# Patient Record
Sex: MALE | Race: WHITE | Employment: OTHER | URBAN - METROPOLITAN AREA
[De-identification: names, ages, dates, MRNs, and addresses within clinical notes are randomized per-mention and may not be internally consistent; named-entity substitution may affect disease eponyms.]

---

## 2017-06-19 ENCOUNTER — APPOINTMENT (OUTPATIENT)
Dept: GENERAL RADIOLOGY | Age: 72
DRG: 446 | End: 2017-06-19
Attending: INTERNAL MEDICINE
Payer: MEDICARE

## 2017-06-19 ENCOUNTER — HOSPITAL ENCOUNTER (INPATIENT)
Age: 72
LOS: 1 days | Discharge: HOME | DRG: 446 | End: 2017-06-20
Attending: INTERNAL MEDICINE | Admitting: HOSPITALIST
Payer: MEDICARE

## 2017-06-19 ENCOUNTER — APPOINTMENT (OUTPATIENT)
Dept: CT IMAGING | Age: 72
DRG: 446 | End: 2017-06-19
Attending: INTERNAL MEDICINE
Payer: MEDICARE

## 2017-06-19 DIAGNOSIS — R79.89 ABNORMAL LFTS: ICD-10-CM

## 2017-06-19 DIAGNOSIS — R10.13 ABDOMINAL PAIN, EPIGASTRIC: Primary | ICD-10-CM

## 2017-06-19 DIAGNOSIS — K80.63 CALCULUS OF GALLBLADDER AND BILE DUCT WITH ACUTE CHOLECYSTITIS, WITH OBSTRUCTION: ICD-10-CM

## 2017-06-19 PROBLEM — R10.9 ABDOMINAL PAIN: Status: ACTIVE | Noted: 2017-06-19

## 2017-06-19 PROBLEM — M54.9 BACK PAIN: Status: ACTIVE | Noted: 2017-06-19

## 2017-06-19 PROBLEM — K80.50 CHOLEDOCHOLITHIASIS: Status: ACTIVE | Noted: 2017-06-19

## 2017-06-19 LAB
ALBUMIN SERPL BCP-MCNC: 4.1 G/DL (ref 3.4–5)
ALBUMIN/GLOB SERPL: 1.3 {RATIO} (ref 0.8–1.7)
ALP SERPL-CCNC: 91 U/L (ref 45–117)
ALT SERPL-CCNC: 450 U/L (ref 16–61)
ANION GAP BLD CALC-SCNC: 12 MMOL/L (ref 3–18)
ANION GAP BLD CALC-SCNC: 24 MMOL/L (ref 10–20)
APPEARANCE UR: CLEAR
APTT PPP: 35.2 SEC (ref 23–36.4)
APTT PPP: 39.6 SEC (ref 23–36.4)
AST SERPL W P-5'-P-CCNC: 263 U/L (ref 15–37)
ATRIAL RATE: 166 BPM
BACTERIA URNS QL MICRO: ABNORMAL /HPF
BASOPHILS # BLD AUTO: 0 K/UL (ref 0–0.06)
BASOPHILS # BLD AUTO: 0 K/UL (ref 0–0.06)
BASOPHILS # BLD: 0 % (ref 0–2)
BASOPHILS # BLD: 0 % (ref 0–2)
BILIRUB SERPL-MCNC: 7.7 MG/DL (ref 0.2–1)
BILIRUB UR QL: ABNORMAL
BNP SERPL-MCNC: 2087 PG/ML (ref 0–900)
BUN BLD-MCNC: 21 MG/DL (ref 7–18)
BUN SERPL-MCNC: 19 MG/DL (ref 7–18)
BUN/CREAT SERPL: 17 (ref 12–20)
CA-I BLD-MCNC: 1.17 MMOL/L (ref 1.12–1.32)
CALCIUM SERPL-MCNC: 9.3 MG/DL (ref 8.5–10.1)
CALCULATED R AXIS, ECG10: 55 DEGREES
CALCULATED T AXIS, ECG11: -31 DEGREES
CHLORIDE BLD-SCNC: 100 MMOL/L (ref 100–108)
CHLORIDE SERPL-SCNC: 105 MMOL/L (ref 100–108)
CK MB CFR SERPL CALC: 1.6 % (ref 0–4)
CK MB SERPL-MCNC: 1.9 NG/ML (ref 5–25)
CK SERPL-CCNC: 118 U/L (ref 39–308)
CO2 BLD-SCNC: 26 MMOL/L (ref 19–24)
CO2 SERPL-SCNC: 27 MMOL/L (ref 21–32)
COLOR UR: ABNORMAL
CREAT SERPL-MCNC: 1.13 MG/DL (ref 0.6–1.3)
CREAT UR-MCNC: 1.1 MG/DL (ref 0.6–1.3)
DIAGNOSIS, 93000: NORMAL
DIFFERENTIAL METHOD BLD: ABNORMAL
DIFFERENTIAL METHOD BLD: ABNORMAL
EOSINOPHIL # BLD: 0 K/UL (ref 0–0.4)
EOSINOPHIL # BLD: 0.1 K/UL (ref 0–0.4)
EOSINOPHIL NFR BLD: 0 % (ref 0–5)
EOSINOPHIL NFR BLD: 1 % (ref 0–5)
EPITH CASTS URNS QL MICRO: ABNORMAL /LPF (ref 0–5)
ERYTHROCYTE [DISTWIDTH] IN BLOOD BY AUTOMATED COUNT: 13.3 % (ref 11.6–14.5)
ERYTHROCYTE [DISTWIDTH] IN BLOOD BY AUTOMATED COUNT: 13.5 % (ref 11.6–14.5)
GLOBULIN SER CALC-MCNC: 3.2 G/DL (ref 2–4)
GLUCOSE BLD STRIP.AUTO-MCNC: 129 MG/DL (ref 74–106)
GLUCOSE SERPL-MCNC: 128 MG/DL (ref 74–99)
GLUCOSE UR STRIP.AUTO-MCNC: NEGATIVE MG/DL
HCT VFR BLD AUTO: 50.3 % (ref 36–48)
HCT VFR BLD AUTO: 51.8 % (ref 36–48)
HCT VFR BLD CALC: 55 % (ref 36–49)
HGB BLD-MCNC: 17.5 G/DL (ref 13–16)
HGB BLD-MCNC: 18.1 G/DL (ref 13–16)
HGB BLD-MCNC: 18.7 G/DL (ref 12–16)
HGB UR QL STRIP: ABNORMAL
INR PPP: 1.3 (ref 0.8–1.2)
KETONES UR QL STRIP.AUTO: NEGATIVE MG/DL
LEUKOCYTE ESTERASE UR QL STRIP.AUTO: NEGATIVE
LIPASE SERPL-CCNC: 113 U/L (ref 73–393)
LYMPHOCYTES # BLD AUTO: 11 % (ref 21–52)
LYMPHOCYTES # BLD AUTO: 8 % (ref 21–52)
LYMPHOCYTES # BLD: 1 K/UL (ref 0.9–3.6)
LYMPHOCYTES # BLD: 1.1 K/UL (ref 0.9–3.6)
MAGNESIUM SERPL-MCNC: 1.7 MG/DL (ref 1.6–2.6)
MCH RBC QN AUTO: 33.6 PG (ref 24–34)
MCH RBC QN AUTO: 33.7 PG (ref 24–34)
MCHC RBC AUTO-ENTMCNC: 34.8 G/DL (ref 31–37)
MCHC RBC AUTO-ENTMCNC: 34.9 G/DL (ref 31–37)
MCV RBC AUTO: 96.3 FL (ref 74–97)
MCV RBC AUTO: 96.7 FL (ref 74–97)
MONOCYTES # BLD: 1.1 K/UL (ref 0.05–1.2)
MONOCYTES # BLD: 1.3 K/UL (ref 0.05–1.2)
MONOCYTES NFR BLD AUTO: 13 % (ref 3–10)
MONOCYTES NFR BLD AUTO: 9 % (ref 3–10)
MUCOUS THREADS URNS QL MICRO: ABNORMAL /LPF
NEUTS SEG # BLD: 7.1 K/UL (ref 1.8–8)
NEUTS SEG # BLD: 9.8 K/UL (ref 1.8–8)
NEUTS SEG NFR BLD AUTO: 75 % (ref 40–73)
NEUTS SEG NFR BLD AUTO: 83 % (ref 40–73)
NITRITE UR QL STRIP.AUTO: NEGATIVE
PH UR STRIP: 5.5 [PH] (ref 5–8)
PLATELET # BLD AUTO: 160 K/UL (ref 135–420)
PLATELET # BLD AUTO: 179 K/UL (ref 135–420)
PMV BLD AUTO: 10.3 FL (ref 9.2–11.8)
PMV BLD AUTO: 10.7 FL (ref 9.2–11.8)
POTASSIUM BLD-SCNC: 3.8 MMOL/L (ref 3.5–5.5)
POTASSIUM SERPL-SCNC: 3.8 MMOL/L (ref 3.5–5.5)
PROT SERPL-MCNC: 7.3 G/DL (ref 6.4–8.2)
PROT UR STRIP-MCNC: 100 MG/DL
PROTHROMBIN TIME: 15.9 SEC (ref 11.5–15.2)
Q-T INTERVAL, ECG07: 358 MS
QRS DURATION, ECG06: 140 MS
QTC CALCULATION (BEZET), ECG08: 497 MS
RBC # BLD AUTO: 5.2 M/UL (ref 4.7–5.5)
RBC # BLD AUTO: 5.38 M/UL (ref 4.7–5.5)
RBC #/AREA URNS HPF: ABNORMAL /HPF (ref 0–5)
SODIUM BLD-SCNC: 145 MMOL/L (ref 136–145)
SODIUM SERPL-SCNC: 144 MMOL/L (ref 136–145)
SP GR UR REFRACTOMETRY: >1.03 (ref 1–1.03)
TROPONIN I BLD-MCNC: <0.04 NG/ML (ref 0–0.08)
TROPONIN I SERPL-MCNC: <0.02 NG/ML (ref 0–0.06)
UROBILINOGEN UR QL STRIP.AUTO: 1 EU/DL (ref 0.2–1)
VENTRICULAR RATE, ECG03: 116 BPM
WBC # BLD AUTO: 11.9 K/UL (ref 4.6–13.2)
WBC # BLD AUTO: 9.5 K/UL (ref 4.6–13.2)
WBC URNS QL MICRO: ABNORMAL /HPF (ref 0–5)

## 2017-06-19 PROCEDURE — 71275 CT ANGIOGRAPHY CHEST: CPT

## 2017-06-19 PROCEDURE — 74011000258 HC RX REV CODE- 258: Performed by: INTERNAL MEDICINE

## 2017-06-19 PROCEDURE — 81001 URINALYSIS AUTO W/SCOPE: CPT | Performed by: INTERNAL MEDICINE

## 2017-06-19 PROCEDURE — 83690 ASSAY OF LIPASE: CPT | Performed by: INTERNAL MEDICINE

## 2017-06-19 PROCEDURE — 74022 RADEX COMPL AQT ABD SERIES: CPT

## 2017-06-19 PROCEDURE — 85730 THROMBOPLASTIN TIME PARTIAL: CPT | Performed by: HOSPITALIST

## 2017-06-19 PROCEDURE — 85025 COMPLETE CBC W/AUTO DIFF WBC: CPT | Performed by: INTERNAL MEDICINE

## 2017-06-19 PROCEDURE — 85730 THROMBOPLASTIN TIME PARTIAL: CPT | Performed by: INTERNAL MEDICINE

## 2017-06-19 PROCEDURE — C9113 INJ PANTOPRAZOLE SODIUM, VIA: HCPCS | Performed by: INTERNAL MEDICINE

## 2017-06-19 PROCEDURE — 36415 COLL VENOUS BLD VENIPUNCTURE: CPT | Performed by: HOSPITALIST

## 2017-06-19 PROCEDURE — 83735 ASSAY OF MAGNESIUM: CPT | Performed by: INTERNAL MEDICINE

## 2017-06-19 PROCEDURE — 85610 PROTHROMBIN TIME: CPT | Performed by: INTERNAL MEDICINE

## 2017-06-19 PROCEDURE — 74011250636 HC RX REV CODE- 250/636: Performed by: HOSPITALIST

## 2017-06-19 PROCEDURE — 93005 ELECTROCARDIOGRAM TRACING: CPT

## 2017-06-19 PROCEDURE — 65270000029 HC RM PRIVATE

## 2017-06-19 PROCEDURE — 99285 EMERGENCY DEPT VISIT HI MDM: CPT

## 2017-06-19 PROCEDURE — 84484 ASSAY OF TROPONIN QUANT: CPT | Performed by: INTERNAL MEDICINE

## 2017-06-19 PROCEDURE — 74011250637 HC RX REV CODE- 250/637: Performed by: INTERNAL MEDICINE

## 2017-06-19 PROCEDURE — 82550 ASSAY OF CK (CPK): CPT | Performed by: INTERNAL MEDICINE

## 2017-06-19 PROCEDURE — 74011250636 HC RX REV CODE- 250/636: Performed by: INTERNAL MEDICINE

## 2017-06-19 PROCEDURE — 83880 ASSAY OF NATRIURETIC PEPTIDE: CPT | Performed by: INTERNAL MEDICINE

## 2017-06-19 PROCEDURE — 80047 BASIC METABLC PNL IONIZED CA: CPT

## 2017-06-19 PROCEDURE — 80053 COMPREHEN METABOLIC PANEL: CPT | Performed by: INTERNAL MEDICINE

## 2017-06-19 RX ORDER — TADALAFIL 5 MG/1
5 TABLET ORAL
COMMUNITY
End: 2017-06-21

## 2017-06-19 RX ORDER — PANTOPRAZOLE SODIUM 40 MG/10ML
40 INJECTION, POWDER, LYOPHILIZED, FOR SOLUTION INTRAVENOUS
Status: COMPLETED | OUTPATIENT
Start: 2017-06-19 | End: 2017-06-19

## 2017-06-19 RX ORDER — TAMSULOSIN HYDROCHLORIDE 0.4 MG/1
0.4 CAPSULE ORAL DAILY
Status: DISCONTINUED | OUTPATIENT
Start: 2017-06-20 | End: 2017-06-21 | Stop reason: HOSPADM

## 2017-06-19 RX ORDER — ONDANSETRON 2 MG/ML
4 INJECTION INTRAMUSCULAR; INTRAVENOUS
Status: DISPENSED | OUTPATIENT
Start: 2017-06-19 | End: 2017-06-20

## 2017-06-19 RX ORDER — MOMETASONE FUROATE 50 UG/1
2 SPRAY, METERED NASAL DAILY
COMMUNITY

## 2017-06-19 RX ORDER — METFORMIN HYDROCHLORIDE 1000 MG/1
1000 TABLET ORAL 2 TIMES DAILY WITH MEALS
COMMUNITY

## 2017-06-19 RX ORDER — DILTIAZEM HYDROCHLORIDE 240 MG/1
240 CAPSULE, COATED, EXTENDED RELEASE ORAL DAILY
Status: DISCONTINUED | OUTPATIENT
Start: 2017-06-20 | End: 2017-06-19 | Stop reason: SDUPTHER

## 2017-06-19 RX ORDER — MONTELUKAST SODIUM 10 MG/1
10 TABLET ORAL
COMMUNITY

## 2017-06-19 RX ORDER — NEBIVOLOL 10 MG/1
10 TABLET ORAL DAILY
COMMUNITY

## 2017-06-19 RX ORDER — MORPHINE SULFATE 2 MG/ML
2 INJECTION, SOLUTION INTRAMUSCULAR; INTRAVENOUS
Status: DISPENSED | OUTPATIENT
Start: 2017-06-19 | End: 2017-06-20

## 2017-06-19 RX ORDER — DILTIAZEM HYDROCHLORIDE 240 MG/1
240 CAPSULE, COATED, EXTENDED RELEASE ORAL DAILY
Status: DISCONTINUED | OUTPATIENT
Start: 2017-06-20 | End: 2017-06-21 | Stop reason: HOSPADM

## 2017-06-19 RX ORDER — SODIUM CHLORIDE 9 MG/ML
75 INJECTION, SOLUTION INTRAVENOUS CONTINUOUS
Status: DISCONTINUED | OUTPATIENT
Start: 2017-06-19 | End: 2017-06-21 | Stop reason: HOSPADM

## 2017-06-19 RX ORDER — NEBIVOLOL 10 MG/1
10 TABLET ORAL
Status: DISCONTINUED | OUTPATIENT
Start: 2017-06-19 | End: 2017-06-19 | Stop reason: RX

## 2017-06-19 RX ORDER — DILTIAZEM HYDROCHLORIDE 240 MG/1
360 CAPSULE, EXTENDED RELEASE ORAL DAILY
COMMUNITY
End: 2017-06-27

## 2017-06-19 RX ORDER — NEBIVOLOL 5 MG/1
10 TABLET ORAL
Status: COMPLETED | OUTPATIENT
Start: 2017-06-19 | End: 2017-06-19

## 2017-06-19 RX ORDER — HEPARIN SODIUM 10000 [USP'U]/100ML
18-36 INJECTION, SOLUTION INTRAVENOUS
Status: DISCONTINUED | OUTPATIENT
Start: 2017-06-19 | End: 2017-06-21 | Stop reason: HOSPADM

## 2017-06-19 RX ORDER — TESTOSTERONE 50 MG/5G
5 GEL TRANSDERMAL
COMMUNITY

## 2017-06-19 RX ORDER — TAMSULOSIN HYDROCHLORIDE 0.4 MG/1
0.8 CAPSULE ORAL DAILY
COMMUNITY
End: 2022-02-17

## 2017-06-19 RX ORDER — NEBIVOLOL 5 MG/1
10 TABLET ORAL DAILY
Status: DISCONTINUED | OUTPATIENT
Start: 2017-06-20 | End: 2017-06-21 | Stop reason: HOSPADM

## 2017-06-19 RX ADMIN — PANTOPRAZOLE SODIUM 40 MG: 40 INJECTION, POWDER, FOR SOLUTION INTRAVENOUS at 17:01

## 2017-06-19 RX ADMIN — PIPERACILLIN SODIUM,TAZOBACTAM SODIUM 3.38 G: 3; .375 INJECTION, POWDER, FOR SOLUTION INTRAVENOUS at 17:01

## 2017-06-19 RX ADMIN — SODIUM CHLORIDE 75 ML/HR: 900 INJECTION, SOLUTION INTRAVENOUS at 22:31

## 2017-06-19 RX ADMIN — HEPARIN SODIUM AND DEXTROSE 18 UNITS/KG/HR: 10000; 5 INJECTION INTRAVENOUS at 22:31

## 2017-06-19 RX ADMIN — NEBIVOLOL HYDROCHLORIDE 10 MG: 5 TABLET ORAL at 17:23

## 2017-06-19 NOTE — ED TRIAGE NOTES
amb into ed w/ reports epigastric pain going through to his mid back onset yesterday -worse today. No n/v/d reported - pt w/ last bm today and \"light colored\".

## 2017-06-19 NOTE — IP AVS SNAPSHOT
[address] [place] [address] 
[phone] Patient: Christiano Givens MRN: [id number] TOB:[id number] You are allergic to the following No active allergies Recent Documentation Height Weight BMI Smoking Status 1.803 m 90.5 kg 28.63 kg/m2 Never Smoker Unresulted Labs Order Current Status ACTIN (SMOOTH MUSCLE) ANTIBODY In process ANTINUCLEAR ANTIBODIES, IFA In process HEPATITIS PANEL, ACUTE In process Emergency Contacts Name Discharge Info Relation Home Work Mobile Marce Burton DISCHARGE CAREGIVER [3] Spouse [3] [phone] About your hospitalization You were admitted on:  June 19, 2017 You last received care in the:  [address] [place] [address] You were discharged on:  June 20, 2017 Unit phone number:  [phone] Why you were hospitalized Your primary diagnosis was:  Choledocholithiasis Your diagnoses also included:  Back Pain, Abdominal Pain, Abnormal Lfts, A-Fib (Hcc), Hypertension Providers Seen During Your Hospitalizations Provider Role Specialty Primary office phone Niko Patel MD Attending Provider Emergency Medicine [phone] Waqar Ley MD Attending Provider [facility] [phone] Your Primary Care Physician (PCP) Primary Care Physician Office Phone Office Fax Fernando Berg [phone] Follow-up Information Follow up With Details Comments Contact Info Malcom Malik MD Schedule an appointment as soon as possible for a visit in 10 days  [address] [facility] 150 
[phone] Jevon Packer MD Go in 1 week as set up by Dr Sherice Ocampo 1 [facility] 150 
[phone] Current Discharge Medication List  
  
START taking these medications Dose & Instructions Dispensing Information Comments Morning Noon Evening Bedtime  
 dabigatran etexilate 75 mg capsule Commonly known as:  PRADAXA Your last dose was: Your next dose is:    
   
   
 Dose:  75 mg Take 1 Cap by mouth every twelve (12) hours. Indications: he has this med at home Quantity:  1 Cap Refills:  0  
     
   
   
   
  
 oxyCODONE-acetaminophen 5-325 mg per tablet Commonly known as:  PERCOCET Your last dose was: Your next dose is:    
   
   
 Dose:  1 Tab Take 1 Tab by mouth every six (6) hours as needed for Pain. Max Daily Amount: 4 Tabs. Quantity:  12 Tab Refills:  0 CONTINUE these medications which have NOT CHANGED Dose & Instructions Dispensing Information Comments Morning Noon Evening Bedtime BYSTOLIC 10 mg tablet Generic drug:  nebivolol Your last dose was: Your next dose is:    
   
   
 Dose:  10 mg Take 10 mg by mouth daily. Refills:  0  
     
   
   
   
  
 dilTIAZem  mg XR capsule Commonly known as:  DILACOR XR Your last dose was: Your next dose is:    
   
   
 Dose:  360 mg Take 360 mg by mouth daily. Refills:  0  
     
   
   
   
  
 FLOMAX 0.4 mg capsule Generic drug:  tamsulosin Your last dose was: Your next dose is:    
   
   
 Dose:  0.4 mg Take 0.4 mg by mouth daily. Refills:  0  
     
   
   
   
  
 metFORMIN 1,000 mg tablet Commonly known as:  GLUCOPHAGE Your last dose was: Your next dose is:    
   
   
 Dose:  1000 mg Take 1,000 mg by mouth two (2) times daily (with meals). Refills:  0  
     
   
   
   
  
 NASONEX 50 mcg/actuation nasal spray Generic drug:  mometasone Your last dose was: Your next dose is:    
   
   
 Dose:  2 Spray 2 Sprays daily. Refills:  0 SINGULAIR 10 mg tablet Generic drug:  montelukast  
   
Your last dose was:     
   
Your next dose is:    
   
   
 Dose:  10 mg  
 Take 10 mg by mouth daily. Refills:  0  
     
   
   
   
  
 TESTIM 50 mg/5 gram (1 %) gel Generic drug:  testosterone Your last dose was: Your next dose is:    
   
   
 Dose:  5 g  
5 g by TransDERmal route daily. Refills:  0 STOP taking these medications CIALIS 5 mg tablet Generic drug:  tadalafil Where to Get Your Medications Information on where to get these meds will be given to you by the nurse or doctor. ! Ask your nurse or doctor about these medications  
  dabigatran etexilate 75 mg capsule  
 oxyCODONE-acetaminophen 5-325 mg per tablet Discharge Instructions Learning About Acute Cholecystitis What is cholecystitis? Cholecystitis (say \"koh-lih-sis-TY-tus\") is inflammation of the gallbladder. The gallbladder stores bile. Bile helps the body digest food. Normally, the bile flows from the gallbladder to the small intestine. A gallstone stuck in the cystic duct is most often the cause of sudden (acute) cholecystitis. The cystic duct is the tube that carries the bile out of the gallbladder. The gallstone blocks the bile from leaving the gallbladder. This results in an irritated and swollen gallbladder. The disease can also be caused by infection or trauma, such as an injury from a car accident. Cholecystitis has to be treated right away. You will probably have to go to the hospital. Surgery is the usual treatment. What are the symptoms? Symptoms include: · Steady and severe pain in the upper right part of belly. This is the most common symptom. The pain can sometimes move to your back or right shoulder blade. It may last for more than 6 hours. · Nausea or vomiting. · A fever. How is it treated? The main way to treat this disease is surgery to remove the gallbladder.  This surgery can often be done through small cuts (incisions) in the belly. This is called a laparoscopic cholecystectomy. In some cases, you may need a more extensive surgery. You may need surgery as soon as possible. The doctor may try to reduce swelling and irritation in the gallbladder before removing it. You may be given fluids and antibiotics through an IV. You may also be given pain medicine. Follow-up care is a key part of your treatment and safety. Be sure to make and go to all appointments, and call your doctor if you are having problems. It's also a good idea to know your test results and keep a list of the medicines you take. Where can you learn more? Go to http://cailin-case.info/. Enter T940 in the search box to learn more about \"Learning About Acute Cholecystitis. \" Current as of: August 9, 2016 Content Version: 11.3 © 5125-3854 [employer]. Care instructions adapted under license by [employer] (which disclaims liability or warranty for this information). If you have questions about a medical condition or this instruction, always ask your healthcare professional. [place] [address] any warranty or liability for your use of this information. Discharge Orders None Introducing [facility]! Gracia Mejia introduces [employer] patient portal. Now you can access parts of your medical record, email your doctor's office, and request medication refills online. 1. In your internet browser, go to [email or web address] 2. Click on the First Time User? Click Here link in the Sign In box. You will see the New Member Sign Up page. 3. Enter your [employer] Access Code exactly as it appears below. You will not need to use this code after youve completed the sign-up process. If you do not sign up before the expiration date, you must request a new code. · [employer] Access Code: [email or web address] Expires: 9/18/2017  8:06 PM 
 
 4. Enter the last four digits of your Social Security Number (xxxx) and Date of Birth (mm/dd/yyyy) as indicated and click Submit. You will be taken to the next sign-up page. 5. Create a Sample[employer] ID. This will be your [employer] login ID and cannot be changed, so think of one that is secure and easy to remember. 6. Create a Sample[employer] password. You can change your password at any time. 7. Enter your Password Reset Question and Answer. This can be used at a later time if you forget your password. 8. Enter your e-mail address. You will receive e-mail notification when new information is available in [address] 9. Click Sign Up. You can now view and download portions of your medical record. 10. Click the Download Summary menu link to download a portable copy of your medical information. If you have questions, please visit the Frequently Asked Questions section of the Sample[employer] website. Remember, [employer] is NOT to be used for urgent needs. For medical emergencies, dial 911. Now available from your iPhone and Android! General Information Please provide this summary of care documentation to your next provider. Patient Signature:  ____________________________________________________________ Date:  ____________________________________________________________  
  
Rosalva Boyce Provider Signature:  ____________________________________________________________ Date:  ____________________________________________________________

## 2017-06-19 NOTE — ED NOTES
TRANSFER - OUT REPORT:    Verbal report given to [facility] RN(name) on Jake Manners  being transferred to medical(unit) for routine progression of care       Report consisted of patients Situation, Background, Assessment and   Recommendations(SBAR). Information from the following report(s) SBAR, Kardex and ED Summary was reviewed with the receiving nurse. Lines:   Peripheral IV 06/19/17 Right Antecubital (Active)   Site Assessment Clean, dry, & intact 6/19/2017 12:51 PM   Phlebitis Assessment 0 6/19/2017 12:51 PM   Infiltration Assessment 0 6/19/2017 12:51 PM   Dressing Status Clean, dry, & intact 6/19/2017 12:51 PM   Dressing Type Transparent 6/19/2017 12:51 PM   Hub Color/Line Status Flushed 6/19/2017 12:51 PM   Action Taken Blood drawn 6/19/2017 12:51 PM        Opportunity for questions and clarification was provided.       Patient transported with:   [employer]

## 2017-06-19 NOTE — ED NOTES
Meal tray ordered (clears) Patient resting on stretcher. Denies pain. Sitting up and reading book. No acute distress noted.

## 2017-06-19 NOTE — ED PROVIDER NOTES
[address]  EMERGENCY DEPARTMENT HISTORY AND PHYSICAL EXAM       Date: 6/19/2017   Patient Name: Robert Espinosa   YOB: 1945  Medical Record Number: [id number]    History of Presenting Illness     Chief Complaint   Patient presents with    Abdominal Pain        History Provided By:  patient    Additional History: 12:35 PM  Robert Espinosa is a 66 y.o. male with a known hx of AFIB, CAD, and HTN who presents to the emergency department via EMS C/O gradually worsening epigastric abdominal pain which radiates to mid back onset yesterday. Pt last meal was last night. Pt last took medication last night. Last BM was light colored today. NKDA. No hx of cardiac issues. PSHx of right shoulder replacement. Pt is a non smoker and an EtOH user. Pt denies SOB, blood in stools, N/V/D, and any other associated signs and sx. Primary Care Provider: Claria Eisenmenger, MD   Specialist:    Past History     Past Medical History:   Past Medical History:   Diagnosis Date    Arthritis     CAD (coronary artery disease)     afib    Hypertension     Ill-defined condition     kidney stones        Past Surgical History:   Past Surgical History:   Procedure Laterality Date    HX ORTHOPAEDIC      r shoulder replacement        Family History:   History reviewed. No pertinent family history. Social History:   Social History   Substance Use Topics    Smoking status: Never Smoker    Smokeless tobacco: None    Alcohol use Yes        Allergies:   No Known Allergies     Review of Systems   Review of Systems   HENT: Positive for congestion. Respiratory: Negative for shortness of breath. Gastrointestinal: Positive for abdominal pain. Negative for blood in stool, diarrhea, nausea and vomiting. Musculoskeletal: Positive for back pain. All other systems reviewed and are negative.       Physical Exam  Vitals:    06/19/17 1230 06/19/17 1608   BP: (!) 151/111 165/87   Pulse: (!) 105 96   Resp: 20 18 Temp: 97.5 °F (36.4 °C) 97.9 °F (36.6 °C)   SpO2: 97% 95%   Weight: 88.5 kg (195 lb)    Height: 5' 11\" (1.803 m)        Physical Exam   Constitutional: He is oriented to person, place, and time. He appears well-developed and well-nourished. HENT:   Head: Normocephalic and atraumatic. Right Ear: External ear normal.   Left Ear: External ear normal.   Nose: Nose normal.   Mouth/Throat: Oropharynx is clear and moist.   Eyes: Conjunctivae and EOM are normal. Pupils are equal, round, and reactive to light. Neck: Normal range of motion. Neck supple. No JVD present. No tracheal deviation present. No thyromegaly present. Cardiovascular: Normal rate, regular rhythm, normal heart sounds and intact distal pulses. Pulmonary/Chest: Effort normal and breath sounds normal.   Abdominal: Soft. Bowel sounds are normal. He exhibits no distension and no mass. There is tenderness in the right upper quadrant and epigastric area. No HSM. Ventral hernia. Hyperactive bowel sounds. Musculoskeletal: Normal range of motion. He exhibits no edema or tenderness. Lymphadenopathy:     He has no cervical adenopathy. Neurological: He is alert and oriented to person, place, and time. He has normal reflexes. No cranial nerve deficit. He exhibits normal muscle tone. Coordination normal.   No focal weakness   Skin: Skin is warm and dry. Psychiatric: He has a normal mood and affect. His behavior is normal. Thought content normal.   Nursing note and vitals reviewed.     Diagnostic Study Results     Labs -      Recent Results (from the past 12 hour(s))   EKG, 12 LEAD, INITIAL    Collection Time: 06/19/17 12:34 PM   Result Value Ref Range    Ventricular Rate 116 BPM    Atrial Rate 166 BPM    QRS Duration 140 ms    Q-T Interval 358 ms    QTC Calculation (Bezet) 497 ms    Calculated R Axis 55 degrees    Calculated T Axis -31 degrees    Diagnosis       Atrial fibrillation with rapid ventricular response with premature   ventricular or aberrantly conducted complexes  Right bundle branch block  T wave abnormality, consider inferior ischemia or digitalis effect  Abnormal ECG  No previous ECGs available     CBC WITH AUTOMATED DIFF    Collection Time: 06/19/17 12:44 PM   Result Value Ref Range    WBC 11.9 4.6 - 13.2 K/uL    RBC 5.38 4.70 - 5.50 M/uL    HGB 18.1 (H) 13.0 - 16.0 g/dL    HCT 51.8 (H) 36.0 - 48.0 %    MCV 96.3 74.0 - 97.0 FL    MCH 33.6 24.0 - 34.0 PG    MCHC 34.9 31.0 - 37.0 g/dL    RDW 13.3 11.6 - 14.5 %    PLATELET 928 637 - 566 K/uL    MPV 10.7 9.2 - 11.8 FL    NEUTROPHILS 83 (H) 40 - 73 %    LYMPHOCYTES 8 (L) 21 - 52 %    MONOCYTES 9 3 - 10 %    EOSINOPHILS 0 0 - 5 %    BASOPHILS 0 0 - 2 %    ABS. NEUTROPHILS 9.8 (H) 1.8 - 8.0 K/UL    ABS. LYMPHOCYTES 1.0 0.9 - 3.6 K/UL    ABS. MONOCYTES 1.1 0.05 - 1.2 K/UL    ABS. EOSINOPHILS 0.0 0.0 - 0.4 K/UL    ABS. BASOPHILS 0.0 0.0 - 0.06 K/UL    DF AUTOMATED     METABOLIC PANEL, COMPREHENSIVE    Collection Time: 06/19/17 12:44 PM   Result Value Ref Range    Sodium 144 136 - 145 mmol/L    Potassium 3.8 3.5 - 5.5 mmol/L    Chloride 105 100 - 108 mmol/L    CO2 27 21 - 32 mmol/L    Anion gap 12 3.0 - 18 mmol/L    Glucose 128 (H) 74 - 99 mg/dL    BUN 19 (H) 7.0 - 18 MG/DL    Creatinine 1.13 0.6 - 1.3 MG/DL    BUN/Creatinine ratio 17 12 - 20      GFR est AA >60 >60 ml/min/1.73m2    GFR est non-AA >60 >60 ml/min/1.73m2    Calcium 9.3 8.5 - 10.1 MG/DL    Bilirubin, total 7.7 (H) 0.2 - 1.0 MG/DL    ALT (SGPT) 450 (H) 16 - 61 U/L    AST (SGOT) 263 (H) 15 - 37 U/L    Alk.  phosphatase 91 45 - 117 U/L    Protein, total 7.3 6.4 - 8.2 g/dL    Albumin 4.1 3.4 - 5.0 g/dL    Globulin 3.2 2.0 - 4.0 g/dL    A-G Ratio 1.3 0.8 - 1.7     LIPASE    Collection Time: 06/19/17 12:44 PM   Result Value Ref Range    Lipase 113 73 - 393 U/L   MAGNESIUM    Collection Time: 06/19/17 12:44 PM   Result Value Ref Range    Magnesium 1.7 1.6 - 2.6 mg/dL   CARDIAC PANEL,(CK, CKMB & TROPONIN)    Collection Time: 06/19/17 12:44 PM Result Value Ref Range     39 - 308 U/L    CK - MB 1.9 <3.6 ng/ml    CK-MB Index 1.6 0.0 - 4.0 %    Troponin-I, Qt. <0.02 0.00 - 0.06 NG/ML   PRO-BNP    Collection Time: 06/19/17 12:44 PM   Result Value Ref Range    NT pro-BNP 2087 (H) 0 - 900 PG/ML   PROTHROMBIN TIME + INR    Collection Time: 06/19/17 12:44 PM   Result Value Ref Range    Prothrombin time 15.9 (H) 11.5 - 15.2 sec    INR 1.3 (H) 0.8 - 1.2     PTT    Collection Time: 06/19/17 12:44 PM   Result Value Ref Range    aPTT 39.6 (H) 23.0 - 36.4 SEC   POC TROPONIN-I    Collection Time: 06/19/17 12:50 PM   Result Value Ref Range    Troponin-I (POC) <0.04 0.00 - 0.08 ng/mL   POC CHEM8    Collection Time: 06/19/17 12:51 PM   Result Value Ref Range    CO2 (POC) 26 (H) 19 - 24 MMOL/L    Glucose (POC) 129 (H) 74 - 106 MG/DL    BUN (POC) 21 (H) 7 - 18 MG/DL    Creatinine (POC) 1.1 0.6 - 1.3 MG/DL    GFR-AA (POC) >60 >60 ml/min/1.73m2    GFR, non-AA (POC) >60 >60 ml/min/1.73m2    Sodium (POC) 145 136 - 145 MMOL/L    Potassium (POC) 3.8 3.5 - 5.5 MMOL/L    Calcium, ionized (POC) 1.17 1.12 - 1.32 MMOL/L    Chloride (POC) 100 100 - 108 MMOL/L    Anion gap (POC) 24 (H) 10 - 20      Hematocrit (POC) 55 (H) 36 - 49 %    Hemoglobin (POC) 18.7 (H) 12 - 16 G/DL       Radiologic Studies -  The following have been ordered and reviewed:  CTA CHEST ABD PELV W CONT   Final Result   IMPRESSION:     No acute vascular abnormality identified. No evidence of aortic dissection or  aneurysm.     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     Cholelithiasis. Mild/moderate intra and extrahepatic biliary tree dilatation. Question of filling defect in the distal CBD. Recommend clinical correlation for  possibility of choledocholithiasis or other lesion and correlation with LFTs to  exclude mechanical obstruction.     1.4 cm lesion in the midpole right kidney not clearly cystic in nature.   Recommend nonemergent ultrasound to assess if this could be a hemorrhagic or  complex cyst versus solid neoplasm.     Nonobstructing bilateral renal calculi.     Right thyroid nodules. This can be further assessed and followed with  nonemergent ultrasound.     Additional chronic and incidental findings as described. XR ABD ACUTE W 1 V CHEST   Final Result   IMPRESSION:      No acute process. As read by the radiologist.         Medical Decision Making   I am the first provider for this patient. I reviewed the vital signs, available nursing notes, past medical history, past surgical history, family history and social history. Vital Signs-Reviewed the patient's vital signs. Patient Vitals for the past 12 hrs:   Temp Pulse Resp BP SpO2   06/19/17 1608 97.9 °F (36.6 °C) 96 18 165/87 95 %   06/19/17 1230 97.5 °F (36.4 °C) (!) 105 20 (!) 151/111 97 %       Pulse Oximetry Analysis - Normal 97% on room air     Cardiac Monitor:   Rate: 116 bpm  Rhythm: Atrial Fibrillation      EKG interpretation: (Preliminary)  Rhythm: 116 bpm. Rate (approx.): AFIB with RVR with RBBB.; Negative STEMI   EKG read by Elisa Naylor MD at 12:34 PM    Old Medical Records: Nursing notes. Provider Notes:   INITIAL CLINICAL IMPRESSION and PLANS:  The patient presents with the primary complaint(s) of: abdominal pain. The presentation, to include historical aspects and clinical findings are consistent with the DX of epigastric pain. However, other possible DX's to consider as primary, associated with, or exacerbated by include:    1. Back pain  2. STEMI  3. Disection, aneurysm, pancreatitis, hepatitis, PUD, ACS, bowel ischemia    Considering the above, my initial management plan to evaluate and therapeutic interventions include the following and as noted in the orders:    1. Labs: CBC, CMP, Lipase, Mg++, Cardiac Panel, Pro-BNP, Prothrombin Time + INR, PTT, UA  2. Imaging: EKG, CTA Chest Abd Pelv w Contrast, Acute Abdominal XR. Procedures:   Procedures    ED Course:  12:35 PM  Initial assessment performed.  The patients presenting problems have been discussed, and they are in agreement with the care plan formulated and outlined with them. I have encouraged them to ask questions as they arise throughout their visit. 3:35 PM  Paged surgeon Dr. Maida Cueva who stated he will consult though to call GI and admit to hospitalist.    4:08 PM   Sammie Gray says admit to hospitalist. Order MRCP. Clear liquid diet. 4:08 PM Discussed patient's history, exam, and available diagnostics results with Jayjay Wellington MD, internal medicine, who agree with admitting the patient. 4:09 PM  Patient is being admitted to the hospital by Jayjay Wellington MD. The results of their tests and reasons for their admission have been discussed with them and/or available family. They convey agreement and understanding for the need to be admitted and for their admission diagnosis. 4:18 PM   Pt has not taken BP medication. Pt requesting Cardizem and Bystolic. Will give pt medication given high blood pressure at 151/111. Medications Given in the ED:  Medications   morphine injection 2 mg (not administered)   ondansetron (ZOFRAN) injection 4 mg (not administered)   iopamidol (ISOVUE-370) 76 % injection 100 mL (not administered)   pantoprazole (PROTONIX) injection 40 mg (not administered)   piperacillin-tazobactam (ZOSYN) 3.375 g in 0.9% sodium chloride (MBP/ADV) 100 mL MBP (not administered)       Diagnosis   Clinical Impression:   1. Abdominal pain, epigastric    2. Calculus of gallbladder and bile duct with acute cholecystitis, with obstruction    3. Abnormal LFTs         3:49 PM  I have spent 35 minutes of critical care time involved in lab review, consultations with specialist, family decision-making, and documentation. During this entire length of time I was immediately available to the patient. Critical Care:   The reason for providing this level of medical care for this critically ill patient was due a critical illness that impaired one or more vital organ systems such that there was a high probability of imminent or life threatening deterioration in the patients condition. This care involved high complexity decision making to assess, manipulate, and support vital system functions, to treat this degreee vital organ system failure and to prevent further life threatening deterioration of the patients condition. _______________________________   Attestations: This note is prepared by Princess Mata, acting as a Scribe for Randi Arceo MD on 12:38 PM on 6/19/2017. Randi Arceo MD: The scribe's documentation has been prepared under my direction and personally reviewed by me in its entirety.   _______________________________

## 2017-06-20 ENCOUNTER — APPOINTMENT (OUTPATIENT)
Dept: MRI IMAGING | Age: 72
DRG: 446 | End: 2017-06-20
Attending: INTERNAL MEDICINE
Payer: MEDICARE

## 2017-06-20 VITALS
WEIGHT: 199.5 LBS | TEMPERATURE: 98.3 F | RESPIRATION RATE: 18 BRPM | HEIGHT: 71 IN | BODY MASS INDEX: 27.93 KG/M2 | HEART RATE: 104 BPM | DIASTOLIC BLOOD PRESSURE: 72 MMHG | SYSTOLIC BLOOD PRESSURE: 134 MMHG | OXYGEN SATURATION: 97 %

## 2017-06-20 LAB
ALBUMIN SERPL BCP-MCNC: 3.4 G/DL (ref 3.4–5)
ALBUMIN SERPL BCP-MCNC: 3.5 G/DL (ref 3.4–5)
ALBUMIN/GLOB SERPL: 1.2 {RATIO} (ref 0.8–1.7)
ALBUMIN/GLOB SERPL: 1.3 {RATIO} (ref 0.8–1.7)
ALP SERPL-CCNC: 80 U/L (ref 45–117)
ALP SERPL-CCNC: 80 U/L (ref 45–117)
ALT SERPL-CCNC: 271 U/L (ref 16–61)
ALT SERPL-CCNC: 322 U/L (ref 16–61)
ANION GAP BLD CALC-SCNC: 10 MMOL/L (ref 3–18)
APTT PPP: 104.6 SEC (ref 23–36.4)
APTT PPP: 125.6 SEC (ref 23–36.4)
APTT PPP: 130.9 SEC (ref 23–36.4)
APTT PPP: 71.2 SEC (ref 23–36.4)
AST SERPL W P-5'-P-CCNC: 149 U/L (ref 15–37)
AST SERPL W P-5'-P-CCNC: 99 U/L (ref 15–37)
BILIRUB DIRECT SERPL-MCNC: 3.2 MG/DL (ref 0–0.2)
BILIRUB SERPL-MCNC: 5.1 MG/DL (ref 0.2–1)
BILIRUB SERPL-MCNC: 9.2 MG/DL (ref 0.2–1)
BUN SERPL-MCNC: 15 MG/DL (ref 7–18)
BUN/CREAT SERPL: 13 (ref 12–20)
CALCIUM SERPL-MCNC: 8.2 MG/DL (ref 8.5–10.1)
CHLORIDE SERPL-SCNC: 107 MMOL/L (ref 100–108)
CO2 SERPL-SCNC: 27 MMOL/L (ref 21–32)
CREAT SERPL-MCNC: 1.17 MG/DL (ref 0.6–1.3)
ERYTHROCYTE [DISTWIDTH] IN BLOOD BY AUTOMATED COUNT: 13.4 % (ref 11.6–14.5)
GLOBULIN SER CALC-MCNC: 2.7 G/DL (ref 2–4)
GLOBULIN SER CALC-MCNC: 2.9 G/DL (ref 2–4)
GLUCOSE SERPL-MCNC: 97 MG/DL (ref 74–99)
HCT VFR BLD AUTO: 49.6 % (ref 36–48)
HGB BLD-MCNC: 17.1 G/DL (ref 13–16)
LIPASE SERPL-CCNC: 116 U/L (ref 73–393)
MCH RBC QN AUTO: 33.5 PG (ref 24–34)
MCHC RBC AUTO-ENTMCNC: 34.5 G/DL (ref 31–37)
MCV RBC AUTO: 97.1 FL (ref 74–97)
PLATELET # BLD AUTO: 135 K/UL (ref 135–420)
PMV BLD AUTO: 10.4 FL (ref 9.2–11.8)
POTASSIUM SERPL-SCNC: 3.6 MMOL/L (ref 3.5–5.5)
PROT SERPL-MCNC: 6.1 G/DL (ref 6.4–8.2)
PROT SERPL-MCNC: 6.4 G/DL (ref 6.4–8.2)
RBC # BLD AUTO: 5.11 M/UL (ref 4.7–5.5)
SODIUM SERPL-SCNC: 144 MMOL/L (ref 136–145)
WBC # BLD AUTO: 7.1 K/UL (ref 4.6–13.2)

## 2017-06-20 PROCEDURE — 83516 IMMUNOASSAY NONANTIBODY: CPT | Performed by: INTERNAL MEDICINE

## 2017-06-20 PROCEDURE — 80053 COMPREHEN METABOLIC PANEL: CPT | Performed by: HOSPITALIST

## 2017-06-20 PROCEDURE — 65270000029 HC RM PRIVATE

## 2017-06-20 PROCEDURE — 85027 COMPLETE CBC AUTOMATED: CPT | Performed by: HOSPITALIST

## 2017-06-20 PROCEDURE — 74011250636 HC RX REV CODE- 250/636: Performed by: HOSPITALIST

## 2017-06-20 PROCEDURE — 80074 ACUTE HEPATITIS PANEL: CPT | Performed by: INTERNAL MEDICINE

## 2017-06-20 PROCEDURE — 86038 ANTINUCLEAR ANTIBODIES: CPT | Performed by: INTERNAL MEDICINE

## 2017-06-20 PROCEDURE — 76498 UNLISTED MR PROCEDURE: CPT

## 2017-06-20 PROCEDURE — 74011250637 HC RX REV CODE- 250/637: Performed by: INTERNAL MEDICINE

## 2017-06-20 PROCEDURE — 74011636320 HC RX REV CODE- 636/320: Performed by: INTERNAL MEDICINE

## 2017-06-20 PROCEDURE — 36415 COLL VENOUS BLD VENIPUNCTURE: CPT | Performed by: HOSPITALIST

## 2017-06-20 PROCEDURE — 80076 HEPATIC FUNCTION PANEL: CPT | Performed by: INTERNAL MEDICINE

## 2017-06-20 PROCEDURE — 85730 THROMBOPLASTIN TIME PARTIAL: CPT | Performed by: HOSPITALIST

## 2017-06-20 PROCEDURE — 74011250637 HC RX REV CODE- 250/637: Performed by: HOSPITALIST

## 2017-06-20 PROCEDURE — 83690 ASSAY OF LIPASE: CPT | Performed by: HOSPITALIST

## 2017-06-20 RX ORDER — OXYCODONE AND ACETAMINOPHEN 5; 325 MG/1; MG/1
1 TABLET ORAL
Qty: 12 TAB | Refills: 0 | Status: SHIPPED | OUTPATIENT
Start: 2017-06-20 | End: 2017-06-27

## 2017-06-20 RX ORDER — HEPARIN SODIUM 1000 [USP'U]/ML
40 INJECTION, SOLUTION INTRAVENOUS; SUBCUTANEOUS ONCE
Status: COMPLETED | OUTPATIENT
Start: 2017-06-20 | End: 2017-06-20

## 2017-06-20 RX ORDER — DABIGATRAN ETEXILATE 75 MG/1
75 CAPSULE ORAL EVERY 12 HOURS
Qty: 1 CAP | Refills: 0 | Status: SHIPPED | OUTPATIENT
Start: 2017-06-20

## 2017-06-20 RX ADMIN — IOPAMIDOL 100 ML: 755 INJECTION, SOLUTION INTRAVENOUS at 03:00

## 2017-06-20 RX ADMIN — SODIUM CHLORIDE 75 ML/HR: 900 INJECTION, SOLUTION INTRAVENOUS at 10:08

## 2017-06-20 RX ADMIN — TAMSULOSIN HYDROCHLORIDE 0.4 MG: 0.4 CAPSULE ORAL at 10:02

## 2017-06-20 RX ADMIN — DILTIAZEM HYDROCHLORIDE 240 MG: 240 CAPSULE, EXTENDED RELEASE ORAL at 10:02

## 2017-06-20 RX ADMIN — HEPARIN SODIUM 3620 UNITS: 1000 INJECTION, SOLUTION INTRAVENOUS; SUBCUTANEOUS at 12:15

## 2017-06-20 RX ADMIN — HEPARIN SODIUM AND DEXTROSE 20 UNITS/KG/HR: 10000; 5 INJECTION INTRAVENOUS at 15:01

## 2017-06-20 RX ADMIN — NEBIVOLOL HYDROCHLORIDE 10 MG: 5 TABLET ORAL at 10:02

## 2017-06-20 NOTE — PROGRESS NOTES
Patient alert and awake with no /co pain or distress. Assessment complete. Call light in reach, safety and comfort measures are in place.

## 2017-06-20 NOTE — PROGRESS NOTES
Shift Summary: Shift uneventful, patient rested during shift with no complaints of pain. Remote telemetry box 46: A Fib, PVC's, and BBB. VSS. NAD.

## 2017-06-20 NOTE — PROGRESS NOTES
Hospitalist Progress Note    Patient: Kathi Santiago MRN: [id number]  CSN: [id number]    YOB: 1945  Age: 66 y.o. Sex: male    DOA: 6/19/2017 LOS:  LOS: 1 day                Assessment/Plan     Patient Active Problem List   Diagnosis Code    Choledocholithiasis K80.50    Back pain M54.9    Abdominal pain R10.9    Abnormal LFTs R79.89    A-fib ([address] I48.91    Hypertension I10        67 yo male admitted for abdominal pain. Denies any symptoms    Choledocholithiasis - MRCP with cholelithiasis, no evidence to suggest choledocholithiasis. CBD diameter with in normal limits. GI and general surgery to see. Liver enzymes improving.     Chronic A-fib - continue his bystolic and cardizem. He reports that he is on pradaxa at home, will hold for procedure, start on heparin drip.      HTN - continue home medications. Review of systems  General: No fevers or chills. Cardiovascular: No chest pain or pressure. No palpitations. Pulmonary: No shortness of breath. Gastrointestinal: No nausea, vomiting. Physical Exam:  General: Awake, cooperative, no acute distress    HEENT: NC, Atraumatic. PERRLA, anicteric sclerae. Lungs: CTA Bilaterally. No Wheezing/Rhonchi/Rales. Heart:  Regular  rhythm,  No murmur, No Rubs, No Gallops  Abdomen: Soft, Non distended, Non tender.  +Bowel sounds,   Extremities: No c/c/e  Psych:   Not anxious or agitated. Neurologic:  No acute neurological deficit.                  Vital signs/Intake and Output:  Visit Vitals    /72    Pulse (!) 104    Temp 98.3 °F (36.8 °C)    Resp 18    Ht 5' 11\" (1.803 m)    Wt 90.5 kg (199 lb 8 oz)    SpO2 97%    BMI 28.63 kg/m2     Current Shift:     Last three shifts:  06/18 1901 - 06/20 0700  In: 769.6 [I.V.:769.6]  Out: -             Labs: Results:       Chemistry Recent Labs      06/20/17   0401  06/19/17   1244   GLU  97  128*   NA  144  144   K  3.6  3.8   CL  107  105   CO2  27  27   BUN  15  19*   CREA  1.17  1.13   CA 8. 2*  9.3   AGAP  10  12   BUCR  13  17   AP  80  91   TP  6.1*  7.3   ALB  3.4  4.1   GLOB  2.7  3.2   AGRAT  1.3  1.3      CBC w/Diff Recent Labs      06/20/17   0401  06/19/17   2130  06/19/17   1244   WBC  7.1  9.5  11.9   RBC  5.11  5.20  5.38   HGB  17.1*  17.5*  18.1*   HCT  49.6*  50.3*  51.8*   PLT  135  160  179   GRANS   --   75*  83*   LYMPH   --   11*  8*   EOS   --   1  0      Cardiac Enzymes Recent Labs      06/19/17   1244   CPK  118   CKND1  1.6      Coagulation Recent Labs      06/20/17   1449  06/20/17   1017   06/19/17   1244   PTP   --    --    --   15.9*   INR   --    --    --   1.3*   APTT  130.9*  71.2*   < >  39.6*    < > = values in this interval not displayed. Lipid Panel No results found for: CHOL, CHOLPOCT, CHOLX, CHLST, CHOLV, 072878, HDL, LDL, LDLC, DLDLP, 433502, VLDLC, VLDL, TGLX, TRIGL, TRIGP, TGLPOCT, CHHD, CHHDX   BNP No results for input(s): BNPP in the last 72 hours.    Liver Enzymes Recent Labs      06/20/17   0401   TP  6.1*   ALB  3.4   AP  80   SGOT  149*      Thyroid Studies No results found for: T4, T3U, TSH, TSHEXT     Procedures/imaging: see electronic medical records for all procedures/Xrays and details which were not copied into this note but were reviewed prior to creation of Plan

## 2017-06-20 NOTE — ROUTINE PROCESS
TRANSFER - IN REPORT:    Verbal report received from Evaristo Dash RN (name) on Kay Martini  being received from ED (unit) for routine progression of care      Report consisted of patients Situation, Background, Assessment and   Recommendations(SBAR). Information from the following report(s) SBAR, Kardex, ED Summary, Procedure Summary, Intake/Output, MAR, Recent Results, Med Rec Status and Cardiac Rhythm A FIB was reviewed with the receiving nurse. Opportunity for questions and clarification was provided. Assessment completed upon patients arrival to unit and care assumed.

## 2017-06-20 NOTE — PROGRESS NOTES
Pt . In nad currently. States he is having no pain at this time. Pt will  Need lap hubert and he desires to proceed  Around July/03/17 after returning from vacation on the [facility]. Will arrange. Surgically he could be discharged now and my office will coordinate.   #[id number]

## 2017-06-20 NOTE — CONSULTS
[address] [place] [address] Rd    Name:  Magdaleno Francis  MR#:  [id number]  :  1945  Account #:  [de-identified]  Date of Adm:  2017  Date of Consultation:  2017      HISTORY OF PRESENT ILLNESS: The patient a 71-year-old male  who came yesterday around noon to the emergency room because of  severe epigastric pain that started the night before, on  night in  the evening and continued throughout the night. The pain was  described as epigastric, radiating to the back, graded as 5-6/10,  associated with dark urine and pale stools. He did not have any fever  or chills. The pain subsided shortly after arriving to the emergency  room, and presently he is completely pain-free. He claimed that he has  been having similar intermittent pain for the past 5-10 years. He is not  very sure about the dates, exactly, but it seems it happens every few  months, but it was not as severe as this one. Also, this is the first time  when he had dark urine. He denies any weight loss. He does not  abuse alcohol. Last night after the pain started, he ate a steak and 1  beer, but this made the pain worse. 20 years ago when he was treated  for kidney stones, he was told to have gallstones. This patient has hypertension, has benign prostatic hypertrophy. He  does not smoke or abuse alcohol. ALLERGIES: NO KNOWN DRUG ALLERGIES. MEDICATIONS AT HOME  1. He takes Bystolic 10.  2. Glucophage 1000 mg twice a day. 3. Diltiazem 360 mg daily. 4. Flomax 0.4 mg.  5. Testosterone 5 grams transdermal daily. 6. Singular 10 mg.  7. Nasonex 50. He denies having any coronary artery disease, any stroke. He does  have a history of atrial fibrillation. He has been on Pradaxa since that  time. He also had a colonoscopy at age 66, where multiple polyps  were removed and he had a repeat colonoscopy a year ago by Dr. Joe Ellington that was negative.  He denies having any dyspepsia, heartburn,  nausea, vomiting or dysphagia. His weight has been stable. No  melena. He has no shortness of breath. No chest pain. No stroke,  paralysis, numbness, loss of consciousness, dizziness. Sometime he claimed that he has hematuria. PHYSICAL EXAMINATION  GENERAL: We have a 78-year-old  male who appears to be  in no distress. He has some memory problems. VITAL SIGNS: He weighs 199 pounds, temperature 98.3, pulse 104,  blood pressure 134/72, breathing 18, saturation 97% on room air. SKIN: Normal. There are no stigmata of chronic liver disease. HEENT: Eyes are unremarkable. The pupils are equal and reactive to  light. The sclerae are anicteric. The conjunctivae are pink. Oropharyngeal cavity is normal with moist and pink mucous  membrane, normal teeth. NECK: Supple. No palpable mass, no enlarged thyroid. LUNGS: Clear to auscultation. CARDIAC: Rhythm is regular. S1, S2 normal. No murmur. ABDOMEN: [place]icklish, rounded, soft, nontender. No mass or  organomegaly. Bowel sounds are normal.  EXTREMITIES: Remarkable for discrete bilateral tibial edema. NEUROLOGIC: He is alert and oriented. Moves all his 4 extremities. LABORATORY DATA: We have a hemoglobin of 15.1, WBC 7.1,  normal MCV and MCH, normal platelets of 630, and that yesterday  was 160 and 179. 75% neutrophils. Coagulation, his PTT was 130. Basic metabolic panel, we have a glucose 128, BUN 19 and today is  15, creatinine 1.13 and 1.17 respectively. Total bilirubin was 7.7  yesterday and today 9.2.  and today 322.  and today  149. Alkaline phosphatase completely normal. Lipase normal.    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 There was a question of distal  common bile duct stone. There was 1.4 cm lesion in the mid pole of  the right kidney, of unknown nature.  Nonobstructing bilateral renal  calculi, right thyroid nodule. MRI with MRCP done this morning showed cholelithiasis, without  evidence to suggest choledocholithiasis. Common bile duct diameter is  within normal limits for the patient's age. Small amount of layering  material is noted within the distal portion of the common bile duct. However, this is not definitive, and may be artifactual. Hepatic and  renal cysts. The lesion that was noticed on the CT scan in right kidney  is suggestive of protein, sebaceous or hemorrhagic cyst. There is  trace bilateral pleural effusion, mild cardiac enlargement and a small  pericardial effusion, bilateral nonobstructing renal calculi. CONCLUSION: This is a 69-year-old male who presented with biliary  pain and abnormal liver enzymes and even obstructive jaundice. The  biliary tree was slightly dilated. Most likely he passed a small stone. He  needs to have his gallbladder removed and also he will need to have  perioperative cholangiogram and preferably a liver biopsy. It seems the  elevation of his LFTs are more in relation to the passage of  choledocholithiasis, but there is always a possibility that he may have a  hepatic problem. The patient denied ever abusing alcohol. For now, I will request serology for hepatitis B and C. I would request  also the autoimmune antibodies just in case, but in the surgery, if there  is no obstructing stone, liver biopsy would be probably appreciated just  to make sure that we are not dealing with any underlying liver disease. This patient has atrial fibrillation. He is on Eliquis. He has diabetes  mellitus, hypertension. 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 He has enlarged prostate, 6.5 x 6.4 cm. Also, he has history of multiple polyps that were removed 2 years ago,  but last colonoscopy a year after was negative. MD Chata Betancourt / Eric Harper  D:  06/20/2017   17:49  T:  06/20/2017   19:00  Job #:  [id number]

## 2017-06-20 NOTE — H&P
History & Physical    Patient: James Scott MRN: [id number]  CSN: [id number]    YOB: 1945  Age: 66 y.o. Sex: male      DOA: 6/19/2017  Primary Care Provider:  Katia Knight MD      Assessment/Plan     Patient Active Problem List   Diagnosis Code    Choledocholithiasis K80.50    Back pain M54.9    Abdominal pain R10.9    Abnormal LFTs R79.89    A-fib ([address] I48.91    Hypertension I10     Admit to telemetry    Choledocholithiasis - mainstay of treatment is the removal of CBD stone. ER physician contacted GI who has recommended MRCP which is ordered. Need to watch for acute pancreatitis. His lipase is in normal range now. Will follow up with lipase level. Start on gentle IVF. Will keep him NPO except for medications    Chronic A-fib - continue his bystolic and cardizem. He reports that he is on pradaxa at home, will hold for procedure, start on heparin drip. HTN - continue home medications. CC: abdominal pain       HPI:     James Scott is a 66 y.o. male who has past history of A-fib, HTN, nephrolithiasis presents to ER with complains of abdominal pain. Started since yesterday night. Located RUQ and epigastric region, radiating to back. Associated with adriana colored stools, dark urine. Denies any N/V/D, fever/chills, chest pain, SOB. He has history of A-fib and HTN. In ER CT scan showed Cholelithiasis. Mild/moderate intra and extrahepatic biliary tree dilatation. Question of filling defect in the distal CBD. Recommend clinical correlation for possibility of choledocholithiasis or other lesion and correlation with LFTs to exclude mechanical obstruction. His transaminases elevated. ER physician contacted GI who recommended MRCP.     Past Medical History:   Diagnosis Date    A-fib ([address]     Arthritis     Hypertension     Ill-defined condition     kidney stones       Past Surgical History:   Procedure Laterality Date    HX ORTHOPAEDIC      r shoulder replacement       History reviewed. No pertinent family history. Social History     Social History    Marital status:      Spouse name: N/A    Number of children: N/A    Years of education: N/A     Social History Main Topics    Smoking status: Never Smoker    Smokeless tobacco: None    Alcohol use Yes    Drug use: No    Sexual activity: Not Asked     Other Topics Concern    None     Social History Narrative    None       Prior to Admission medications    Medication Sig Start Date End Date Taking? Authorizing Provider   nebivolol (BYSTOLIC) 10 mg tablet Take 10 mg by mouth daily. Yes Felicia Carrion MD   metFORMIN (GLUCOPHAGE) 1,000 mg tablet Take 1,000 mg by mouth two (2) times daily (with meals). Yes Felicia Carrion MD   dilTIAZem XR (DILACOR XR) 240 mg XR capsule Take 240 mg by mouth daily. Yes Felicia Carrion MD   tadalafil (CIALIS) 5 mg tablet Take 5 mg by mouth. Yes Felicia Carrion MD   tamsulosin (FLOMAX) 0.4 mg capsule Take 0.4 mg by mouth daily. Yes Felicia Carrion MD   testosterone (TESTIM) 50 mg/5 gram (1 %) gel 5 g by TransDERmal route daily. Yes Felicia Carrion MD   montelukast (SINGULAIR) 10 mg tablet Take 10 mg by mouth daily. Yes Felicia Carrion MD   mometasone (NASONEX) 50 mcg/actuation nasal spray 2 Sprays daily. Yes Felicia Carrion MD       No Known Allergies    Review of Systems  Gen: No fever, chills, malaise, weight loss/gain. Heent: No headache, rhinorrhea, epistaxis, ear pain, hearing loss, sinus pain, neck pain/stiffness, sore throat. Heart: No chest pain, palpitations, QUINN, pnd, or orthopnea. Resp: No cough, hemoptysis, wheezing and shortness of breath. GI: see above. : No urinary obstruction, dysuria or hematuria. Derm: No rash, new skin lesion or pruritis. Musc/skeletal: no bone or joint complains. Vasc: No edema, cyanosis or claudication. Endo: No heat/cold intolerance, no polyuria,polydipsia or polyphagia. Neuro: No unilateral weakness, numbness, tingling. No seizures.    Heme: No easy bruising or bleeding. Physical Exam:     Physical Exam:  Visit Vitals    /87 (BP 1 Location: Right arm, BP Patient Position: At rest)    Pulse 96    Temp 97.9 °F (36.6 °C)    Resp 18    Ht 5' 11\" (1.803 m)    Wt 88.5 kg (195 lb)    SpO2 95%    BMI 27.2 kg/m2      O2 Device: Room air    Temp (24hrs), Av.7 °F (36.5 °C), Min:97.5 °F (36.4 °C), Max:97.9 °F (36.6 °C)             General:  Awake, cooperative, no distress. Head:  Normocephalic, without obvious abnormality, atraumatic. Eyes:  Conjunctivae/corneas clear, sclera anicteric, PERRL, EOMs intact. Nose: Nares normal. No drainage or sinus tenderness. Throat: Lips, mucosa, and tongue normal.    Neck: Supple, symmetrical, trachea midline, no adenopathy. Lungs:   Clear to auscultation bilaterally. Heart:  Irregular rate and rhythm, S1, S2 normal, no murmur, click, rub or gallop. Abdomen: Soft, non-tender. Bowel sounds normal. No masses,  No organomegaly. Extremities: Extremities normal, atraumatic, no cyanosis or edema. Capillary refill normal.   Pulses: 2+ and symmetric all extremities. Skin: Skin color pink, turgor normal. No rashes or lesions   Neurologic: CNII-XII intact. No focal motor or sensory deficit.        Labs Reviewed:    CMP:   Lab Results   Component Value Date/Time     2017 12:44 PM    K 3.8 2017 12:44 PM     2017 12:44 PM    CO2 27 2017 12:44 PM    AGAP 12 2017 12:44 PM     (H) 2017 12:44 PM    BUN 19 (H) 2017 12:44 PM    CREA 1.13 2017 12:44 PM    GFRAA >60 2017 12:44 PM    GFRNA >60 2017 12:44 PM    CA 9.3 2017 12:44 PM    MG 1.7 2017 12:44 PM    ALB 4.1 2017 12:44 PM    TP 7.3 2017 12:44 PM    GLOB 3.2 2017 12:44 PM    AGRAT 1.3 2017 12:44 PM    SGOT 263 (H) 2017 12:44 PM     (H) 2017 12:44 PM     CBC:   Lab Results   Component Value Date/Time    WBC 11.9 06/19/2017 12:44 PM    HGB 18.1 (H) 06/19/2017 12:44 PM    HCT 51.8 (H) 06/19/2017 12:44 PM     06/19/2017 12:44 PM     All Cardiac Markers in the last 24 hours:   Lab Results   Component Value Date/Time     06/19/2017 12:44 PM    CKMB 1.9 06/19/2017 12:44 PM    CKND1 1.6 06/19/2017 12:44 PM    TROIQ <0.02 06/19/2017 12:44 PM    TNIPOC <0.04 06/19/2017 12:50 PM         Procedures/imaging: see electronic medical records for all procedures/Xrays and details which were not copied into this note but were reviewed prior to creation of Plan        CC: Magalis Gooden MD

## 2017-06-20 NOTE — PROGRESS NOTES
conducted an initial consultation and Spiritual Assessment for Ml Allen, who is a 66 y. o.,male. Patients Primary Language is: [place]. According to the patients EMR [employer] Affiliation is: Unitarian. The reason the Patient came to the hospital is:   Patient Active Problem List    Diagnosis Date Noted    Choledocholithiasis 06/19/2017    Back pain 06/19/2017    Abdominal pain 06/19/2017    Abnormal LFTs 06/19/2017    A-fib ([address]     Hypertension         The  provided the following Interventions:  Initiated a relationship of care and support. Explored issues of parker, belief, spirituality and Latter day/ritual needs while hospitalized. Listened empathically. Provided chaplaincy education. Provided information about Spiritual Care Services. Offered assurance of continued prayers on patients behalf. Chart reviewed. The following outcomes were achieved:  Patient shared limited information about both their medical narrative and spiritual journey/beliefs. Patient processed feeling about current hospitalization. Patient expressed gratitude for pastoral care visit. Assessment:  Patient does not have any Latter day/cultural needs that will affect patients preferences in health care. There are no further spiritual or Latter day issues which require intervention at this time. Plan:  Chaplains will continue to follow and will provide pastoral care on an as needed/requested basis.  recommends bedside caregivers page  on duty if patient shows signs of acute spiritual or emotional distress.       Sister Jeffylisa Christian Bhatia, [address]  [phone]

## 2017-06-20 NOTE — CONSULTS
[address] [place] [address] Rd    Name:  Malcom Grier  MR#:  [id number]  :  1945  Account #:  [de-identified]  Date of Adm:  2017  Date of Consultation:  2017      REASON FOR CONSULTATION: I was asked by Dr. Gabby Aguilar to see this  patient in surgical consultation concerning gallbladder trouble. HISTORY OF PRESENT ILLNESS: This is a 58-year-old male with a  history of atrial fibrillation and hypertension, who presented with severe  pain and discomfort in the epigastrium that started approximately 2  days prior to admission. It progressively worsened. He became  nauseated with progressive pain, but no vomiting. He noticed that his  urine and stool had changes; that his urine became dark colored and  stool became adriana-colored. He was seen in the emergency room and  evaluation suggested possible choledocholithiasis. We advised  hospitalization with the GI consult. MRCP has been ordered showing  that there were no stones along the common bile duct. The patient is  now being evaluated for possible cholecystectomy. PAST MEDICAL HISTORY: Significant for chronic back pain, atrial  fibrillation, and hypertension. ALLERGIES: NO KNOWN ALLERGIES. CURRENT MEDICATIONS: Included  1. Bystolic. 2. Metformin. 3. Diltiazem. 4. Singulair. 5. Nasonex. PREVIOUS SURGERIES: Include right shoulder surgery. FAMILY HISTORY: Not significant for breast, colon cancer. It is  significant for coronary artery disease. SOCIAL HISTORY: . REVIEW OF SYSTEMS: Except for the pain and discomfort in his  abdomen at this time he is relatively free of any other major problems. PHYSICAL EXAMINATION  GENERAL: Pleasant, cooperative male, looking his stated age. HEENT: Normal.  NECK: Supple. CHEST: Clear. HEART: Sinus without murmur, gallop or rub. ABDOMEN: Nondistended. He has mild tenderness in the right upper  quadrant. The patient has an umbilical hernia defect.   GENITOURINARY: Normal.  EXTREMITIES: Full range of motion without neurologic deficit. NEUROLOGIC: Grossly intact. IMPRESSION: Acute and chronic calculus cholecystitis with now ruled  out common bile duct stone. RECOMMENDATIONS: Schedule the patient for laparoscopic  cholecystectomy. However, the patient points out that he has made  reservations for a vacation along the [employer]  starting in 2 days. The patient therefore would like to hold off on  surgery until he gets back if he is feeling as well as he is feeling now. Otherwise, he is having no abdominal pain or discomfort at this time. Today, we carefully talked about low-fat diet and avoiding flare-ups of  pain if at all possible. He understands the value of avoiding fats. Additionally, the patient has been advised to go ahead and schedule  the surgery as soon as he gets back, which is now 07/03/2017.         MD SHAHAB Snowden / JUMANA  D:  06/20/2017   19:03  T:  06/20/2017   19:46  Job #:  [id number]

## 2017-06-20 NOTE — PROGRESS NOTES
1700 assumed care of patient from [id number] [facility]. Patient awake alert and oriented x4. Patient denies pain at this time. 36 Dr Valentino Parrot in room to discuss patient DX. Wife present for discussion. Dr king has returned page and is on his way to consult on patients case. 1830 Heparin rate change, decreased by 2 units. 1910 Bedside and Verbal shift change report given to Abrahan Gracia RN (oncoming nurse) by Don Jacob RN (offgoing nurse). Report included the following information SBAR, Procedure Summary, MAR and Recent Results.

## 2017-06-20 NOTE — ROUTINE PROCESS
Bedside and Verbal shift change report given to Ana Paula Peraza RN (oncoming nurse) by Leeanne Villa RN (offgoing nurse). Report included the following information SBAR, Kardex, ED Summary, Procedure Summary, Intake/Output, MAR, Recent Results, Med Rec Status and Cardiac Rhythm A Fib: PVC: BBB.

## 2017-06-20 NOTE — PROGRESS NOTES
Pt admitted due to abd pain due to choledocholithiasis. Surgery has been consulted. Pt is independent and his wife, Jack Brooke ([phone]), will assist pt upon discharge. No plan of care needs have been identified at this time. CM remains available to assist with plan of care needs. Readmission Risk Assessment:     Moderate Risk and MSSP/Good Help ACO patients    RRAT Score:  13-20    Initial Assessment: physician follow up vs [facility]     Emergency Contact:  Wife/renata Burton    Pertinent Medical Hx:     See chart    PCP/Specialists:  [place]:   None    DME:      None    Moderate Risk Care Transition Plan:  1. Evaluate for [facility] or H, SNF, acute rehab, community care coordination of resources. 2. Involve patient/caregiver in assessment, planning, education and implement of intervention. 3. CM daily patient care huddles/interdisciplinary rounds. 4. PCP/Specialist appointment within 5  7 days made prior to discharge. 5. Facilitate transportation and logistics for follow-up appointments. 6. Medication reconciliation [address] [facility]  7. Formal handoff between hospital provider and post-acute provider to transition patient  Handoff to [facility] Nurse Navigator or PCP practice. Care Management Interventions  PCP Verified by CM: Yes  Mode of Transport at Discharge:  Other (see comment) (Family/wife)  Transition of Care Consult (CM Consult): Discharge Planning  Health Maintenance Reviewed: Yes  Current Support Network: Lives with Spouse  Confirm Follow Up Transport: Family  Plan discussed with Pt/Family/Caregiver: Yes  Discharge Location  Discharge Placement: Home

## 2017-06-21 LAB
HAV IGM SERPL QL IA: NEGATIVE
HBV CORE IGM SER QL: NEGATIVE
HBV SURFACE AG SER QL: <0.1 INDEX
HBV SURFACE AG SER QL: NEGATIVE
HCV AB SER IA-ACNC: 0.04 INDEX
HCV AB SERPL QL IA: NEGATIVE
HCV COMMENT,HCGAC: NORMAL
SP1: NORMAL
SP2: NORMAL
SP3: NORMAL

## 2017-06-21 NOTE — DISCHARGE INSTRUCTIONS
Learning About Acute Cholecystitis  What is cholecystitis? Cholecystitis (say \"koh-lih-sis-TY-tus\") is inflammation of the gallbladder. The gallbladder stores bile. Bile helps the body digest food. Normally, the bile flows from the gallbladder to the small intestine. A gallstone stuck in the cystic duct is most often the cause of sudden (acute) cholecystitis. The cystic duct is the tube that carries the bile out of the gallbladder. The gallstone blocks the bile from leaving the gallbladder. This results in an irritated and swollen gallbladder. The disease can also be caused by infection or trauma, such as an injury from a car accident. Cholecystitis has to be treated right away. You will probably have to go to the hospital. Surgery is the usual treatment. What are the symptoms? Symptoms include:  · Steady and severe pain in the upper right part of belly. This is the most common symptom. The pain can sometimes move to your back or right shoulder blade. It may last for more than 6 hours. · Nausea or vomiting. · A fever. How is it treated? The main way to treat this disease is surgery to remove the gallbladder. This surgery can often be done through small cuts (incisions) in the belly. This is called a laparoscopic cholecystectomy. In some cases, you may need a more extensive surgery. You may need surgery as soon as possible. The doctor may try to reduce swelling and irritation in the gallbladder before removing it. You may be given fluids and antibiotics through an IV. You may also be given pain medicine. Follow-up care is a key part of your treatment and safety. Be sure to make and go to all appointments, and call your doctor if you are having problems. It's also a good idea to know your test results and keep a list of the medicines you take. Where can you learn more? Go to http://cailin-case.info/.   Enter T940 in the search box to learn more about \"Learning About Acute Cholecystitis. \"  Current as of: August 9, 2016  Content Version: 11.3  © 4469-6737 [employer]. Care instructions adapted under license by [employer] (which disclaims liability or warranty for this information). If you have questions about a medical condition or this instruction, always ask your healthcare professional. [place] [address] any warranty or liability for your use of this information.

## 2017-06-21 NOTE — DISCHARGE SUMMARY
[facility] SUMMARY    Name:  Rosaura Pantoja  MR#:  [id number]  :  1945  Account #:  [de-identified]  Date of Adm:  2017  Date of Discharge:  2017      CONSULTANTS:  Marlen Ferreira M.D., of General Surgery    DIAGNOSES AT DISCHARGE  1. Cholelithiasis. 2. Chronic atrial fibrillation. 3. Hypertension. 4. Non-insulin-dependent diabetes mellitus. HOSPITAL SUMMARY:  This is a 71-year-old gentleman with known  gallbladder disease. His wife, who is a nurse, states that he has  declined to move ahead to have his gallbladder removed in the past,  although he has had prior gallbladder attacks. He came into the  hospital with abdominal pain that started the night before. It was in his  right upper quadrant and epigastric region. He had associated adriana-  colored stools and dark urine. The CT scan in the ER showed  cholelithiasis. There was a question of a filling defect in the distal  common bile duct, and he had elevated LFTs also. The patient had an  MRCP that showed no evidence to suggest choledocholithiasis. There  was cholelithiasis. The common bile duct was within normal limits. There were hepatic and renal cysts, trace bilateral pleural effusions,  and bilateral non-obstructing renal calculi. 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 He had cholelithiasis and a 1.4 cm lesion in the midpole of the  right kidney, not clearly cystic in nature. It was recommended a  nonemergent ultrasound to see if this could be hemorrhagic or a  complex cyst versus neoplasm. There was a right thyroid nodule. His  LFTs were 450 and 263 on admission respectively for ALT and AST,  and are now down to 271 and 99. His total bilirubin went from 9.2 to  5.1, and his direct bilirubin was 3.2, which is still elevated.   Troponin  was normal.  On metabolic profile, otherwise, sodium, potassium,  chloride, and bicarbonate were all within normal limits. Creatinine was  1.17, and BUN was 15. His hemoglobin and hematocrit were stable at  17.1 and 49.6, with white count 7.1 and platelet count 695. His  urinalysis showed moderate bilirubin, which is consistent with his  disease. He had a blood sugar check yesterday afternoon that was  129. In summary, the patient has been cleared by Surgery today. They  state that due to the fact that he has no pain currently, he tolerated  clear liquids today, and he wants to leave the hospital to go on a  scheduled vacation, that he could be discharged. Shital Garcia M.D.,  called me. I am on-call tonight. He asked me if we could discharge  the patient to home as he is desperate to go home, so I have reviewed  his chart. I have gone to interview the patient and his wife, who is a  nurse, at the bedside. He denies any abdominal pain, nausea, and  diarrhea. He had noticed adriana-colored stools until yesterday. His urine  has been dark in color, but he denies any dysuria. He has no chest  pain or shortness of breath. A Heparin drip was started empirically  because he was taken off his Pradaxa here in anticipation of a surgical  procedure. But, at this point, he is anxious to go home. He states he  has a planned vacation. Shital Garcia M.D., has said that he can  schedule his laparoscopic cholecystectomy when he returns from  vacation in 7 to 10 days, and that is what the patient wants to do. His  abdomen is soft and not tender this evening. I examined him. He has  no rebound or guarding. No Proctor sign. He has no abnormal  distention. He is mentating appropriately and looks comfortable  otherwise. Myself, his wife, and he have had a discussion about  decreasing his utilization of Tylenol due to his elevated LFTs, which he  is chronically on at home, as well as avoiding alcohol.   He is to be on a  bland diet over the next week until he follows up with Surgery. Today, his vital signs reveal his blood pressure is 134/72, pulse 97 to  104, temperature 98.3, and respiratory rate 18, and he is saturating  97% on room air. Due to the fact he has been cleared by Surgery, his  LFTs are improving, and clinically he is better, as well as wanting to  leave the hospital, I will let him discharge this evening. FOLLOWUP:  He has a followup scheduled with Talisha Hanks M.D.,  the week of July 3, 2017, and he is to see Dr. Emelyn Moctezuma, his PCP, in two  weeks. DISCHARGE MEDICATIONS  His medications for discharge include:  1. His usual Pradaxa twice daily. 2. Diltiazem  mg daily. 3. Glucophage 1000 mg twice daily. 4. Singulair 10 mg daily. 5. Bystolic 10 mg daily. 6. Percocet 1 tablet every 6 hours as needed for pain, #12, no refills. 7. Flomax 0.4 mg daily. 8. Testosterone transdermally daily. I have asked him to hold his Cialis due to his gallbladder disease. Of  note, his lipase was normal here. I have discussed the plan of care  with both him and his wife. Per his request, we will let him discharge  this evening and have his surgery done as an outpatient. I have  advised them if he has any recurrent pain, nausea, diarrhea, fevers,  chills, or any other worrisome symptoms, he is to go to the closest  emergency room, which he and his wife both understand.         Judith Cortes MD    [place] / [address]  D:  06/20/2017   20:10  T:  06/21/2017   07:54  Job #:  [id number]

## 2017-06-27 LAB
ACTIN IGG SERPL-ACNC: 26 UNITS (ref 0–19)
ANA TITR SER IF: POSITIVE {TITER}
NOTE:, 016270: NORMAL
SPECKLED PATTERN: NORMAL

## 2017-07-03 ENCOUNTER — ANESTHESIA EVENT (OUTPATIENT)
Dept: SURGERY | Age: 72
End: 2017-07-03
Payer: MEDICARE

## 2017-07-03 ENCOUNTER — ANESTHESIA (OUTPATIENT)
Dept: SURGERY | Age: 72
End: 2017-07-03
Payer: MEDICARE

## 2017-07-03 ENCOUNTER — HOSPITAL ENCOUNTER (OUTPATIENT)
Age: 72
Setting detail: OUTPATIENT SURGERY
Discharge: HOME | End: 2017-07-03
Attending: SURGERY | Admitting: SURGERY
Payer: MEDICARE

## 2017-07-03 ENCOUNTER — APPOINTMENT (OUTPATIENT)
Dept: GENERAL RADIOLOGY | Age: 72
End: 2017-07-03
Attending: SURGERY
Payer: MEDICARE

## 2017-07-03 VITALS
SYSTOLIC BLOOD PRESSURE: 135 MMHG | WEIGHT: 199.56 LBS | DIASTOLIC BLOOD PRESSURE: 80 MMHG | TEMPERATURE: 97.3 F | BODY MASS INDEX: 27.94 KG/M2 | HEIGHT: 71 IN | OXYGEN SATURATION: 95 % | RESPIRATION RATE: 15 BRPM | HEART RATE: 78 BPM

## 2017-07-03 LAB
EST. AVERAGE GLUCOSE BLD GHB EST-MCNC: 120 MG/DL
GLUCOSE BLD STRIP.AUTO-MCNC: 121 MG/DL (ref 70–110)
HBA1C MFR BLD: 5.8 % (ref 4.5–5.6)
INR PPP: 1.1 (ref 0.8–1.2)
PROTHROMBIN TIME: 13.7 SEC (ref 11.5–15.2)

## 2017-07-03 PROCEDURE — 77030016151 HC PROTCTR LNS DFOG COVD -B: Performed by: SURGERY

## 2017-07-03 PROCEDURE — 74300 X-RAY BILE DUCTS/PANCREAS: CPT

## 2017-07-03 PROCEDURE — 77030020053 HC ELECTRD LAPSCP COVD -B: Performed by: SURGERY

## 2017-07-03 PROCEDURE — 82962 GLUCOSE BLOOD TEST: CPT

## 2017-07-03 PROCEDURE — 74011250636 HC RX REV CODE- 250/636

## 2017-07-03 PROCEDURE — 74011250636 HC RX REV CODE- 250/636: Performed by: SURGERY

## 2017-07-03 PROCEDURE — 77030002935 HC SUT MCRYL J&J -C: Performed by: SURGERY

## 2017-07-03 PROCEDURE — 76210000006 HC OR PH I REC 0.5 TO 1 HR: Performed by: SURGERY

## 2017-07-03 PROCEDURE — 77030018875 HC APPL CLP LIG4 J&J -B: Performed by: SURGERY

## 2017-07-03 PROCEDURE — 74011000250 HC RX REV CODE- 250: Performed by: SURGERY

## 2017-07-03 PROCEDURE — 77030008683 HC TU ET CUF COVD -A: Performed by: ANESTHESIOLOGY

## 2017-07-03 PROCEDURE — 74011000250 HC RX REV CODE- 250

## 2017-07-03 PROCEDURE — 76010000153 HC OR TIME 1.5 TO 2 HR: Performed by: SURGERY

## 2017-07-03 PROCEDURE — 76060000034 HC ANESTHESIA 1.5 TO 2 HR: Performed by: SURGERY

## 2017-07-03 PROCEDURE — 77030034154 HC SHR COAG HARM ACE J&J -F: Performed by: SURGERY

## 2017-07-03 PROCEDURE — 77030008462 HC STPLR SKN PROX J&J -A: Performed by: SURGERY

## 2017-07-03 PROCEDURE — 77030010507 HC ADH SKN DERMBND J&J -B: Performed by: SURGERY

## 2017-07-03 PROCEDURE — 77030027138 HC INCENT SPIROMETER -A: Performed by: SURGERY

## 2017-07-03 PROCEDURE — 74011000272 HC RX REV CODE- 272: Performed by: SURGERY

## 2017-07-03 PROCEDURE — 77030031139 HC SUT VCRL2 J&J -A: Performed by: SURGERY

## 2017-07-03 PROCEDURE — 77030020255 HC SOL INJ LR 1000ML BG: Performed by: SURGERY

## 2017-07-03 PROCEDURE — 83036 HEMOGLOBIN GLYCOSYLATED A1C: CPT | Performed by: SURGERY

## 2017-07-03 PROCEDURE — 88304 TISSUE EXAM BY PATHOLOGIST: CPT | Performed by: SURGERY

## 2017-07-03 PROCEDURE — 36415 COLL VENOUS BLD VENIPUNCTURE: CPT | Performed by: SURGERY

## 2017-07-03 PROCEDURE — 74011250636 HC RX REV CODE- 250/636: Performed by: ANESTHESIOLOGY

## 2017-07-03 PROCEDURE — 76210000026 HC REC RM PH II 1 TO 1.5 HR: Performed by: SURGERY

## 2017-07-03 PROCEDURE — 77030033271 HC TRCR ENDOSC EPATH2 J&J -B: Performed by: SURGERY

## 2017-07-03 PROCEDURE — 77030020782 HC GWN BAIR PAWS FLX 3M -B: Performed by: SURGERY

## 2017-07-03 PROCEDURE — 77030004818 HC CATH CHOLGM TELE -B: Performed by: SURGERY

## 2017-07-03 PROCEDURE — 77030008603 HC TRCR ENDOSC EPATH J&J -C: Performed by: SURGERY

## 2017-07-03 PROCEDURE — 77030006643: Performed by: ANESTHESIOLOGY

## 2017-07-03 PROCEDURE — 77030009851 HC PCH RTVR ENDOSC AMR -B: Performed by: SURGERY

## 2017-07-03 PROCEDURE — 74011636320 HC RX REV CODE- 636/320: Performed by: SURGERY

## 2017-07-03 PROCEDURE — 77030032490 HC SLV COMPR SCD KNE COVD -B: Performed by: SURGERY

## 2017-07-03 PROCEDURE — 77030010030: Performed by: SURGERY

## 2017-07-03 PROCEDURE — 85610 PROTHROMBIN TIME: CPT | Performed by: SURGERY

## 2017-07-03 RX ORDER — HYDROMORPHONE HYDROCHLORIDE 1 MG/ML
0.5 INJECTION, SOLUTION INTRAMUSCULAR; INTRAVENOUS; SUBCUTANEOUS
Status: DISCONTINUED | OUTPATIENT
Start: 2017-07-03 | End: 2017-07-03 | Stop reason: HOSPADM

## 2017-07-03 RX ORDER — PROPOFOL 10 MG/ML
INJECTION, EMULSION INTRAVENOUS AS NEEDED
Status: DISCONTINUED | OUTPATIENT
Start: 2017-07-03 | End: 2017-07-03 | Stop reason: HOSPADM

## 2017-07-03 RX ORDER — ROCURONIUM BROMIDE 10 MG/ML
INJECTION, SOLUTION INTRAVENOUS AS NEEDED
Status: DISCONTINUED | OUTPATIENT
Start: 2017-07-03 | End: 2017-07-03 | Stop reason: HOSPADM

## 2017-07-03 RX ORDER — SODIUM CHLORIDE, SODIUM LACTATE, POTASSIUM CHLORIDE, CALCIUM CHLORIDE 600; 310; 30; 20 MG/100ML; MG/100ML; MG/100ML; MG/100ML
50 INJECTION, SOLUTION INTRAVENOUS CONTINUOUS
Status: DISCONTINUED | OUTPATIENT
Start: 2017-07-03 | End: 2017-07-03 | Stop reason: HOSPADM

## 2017-07-03 RX ORDER — DEXAMETHASONE SODIUM PHOSPHATE 4 MG/ML
INJECTION, SOLUTION INTRA-ARTICULAR; INTRALESIONAL; INTRAMUSCULAR; INTRAVENOUS; SOFT TISSUE AS NEEDED
Status: DISCONTINUED | OUTPATIENT
Start: 2017-07-03 | End: 2017-07-03 | Stop reason: HOSPADM

## 2017-07-03 RX ORDER — FENTANYL CITRATE 50 UG/ML
INJECTION, SOLUTION INTRAMUSCULAR; INTRAVENOUS AS NEEDED
Status: DISCONTINUED | OUTPATIENT
Start: 2017-07-03 | End: 2017-07-03

## 2017-07-03 RX ORDER — SODIUM CHLORIDE, SODIUM LACTATE, POTASSIUM CHLORIDE, CALCIUM CHLORIDE 600; 310; 30; 20 MG/100ML; MG/100ML; MG/100ML; MG/100ML
125 INJECTION, SOLUTION INTRAVENOUS CONTINUOUS
Status: DISCONTINUED | OUTPATIENT
Start: 2017-07-03 | End: 2017-07-03 | Stop reason: HOSPADM

## 2017-07-03 RX ORDER — DEXTROSE 50 % IN WATER (D50W) INTRAVENOUS SYRINGE
25-50 AS NEEDED
Status: DISCONTINUED | OUTPATIENT
Start: 2017-07-03 | End: 2017-07-03 | Stop reason: HOSPADM

## 2017-07-03 RX ORDER — SODIUM CHLORIDE 0.9 % (FLUSH) 0.9 %
5-10 SYRINGE (ML) INJECTION AS NEEDED
Status: DISCONTINUED | OUTPATIENT
Start: 2017-07-03 | End: 2017-07-03 | Stop reason: HOSPADM

## 2017-07-03 RX ORDER — MIDAZOLAM HYDROCHLORIDE 1 MG/ML
INJECTION, SOLUTION INTRAMUSCULAR; INTRAVENOUS AS NEEDED
Status: DISCONTINUED | OUTPATIENT
Start: 2017-07-03 | End: 2017-07-03 | Stop reason: HOSPADM

## 2017-07-03 RX ORDER — GLYCOPYRROLATE 0.2 MG/ML
INJECTION INTRAMUSCULAR; INTRAVENOUS AS NEEDED
Status: DISCONTINUED | OUTPATIENT
Start: 2017-07-03 | End: 2017-07-03 | Stop reason: HOSPADM

## 2017-07-03 RX ORDER — MAGNESIUM SULFATE 100 %
4 CRYSTALS MISCELLANEOUS AS NEEDED
Status: DISCONTINUED | OUTPATIENT
Start: 2017-07-03 | End: 2017-07-03 | Stop reason: HOSPADM

## 2017-07-03 RX ORDER — ONDANSETRON 2 MG/ML
4 INJECTION INTRAMUSCULAR; INTRAVENOUS ONCE
Status: DISCONTINUED | OUTPATIENT
Start: 2017-07-03 | End: 2017-07-03 | Stop reason: HOSPADM

## 2017-07-03 RX ORDER — FENTANYL CITRATE 50 UG/ML
INJECTION, SOLUTION INTRAMUSCULAR; INTRAVENOUS AS NEEDED
Status: DISCONTINUED | OUTPATIENT
Start: 2017-07-03 | End: 2017-07-03 | Stop reason: HOSPADM

## 2017-07-03 RX ORDER — NEOSTIGMINE METHYLSULFATE 5 MG/5 ML
SYRINGE (ML) INTRAVENOUS AS NEEDED
Status: DISCONTINUED | OUTPATIENT
Start: 2017-07-03 | End: 2017-07-03 | Stop reason: HOSPADM

## 2017-07-03 RX ORDER — NALOXONE HYDROCHLORIDE 0.4 MG/ML
0.1 INJECTION, SOLUTION INTRAMUSCULAR; INTRAVENOUS; SUBCUTANEOUS
Status: DISCONTINUED | OUTPATIENT
Start: 2017-07-03 | End: 2017-07-03 | Stop reason: HOSPADM

## 2017-07-03 RX ORDER — CEFAZOLIN SODIUM IN 0.9 % NACL 2 G/100 ML
PLASTIC BAG, INJECTION (ML) INTRAVENOUS AS NEEDED
Status: DISCONTINUED | OUTPATIENT
Start: 2017-07-03 | End: 2017-07-03 | Stop reason: HOSPADM

## 2017-07-03 RX ORDER — INSULIN LISPRO 100 [IU]/ML
INJECTION, SOLUTION INTRAVENOUS; SUBCUTANEOUS ONCE
Status: DISCONTINUED | OUTPATIENT
Start: 2017-07-03 | End: 2017-07-03 | Stop reason: HOSPADM

## 2017-07-03 RX ORDER — ONDANSETRON 2 MG/ML
INJECTION INTRAMUSCULAR; INTRAVENOUS AS NEEDED
Status: DISCONTINUED | OUTPATIENT
Start: 2017-07-03 | End: 2017-07-03 | Stop reason: HOSPADM

## 2017-07-03 RX ORDER — LIDOCAINE HYDROCHLORIDE 20 MG/ML
INJECTION, SOLUTION EPIDURAL; INFILTRATION; INTRACAUDAL; PERINEURAL AS NEEDED
Status: DISCONTINUED | OUTPATIENT
Start: 2017-07-03 | End: 2017-07-03 | Stop reason: HOSPADM

## 2017-07-03 RX ORDER — OXYCODONE AND ACETAMINOPHEN 5; 325 MG/1; MG/1
1 TABLET ORAL AS NEEDED
Status: DISCONTINUED | OUTPATIENT
Start: 2017-07-03 | End: 2017-07-03 | Stop reason: HOSPADM

## 2017-07-03 RX ORDER — FENTANYL CITRATE 50 UG/ML
25 INJECTION, SOLUTION INTRAMUSCULAR; INTRAVENOUS
Status: ACTIVE | OUTPATIENT
Start: 2017-07-03 | End: 2017-07-03

## 2017-07-03 RX ADMIN — PROPOFOL 140 MG: 10 INJECTION, EMULSION INTRAVENOUS at 07:52

## 2017-07-03 RX ADMIN — SODIUM CHLORIDE, SODIUM LACTATE, POTASSIUM CHLORIDE, AND CALCIUM CHLORIDE 125 ML/HR: 600; 310; 30; 20 INJECTION, SOLUTION INTRAVENOUS at 09:53

## 2017-07-03 RX ADMIN — Medication 2 MG: at 09:11

## 2017-07-03 RX ADMIN — GLYCOPYRROLATE 0.4 MG: 0.2 INJECTION INTRAMUSCULAR; INTRAVENOUS at 09:11

## 2017-07-03 RX ADMIN — FENTANYL CITRATE 100 MCG: 50 INJECTION, SOLUTION INTRAMUSCULAR; INTRAVENOUS at 07:51

## 2017-07-03 RX ADMIN — MIDAZOLAM HYDROCHLORIDE 2 MG: 1 INJECTION, SOLUTION INTRAMUSCULAR; INTRAVENOUS at 07:47

## 2017-07-03 RX ADMIN — DEXAMETHASONE SODIUM PHOSPHATE 4 MG: 4 INJECTION, SOLUTION INTRA-ARTICULAR; INTRALESIONAL; INTRAMUSCULAR; INTRAVENOUS; SOFT TISSUE at 08:05

## 2017-07-03 RX ADMIN — GLYCOPYRROLATE 0.1 MG: 0.2 INJECTION INTRAMUSCULAR; INTRAVENOUS at 08:19

## 2017-07-03 RX ADMIN — FENTANYL CITRATE 50 MCG: 50 INJECTION, SOLUTION INTRAMUSCULAR; INTRAVENOUS at 08:10

## 2017-07-03 RX ADMIN — LIDOCAINE HYDROCHLORIDE 60 MG: 20 INJECTION, SOLUTION EPIDURAL; INFILTRATION; INTRACAUDAL; PERINEURAL at 07:52

## 2017-07-03 RX ADMIN — Medication 2 G: at 08:08

## 2017-07-03 RX ADMIN — SODIUM CHLORIDE, SODIUM LACTATE, POTASSIUM CHLORIDE, AND CALCIUM CHLORIDE 125 ML/HR: 600; 310; 30; 20 INJECTION, SOLUTION INTRAVENOUS at 06:21

## 2017-07-03 RX ADMIN — SODIUM CHLORIDE, SODIUM LACTATE, POTASSIUM CHLORIDE, AND CALCIUM CHLORIDE: 600; 310; 30; 20 INJECTION, SOLUTION INTRAVENOUS at 08:16

## 2017-07-03 RX ADMIN — ONDANSETRON 4 MG: 2 INJECTION INTRAMUSCULAR; INTRAVENOUS at 08:50

## 2017-07-03 RX ADMIN — FENTANYL CITRATE 50 MCG: 50 INJECTION, SOLUTION INTRAMUSCULAR; INTRAVENOUS at 08:47

## 2017-07-03 RX ADMIN — ROCURONIUM BROMIDE 10 MG: 10 INJECTION, SOLUTION INTRAVENOUS at 08:04

## 2017-07-03 RX ADMIN — ROCURONIUM BROMIDE 35 MG: 10 INJECTION, SOLUTION INTRAVENOUS at 07:52

## 2017-07-03 RX ADMIN — HYDROMORPHONE HYDROCHLORIDE 0.5 MG: 1 INJECTION, SOLUTION INTRAMUSCULAR; INTRAVENOUS; SUBCUTANEOUS at 09:52

## 2017-07-03 NOTE — DISCHARGE INSTRUCTIONS
Cholecystectomy: What to Expect at [address] [place] [address]  After your surgery, it is normal to feel weak and tired for several days after you return home. Your belly may be swollen. If you had laparoscopic surgery, you may also have pain in your shoulder for about 24 hours. You may have gas or need to burp a lot at first, and a few people get diarrhea. The diarrhea usually goes away in 2 to 4 weeks, but it may last longer. How quickly you recover depends on whether you had a laparoscopic or open surgery. · For a laparoscopic surgery, most people can go back to work or their normal routine in 1 to 2 weeks, but it may take longer, depending on the type of work you do. · For an open surgery, it will probably take 4 to 6 weeks before you get back to your normal routine. This care sheet gives you a general idea about how long it will take for you to recover. However, each person recovers at a different pace. Follow the steps below to get better as quickly as possible. How can you care for yourself at home? Activity  · Rest when you feel tired. Getting enough sleep will help you recover. · Try to walk each day. Start out by walking a little more than you did the day before. Gradually increase the amount you walk. Walking boosts blood flow and helps prevent pneumonia and constipation. · For about 2 to 4 weeks, avoid lifting anything that would make you strain. This may include a child, heavy grocery bags and milk containers, a heavy briefcase or backpack, cat litter or dog food bags, or a vacuum . · Avoid strenuous activities, such as biking, jogging, weightlifting, and aerobic exercise, until your doctor says it is okay. · You may shower 24 to 48 hours after surgery, if your doctor okays it. Pat the cut (incision) dry. Do not take a bath for the first 2 weeks, or until your doctor tells you it is okay.   · You may drive when you are no longer taking pain medicine and can quickly move your foot from the gas pedal to the brake. You must also be able to sit comfortably for a long period of time, even if you do not plan to go far. You might get caught in traffic. · For a laparoscopic surgery, most people can go back to work or their normal routine in 1 to 2 weeks, but it may take longer. For an open surgery, it will probably take 4 to 6 weeks before you get back to your normal routine. · Your doctor will tell you when you can have sex again. Diet  · Eat smaller meals more often instead of fewer larger meals. You can eat a normal diet, but avoid eating fatty foods for about 1 month. Fatty foods include hamburger, whole milk, cheese, and many snack foods. If your stomach is upset, try bland, low-fat foods like plain rice, broiled chicken, toast, and yogurt. · Drink plenty of fluids (unless your doctor tells you not to). · If you have diarrhea, try avoiding spicy foods, dairy products, fatty foods, and alcohol. You can also watch to see if specific foods cause it, and stop eating them. If the diarrhea continues for more than 2 weeks, talk to your doctor. · You may notice that your bowel movements are not regular right after your surgery. This is common. Try to avoid constipation and straining with bowel movements. You may want to take a fiber supplement every day. If you have not had a bowel movement after a couple of days, ask your doctor about taking a mild laxative. Medicines  · Your doctor will tell you if and when you can restart your medicines. He or she will also give you instructions about taking any new medicines. · If you take blood thinners, such as warfarin (Coumadin), clopidogrel (Plavix), or aspirin, be sure to talk to your doctor. He or she will tell you if and when to start taking those medicines again. Make sure that you understand exactly what your doctor wants you to do. · Take pain medicines exactly as directed.   ¨ If the doctor gave you a prescription medicine for pain, take it as prescribed. ¨ If you are not taking a prescription pain medicine, take an over-the-counter medicine such as acetaminophen (Tylenol), ibuprofen (Advil, Motrin), or naproxen (Aleve). Read and follow all instructions on the label. ¨ Do not take two or more pain medicines at the same time unless the doctor told you to. Many pain medicines contain acetaminophen, which is Tylenol. Too much Tylenol can be harmful. · If you think your pain medicine is making you sick to your stomach:  ¨ Take your medicine after meals (unless your doctor tells you not to). ¨ Ask your doctor for a different pain medicine. · If your doctor prescribed antibiotics, take them as directed. Do not stop taking them just because you feel better. You need to take the full course of antibiotics. Incision care  · If you have strips of tape on the incision, or cut, leave the tape on for a week or until it falls off. · After 24 to 48 hours, wash the area daily with warm, soapy water, and pat it dry. · You may have staples to hold the cut together. Keep them dry until your doctor takes them out. This is usually in 7 to 10 days. · Keep the area clean and dry. You may cover it with a gauze bandage if it weeps or rubs against clothing. Change the bandage every day. Ice  · To reduce swelling and pain, put ice or a cold pack on your belly for 10 to 20 minutes at a time. Do this every 1 to 2 hours. Put a thin cloth between the ice and your skin. Follow-up care is a key part of your treatment and safety. Be sure to make and go to all appointments, and call your doctor if you are having problems. It's also a good idea to know your test results and keep a list of the medicines you take. When should you call for help? Call 911 anytime you think you may need emergency care. For example, call if:  · You passed out (lost consciousness). · You have severe trouble breathing. · You have sudden chest pain and shortness of breath, or you cough up blood.   Call your doctor now or seek immediate medical care if:  · You are sick to your stomach and cannot drink fluids. · You have pain that does not get better when you take your pain medicine. · You have signs of infection, such as:  ¨ Increased pain, swelling, warmth, or redness. ¨ Red streaks leading from the incision. ¨ Pus draining from the incision. ¨ Swollen lymph nodes in your neck, armpits, or groin. ¨ A fever. · Your urine turns dark brown or your stool is light-colored or adriana-colored. · Your skin or the whites of your eyes turn yellow. · Bright red blood has soaked through a large bandage over your incision. · You have signs of a blood clot, such as:  ¨ Pain in your calf, back of knee, thigh, or groin. ¨ Redness and swelling in your leg or groin. · You have trouble passing urine or stool, especially if you have mild pain or swelling in your lower belly. Watch closely for any changes in your health, and be sure to contact your doctor if:  · You had a laparoscopic surgery and your shoulder pain lasts more than 24 hours. · You do not have a bowel movement after taking a laxative. Where can you learn more? Go to http://cailin-case.info/. Enter 637 20 [id number] in the search box to learn more about \"Cholecystectomy: What to Expect at Home. \"  Current as of: August 9, 2016  Content Version: 11.3  © 8029-7954 [employer]. Care instructions adapted under license by [employer] (which disclaims liability or warranty for this information). If you have questions about a medical condition or this instruction, always ask your healthcare professional. [place] [address] any warranty or liability for your use of this information. DISCHARGE SUMMARY from Nurse    The following personal items are in your possession at time of discharge:    Dental Appliances: None  Visual Aid: Glasses, With patient     Home Medications: None  Jewelry: None  Clothing:  Footwear, Pants, Shirt, Socks, Undergarments (given to Gemini Eugene )  Other Valuables: Eyeglasses (given to Gemini Eugene )             PATIENT INSTRUCTIONS:    After general anesthesia or intravenous sedation, for 24 hours or while taking prescription Narcotics:  · Limit your activities  · Do not drive and operate hazardous machinery  · Do not make important personal or business decisions  · Do  not drink alcoholic beverages  · If you have not urinated within 8 hours after discharge, please contact your surgeon on call. Report the following to your surgeon:  · Excessive pain, swelling, redness or odor of or around the surgical area  · Temperature over 100.5  · Nausea and vomiting lasting longer than 4 hours or if unable to take medications  · Any signs of decreased circulation or nerve impairment to extremity: change in color, persistent  numbness, tingling, coldness or increase pain  · Any questions        What to do at Home:  Recommended activity: Activity as tolerated and no driving for today,     If you experience any of the following symptoms: fever, chills or nausea vomiting  please follow up with your physician. *  Please give a list of your current medications to your Primary Care Provider. *  Please update this list whenever your medications are discontinued, doses are      changed, or new medications (including over-the-counter products) are added. *  Please carry medication information at all times in case of emergency situations. These are general instructions for a healthy lifestyle:    No smoking/ No tobacco products/ Avoid exposure to second hand smoke    Surgeon General's Warning:  Quitting smoking now greatly reduces serious risk to your health.     Obesity, smoking, and sedentary lifestyle greatly increases your risk for illness    A healthy diet, regular physical exercise & weight monitoring are important for maintaining a healthy lifestyle    You may be retaining fluid if you have a history of heart failure or if you experience any of the following symptoms:  Weight gain of 3 pounds or more overnight or 5 pounds in a week, increased swelling in our hands or feet or shortness of breath while lying flat in bed. Please call your doctor as soon as you notice any of these symptoms; do not wait until your next office visit. Recognize signs and symptoms of STROKE:    F-face looks uneven    A-arms unable to move or move unevenly    S-speech slurred or non-existent    T-time-call 911 as soon as signs and symptoms begin-DO NOT go       Back to bed or wait to see if you get better-TIME IS BRAIN. Warning Signs of HEART ATTACK     Call 911 if you have these symptoms:   Chest discomfort. Most heart attacks involve discomfort in the center of the chest that lasts more than a few minutes, or that goes away and comes back. It can feel like uncomfortable pressure, squeezing, fullness, or pain.  Discomfort in other areas of the upper body. Symptoms can include pain or discomfort in one or both arms, the back, neck, jaw, or stomach.  Shortness of breath with or without chest discomfort.  Other signs may include breaking out in a cold sweat, nausea, or lightheadedness. Don't wait more than five minutes to call 911 - MINUTES MATTER! Fast action can save your life. Calling 911 is almost always the fastest way to get lifesaving treatment. Emergency Medical Services staff can begin treatment when they arrive -- up to an hour sooner than if someone gets to the hospital by car. Patient armband removed and shredded    The discharge information has been reviewed with the patient and guardian. The patient and caregiver verbalized understanding. Discharge medications reviewed with the patient and caregiver and appropriate educational materials and side effects teaching were provided.

## 2017-07-03 NOTE — ANESTHESIA POSTPROCEDURE EVALUATION
Post-Anesthesia Evaluation and Assessment    Patient: Neli Pedersen MRN: [id number]  SSN: [id number]    YOB: 1945  Age: 66 y.o. Sex: male       Cardiovascular Function/Vital Signs  Visit Vitals    /85    Pulse 80    Temp 37.1 °C (98.8 °F)    Resp 13    Ht 5' 11\" (1.803 m)    Wt 90.5 kg (199 lb 9 oz)    SpO2 96%    BMI 27.83 kg/m2       Patient is status post general anesthesia for Procedure(s):  LAPAROSCOPIC CHOLECYSTECTOMY WITH INTRAOPERATIVE CHOLANGIOGRAM W/C-ARM . Nausea/Vomiting: None    Postoperative hydration reviewed and adequate. Pain:  Pain Scale 1: FLACC (07/03/17 1005)  Pain Intensity 1: 0 (07/03/17 1005)   Managed    Neurological Status:   Neuro (WDL): Within Defined Limits (07/03/17 1005)  Neuro  Neurologic State: Alert; Appropriate for age;Drowsy (07/03/17 1005)  LUE Motor Response: Purposeful (07/03/17 0940)  LLE Motor Response: Purposeful (07/03/17 0940)  RUE Motor Response: Purposeful (07/03/17 0940)  RLE Motor Response: Purposeful (07/03/17 0940)   At baseline    Mental Status and Level of Consciousness: Arousable    Pulmonary Status:   O2 Device: Nasal cannula (07/03/17 0931)   Adequate oxygenation and airway patent    Complications related to anesthesia: None    Post-anesthesia assessment completed.  No concerns      Signed By: Judy Yates CRNA     July 3, 2017

## 2017-07-03 NOTE — PERIOP NOTES
Verified no duplicate medications and noted on discharge instructions that pt is to resume Pradaxa tomorrow July 4, 2017 with Juan Luis Joseph RN

## 2017-07-03 NOTE — OP NOTES
[address] [place] [address]  OPERATIVE REPORT    Name:  Marianna Castro  MR#:  [id number]  :  1945  Account #:  [de-identified]  Date of Adm:  2017  Date of Surgery:  2017      PREOPERATIVE DIAGNOSIS: Acute on chronic calculous  cholecystitis. POSTOPERATIVE DIAGNOSIS: Acute on chronic calculous  cholecystitis. PROCEDURES PERFORMED: Laparoscopic cholecystectomy and  intraoperative cholangiogram.    SURGEON: Lalo Gomez MD    ANESTHESIA: General endotracheal.    ANESTHESIOLOGIST: Dr. Jimenez Spindle: 5 mL. FLUID REPLACEMENT: 1 L. DRAINS: Not any. SPECIMENS REMOVED: Gallbladder and its contents. COMPLICATIONS: Not any identified during the operative procedure. INDICATIONS FOR PROCEDURE: Bouts of severe right upper  quadrant abdominal pain consistent with acute cholecystitis. Multiple  stones seen in the gallbladder and, therefore, a chronic component  expected. The procedure, risks, benefits, and alternatives were  discussed with the patient and his wife. They fully understood and  agreed. PROCEDURE IN DETAIL: The patient was taken to the operating room  on July 3, 2017. On the operating table, satisfactory general  endotracheal anesthesia was introduced. The abdomen was properly  prepped and draped. A surgical pause was held, identifying the patient,  planned procedure. An infraumbilical incision was used to establish  pneumoperitoneum using Mack technique. Following this, a  subxiphoid 2 mm trocar and 2 lateral 5 mm ports were inserted. The  gallbladder was noted to be quite tense and distended. Needle  aspiration decompression was required. There was a suggestion of a  stone jammed in the neck of the gallbladder. The patient had dense  adhesions over it from the greater omentum, and that was adhered to  the wall of it.     After decompressing the gallbladder and dissecting away the omental  attachment, the cholecystoduodenal ligament was identified. Gallbladder was elevated over the surface of the liver, and the  cholecystoduodenal ligament was divided. The cystic duct was  identified and we entered the triangle of Calot. The cystic duct was  noted to be somewhat larger than normally expected. We were able to  dissect it out and place a clip proximally on the gallbladder side, then  an intraoperative cholangiogram was obtained. Although the flow of contrast into the duodenum was noted, the  common bile duct and the hepatic radicles appeared somewhat  enlarged and dilated. There was no clear filling defect noted. And images were submitted to our radiologist for further evaluation. At this point, the cystic duct was doubly clipped and divided. The cystic  artery was identified, doubly clipped, and divided with the Harmonic  scalpel. The gallbladder was then removed from its fossa in retrograde  fashion using the Harmonic scalpel. It was placed in an Endobag and  removed at the infraumbilical site. There were multiple large stones  noted in the gallbladder. At this point we reestablished  pneumoperitoneum, we checked for hemostasis and the gallbladder  fossa appeared to be perfectly dry, no signs of bowel or bladder leak,  and irrigation was completed. At this point, all trocars were removed. The fascia at the infraumbilical site was closed with interrupted 0  Vicryl, the skin closed at all 4 port sites with subcuticular stitches of 4-0  Monocryl and the skin staples. Following sterile dry dressings were applied. The sponge, needle, and instrument count were correct x2. Intraoperatively, the patient tolerated this procedure without difficulty  and was taken to the recovery room in satisfactory condition. Please note that prior to beginning this procedure, a surgical pause was  held, identifying the patient and planned procedure.         MD SHAHAB Taylor / Shira Singh  D:  07/03/2017   09:33  T:  07/03/2017   19:40  Job #:  [id number]

## 2017-07-03 NOTE — BRIEF OP NOTE
BRIEF OPERATIVE NOTE    Date of Procedure: 7/3/2017   Preoperative Diagnosis: ACUTE CHOLECYSTITIS  Postoperative Diagnosis: ACUTE CHOLECYSTITIS    Procedure(s):  LAPAROSCOPIC CHOLECYSTECTOMY WITH INTRAOPERATIVE CHOLANGIOGRAM W/C-ARM   Surgeon(s) and Role:     * Sherril Spatz, MD - Primary         Assistant Staff:       Surgical Staff:  Circ-1: Ran Connelly  Circ-2: Manuela Rick  Radiology Technician: Juliette Foy  Scrub Tech-1: Josephina Schirmer  Scrub Tech-2: Shanon Chavez  Event Time In   Incision Start 1160   Incision Close 0913     Anesthesia: General   Estimated Blood Loss: 5 ml  Specimens:   ID Type Source Tests Collected by Time Destination   1 : gallbladder and contents Preservative Gallbladder  Sherril Spatz, MD 7/3/2017 7353 Pathology      Findings: acute and chronic calculous cholecystitis   Complications: not any identified during surgery  Implants: * No implants in log *

## 2017-07-03 NOTE — IP AVS SNAPSHOT
Razamikel Heron 
 
 
 [facility] [address] 
[phone] Patient: Marcianne Blizzard MRN: [id number] RDP:6/3/2182 You are allergic to the following Allergen Reactions Scallops Nausea and Vomiting Recent Documentation Height Weight BMI Smoking Status 1.803 m 90.5 kg 27.83 kg/m2 Never Smoker Emergency Contacts Name Discharge Info Relation Home Work Mobile [address] CAREGIVER [3] Spouse [3] [phone] About your hospitalization You were admitted on:  July 3, 2017 You last received care in the:  [facility] PACU You were discharged on:  July 3, 2017 Unit phone number:  [phone] Why you were hospitalized Your primary diagnosis was:  Not on File Providers Seen During Your Hospitalizations Provider Role Specialty Primary office phone Wendee Curling, MD Attending Provider General Surgery [phone] Your Primary Care Physician (PCP) Primary Care Physician Office Phone Office Fax Amanda Factor [phone] Follow-up Information None Current Discharge Medication List  
  
ASK your doctor about these medications Dose & Instructions Dispensing Information Comments Morning Noon Evening Bedtime BONIVA 150 mg tablet Generic drug:  ibandronate Your last dose was: Your next dose is:    
   
   
 Dose:  150 mg Take 150 mg by mouth every thirty (30) days. Refills:  0  
     
   
   
   
  
 BYSTOLIC 10 mg tablet Generic drug:  nebivolol Your last dose was: Your next dose is:    
   
   
 Dose:  10 mg Take 10 mg by mouth daily. Refills:  0  
     
   
   
   
  
 CALCIUM 600 WITH VITAMIN D3 600 mg(1,500mg) -400 unit Cap Generic drug:  Calcium-Cholecalciferol (D3) Your last dose was: Your next dose is: Take  by mouth two (2) times a day. Refills:  0  
     
   
   
   
  
 dabigatran etexilate 75 mg capsule Commonly known as:  PRADAXA Your last dose was: Your next dose is:    
   
   
 Dose:  75 mg Take 1 Cap by mouth every twelve (12) hours. Indications: he has this med at home Quantity:  1 Cap Refills:  0  
     
   
   
   
  
 dilTIAZem  mg Tb24 tablet Commonly known as:  CARDIZEM LA Your last dose was: Your next dose is:    
   
   
 Dose:  360 mg Take 360 mg by mouth daily. Refills:  0  
     
   
   
   
  
 FLOMAX 0.4 mg capsule Generic drug:  tamsulosin Your last dose was: Your next dose is:    
   
   
 Dose:  0.8 mg Take 0.8 mg by mouth daily. Refills:  0  
     
   
   
   
  
 metFORMIN 1,000 mg tablet Commonly known as:  GLUCOPHAGE Your last dose was: Your next dose is:    
   
   
 Dose:  1000 mg Take 1,000 mg by mouth two (2) times daily (with meals). Refills:  0  
     
   
   
   
  
 multivitamin tablet Commonly known as:  ONE A DAY Your last dose was: Your next dose is:    
   
   
 Dose:  1 Tab Take 1 Tab by mouth daily. Refills:  0  
     
   
   
   
  
 NASONEX 50 mcg/actuation nasal spray Generic drug:  mometasone Your last dose was: Your next dose is:    
   
   
 Dose:  2 Spray 2 Sprays by Both Nostrils route daily. Refills:  0 SINGULAIR 10 mg tablet Generic drug:  montelukast  
   
Your last dose was: Your next dose is:    
   
   
 Dose:  10 mg Take 10 mg by mouth nightly. Refills:  0  
     
   
   
   
  
 TESTIM 50 mg/5 gram (1 %) gel Generic drug:  testosterone Your last dose was: Your next dose is:    
   
   
 Dose:  5 g  
5 g by TransDERmal route four (4) days a week. Refills:  0  
     
   
   
   
  
 VITAMIN C 500 mg tablet Generic drug:  ascorbic acid (vitamin C) Your last dose was: Your next dose is:    
   
   
 Dose:  500 mg Take 500 mg by mouth daily. Refills:  0 Discharge Instructions Cholecystectomy: What to Expect at [facility] Your Recovery After your surgery, it is normal to feel weak and tired for several days after you return home. Your belly may be swollen. If you had laparoscopic surgery, you may also have pain in your shoulder for about 24 hours. You may have gas or need to burp a lot at first, and a few people get diarrhea. The diarrhea usually goes away in 2 to 4 weeks, but it may last longer. How quickly you recover depends on whether you had a laparoscopic or open surgery. · For a laparoscopic surgery, most people can go back to work or their normal routine in 1 to 2 weeks, but it may take longer, depending on the type of work you do. · For an open surgery, it will probably take 4 to 6 weeks before you get back to your normal routine. This care sheet gives you a general idea about how long it will take for you to recover. However, each person recovers at a different pace. Follow the steps below to get better as quickly as possible. How can you care for yourself at home? Activity · Rest when you feel tired. Getting enough sleep will help you recover. · Try to walk each day. Start out by walking a little more than you did the day before. Gradually increase the amount you walk. Walking boosts blood flow and helps prevent pneumonia and constipation. · For about 2 to 4 weeks, avoid lifting anything that would make you strain. This may include a child, heavy grocery bags and milk containers, a heavy briefcase or backpack, cat litter or dog food bags, or a vacuum . · Avoid strenuous activities, such as biking, jogging, weightlifting, and aerobic exercise, until your doctor says it is okay. · You may shower 24 to 48 hours after surgery, if your doctor okays it. Pat the cut (incision) dry. Do not take a bath for the first 2 weeks, or until your doctor tells you it is okay. · You may drive when you are no longer taking pain medicine and can quickly move your foot from the gas pedal to the brake. You must also be able to sit comfortably for a long period of time, even if you do not plan to go far. You might get caught in traffic. · For a laparoscopic surgery, most people can go back to work or their normal routine in 1 to 2 weeks, but it may take longer. For an open surgery, it will probably take 4 to 6 weeks before you get back to your normal routine. · Your doctor will tell you when you can have sex again. Diet · Eat smaller meals more often instead of fewer larger meals. You can eat a normal diet, but avoid eating fatty foods for about 1 month. Fatty foods include hamburger, whole milk, cheese, and many snack foods. If your stomach is upset, try bland, low-fat foods like plain rice, broiled chicken, toast, and yogurt. · Drink plenty of fluids (unless your doctor tells you not to). · If you have diarrhea, try avoiding spicy foods, dairy products, fatty foods, and alcohol. You can also watch to see if specific foods cause it, and stop eating them. If the diarrhea continues for more than 2 weeks, talk to your doctor. · You may notice that your bowel movements are not regular right after your surgery. This is common. Try to avoid constipation and straining with bowel movements. You may want to take a fiber supplement every day. If you have not had a bowel movement after a couple of days, ask your doctor about taking a mild laxative. Medicines · Your doctor will tell you if and when you can restart your medicines. He or she will also give you instructions about taking any new medicines. · If you take blood thinners, such as warfarin (Coumadin), clopidogrel (Plavix), or aspirin, be sure to talk to your doctor.  He or she will tell you if and when to start taking those medicines again. Make sure that you understand exactly what your doctor wants you to do. · Take pain medicines exactly as directed. ¨ If the doctor gave you a prescription medicine for pain, take it as prescribed. ¨ If you are not taking a prescription pain medicine, take an over-the-counter medicine such as acetaminophen (Tylenol), ibuprofen (Advil, Motrin), or naproxen (Aleve). Read and follow all instructions on the label. ¨ Do not take two or more pain medicines at the same time unless the doctor told you to. Many pain medicines contain acetaminophen, which is Tylenol. Too much Tylenol can be harmful. · If you think your pain medicine is making you sick to your stomach: 
¨ Take your medicine after meals (unless your doctor tells you not to). ¨ Ask your doctor for a different pain medicine. · If your doctor prescribed antibiotics, take them as directed. Do not stop taking them just because you feel better. You need to take the full course of antibiotics. Incision care · If you have strips of tape on the incision, or cut, leave the tape on for a week or until it falls off. · After 24 to 48 hours, wash the area daily with warm, soapy water, and pat it dry. · You may have staples to hold the cut together. Keep them dry until your doctor takes them out. This is usually in 7 to 10 days. · Keep the area clean and dry. You may cover it with a gauze bandage if it weeps or rubs against clothing. Change the bandage every day. Ice · To reduce swelling and pain, put ice or a cold pack on your belly for 10 to 20 minutes at a time. Do this every 1 to 2 hours. Put a thin cloth between the ice and your skin. Follow-up care is a key part of your treatment and safety. Be sure to make and go to all appointments, and call your doctor if you are having problems. It's also a good idea to know your test results and keep a list of the medicines you take. When should you call for help? Call 911 anytime you think you may need emergency care. For example, call if: 
· You passed out (lost consciousness). · You have severe trouble breathing. · You have sudden chest pain and shortness of breath, or you cough up blood. Call your doctor now or seek immediate medical care if: 
· You are sick to your stomach and cannot drink fluids. · You have pain that does not get better when you take your pain medicine. · You have signs of infection, such as: 
¨ Increased pain, swelling, warmth, or redness. ¨ Red streaks leading from the incision. ¨ Pus draining from the incision. ¨ Swollen lymph nodes in your neck, armpits, or groin. ¨ A fever. · Your urine turns dark brown or your stool is light-colored or adriana-colored. · Your skin or the whites of your eyes turn yellow. · Bright red blood has soaked through a large bandage over your incision. · You have signs of a blood clot, such as: 
¨ Pain in your calf, back of knee, thigh, or groin. ¨ Redness and swelling in your leg or groin. · You have trouble passing urine or stool, especially if you have mild pain or swelling in your lower belly. Watch closely for any changes in your health, and be sure to contact your doctor if: 
· You had a laparoscopic surgery and your shoulder pain lasts more than 24 hours. · You do not have a bowel movement after taking a laxative. Where can you learn more? Go to http://cailin-case.info/. Enter [phone] in the search box to learn more about \"Cholecystectomy: What to Expect at Home. \" Current as of: August 9, 2016 Content Version: 11.3 © 6616-4103 [employer]. Care instructions adapted under license by [employer] (which disclaims liability or warranty for this information).  If you have questions about a medical condition or this instruction, always ask your healthcare professional. [employer] disclaims any warranty or liability for your use of this information. DISCHARGE SUMMARY from Nurse The following personal items are in your possession at time of discharge: 
 
Dental Appliances: None Visual Aid: Glasses, With patient Home Medications: None Jewelry: None Clothing: Footwear, Pants, Shirt, Socks, Undergarments (given to Leopoldo Dubois ) Other Valuables: Eyeglasses (given to Leopoldo Dubois ) PATIENT INSTRUCTIONS: 
 
 
F-face looks uneven A-arms unable to move or move unevenly S-speech slurred or non-existent T-time-call 911 as soon as signs and symptoms begin-DO NOT go Back to bed or wait to see if you get better-TIME IS BRAIN. Warning Signs of HEART ATTACK Call 911 if you have these symptoms: 
? Chest discomfort. Most heart attacks involve discomfort in the center of the chest that lasts more than a few minutes, or that goes away and comes back. It can feel like uncomfortable pressure, squeezing, fullness, or pain. ? Discomfort in other areas of the upper body. Symptoms can include pain or discomfort in one or both arms, the back, neck, jaw, or stomach. ? Shortness of breath with or without chest discomfort. ? Other signs may include breaking out in a cold sweat, nausea, or lightheadedness. Don't wait more than five minutes to call [address]! Fast action can save your life. Calling 911 is almost always the fastest way to get lifesaving treatment. Emergency Medical Services staff can begin treatment when they arrive  up to an hour sooner than if someone gets to the hospital by car. Patient armband removed and shredded The discharge information has been reviewed with the patient and guardian. The patient and caregiver verbalized understanding. Discharge medications reviewed with the patient and caregiver and appropriate educational materials and side effects teaching were provided. Discharge Orders None Introducing [facility]! Susanne Sanchez introduces [employer] patient portal. Now you can access parts of your medical record, email your doctor's office, and request medication refills online. 1. In your internet browser, go to [email or web address] 2. Click on the First Time User? Click Here link in the Sign In box. You will see the New Member Sign Up page. 3. Enter your GroupTie Access Code exactly as it appears below. You will not need to use this code after youve completed the sign-up process. If you do not sign up before the expiration date, you must request a new code. · [employer] Access Code: [email or web address] Expires: 9/18/2017  8:06 PM 
 
4. Enter the last four digits of your Social Security Number (xxxx) and Date of Birth (mm/dd/yyyy) as indicated and click Submit. You will be taken to the next sign-up page. 5. Create a GroupTie ID. This will be your GroupTie login ID and cannot be changed, so think of one that is secure and easy to remember. 6. Create a GroupTie password. You can change your password at any time. 7. Enter your Password Reset Question and Answer. This can be used at a later time if you forget your password. 8. Enter your e-mail address. You will receive e-mail notification when new information is available in [address] 9. Click Sign Up. You can now view and download portions of your medical record. 10. Click the Download Summary menu link to download a portable copy of your medical information. If you have questions, please visit the Frequently Asked Questions section of the Group[employer] website.  Remember, Group[employer]ie is NOT to be used for urgent needs. For medical emergencies, dial 911. Now available from your iPhone and Android! General Information Please provide this summary of care documentation to your next provider. Patient Signature:  ____________________________________________________________ Date:  ____________________________________________________________  
  
Arcelia Fines Provider Signature:  ____________________________________________________________ Date:  ____________________________________________________________

## 2017-07-03 NOTE — PERIOP NOTES
Dr Vj Joel stated there was a H&P on his chart from the pt's admitting MD.  I informed OR ANGELIKA Sosa that Dr Vj Joel needed to fill in a H&P and put a short form on the chart and asked her to please let Dr Vj Joel know I couldn't find any H&P for this admission

## 2017-07-03 NOTE — ANESTHESIA PREPROCEDURE EVALUATION
Anesthetic History   No history of anesthetic complications            Review of Systems / Medical History  Patient summary reviewed, nursing notes reviewed and pertinent labs reviewed    Pulmonary  Within defined limits                 Neuro/Psych   Within defined limits           Cardiovascular    Hypertension        Dysrhythmias : atrial fibrillation           GI/Hepatic/Renal  Within defined limits              Endo/Other  Within defined limits           Other Findings              Physical Exam    Airway  Mallampati: II  TM Distance: 4 - 6 cm  Neck ROM: normal range of motion   Mouth opening: Diminished (comment)     Cardiovascular  Regular rate and rhythm,  S1 and S2 normal,  no murmur, click, rub, or gallop             Dental  No notable dental hx       Pulmonary  Breath sounds clear to auscultation               Abdominal  Abdominal exam normal       Other Findings            Anesthetic Plan    ASA: 3  Anesthesia type: general          Induction: Intravenous  Anesthetic plan and risks discussed with: Family and Patient

## 2017-12-14 ENCOUNTER — HOSPITAL ENCOUNTER (OUTPATIENT)
Dept: PREADMISSION TESTING | Age: 72
Discharge: HOME | End: 2017-12-14
Payer: MEDICARE

## 2017-12-14 DIAGNOSIS — Z01.818 PREOP EXAMINATION: ICD-10-CM

## 2017-12-14 LAB
ANION GAP SERPL CALC-SCNC: 7 MMOL/L (ref 3–18)
BUN SERPL-MCNC: 19 MG/DL (ref 7–18)
BUN/CREAT SERPL: 17 (ref 12–20)
CALCIUM SERPL-MCNC: 9.2 MG/DL (ref 8.5–10.1)
CHLORIDE SERPL-SCNC: 109 MMOL/L (ref 100–108)
CO2 SERPL-SCNC: 28 MMOL/L (ref 21–32)
CREAT SERPL-MCNC: 1.12 MG/DL (ref 0.6–1.3)
EST. AVERAGE GLUCOSE BLD GHB EST-MCNC: 103 MG/DL
GLUCOSE SERPL-MCNC: 133 MG/DL (ref 74–99)
HBA1C MFR BLD: 5.2 % (ref 4.5–5.6)
HCT VFR BLD AUTO: 48.1 % (ref 36–48)
HGB BLD-MCNC: 15.7 G/DL (ref 13–16)
POTASSIUM SERPL-SCNC: 4 MMOL/L (ref 3.5–5.5)
SODIUM SERPL-SCNC: 144 MMOL/L (ref 136–145)

## 2017-12-14 PROCEDURE — 93005 ELECTROCARDIOGRAM TRACING: CPT

## 2017-12-14 PROCEDURE — 80048 BASIC METABOLIC PNL TOTAL CA: CPT | Performed by: UROLOGY

## 2017-12-14 PROCEDURE — 36415 COLL VENOUS BLD VENIPUNCTURE: CPT | Performed by: UROLOGY

## 2017-12-14 PROCEDURE — 83036 HEMOGLOBIN GLYCOSYLATED A1C: CPT | Performed by: UROLOGY

## 2017-12-14 PROCEDURE — 85018 HEMOGLOBIN: CPT | Performed by: UROLOGY

## 2017-12-15 LAB
ATRIAL RATE: 150 BPM
CALCULATED R AXIS, ECG10: 95 DEGREES
CALCULATED T AXIS, ECG11: 24 DEGREES
DIAGNOSIS, 93000: NORMAL
Q-T INTERVAL, ECG07: 406 MS
QRS DURATION, ECG06: 144 MS
QTC CALCULATION (BEZET), ECG08: 485 MS
VENTRICULAR RATE, ECG03: 86 BPM

## 2017-12-27 ENCOUNTER — ANESTHESIA EVENT (OUTPATIENT)
Dept: SURGERY | Age: 72
End: 2017-12-27
Payer: MEDICARE

## 2017-12-27 RX ORDER — INSULIN LISPRO 100 [IU]/ML
INJECTION, SOLUTION INTRAVENOUS; SUBCUTANEOUS ONCE
Status: CANCELLED | OUTPATIENT
Start: 2017-12-27 | End: 2017-12-28

## 2017-12-27 RX ORDER — DEXTROSE 50 % IN WATER (D50W) INTRAVENOUS SYRINGE
25-50 AS NEEDED
Status: CANCELLED | OUTPATIENT
Start: 2017-12-27

## 2017-12-27 NOTE — H&P
A    Urology [address], [address] [facility]  phone: [phone]  fax: [phone]          Patient: Chirag Nunez  YOB: 1945  Date: 12/26/2017 6:22 PM   Visit Type: PreOp      Assessment/Plan  # Detail Type Description    1. Assessment Elevated prostate specific antigen [PSA] (R97.20). Patient Plan His has jumped significanlty, he had UTI at that time. His PSA is now 17.41. I will repeat in 2-3 months         2. Assessment Enlarged prostate with lower urinary tract symptoms (N40.1), chronic. Patient Plan Pt with hx of urinary sxs which have been slowly worsening. He has evidence of obstruction of cystoscopy  in addition he has bladder stone as well. Patient underwent a workup today, he is a good candidate for Urolift. I will also use holmium laser to treat  All risks including pain infection bleeding were reviewed as well as need for a saldana and he wishes to proceed         3. Assessment Frequency of micturition (R35.0). 4. Assessment Urge incontinence (N39.41). 5. Assessment Urgency of urination (R39.15). 6. 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 7. Assessment Other male erectile dysfunction (N52.8). 8. Assessment Erythrocytosis (D75.1). 9. Assessment Essential (primary) hypertension (I10). Patient Plan  This represents a risk factor and likely is a cause of the patients low testosterone and ED        10. Assessment Mixed hyperlipidemia (E78.2). Patient Plan  This represents a risk factor and likely is a cause of the patients low testosterone and ED        11. Assessment Chronic kidney disease, stage 3 (moderate) (N18.3). Patient Plan  This represents a risk factor and likely is a cause of the patients low testosterone and ED                    This 67year old male presents for BPH, Elevated PSA and Hypogonadism. History of Present Illness:  1.   BPH      Onset was gradual. It occurs intermittently. The problem is with no change. Reviewed today was a PSA taken on 08/03/2015 with findings of 5.3 ng/mL, (18% free). Associated symptoms include hematuria, intermittent stream, nocturia, slow stream, urgency, urinary incontinence (urgency) and urinary frequency. Pertinent negatives include chills, constipation and fever. Additional information: Pt with hx of urinary sxs which have been slowly worsening. He was on Proscar many years ago. He underwent workup revealing evidence of obstruction from BPH as well as a bladder stone. .      2.  Elevated PSA      The onset of symptoms was gradual. The problem is currently stable. Reviewed today was a PSA taken on 11/08/2017 with findings of 17.41 ng/mL. Pertinent history includes age over 48, family history of prostate cancer, prior prostatitis and prior UTIs. The patient does not report modifying factors such as medication, instrumentation, recent ejaculation or recent 5 BARRY. Associated symptoms include hematuria, urinary incontinence, intermittent urinary stream, nocturia , slow stream, urinary urgency and frequent urination. Additional information: no recent UTI, the patient is not sexually active, His has jumped significanlty and he had UTI at that time. His PSA is now 17.41. I will repeat in 2-3 months. 3.  Hypogonadism      The symptoms began gradually and have been moderate. The patient is here today for a follow up visit. The patient states he does have difficultly attaining an erection and has difficulty maintaining an erection. Pertinent history includes use of antihypertensives, hyperlipidemia and hypertension but not diabetes or neurologic disease. Reviewed today was a Testosterone taken on 11/08/2017 with findings of 518 ng/dL. He also complains of difficulty achieving erections and difficulty maintaining erections but denies depression. Additional information: Pt with hypogonadism. He is doing well.   He is seeing a Hematologist for high iron levels. He cont to do well using Testim  He had a recent bone density now which shows he has no osteoporosis and now has osteopenia. He is followed by hematology. Chronic conditions addressed today:  Diagnosis Description Code Status HPI Comments   Enlarged prostate with lower urinary tract symptoms N40.1     Testicular hypofunction E29.1         PROBLEM LIST:  Problem Description Onset Date   Allergic rhinitis 05/16/2013   Impaired fasting glycaemia 11/16/2012   Impotence of organic origin 12/19/2012   Testicular hypofunction 11/01/2011   Proteinuria 11/01/2011   Secondary polycythemia 11/01/2011   Actinic keratosis 11/01/2011   Degenerative joint disease involving multiple joints 11/01/2011   Urolith 05/18/2009   Benign prostatic hyperplasia 05/18/2009   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 GI Negative Constipation.  Positive Difficulty achieving erections, Difficulty maintaining erections, Hematuria, Intermittent urinary stream, Nocturia, Slow stream, Urgency, Urinary frequency, Urinary incontinence. Psych Negative Depression. Physical Exam  Exam Findings Details   Constitutional Normal Well developed. Neck Exam Normal Inspection - Normal.   Respiratory Normal Inspection - Normal.   Extremity Normal No edema. Neurological Normal Alert and oriented to person, place and time. Cranial nerves intact.   No motor or sensory deficits. Psychiatric Normal Oriented to time, place, person and situation. Appropriate mood and affect. Medications (added, continued, or stopped today)  Started Medication Directions Instruction Stopped    Bystolic 10 mg tablet take 1 Tablet by oral route  every day Rx by cardiology     Cardizem  mg capsule,extended release take 1 capsule (360MG)  by oral route  every day Rx by cardiology    12/04/2017 Cialis 5 mg tablet take 1 tablet by oral route  every day     10/16/2017 Fosamax 70 mg tablet take 1 tablet by oral route  every week in the morning, at least 30 min before first food, beverage, or medication of day     12/04/2017 metformin 500 mg tablet take 1 tablet by oral route 2 times every day with morning and evening meals     10/17/2013 multivitamin Cap take 1 capsule by ORAL route  every day     06/01/2017 Nasonex 50 mcg/actuation Spray INSTILL 2 SPRAYS IN EACH NOSTRIL ONCE DAILY      Os-Nile 500 + D3 500 mg (1,250 mg)-200 unit tablet take 1 by Oral route 2 times every day      Pradaxa 150 mg capsule take 1 capsule (150MG)  by oral route 2 times every day Rx by cardiology    06/01/2017 Singulair 10 mg tablet take 1 tablet by oral route  every day in the evening     12/18/2017 TAMSULOSIN HCL 0.4 MG CAPSULE TAKE TWO CAPSULES BY MOUTH DAILY 1/2 HOUR FOLLOWING THE SAME MEAL EACH DAY     10/16/2017 Testim 50 mg/5 gram (1 %) transdermal gel apply 1 Tube by Topical route once a day 4 times per week Called in to [facility]. DAVID    11/04/2009 VITAMIN C      06/01/2017 Voltaren 1 % topical gel apply (2G)  by topical route 4 times every day to the affected area(s)     Completed by:        Keisha Zapata  12/26/2017 6:37 PM   Document generated by:  Fabián Forbes 12/26/2017 06:37 PM      -----------------------------------------------------------------------------------------------------------

## 2017-12-28 ENCOUNTER — ANESTHESIA (OUTPATIENT)
Dept: SURGERY | Age: 72
End: 2017-12-28
Payer: MEDICARE

## 2017-12-28 ENCOUNTER — HOSPITAL ENCOUNTER (OUTPATIENT)
Age: 72
Setting detail: OUTPATIENT SURGERY
Discharge: HOME | End: 2017-12-28
Attending: UROLOGY | Admitting: UROLOGY
Payer: MEDICARE

## 2017-12-28 VITALS
SYSTOLIC BLOOD PRESSURE: 126 MMHG | RESPIRATION RATE: 18 BRPM | OXYGEN SATURATION: 97 % | WEIGHT: 190.31 LBS | TEMPERATURE: 97.9 F | HEIGHT: 71 IN | BODY MASS INDEX: 26.64 KG/M2 | DIASTOLIC BLOOD PRESSURE: 78 MMHG | HEART RATE: 90 BPM

## 2017-12-28 LAB — GLUCOSE BLD STRIP.AUTO-MCNC: 120 MG/DL (ref 70–110)

## 2017-12-28 PROCEDURE — 74011250636 HC RX REV CODE- 250/636: Performed by: UROLOGY

## 2017-12-28 PROCEDURE — 77030018836 HC SOL IRR NACL ICUM -A: Performed by: UROLOGY

## 2017-12-28 PROCEDURE — C1758 CATHETER, URETERAL: HCPCS | Performed by: UROLOGY

## 2017-12-28 PROCEDURE — 74011250636 HC RX REV CODE- 250/636

## 2017-12-28 PROCEDURE — 76060000032 HC ANESTHESIA 0.5 TO 1 HR: Performed by: UROLOGY

## 2017-12-28 PROCEDURE — 74011000250 HC RX REV CODE- 250

## 2017-12-28 PROCEDURE — 76210000006 HC OR PH I REC 0.5 TO 1 HR: Performed by: UROLOGY

## 2017-12-28 PROCEDURE — 77030010103 HC SEAL PRT ENDOSC OCOA -B: Performed by: UROLOGY

## 2017-12-28 PROCEDURE — 76210000021 HC REC RM PH II 0.5 TO 1 HR: Performed by: UROLOGY

## 2017-12-28 PROCEDURE — 77030005520 HC CATH URETH FOL38 BARD -A: Performed by: UROLOGY

## 2017-12-28 PROCEDURE — 82962 GLUCOSE BLOOD TEST: CPT

## 2017-12-28 PROCEDURE — C1769 GUIDE WIRE: HCPCS | Performed by: UROLOGY

## 2017-12-28 PROCEDURE — 74011250637 HC RX REV CODE- 250/637: Performed by: ANESTHESIOLOGY

## 2017-12-28 PROCEDURE — 76010000138 HC OR TIME 0.5 TO 1 HR: Performed by: UROLOGY

## 2017-12-28 PROCEDURE — 77030020782 HC GWN BAIR PAWS FLX 3M -B: Performed by: UROLOGY

## 2017-12-28 PROCEDURE — 82360 CALCULUS ASSAY QUANT: CPT | Performed by: UROLOGY

## 2017-12-28 PROCEDURE — 77030012508 HC MSK AIRWY LMA AMBU -A: Performed by: ANESTHESIOLOGY

## 2017-12-28 PROCEDURE — L8699 PROSTHETIC IMPLANT NOS: HCPCS | Performed by: UROLOGY

## 2017-12-28 PROCEDURE — 74011250637 HC RX REV CODE- 250/637: Performed by: UROLOGY

## 2017-12-28 DEVICE — SYSTEM URO W/ IMPL DEL DEV FOR TREAT OF URIN OUTFLO: Type: IMPLANTABLE DEVICE | Site: PROSTATE | Status: FUNCTIONAL

## 2017-12-28 RX ORDER — MIDAZOLAM HYDROCHLORIDE 1 MG/ML
INJECTION, SOLUTION INTRAMUSCULAR; INTRAVENOUS AS NEEDED
Status: DISCONTINUED | OUTPATIENT
Start: 2017-12-28 | End: 2017-12-28 | Stop reason: HOSPADM

## 2017-12-28 RX ORDER — PROPOFOL 10 MG/ML
INJECTION, EMULSION INTRAVENOUS AS NEEDED
Status: DISCONTINUED | OUTPATIENT
Start: 2017-12-28 | End: 2017-12-28 | Stop reason: HOSPADM

## 2017-12-28 RX ORDER — SODIUM CHLORIDE 0.9 % (FLUSH) 0.9 %
5-10 SYRINGE (ML) INJECTION AS NEEDED
Status: CANCELLED | OUTPATIENT
Start: 2017-12-28

## 2017-12-28 RX ORDER — HYDROMORPHONE HYDROCHLORIDE 2 MG/ML
0.5 INJECTION, SOLUTION INTRAMUSCULAR; INTRAVENOUS; SUBCUTANEOUS
Status: DISCONTINUED | OUTPATIENT
Start: 2017-12-28 | End: 2017-12-28 | Stop reason: HOSPADM

## 2017-12-28 RX ORDER — SODIUM CHLORIDE, SODIUM LACTATE, POTASSIUM CHLORIDE, CALCIUM CHLORIDE 600; 310; 30; 20 MG/100ML; MG/100ML; MG/100ML; MG/100ML
125 INJECTION, SOLUTION INTRAVENOUS CONTINUOUS
Status: DISCONTINUED | OUTPATIENT
Start: 2017-12-28 | End: 2017-12-28 | Stop reason: HOSPADM

## 2017-12-28 RX ORDER — ATROPA BELLADONNA AND OPIUM 16.2; 3 MG/1; MG/1
SUPPOSITORY RECTAL AS NEEDED
Status: DISCONTINUED | OUTPATIENT
Start: 2017-12-28 | End: 2017-12-28 | Stop reason: HOSPADM

## 2017-12-28 RX ORDER — SODIUM CHLORIDE 0.9 % (FLUSH) 0.9 %
5-10 SYRINGE (ML) INJECTION AS NEEDED
Status: DISCONTINUED | OUTPATIENT
Start: 2017-12-28 | End: 2017-12-28 | Stop reason: HOSPADM

## 2017-12-28 RX ORDER — SODIUM CHLORIDE 0.9 % (FLUSH) 0.9 %
5-10 SYRINGE (ML) INJECTION EVERY 8 HOURS
Status: CANCELLED | OUTPATIENT
Start: 2017-12-28

## 2017-12-28 RX ORDER — FENTANYL CITRATE 50 UG/ML
INJECTION, SOLUTION INTRAMUSCULAR; INTRAVENOUS AS NEEDED
Status: DISCONTINUED | OUTPATIENT
Start: 2017-12-28 | End: 2017-12-28 | Stop reason: HOSPADM

## 2017-12-28 RX ORDER — FUROSEMIDE 10 MG/ML
INJECTION INTRAMUSCULAR; INTRAVENOUS AS NEEDED
Status: DISCONTINUED | OUTPATIENT
Start: 2017-12-28 | End: 2017-12-28 | Stop reason: HOSPADM

## 2017-12-28 RX ORDER — LEVOFLOXACIN 5 MG/ML
500 INJECTION, SOLUTION INTRAVENOUS ONCE
Status: COMPLETED | OUTPATIENT
Start: 2017-12-28 | End: 2017-12-28

## 2017-12-28 RX ORDER — TADALAFIL 5 MG/1
5 TABLET ORAL
COMMUNITY

## 2017-12-28 RX ORDER — INSULIN LISPRO 100 [IU]/ML
INJECTION, SOLUTION INTRAVENOUS; SUBCUTANEOUS ONCE
Status: DISCONTINUED | OUTPATIENT
Start: 2017-12-28 | End: 2017-12-28 | Stop reason: HOSPADM

## 2017-12-28 RX ORDER — HYDROCODONE BITARTRATE AND ACETAMINOPHEN 5; 325 MG/1; MG/1
1 TABLET ORAL
Status: COMPLETED | OUTPATIENT
Start: 2017-12-28 | End: 2017-12-28

## 2017-12-28 RX ORDER — ONDANSETRON 2 MG/ML
INJECTION INTRAMUSCULAR; INTRAVENOUS AS NEEDED
Status: DISCONTINUED | OUTPATIENT
Start: 2017-12-28 | End: 2017-12-28 | Stop reason: HOSPADM

## 2017-12-28 RX ORDER — LIDOCAINE HYDROCHLORIDE 20 MG/ML
INJECTION, SOLUTION EPIDURAL; INFILTRATION; INTRACAUDAL; PERINEURAL AS NEEDED
Status: DISCONTINUED | OUTPATIENT
Start: 2017-12-28 | End: 2017-12-28 | Stop reason: HOSPADM

## 2017-12-28 RX ORDER — DEXTROSE 50 % IN WATER (D50W) INTRAVENOUS SYRINGE
25-50 AS NEEDED
Status: DISCONTINUED | OUTPATIENT
Start: 2017-12-28 | End: 2017-12-28 | Stop reason: HOSPADM

## 2017-12-28 RX ADMIN — MIDAZOLAM HYDROCHLORIDE 2 MG: 1 INJECTION, SOLUTION INTRAMUSCULAR; INTRAVENOUS at 11:06

## 2017-12-28 RX ADMIN — FENTANYL CITRATE 25 MCG: 50 INJECTION, SOLUTION INTRAMUSCULAR; INTRAVENOUS at 11:25

## 2017-12-28 RX ADMIN — PROPOFOL 50 MG: 10 INJECTION, EMULSION INTRAVENOUS at 11:49

## 2017-12-28 RX ADMIN — FUROSEMIDE 10 MG: 10 INJECTION INTRAMUSCULAR; INTRAVENOUS at 11:49

## 2017-12-28 RX ADMIN — LIDOCAINE HYDROCHLORIDE 60 MG: 20 INJECTION, SOLUTION EPIDURAL; INFILTRATION; INTRACAUDAL; PERINEURAL at 11:12

## 2017-12-28 RX ADMIN — HYDROCODONE BITARTRATE AND ACETAMINOPHEN 1 TABLET: 5; 325 TABLET ORAL at 13:41

## 2017-12-28 RX ADMIN — ONDANSETRON 4 MG: 2 INJECTION INTRAMUSCULAR; INTRAVENOUS at 11:18

## 2017-12-28 RX ADMIN — SODIUM CHLORIDE, SODIUM LACTATE, POTASSIUM CHLORIDE, AND CALCIUM CHLORIDE 125 ML/HR: 600; 310; 30; 20 INJECTION, SOLUTION INTRAVENOUS at 12:33

## 2017-12-28 RX ADMIN — SODIUM CHLORIDE, SODIUM LACTATE, POTASSIUM CHLORIDE, AND CALCIUM CHLORIDE 125 ML/HR: 600; 310; 30; 20 INJECTION, SOLUTION INTRAVENOUS at 10:36

## 2017-12-28 RX ADMIN — LEVOFLOXACIN 500 MG: 5 INJECTION, SOLUTION INTRAVENOUS at 11:06

## 2017-12-28 RX ADMIN — SODIUM CHLORIDE, SODIUM LACTATE, POTASSIUM CHLORIDE, AND CALCIUM CHLORIDE: 600; 310; 30; 20 INJECTION, SOLUTION INTRAVENOUS at 11:41

## 2017-12-28 RX ADMIN — FENTANYL CITRATE 25 MCG: 50 INJECTION, SOLUTION INTRAMUSCULAR; INTRAVENOUS at 11:28

## 2017-12-28 RX ADMIN — FENTANYL CITRATE 25 MCG: 50 INJECTION, SOLUTION INTRAMUSCULAR; INTRAVENOUS at 11:23

## 2017-12-28 RX ADMIN — PROPOFOL 150 MG: 10 INJECTION, EMULSION INTRAVENOUS at 11:12

## 2017-12-28 RX ADMIN — FENTANYL CITRATE 25 MCG: 50 INJECTION, SOLUTION INTRAMUSCULAR; INTRAVENOUS at 11:11

## 2017-12-28 NOTE — BRIEF OP NOTE
BRIEF OPERATIVE NOTE    Date of Procedure: 12/28/2017   Preoperative Diagnosis: BENIGN PROSTATE HYPERTROPHY W/OBSTRUCTION, BLADDER STONES  Postoperative Diagnosis: BENIGN PROSTATE HYPERTROPHY W/OBSTRUCTION, BLADDER STONES    Procedure(s):  UROLIFT,  1+5  CYSTOLITHALOPAXY W/HOLMIUM \"SPEC POP\"  Surgeon(s) and Role:     * Cherrie Crump MD - Primary         Assistant Staff:       Surgical Staff:  Circ-1: Clive Scott RN  Scrub Tech-1: Ivette Moritz  Surg Asst-1: Nancy Flores  Float Staff: Reji Faith RN  Event Time In   Incision Start 1116   Incision Close      Anesthesia: General   Estimated Blood Loss: minimal  Specimens:   ID Type Source Tests Collected by Time Destination   1 : bladder stones Preservative Bladder  Cherrie Crump MD 12/28/2017 1142 Pathology      Findings: enlarged lateral lobes, 2cm bladder stone, cellules and trabeculations   Complications: none  Implants:   Implant Name Type Inv.  Item Serial No.  Lot No. LRB No. Used Action   IMPL UROL UROLIFT SYSTEM -- NEOTRACT - [id number]  IMPL UROL UROLIFT SYSTEM -- NEOTRACT  [employer] [id number] N/A 2 Implanted   IMPL [place] [address] -- NEOTRACT - [id number]   [address] -- NEOTRACT   [employer] [id number] N/A 4 Implanted

## 2017-12-28 NOTE — INTERVAL H&P NOTE
H&P Update:  Ale Mosley was seen and examined. History and physical has been reviewed. The patient has been examined.  There have been no significant clinical changes since the completion of the originally dated History and Physical.    Signed By: Lesly Liriano MD     December 28, 2017 11:08 AM

## 2017-12-28 NOTE — ANESTHESIA PREPROCEDURE EVALUATION
Anesthetic History   No history of anesthetic complications            Review of Systems / Medical History  Patient summary reviewed, nursing notes reviewed and pertinent labs reviewed    Pulmonary  Within defined limits                 Neuro/Psych   Within defined limits           Cardiovascular    Hypertension              Exercise tolerance: >4 METS     GI/Hepatic/Renal  Within defined limits              Endo/Other        Arthritis     Other Findings              Physical Exam    Airway  Mallampati: III  TM Distance: 4 - 6 cm  Neck ROM: decreased range of motion   Mouth opening: Normal     Cardiovascular    Rhythm: irregular  Rate: normal         Dental         Pulmonary                 Abdominal  GI exam deferred       Other Findings            Anesthetic Plan    ASA: 3  Anesthesia type: general          Induction: Intravenous  Anesthetic plan and risks discussed with: Patient

## 2017-12-28 NOTE — ANESTHESIA POSTPROCEDURE EVALUATION
Post-Anesthesia Evaluation and Assessment    Patient: Filemon Byers MRN: [id number]  SSN: [id number]    YOB: 1945  Age: 67 y.o. Sex: male       Cardiovascular Function/Vital Signs  Visit Vitals    /76    Pulse 93    Temp 36.2 °C (97.2 °F)    Resp 13    Ht 5' 11\" (1.803 m)    Wt 86.3 kg (190 lb 5 oz)    SpO2 99%    BMI 26.54 kg/m2       Patient is status post general anesthesia for Procedure(s):  UROLIFT,   CYSTOLITHALOPAXY W/HOLMIUM \"SPEC POP\". Nausea/Vomiting: None    Postoperative hydration reviewed and adequate. Pain:  Pain Scale 1: Numeric (0 - 10) (12/28/17 1235)  Pain Intensity 1: 0 (12/28/17 1235)   Managed    Neurological Status:   Neuro (WDL): Exceptions to WDL (12/28/17 1205)  Neuro  Neurologic State: Drowsy (12/28/17 1205)  LUE Motor Response: Purposeful (12/28/17 1205)  LLE Motor Response: Purposeful (12/28/17 1205)  RUE Motor Response: Purposeful (12/28/17 1205)  RLE Motor Response: Purposeful (12/28/17 1205)   At baseline    Mental Status and Level of Consciousness: Arousable    Pulmonary Status:   O2 Device: Nasal cannula (12/28/17 1235)   Adequate oxygenation and airway patent    Complications related to anesthesia: None    Post-anesthesia assessment completed.  No concerns        Signed By: Librado Sun CRNA     December 28, 2017

## 2017-12-28 NOTE — IP AVS SNAPSHOT
Lavaun Jeans 
 
 
 [address] [facility] [address] 
[phone] Patient: Oscar Huddleston MRN: [id number] AQV:8/9/1974 About your hospitalization You were admitted on:  December 28, 2017 You last received care in the:  [facility] PACU You were discharged on:  December 28, 2017 Why you were hospitalized Your primary diagnosis was:  Not on File Things You Need To Do (next 8 weeks) Follow up with Kierra Pineda MD  
  
Phone:  [phone] Where:  [address] [facility] 80 Follow up with Cherrie Crump MD  
  
Phone:  [phone] Where:  [address] [facility], [facility] 82 [facility], [address] [address] Discharge Orders None A check miguel indicates which time of day the medication should be taken. My Medications TAKE these medications as instructed Instructions Each Dose to Equal  
 Morning Noon Evening Bedtime BONIVA 150 mg tablet Generic drug:  ibandronate Your last dose was: Your next dose is: Take 150 mg by mouth every thirty (30) days. 150 mg  
    
   
   
   
  
 BYSTOLIC 10 mg tablet Generic drug:  nebivolol Your last dose was: Your next dose is: Take 10 mg by mouth daily. 10 mg CALCIUM 600 WITH VITAMIN D3 600 mg(1,500mg) -400 unit Cap Generic drug:  Calcium-Cholecalciferol (D3) Your last dose was: Your next dose is: Take  by mouth two (2) times a day. CIALIS 5 mg tablet Generic drug:  tadalafil Your last dose was: Your next dose is: Take 5 mg by mouth. 5 mg  
    
   
   
   
  
 dabigatran etexilate 75 mg capsule Commonly known as:  PRADAXA Your last dose was: Your next dose is: Take 1 Cap by mouth every twelve (12) hours. Indications: he has this med at home  75 mg  
    
   
   
   
  
 dilTIAZem  mg Tb24 tablet Commonly known as:  CARDIZEM LA Your last dose was: Your next dose is: Take 360 mg by mouth daily. 360 mg  
    
   
   
   
  
 FLOMAX 0.4 mg capsule Generic drug:  tamsulosin Your last dose was: Your next dose is: Take 0.8 mg by mouth daily. 0.8 mg  
    
   
   
   
  
 metFORMIN 1,000 mg tablet Commonly known as:  GLUCOPHAGE Your last dose was: Your next dose is: Take 1,000 mg by mouth two (2) times daily (with meals). 1000 mg  
    
   
   
   
  
 multivitamin tablet Commonly known as:  ONE A DAY Your last dose was: Your next dose is: Take 1 Tab by mouth daily. 1 Tab NASONEX 50 mcg/actuation nasal spray Generic drug:  mometasone Your last dose was: Your next dose is: 2 Sprays by Both Nostrils route daily. 2 [place] SINGULAIR 10 mg tablet Generic drug:  montelukast  
   
Your last dose was: Your next dose is: Take 10 mg by mouth nightly. 10 mg  
    
   
   
   
  
 TESTIM 50 mg/5 gram (1 %) gel Generic drug:  testosterone Your last dose was: Your next dose is:    
   
   
 5 g by TransDERmal route four (4) days a week. 5 g  
    
   
   
   
  
 VITAMIN C 500 mg tablet Generic drug:  ascorbic acid (vitamin C) Your last dose was: Your next dose is: Take 500 mg by mouth daily. 500 mg Discharge Instructions Usman Munoz M.D. [address] Office: [phone] Fax:    [phone] PROCEDURE: Procedure(s): UROLIFT, CYSTOLITHALOPAXY W/HOLMIUM \"SPEC POP\" Notify [place] [address] [facility] IMMEDIATELY if any of the following occur: ? You are unable to urinate. Urgency to urinate is not uncommon. ? You find yourself urinating small frequent amounts associated with severe lower abdominal discomfort. ? Bright red blood with clots in the urine. Some reddish urine is not uncommon and should be treated with increasing the amount of fluids you drink. ? Temperature above 101.5° and / or chills. ? You are nauseous and / or vomiting and you cannot hold down any fluids. ? Your pain is not controlled with the pain medication prescribed. Special Considerations:  
 
? Do not drive for at least 24 hours after the procedure and until you are no longer taking narcotic pain medication and you are able to move and react without hesitation. MEDICATIONS: 
Pain     Norco®     Percocet®   Dilaudid® Antibiotics     Cipro     Ceftin®     Levaquin     Bactrim DS® Urgency     Vesicare® Burning     Pyridium®      Jayda Silversmith Nausea     Zofran®       Phenergan® Miscellaneous Prescriptions Written on Chart Prescriptions sent Electronically Our office will call you tomorrow to schedule your first follow-up appointment. Please contact [place] [address] [facility] at [phone] or go to the nearest Emergency Department / Urgent Care facility for any other medical questions or concerns. Regular diet Drink plenty of fluids Shower tomorrow Keep saldana bag emptied Ambulate multiple times daily to promote good circulation and lung expansion Follow Dr. Sravan Cotto instructions for saldana removal 
 
 
 
 
DISCHARGE SUMMARY from Nurse PATIENT INSTRUCTIONS: 
 
 
F-face looks uneven A-arms unable to move or move unevenly S-speech slurred or non-existent T-time-call 911 as soon as signs and symptoms begin-DO NOT go  
 Back to bed or wait to see if you get better-TIME IS BRAIN. Warning Signs of HEART ATTACK Call 911 if you have these symptoms: 
? Chest discomfort. Most heart attacks involve discomfort in the center of the chest that lasts more than a few minutes, or that goes away and comes back. It can feel like uncomfortable pressure, squeezing, fullness, or pain. ? Discomfort in other areas of the upper body. Symptoms can include pain or discomfort in one or both arms, the back, neck, jaw, or stomach. ? Shortness of breath with or without chest discomfort. ? Other signs may include breaking out in a cold sweat, nausea, or lightheadedness. Don't wait more than five minutes to call [address]! Fast action can save your life. Calling 911 is almost always the fastest way to get lifesaving treatment. Emergency Medical Services staff can begin treatment when they arrive  up to an hour sooner than if someone gets to the hospital by car. Patient armband removed and shredded The discharge information has been reviewed with the patient and caregiver. The patient and caregiver verbalized understanding. Discharge medications reviewed with the patient and caregiver and appropriate educational materials and side effects teaching were provided. ___________________________________________________________________________________________________________________________________ Introducing [facility]! [employer] introduces gamigo patient portal. Now you can access parts of your medical record, email your doctor's office, and request medication refills online. 1. In your internet browser, go to [email or web address] 2. Click on the First Time User? Click Here link in the Sign In box. You will see the New Member Sign Up page. 3. Enter your gamigo Access Code exactly as it appears below. You will not need to use this code after youve completed the sign-up process.  If you do not sign up before the expiration date, you must request a new code. · PurePlay Access Code: [email or web address] Expires: 3/14/2018  2:15 PM 
 
4. Enter the last four digits of your Social Security Number (xxxx) and Date of Birth (mm/dd/yyyy) as indicated and click Submit. You will be taken to the next sign-up page. 5. Create a PurePlay ID. This will be your PurePlay login ID and cannot be changed, so think of one that is secure and easy to remember. 6. Create a PurePlay password. You can change your password at any time. 7. Enter your Password Reset Question and Answer. This can be used at a later time if you forget your password. 8. Enter your e-mail address. You will receive e-mail notification when new information is available in [address] 9. Click Sign Up. You can now view and download portions of your medical record. 10. Click the Download Summary menu link to download a portable copy of your medical information. If you have questions, please visit the Frequently Asked Questions section of the PurePlay website. Remember, PurePlay is NOT to be used for urgent needs. For medical emergencies, dial 911. Now available from your iPhone and Android! Providers Seen During Your Hospitalization Provider Specialty Primary office phone Magnolia Shell MD Urology [phone] Your Primary Care Physician (PCP) Primary Care Physician Office Phone Office Fax Vivek Bear [phone] You are allergic to the following Allergen Reactions Scallops Nausea and Vomiting Recent Documentation Height Weight BMI Smoking Status 1.803 m 86.3 kg 26.54 kg/m2 Never Smoker Emergency Contacts Name Discharge Info Relation Home Work Mobile [address] CAREGIVER [3] Spouse [3] [phone] Patient Belongings The following personal items are in your possession at time of discharge: Dental Appliances: None  Visual Aid: Glasses, With patient      Home Medications: None   Jewelry: None  Clothing: Pants, Shirt, Belt, Undergarments, Footwear, Socks, Jacket/Coat (with [employer])    Other Valuables: Cell Phone, Michelle Arredondo (with [employer]) Please provide this summary of care documentation to your next provider. Signatures-by signing, you are acknowledging that this After Visit Summary has been reviewed with you and you have received a copy. Patient Signature:  ____________________________________________________________ Date:  ____________________________________________________________  
  
[facility] Provider Signature:  ____________________________________________________________ Date:  ____________________________________________________________

## 2017-12-28 NOTE — PERIOP NOTES
TRANSFER - OUT REPORT:    Verbal report given to [facility] RN (name) on Fredy Quesada  being transferred to Phase II (unit) for routine progression of care       Report consisted of patients Situation, Background, Assessment and   Recommendations(SBAR). Information from the following report(s) SBAR, Kardex, Procedure Summary and Intake/Output was reviewed with the receiving nurse. Lines:   Peripheral IV 12/28/17 Right Hand (Active)   Site Assessment Clean, dry, & intact 12/28/2017 12:24 PM   Phlebitis Assessment 0 12/28/2017 12:24 PM   Infiltration Assessment 0 12/28/2017 12:24 PM   Dressing Status Clean, dry, & intact 12/28/2017 12:24 PM   Dressing Type Transparent;Tape 12/28/2017 12:08 PM   Hub Color/Line Status Infusing 12/28/2017 12:08 PM        Opportunity for questions and clarification was provided.       Patient transported with:   O2 @ 2 liters  Registered Nurse

## 2017-12-28 NOTE — DISCHARGE INSTRUCTIONS
Mary Jarrell. Simona Clancy M.D. [facility]  [address], [address], [address] [facility]  Office: [phone]  Fax:    [phone]: Procedure(s):  Lavaun Bias W/HOLMIUM \"SPEC POP\"    Notify [place] [address] [facility] IMMEDIATELY if any of the following occur:     You are unable to urinate. Urgency to urinate is not uncommon.  You find yourself urinating small frequent amounts associated with severe lower abdominal discomfort.  Bright red blood with clots in the urine. Some reddish urine is not uncommon and should be treated with increasing the amount of fluids you drink.  Temperature above 101.5° and / or chills.  You are nauseous and / or vomiting and you cannot hold down any fluids.  Your pain is not controlled with the pain medication prescribed. Special Considerations:      Do not drive for at least 24 hours after the procedure and until you are no longer taking narcotic pain medication and you are able to move and react without hesitation. MEDICATIONS:  Pain   [x]  Norco®   []  Percocet® []  Dilaudid®       Antibiotics   []  Cipro   []  Ceftin®    [x] Levaquin   []  Bactrim DS®       Urgency   []  Vesicare®   []       Burning   []  Pyridium®   []   UribelTM     Nausea   []  Zofran®   []    Phenergan®     Miscellaneous         [] Prescriptions Written on Chart    [] Prescriptions sent Electronically           Our office will call you tomorrow to schedule your first follow-up appointment. Please contact [place] [address] [facility] at [phone] or go to the nearest Emergency Department / Urgent Care facility for any other medical questions or concerns.       Regular diet  Drink plenty of fluids  Shower tomorrow  Keep saldana bag emptied  Ambulate multiple times daily to promote good circulation and lung expansion  Follow Dr. Johanne Richards instructions for saldana removal          DISCHARGE SUMMARY from Nurse    PATIENT INSTRUCTIONS:    After general anesthesia or intravenous sedation, for 24 hours or while taking prescription Narcotics:  · Limit your activities  · Do not drive and operate hazardous machinery  · Do not make important personal or business decisions  · Do  not drink alcoholic beverages  · If you have not urinated within 8 hours after discharge, please contact your surgeon on call. Report the following to your surgeon:  · Excessive pain, swelling, redness or odor of or around the surgical area  · Temperature over 100.5  · Nausea and vomiting lasting longer than 4 hours or if unable to take medications  · Any signs of decreased circulation or nerve impairment to extremity: change in color, persistent  numbness, tingling, coldness or increase pain  · Any questions    What to do at Home:  Recommended activity: Ambulate in house and No driving while on analgesics,     If you experience any of the following symptoms above, please follow up with Dr. Sheila Jasso. *  Please give a list of your current medications to your Primary Care Provider. *  Please update this list whenever your medications are discontinued, doses are      changed, or new medications (including over-the-counter products) are added. *  Please carry medication information at all times in case of emergency situations. These are general instructions for a healthy lifestyle:    No smoking/ No tobacco products/ Avoid exposure to second hand smoke  Surgeon General's Warning:  Quitting smoking now greatly reduces serious risk to your health. Obesity, smoking, and sedentary lifestyle greatly increases your risk for illness    A healthy diet, regular physical exercise & weight monitoring are important for maintaining a healthy lifestyle    You may be retaining fluid if you have a history of heart failure or if you experience any of the following symptoms:  Weight gain of 3 pounds or more overnight or 5 pounds in a week, increased swelling in our hands or feet or shortness of breath while lying flat in bed. Please call your doctor as soon as you notice any of these symptoms; do not wait until your next office visit. Recognize signs and symptoms of STROKE:    F-face looks uneven    A-arms unable to move or move unevenly    S-speech slurred or non-existent    T-time-call 911 as soon as signs and symptoms begin-DO NOT go       Back to bed or wait to see if you get better-TIME IS BRAIN. Warning Signs of HEART ATTACK     Call 911 if you have these symptoms:   Chest discomfort. Most heart attacks involve discomfort in the center of the chest that lasts more than a few minutes, or that goes away and comes back. It can feel like uncomfortable pressure, squeezing, fullness, or pain.  Discomfort in other areas of the upper body. Symptoms can include pain or discomfort in one or both arms, the back, neck, jaw, or stomach.  Shortness of breath with or without chest discomfort.  Other signs may include breaking out in a cold sweat, nausea, or lightheadedness. Don't wait more than five minutes to call 911 - MINUTES MATTER! Fast action can save your life. Calling 911 is almost always the fastest way to get lifesaving treatment. Emergency Medical Services staff can begin treatment when they arrive -- up to an hour sooner than if someone gets to the hospital by car. Patient armband removed and shredded    The discharge information has been reviewed with the patient and caregiver. The patient and caregiver verbalized understanding. Discharge medications reviewed with the patient and caregiver and appropriate educational materials and side effects teaching were provided.   ___________________________________________________________________________________________________________________________________

## 2018-01-08 LAB
CA PHOS MFR STONE: 10 %
CALCIUM OXALATE DIHYDRATE MFR STONE IR: 25 %
COLOR STONE: NORMAL
COM MFR STONE: 65 %
COMMENT, 519994: NORMAL
COMPOSITION, 120440: NORMAL
DISCLAIMER, STO32L: NORMAL
NIDUS STONE QL: NORMAL
SIZE STONE: NORMAL MM
STONE ANALYSIS-IMP: NORMAL
WT STONE: 590.7 MG

## 2018-01-12 NOTE — OP NOTES
UROLOGY OPERATIVE NOTE    Patient: Pratik Cook MRN: [id number]  SSN: [id number]    YOB: 1945  Age: 67 y.o. Sex: male          Date of Procedure:  12/28/2017   Preoperative Diagnosis:  BENIGN PROSTATE HYPERTROPHY W/OBSTRUCTION, BLADDER STONES  Postoperative Diagnosis:  BENIGN PROSTATE HYPERTROPHY W/OBSTRUCTION, BLADDER STONES    Procedure:  Procedure(s):  UROLIFT,   CYSTOLITHALOPAXY W/HOLMIUM \"SPEC POP\"  Surgeon:  Surgeon(s) and Role:     * Heidi Ruiz MD - Primary  Anesthesia:  General   Estimated Blood Loss:  Minimal  Specimens:    ID Type Source Tests Collected by Time Destination   1 : bladder stones Preservative Bladder  Heidi Ruiz MD 12/28/2017 1142 Pathology      Implants:    Implant Name Type Inv. Item Serial No.  Lot No. LRB No. Used Action   IMPL UROL UROLIFT SYSTEM -- NEOTRACT - [id number]  [address] [place] [address] [id number] N/A 2 Implanted   IMPL Klinta 36 -- NEOTRACT - [id number]   [facility] [employer] -- [address] [place] [address] [id number] N/A 4 Implanted       Findings:  enlarged lateral lobes, 2cm bladder stone, cellules and trabeculations     Pt brought into the operating room and placed into the supine position. After administration of general anesthesia he was placed in to lithotomy position. Groin and genitalia were prepped and draped in usual sterile fashion. A 23F cystoscope was inserted into the bladder. Cystoscopy revealed 2 cm bladder stone as well as cellules and trabeculations grade 2-3 throughout the bladder. The prostate revealed enlarged lateral lobes. I began using the holmium laser and fragmented the stone into multiple small pieces. Using alan syringe I was able to remove the stone completely. I remove the cystoscope and placed the Urolift cystoscope into the bladder. The cystoscopy bridge was replaced with a UroLift delivery device.   The first treatment site was the patient's left side approximately 1.5cm distal to the bladder neck. The distal tip of the delivery device was then angled laterally approximately 20 degrees at this position to compress the lateral lobe. The trigger was pulled, thereby deploying a needle containing the implant through the prostate. The needle was then retracted, allowing one end of the implant to be delivered to the capsular surface of the prostate. The implant was then tensioned to assure capsular seating and removal of slack monofilament. The device was then angled back toward midline and slowly advanced proximally (typically 3 to 4 mm) until cystoscopic verification of the monofilament being centered in the delivery bay. The urethral end piece was then affixed to the monofilament thereby tailoring the size of the implant. Excess filament was then severed. The delivery device was then re-advanced into the bladder. The delivery device was then replaced with cystoscope and bridge and the implant location and opening effect was confirmed cystoscopically. The same procedure was then repeated on the right side, and 2 additional implants were delivered just proximal to the veru montanum, again one on right and one on left side of the prostate, following the same technique. Cystoscopy then revealed a persistent area of obstruction, and two more implants were delivered in the mid prostate. A final cystoscopy was conducted first to inspect the location and state of each implant and second, to confirm the presence of a continuous anterior channel was present through the prostatic urethra with irrigation flow turned off. The bladder was then emptied. The patient was extubated and transferred to recovery room in stable condition.

## 2022-02-09 ENCOUNTER — TRANSCRIBE ORDER (OUTPATIENT)
Dept: REGISTRATION | Age: 77
End: 2022-02-09

## 2022-02-09 ENCOUNTER — HOSPITAL ENCOUNTER (OUTPATIENT)
Dept: PREADMISSION TESTING | Age: 77
Discharge: HOME | End: 2022-02-09
Payer: MEDICARE

## 2022-02-09 DIAGNOSIS — M19.012 ARTHRITIS OF LEFT SHOULDER REGION: ICD-10-CM

## 2022-02-09 DIAGNOSIS — M19.012 ARTHRITIS OF LEFT SHOULDER REGION: Primary | ICD-10-CM

## 2022-02-09 LAB
ALBUMIN SERPL-MCNC: 3.9 G/DL (ref 3.4–5)
ALBUMIN/GLOB SERPL: 1.4 {RATIO} (ref 0.8–1.7)
ALP SERPL-CCNC: 84 U/L (ref 45–117)
ALT SERPL-CCNC: 28 U/L (ref 16–61)
ANION GAP SERPL CALC-SCNC: 4 MMOL/L (ref 3–18)
AST SERPL-CCNC: 18 U/L (ref 10–38)
ATRIAL RATE: 87 BPM
BASOPHILS # BLD: 0.1 K/UL (ref 0–0.1)
BASOPHILS NFR BLD: 1 % (ref 0–2)
BILIRUB SERPL-MCNC: 0.8 MG/DL (ref 0.2–1)
BUN SERPL-MCNC: 25 MG/DL (ref 7–18)
BUN/CREAT SERPL: 21 (ref 12–20)
CALCIUM SERPL-MCNC: 9.2 MG/DL (ref 8.5–10.1)
CALCULATED R AXIS, ECG10: 89 DEGREES
CALCULATED T AXIS, ECG11: 7 DEGREES
CHLORIDE SERPL-SCNC: 110 MMOL/L (ref 100–111)
CO2 SERPL-SCNC: 30 MMOL/L (ref 21–32)
CREAT SERPL-MCNC: 1.18 MG/DL (ref 0.6–1.3)
DIAGNOSIS, 93000: NORMAL
DIFFERENTIAL METHOD BLD: ABNORMAL
EOSINOPHIL # BLD: 0.2 K/UL (ref 0–0.4)
EOSINOPHIL NFR BLD: 2 % (ref 0–5)
ERYTHROCYTE [DISTWIDTH] IN BLOOD BY AUTOMATED COUNT: 12.8 % (ref 11.6–14.5)
GLOBULIN SER CALC-MCNC: 2.7 G/DL (ref 2–4)
GLUCOSE SERPL-MCNC: 107 MG/DL (ref 74–99)
HCT VFR BLD AUTO: 49.8 % (ref 36–48)
HGB BLD-MCNC: 16.1 G/DL (ref 13–16)
IMM GRANULOCYTES # BLD AUTO: 0.1 K/UL (ref 0–0.04)
IMM GRANULOCYTES NFR BLD AUTO: 2 % (ref 0–0.5)
LYMPHOCYTES # BLD: 1.9 K/UL (ref 0.9–3.6)
LYMPHOCYTES NFR BLD: 24 % (ref 21–52)
MCH RBC QN AUTO: 32.7 PG (ref 24–34)
MCHC RBC AUTO-ENTMCNC: 32.3 G/DL (ref 31–37)
MCV RBC AUTO: 101.2 FL (ref 78–100)
MONOCYTES # BLD: 0.8 K/UL (ref 0.05–1.2)
MONOCYTES NFR BLD: 10 % (ref 3–10)
NEUTS SEG # BLD: 4.7 K/UL (ref 1.8–8)
NEUTS SEG NFR BLD: 61 % (ref 40–73)
NRBC # BLD: 0 K/UL (ref 0–0.01)
NRBC BLD-RTO: 0 PER 100 WBC
PLATELET # BLD AUTO: 188 K/UL (ref 135–420)
PMV BLD AUTO: 10.3 FL (ref 9.2–11.8)
POTASSIUM SERPL-SCNC: 4.3 MMOL/L (ref 3.5–5.5)
PROT SERPL-MCNC: 6.6 G/DL (ref 6.4–8.2)
Q-T INTERVAL, ECG07: 410 MS
QRS DURATION, ECG06: 148 MS
QTC CALCULATION (BEZET), ECG08: 463 MS
RBC # BLD AUTO: 4.92 M/UL (ref 4.35–5.65)
SODIUM SERPL-SCNC: 144 MMOL/L (ref 136–145)
VENTRICULAR RATE, ECG03: 77 BPM
WBC # BLD AUTO: 7.8 K/UL (ref 4.6–13.2)

## 2022-02-09 PROCEDURE — 93005 ELECTROCARDIOGRAM TRACING: CPT

## 2022-02-09 PROCEDURE — 80053 COMPREHEN METABOLIC PANEL: CPT

## 2022-02-09 PROCEDURE — 85025 COMPLETE CBC W/AUTO DIFF WBC: CPT

## 2022-02-09 PROCEDURE — 36415 COLL VENOUS BLD VENIPUNCTURE: CPT

## 2022-02-09 PROCEDURE — 87086 URINE CULTURE/COLONY COUNT: CPT

## 2022-02-10 LAB
BACTERIA SPEC CULT: NORMAL
SERVICE CMNT-IMP: NORMAL
SERVICE CMNT-IMP: NORMAL

## 2022-02-17 ENCOUNTER — HOSPITAL ENCOUNTER (OUTPATIENT)
Dept: PREADMISSION TESTING | Age: 77
Discharge: HOME | End: 2022-02-17

## 2022-02-17 VITALS — WEIGHT: 195 LBS | HEIGHT: 71 IN | BODY MASS INDEX: 27.3 KG/M2

## 2022-02-17 RX ORDER — TRANEXAMIC ACID 650 1/1
1950 TABLET ORAL ONCE
Status: CANCELLED | OUTPATIENT
Start: 2022-02-17 | End: 2022-02-18

## 2022-02-17 RX ORDER — MELOXICAM 15 MG/1
10 TABLET ORAL DAILY
COMMUNITY

## 2022-02-17 RX ORDER — CEFAZOLIN SODIUM/WATER 2 G/20 ML
2 SYRINGE (ML) INTRAVENOUS ONCE
Status: CANCELLED | OUTPATIENT
Start: 2022-02-17 | End: 2022-02-17

## 2022-02-17 RX ORDER — SODIUM CHLORIDE, SODIUM LACTATE, POTASSIUM CHLORIDE, CALCIUM CHLORIDE 600; 310; 30; 20 MG/100ML; MG/100ML; MG/100ML; MG/100ML
125 INJECTION, SOLUTION INTRAVENOUS CONTINUOUS
Status: CANCELLED | OUTPATIENT
Start: 2022-02-17

## 2022-02-17 NOTE — PERIOP NOTES
PAT - SURGICAL PRE-ADMISSION INSTRUCTIONS    NAME:  Promise Canchola                                                          TODAY'S DATE:  2/17/2022    SURGERY DATE:  3/4/2022                                  SURGERY ARRIVAL TIME:   TBA    1. Do NOT eat or drink anything, including candy or gum, after MIDNIGHT on 3/4/2022 , unless you have specific instructions from your Surgeon or Anesthesia Provider to do so. 2. No smoking 24 hours before surgery. 3. No alcohol 24 hours prior to the day of surgery. 4. No recreational drugs for one week prior to the day of surgery. 5. Leave all valuables, including money/purse, at home. 6. Remove all jewelry, nail polish, makeup (including mascara); no lotions, powders, deodorant, or perfume/cologne/after shave. 7. Glasses/Contact lenses and Dentures may be worn to the hospital.  They will be removed prior to surgery. 8. Call your doctor if symptoms of a cold or illness develop within 24 ours prior to surgery. 9. AN ADULT MUST DRIVE YOU HOME AFTER OUTPATIENT SURGERY. 10. If you are having an OUTPATIENT procedure, please make arrangements for a responsible adult to be with you for 24 hours after your surgery. 11. If you are admitted to the hospital, you will be assigned to a bed after surgery is complete. Normally a family member will not be able to see you until you are in your assigned bed. 12. Visitation Restrictions Explained. Special Instructions:  Covid Test to be done 2/28/2022, Quarantine requirements discussed. Patient vaccinated. Has Advanced Directive . No DNR  HOLD metformin/glucophage dose starting the EVENING BEFORE the day of surgery.     Patient Prep:    use skin prep cloths with CHG     These surgical instructions were reviewed with patient during the PAT phone call

## 2022-02-25 NOTE — H&P
Patient Name:   Margarita Baker  Account #:  [de-identified]  YOB: 1945    Chief Complaint:  Left shoulder and neck pain. History of Chief Complaint:  He is now 67. He complains of pain in his left shoulder with some soreness in his neck. He does not describe any radiation of the pain distally. Past Medical/Surgical History:    Disease/Disorder Date Side Surgery Date Side Comment   Arthritis         Atrial fibrillation         Hemochromatosis         Hypertension         Low testosterone level         Osteoporosis         Polio 1952        Prediabetes         UTI - Urinary tract infection            Cholecystectomy 2017     Cancer, skin   Excision 1977        Shoulder replacement 01/04/2007 right       Tonsillectomy      BPH - benign prostatic hyperplasia   UroLift 2018       Allergies:  No known allergies. Ingredient Reaction Medication Name Comment   NO KNOWN ALLERGIES          Current Medications:    Medication Directions   Bystolic 10 mg tablet    calcium citrate    diltiazem  mg capsule,extended release 24 hr    ibandronate 150 mg tablet    meloxicam 15 mg tablet    metformin 500 mg tablet    mometasone 50 mcg/actuation nasal spray    montelukast 10 mg tablet    multivitamin tablet    Percocet 5 mg-325 mg tablet take 1 tablet by oral route every 4 hours as needed   Pradaxa 150 mg capsule take 1 capsule by mouth twice a day   tadalafil 5 mg tablet    testosterone 50 mg/5 gram (1 %) transdermal gel      Social History:    SMOKING  Status Tobacco Type Units Per Day Yrs Used   Never smoker      ALCOHOL  There is a history of alcohol use. consumed occasionally. Family History:    Disease Detail Family Member Age Cause of Death Comments   Family history of Arthritis   N    Family history of Hypertension   N      Review of Systems:    GENERAL:  Patient has no signs of fever, chills or weight change.   HEAD/ENTM:  Patient has no signs of headaches, dizziness, hearing loss, ringing in ears, sore throat/hoarseness, recent cold, double vision, blurred vision, itchy eyes, eye redness or eye discharge. NEUROLOGIC:  Patient has no signs of fainting, muscle weakness, numbness/tingling, loss of balance or seizure disorder. CARDIOVASCULAR:  Patient has no signs of chest pain, palpitations, rheumatic fever or heart murmur. RESPIRATORY:  Patient has no signs of chronic cough, wheezing, difficulty breathing, pain on breathing or shortness of breath. GASTROINTESTINAL:  Patient has no signs of nausea/vomiting, difficulty swallowing, gas/bloating, indigestion, abdominal pain, diarrhea, bloody stools or hemorrhoids. GENITOURINARY:  Patient has no signs of blood in urine, painful urinating, burning sensation, bladder/kidney infection, frequent urinating or incontinence. MUSCULOSKELETAL: Patient presents with joint pain and swelling of feet. Patient has no signs of fracture/dislocation, sprain/strain, tendonitis, joint stiffness, rheumatoid disease or gout. INTEGUMENTARY:  Patient has no signs of rash/itching, psoriasis, Raynaud's phenomenon or varicose veins. EMOTIONAL:  Patient has no signs of anxiety, depression, bipolar disorder, memory loss or change in mood. Patient also presents with atrial fibrillation. Vitals:  Date BP Pulse Temp (F) Resp. (per min.) Height (Total in.) Weight (lbs.) BMI   09/01/2021     71.00 190.00 26.50   11/06/2017     71.00  25.94     Physical Examination:  Exam of the cervical spine shows limited motion. He has pain with extremes of motion. Neurologic exam of the upper extremities is normal.    The left shoulder shows limited motion. He reaches about 50 degrees of forward flexion and 50 degrees of abduction. He has very limited rotation with significant crepitation with movement. He has weakness in both external rotation and abduction.     Radiograph Examination:  AP, lateral, bilateral oblique, flexion, and extension views of the cervical spine were obtained and interpreted in the office today and reveal severe DJD at multiple levels, with some foraminal space narrowing, right more than left at the lower levels. AP, scapular, and axillary views of the left shoulder were obtained and interpreted in the office today and reveal a complete loss of joint cartilage with collapse of the head with flattening along the axillary view and a large inferior osteophyte. Impression:    1. Severe glenohumeral degenerative joint disease, left shoulder, with deficient rotator cuff. 2.  Cervical spondylosis. Plan:  He will be scheduled for a left reverse total shoulder arthroplasty. He understands the risks and benefits of the procedure, and he is ready to proceed. He was given a sling. Postop, he will get Percocet. He will get cardiac clearance with Dr. Leisa Olea.

## 2022-02-28 ENCOUNTER — HOSPITAL ENCOUNTER (OUTPATIENT)
Dept: PREADMISSION TESTING | Age: 77
Discharge: HOME | End: 2022-02-28
Payer: MEDICARE

## 2022-02-28 PROCEDURE — U0003 INFECTIOUS AGENT DETECTION BY NUCLEIC ACID (DNA OR RNA); SEVERE ACUTE RESPIRATORY SYNDROME CORONAVIRUS 2 (SARS-COV-2) (CORONAVIRUS DISEASE [COVID-19]), AMPLIFIED PROBE TECHNIQUE, MAKING USE OF HIGH THROUGHPUT TECHNOLOGIES AS DESCRIBED BY CMS-2020-01-R: HCPCS

## 2022-03-01 LAB — SARS-COV-2, NAA: NOT DETECTED

## 2022-03-03 ENCOUNTER — ANESTHESIA EVENT (OUTPATIENT)
Dept: SURGERY | Age: 77
End: 2022-03-03
Payer: MEDICARE

## 2022-03-04 ENCOUNTER — HOSPITAL ENCOUNTER (OUTPATIENT)
Age: 77
Setting detail: OUTPATIENT SURGERY
Discharge: HOME | End: 2022-03-04
Attending: ORTHOPAEDIC SURGERY | Admitting: ORTHOPAEDIC SURGERY
Payer: MEDICARE

## 2022-03-04 ENCOUNTER — APPOINTMENT (OUTPATIENT)
Dept: GENERAL RADIOLOGY | Age: 77
End: 2022-03-04
Attending: ORTHOPAEDIC SURGERY
Payer: MEDICARE

## 2022-03-04 ENCOUNTER — ANESTHESIA (OUTPATIENT)
Dept: SURGERY | Age: 77
End: 2022-03-04
Payer: MEDICARE

## 2022-03-04 VITALS
OXYGEN SATURATION: 94 % | SYSTOLIC BLOOD PRESSURE: 128 MMHG | HEART RATE: 73 BPM | TEMPERATURE: 97.2 F | RESPIRATION RATE: 20 BRPM | HEIGHT: 71 IN | BODY MASS INDEX: 28.94 KG/M2 | WEIGHT: 206.7 LBS | DIASTOLIC BLOOD PRESSURE: 67 MMHG

## 2022-03-04 DIAGNOSIS — M19.012 PRIMARY OSTEOARTHRITIS OF LEFT SHOULDER: Primary | Chronic | ICD-10-CM

## 2022-03-04 LAB
ABO + RH BLD: NORMAL
BLOOD GROUP ANTIBODIES SERPL: NORMAL
GLUCOSE BLD STRIP.AUTO-MCNC: 143 MG/DL (ref 70–110)
GLUCOSE BLD STRIP.AUTO-MCNC: 91 MG/DL (ref 70–110)
SPECIMEN EXP DATE BLD: NORMAL

## 2022-03-04 PROCEDURE — 82962 GLUCOSE BLOOD TEST: CPT

## 2022-03-04 PROCEDURE — 74011000250 HC RX REV CODE- 250: Performed by: ORTHOPAEDIC SURGERY

## 2022-03-04 PROCEDURE — 74011250636 HC RX REV CODE- 250/636

## 2022-03-04 PROCEDURE — 77030006643: Performed by: ANESTHESIOLOGY

## 2022-03-04 PROCEDURE — 77030008477 HC STYL SATN SLP COVD -A: Performed by: ANESTHESIOLOGY

## 2022-03-04 PROCEDURE — 74011250636 HC RX REV CODE- 250/636: Performed by: ORTHOPAEDIC SURGERY

## 2022-03-04 PROCEDURE — 76210000016 HC OR PH I REC 1 TO 1.5 HR: Performed by: ORTHOPAEDIC SURGERY

## 2022-03-04 PROCEDURE — 2709999900 HC NON-CHARGEABLE SUPPLY: Performed by: ORTHOPAEDIC SURGERY

## 2022-03-04 PROCEDURE — 77030041075 HC DRSG AG OPTIFRM MDII -B: Performed by: ORTHOPAEDIC SURGERY

## 2022-03-04 PROCEDURE — 77030040361 HC SLV COMPR DVT MDII -B: Performed by: ORTHOPAEDIC SURGERY

## 2022-03-04 PROCEDURE — C1713 ANCHOR/SCREW BN/BN,TIS/BN: HCPCS | Performed by: ORTHOPAEDIC SURGERY

## 2022-03-04 PROCEDURE — C1776 JOINT DEVICE (IMPLANTABLE): HCPCS | Performed by: ORTHOPAEDIC SURGERY

## 2022-03-04 PROCEDURE — 74011000258 HC RX REV CODE- 258

## 2022-03-04 PROCEDURE — 77030013708 HC HNDPC SUC IRR PULS STRY –B: Performed by: ORTHOPAEDIC SURGERY

## 2022-03-04 PROCEDURE — 74011000250 HC RX REV CODE- 250: Performed by: ANESTHESIOLOGY

## 2022-03-04 PROCEDURE — 77030006807 HC BLD SAW RECIP KMET -B: Performed by: ORTHOPAEDIC SURGERY

## 2022-03-04 PROCEDURE — 76060000036 HC ANESTHESIA 2.5 TO 3 HR: Performed by: ORTHOPAEDIC SURGERY

## 2022-03-04 PROCEDURE — 77030008683 HC TU ET CUF COVD -A: Performed by: ANESTHESIOLOGY

## 2022-03-04 PROCEDURE — 77030013079 HC BLNKT BAIR HGGR 3M -A: Performed by: ANESTHESIOLOGY

## 2022-03-04 PROCEDURE — 77030018846 HC SOL IRR STRL H20 ICUM -A: Performed by: ORTHOPAEDIC SURGERY

## 2022-03-04 PROCEDURE — 77030033138 HC SUT PGA STRATFX J&J -B: Performed by: ORTHOPAEDIC SURGERY

## 2022-03-04 PROCEDURE — 76942 ECHO GUIDE FOR BIOPSY: CPT | Performed by: ORTHOPAEDIC SURGERY

## 2022-03-04 PROCEDURE — 77030031139 HC SUT VCRL2 J&J -A: Performed by: ORTHOPAEDIC SURGERY

## 2022-03-04 PROCEDURE — 36415 COLL VENOUS BLD VENIPUNCTURE: CPT

## 2022-03-04 PROCEDURE — 77030027138 HC INCENT SPIROMETER -A: Performed by: ORTHOPAEDIC SURGERY

## 2022-03-04 PROCEDURE — 73020 X-RAY EXAM OF SHOULDER: CPT

## 2022-03-04 PROCEDURE — 77030020782 HC GWN BAIR PAWS FLX 3M -B: Performed by: ORTHOPAEDIC SURGERY

## 2022-03-04 PROCEDURE — 64415 NJX AA&/STRD BRCH PLXS IMG: CPT | Performed by: ANESTHESIOLOGY

## 2022-03-04 PROCEDURE — 77030026044 HC TIP IRR PULS STRY -A: Performed by: ORTHOPAEDIC SURGERY

## 2022-03-04 PROCEDURE — 86900 BLOOD TYPING SEROLOGIC ABO: CPT

## 2022-03-04 PROCEDURE — 74011250636 HC RX REV CODE- 250/636: Performed by: ANESTHESIOLOGY

## 2022-03-04 PROCEDURE — 77030002922 HC SUT FBRWRE ARTH -B: Performed by: ORTHOPAEDIC SURGERY

## 2022-03-04 PROCEDURE — 74011250637 HC RX REV CODE- 250/637: Performed by: ORTHOPAEDIC SURGERY

## 2022-03-04 PROCEDURE — 76010000132 HC OR TIME 2.5 TO 3 HR: Performed by: ORTHOPAEDIC SURGERY

## 2022-03-04 PROCEDURE — 76210000022 HC REC RM PH II 1.5 TO 2 HR: Performed by: ORTHOPAEDIC SURGERY

## 2022-03-04 PROCEDURE — 77030016060 HC NDL NRV BLK TELE -A: Performed by: ANESTHESIOLOGY

## 2022-03-04 PROCEDURE — 74011000250 HC RX REV CODE- 250

## 2022-03-04 DEVICE — STEM HUM DIA14MM SHLDR PRI PRESSFIT EQUINOXE: Type: IMPLANTABLE DEVICE | Site: SHOULDER | Status: FUNCTIONAL

## 2022-03-04 DEVICE — IMPLANTABLE DEVICE: Type: IMPLANTABLE DEVICE | Site: SHOULDER | Status: FUNCTIONAL

## 2022-03-04 DEVICE — KIT BNE SCR L26MM DIA4.5MM GLEN SHLDR ORNG COMPR LOK CAP: Type: IMPLANTABLE DEVICE | Site: SHOULDER | Status: FUNCTIONAL

## 2022-03-04 DEVICE — KIT BONE SCR L38MM DIA4.5MM GLEN SHLDR GRN COMPR LCK CAP RVS: Type: IMPLANTABLE DEVICE | Site: SHOULDER | Status: FUNCTIONAL

## 2022-03-04 DEVICE — DEPUY CMW 2 FAST SET BONE CEMENT 20G: Type: IMPLANTABLE DEVICE | Site: SHOULDER | Status: FUNCTIONAL

## 2022-03-04 DEVICE — SHOULDER S3 TOT ADV REVRS -- IMPL CAPPED S3: Type: IMPLANTABLE DEVICE | Site: SHOULDER | Status: FUNCTIONAL

## 2022-03-04 DEVICE — KIT BONE SCR L18MM DIA4.5MM GLEN SHLDR WHT COMPR LCK CAP RVS: Type: IMPLANTABLE DEVICE | Site: SHOULDER | Status: FUNCTIONAL

## 2022-03-04 DEVICE — IMPLANTABLE DEVICE
Type: IMPLANTABLE DEVICE | Site: SHOULDER | Status: FUNCTIONAL
Brand: EQUINOXE

## 2022-03-04 DEVICE — SCR TORQUE DEFINING KIT -- EQUINOXE: Type: IMPLANTABLE DEVICE | Site: SHOULDER | Status: FUNCTIONAL

## 2022-03-04 DEVICE — TRAY HUM ADPT REV +0 -- EQUINOXE: Type: IMPLANTABLE DEVICE | Site: SHOULDER | Status: FUNCTIONAL

## 2022-03-04 DEVICE — SCR BNE LCK GLENOSPHERE -- EQUINOXE: Type: IMPLANTABLE DEVICE | Site: SHOULDER | Status: FUNCTIONAL

## 2022-03-04 RX ORDER — ROPIVACAINE HYDROCHLORIDE 5 MG/ML
INJECTION, SOLUTION EPIDURAL; INFILTRATION; PERINEURAL
Status: COMPLETED | OUTPATIENT
Start: 2022-03-04 | End: 2022-03-04

## 2022-03-04 RX ORDER — ONDANSETRON 2 MG/ML
4 INJECTION INTRAMUSCULAR; INTRAVENOUS
Status: CANCELLED | OUTPATIENT
Start: 2022-03-04

## 2022-03-04 RX ORDER — MAGNESIUM SULFATE 100 %
4 CRYSTALS MISCELLANEOUS AS NEEDED
Status: DISCONTINUED | OUTPATIENT
Start: 2022-03-04 | End: 2022-03-04 | Stop reason: HOSPADM

## 2022-03-04 RX ORDER — CEFAZOLIN SODIUM/WATER 2 G/20 ML
2 SYRINGE (ML) INTRAVENOUS ONCE
Status: COMPLETED | OUTPATIENT
Start: 2022-03-04 | End: 2022-03-04

## 2022-03-04 RX ORDER — SODIUM CHLORIDE 0.9 % (FLUSH) 0.9 %
5-40 SYRINGE (ML) INJECTION AS NEEDED
Status: DISCONTINUED | OUTPATIENT
Start: 2022-03-04 | End: 2022-03-04 | Stop reason: HOSPADM

## 2022-03-04 RX ORDER — ROCURONIUM BROMIDE 10 MG/ML
INJECTION, SOLUTION INTRAVENOUS AS NEEDED
Status: DISCONTINUED | OUTPATIENT
Start: 2022-03-04 | End: 2022-03-04 | Stop reason: HOSPADM

## 2022-03-04 RX ORDER — NALOXONE HYDROCHLORIDE 0.4 MG/ML
0.4 INJECTION, SOLUTION INTRAMUSCULAR; INTRAVENOUS; SUBCUTANEOUS AS NEEDED
Status: CANCELLED | OUTPATIENT
Start: 2022-03-04

## 2022-03-04 RX ORDER — HYDROMORPHONE HYDROCHLORIDE 1 MG/ML
0.5 INJECTION, SOLUTION INTRAMUSCULAR; INTRAVENOUS; SUBCUTANEOUS
Status: DISCONTINUED | OUTPATIENT
Start: 2022-03-04 | End: 2022-03-04 | Stop reason: HOSPADM

## 2022-03-04 RX ORDER — SUCCINYLCHOLINE CHLORIDE 100 MG/5ML
SYRINGE (ML) INTRAVENOUS AS NEEDED
Status: DISCONTINUED | OUTPATIENT
Start: 2022-03-04 | End: 2022-03-04 | Stop reason: HOSPADM

## 2022-03-04 RX ORDER — TRANEXAMIC ACID 650 1/1
1950 TABLET ORAL ONCE
Status: COMPLETED | OUTPATIENT
Start: 2022-03-04 | End: 2022-03-04

## 2022-03-04 RX ORDER — FENTANYL CITRATE 50 UG/ML
INJECTION, SOLUTION INTRAMUSCULAR; INTRAVENOUS AS NEEDED
Status: DISCONTINUED | OUTPATIENT
Start: 2022-03-04 | End: 2022-03-04 | Stop reason: HOSPADM

## 2022-03-04 RX ORDER — CEFAZOLIN SODIUM/WATER 2 G/20 ML
2 SYRINGE (ML) INTRAVENOUS EVERY 8 HOURS
Status: CANCELLED | OUTPATIENT
Start: 2022-03-04 | End: 2022-03-05

## 2022-03-04 RX ORDER — LIDOCAINE HYDROCHLORIDE 20 MG/ML
INJECTION, SOLUTION EPIDURAL; INFILTRATION; INTRACAUDAL; PERINEURAL AS NEEDED
Status: DISCONTINUED | OUTPATIENT
Start: 2022-03-04 | End: 2022-03-04 | Stop reason: HOSPADM

## 2022-03-04 RX ORDER — FENTANYL CITRATE 50 UG/ML
25 INJECTION, SOLUTION INTRAMUSCULAR; INTRAVENOUS AS NEEDED
Status: DISCONTINUED | OUTPATIENT
Start: 2022-03-04 | End: 2022-03-04 | Stop reason: HOSPADM

## 2022-03-04 RX ORDER — EPHEDRINE SULFATE/0.9% NACL/PF 50 MG/5 ML
SYRINGE (ML) INTRAVENOUS AS NEEDED
Status: DISCONTINUED | OUTPATIENT
Start: 2022-03-04 | End: 2022-03-04 | Stop reason: HOSPADM

## 2022-03-04 RX ORDER — DEXTROSE MONOHYDRATE 100 MG/ML
0-250 INJECTION, SOLUTION INTRAVENOUS AS NEEDED
Status: DISCONTINUED | OUTPATIENT
Start: 2022-03-04 | End: 2022-03-04 | Stop reason: HOSPADM

## 2022-03-04 RX ORDER — NALOXONE HYDROCHLORIDE 0.4 MG/ML
0.1 INJECTION, SOLUTION INTRAMUSCULAR; INTRAVENOUS; SUBCUTANEOUS AS NEEDED
Status: DISCONTINUED | OUTPATIENT
Start: 2022-03-04 | End: 2022-03-04 | Stop reason: HOSPADM

## 2022-03-04 RX ORDER — ONDANSETRON 2 MG/ML
INJECTION INTRAMUSCULAR; INTRAVENOUS AS NEEDED
Status: DISCONTINUED | OUTPATIENT
Start: 2022-03-04 | End: 2022-03-04 | Stop reason: HOSPADM

## 2022-03-04 RX ORDER — DEXAMETHASONE SODIUM PHOSPHATE 4 MG/ML
INJECTION, SOLUTION INTRA-ARTICULAR; INTRALESIONAL; INTRAMUSCULAR; INTRAVENOUS; SOFT TISSUE AS NEEDED
Status: DISCONTINUED | OUTPATIENT
Start: 2022-03-04 | End: 2022-03-04 | Stop reason: HOSPADM

## 2022-03-04 RX ORDER — INSULIN LISPRO 100 [IU]/ML
INJECTION, SOLUTION INTRAVENOUS; SUBCUTANEOUS ONCE
Status: DISCONTINUED | OUTPATIENT
Start: 2022-03-04 | End: 2022-03-04 | Stop reason: HOSPADM

## 2022-03-04 RX ORDER — SODIUM CHLORIDE 0.9 % (FLUSH) 0.9 %
5-40 SYRINGE (ML) INJECTION EVERY 8 HOURS
Status: DISCONTINUED | OUTPATIENT
Start: 2022-03-04 | End: 2022-03-04 | Stop reason: HOSPADM

## 2022-03-04 RX ORDER — DEXMEDETOMIDINE HYDROCHLORIDE 100 UG/ML
INJECTION, SOLUTION INTRAVENOUS
Status: COMPLETED | OUTPATIENT
Start: 2022-03-04 | End: 2022-03-04

## 2022-03-04 RX ORDER — SODIUM CHLORIDE, SODIUM LACTATE, POTASSIUM CHLORIDE, CALCIUM CHLORIDE 600; 310; 30; 20 MG/100ML; MG/100ML; MG/100ML; MG/100ML
1000 INJECTION, SOLUTION INTRAVENOUS CONTINUOUS
Status: DISCONTINUED | OUTPATIENT
Start: 2022-03-04 | End: 2022-03-04 | Stop reason: HOSPADM

## 2022-03-04 RX ORDER — DIPHENHYDRAMINE HYDROCHLORIDE 50 MG/ML
12.5 INJECTION, SOLUTION INTRAMUSCULAR; INTRAVENOUS
Status: DISCONTINUED | OUTPATIENT
Start: 2022-03-04 | End: 2022-03-04 | Stop reason: HOSPADM

## 2022-03-04 RX ORDER — SODIUM CHLORIDE 0.9 % (FLUSH) 0.9 %
5-40 SYRINGE (ML) INJECTION EVERY 8 HOURS
Status: CANCELLED | OUTPATIENT
Start: 2022-03-04

## 2022-03-04 RX ORDER — SODIUM CHLORIDE, SODIUM LACTATE, POTASSIUM CHLORIDE, CALCIUM CHLORIDE 600; 310; 30; 20 MG/100ML; MG/100ML; MG/100ML; MG/100ML
125 INJECTION, SOLUTION INTRAVENOUS CONTINUOUS
Status: DISCONTINUED | OUTPATIENT
Start: 2022-03-04 | End: 2022-03-04 | Stop reason: HOSPADM

## 2022-03-04 RX ORDER — MIDAZOLAM HYDROCHLORIDE 1 MG/ML
INJECTION, SOLUTION INTRAMUSCULAR; INTRAVENOUS
Status: COMPLETED | OUTPATIENT
Start: 2022-03-04 | End: 2022-03-04

## 2022-03-04 RX ORDER — FLUMAZENIL 0.1 MG/ML
0.2 INJECTION INTRAVENOUS
Status: DISCONTINUED | OUTPATIENT
Start: 2022-03-04 | End: 2022-03-04 | Stop reason: HOSPADM

## 2022-03-04 RX ORDER — OXYCODONE AND ACETAMINOPHEN 5; 325 MG/1; MG/1
1 TABLET ORAL
Qty: 60 TABLET | Refills: 0 | Status: SHIPPED
Start: 2022-03-04 | End: 2022-03-11

## 2022-03-04 RX ORDER — SODIUM CHLORIDE 0.9 % (FLUSH) 0.9 %
5-40 SYRINGE (ML) INJECTION AS NEEDED
Status: CANCELLED | OUTPATIENT
Start: 2022-03-04

## 2022-03-04 RX ORDER — PROPOFOL 10 MG/ML
INJECTION, EMULSION INTRAVENOUS AS NEEDED
Status: DISCONTINUED | OUTPATIENT
Start: 2022-03-04 | End: 2022-03-04 | Stop reason: HOSPADM

## 2022-03-04 RX ORDER — PHENYLEPHRINE HCL IN 0.9% NACL 1 MG/10 ML
SYRINGE (ML) INTRAVENOUS AS NEEDED
Status: DISCONTINUED | OUTPATIENT
Start: 2022-03-04 | End: 2022-03-04 | Stop reason: HOSPADM

## 2022-03-04 RX ADMIN — DEXAMETHASONE SODIUM PHOSPHATE 4 MG: 4 INJECTION, SOLUTION INTRAMUSCULAR; INTRAVENOUS at 08:11

## 2022-03-04 RX ADMIN — SODIUM CHLORIDE, SODIUM LACTATE, POTASSIUM CHLORIDE, AND CALCIUM CHLORIDE 1000 ML: 600; 310; 30; 20 INJECTION, SOLUTION INTRAVENOUS at 06:25

## 2022-03-04 RX ADMIN — ROCURONIUM BROMIDE 10 MG: 10 INJECTION, SOLUTION INTRAVENOUS at 08:38

## 2022-03-04 RX ADMIN — FENTANYL CITRATE 50 MCG: 50 INJECTION, SOLUTION INTRAMUSCULAR; INTRAVENOUS at 07:39

## 2022-03-04 RX ADMIN — ONDANSETRON HYDROCHLORIDE 4 MG: 2 INJECTION INTRAMUSCULAR; INTRAVENOUS at 08:11

## 2022-03-04 RX ADMIN — Medication 2 G: at 07:48

## 2022-03-04 RX ADMIN — Medication 150 MCG: at 09:47

## 2022-03-04 RX ADMIN — LIDOCAINE HYDROCHLORIDE 40 MG: 20 INJECTION, SOLUTION INTRAVENOUS at 07:40

## 2022-03-04 RX ADMIN — SUGAMMADEX 200 MG: 100 INJECTION, SOLUTION INTRAVENOUS at 09:53

## 2022-03-04 RX ADMIN — ROCURONIUM BROMIDE 10 MG: 10 INJECTION, SOLUTION INTRAVENOUS at 09:28

## 2022-03-04 RX ADMIN — ROCURONIUM BROMIDE 10 MG: 10 INJECTION, SOLUTION INTRAVENOUS at 07:39

## 2022-03-04 RX ADMIN — Medication 100 MG: at 07:40

## 2022-03-04 RX ADMIN — ROCURONIUM BROMIDE 20 MG: 10 INJECTION, SOLUTION INTRAVENOUS at 09:11

## 2022-03-04 RX ADMIN — MIDAZOLAM 2 MG: 1 INJECTION INTRAMUSCULAR; INTRAVENOUS at 07:10

## 2022-03-04 RX ADMIN — Medication 100 MCG: at 08:08

## 2022-03-04 RX ADMIN — DEXMEDETOMIDINE HYDROCHLORIDE 20 MCG: 100 INJECTION, SOLUTION INTRAVENOUS at 07:10

## 2022-03-04 RX ADMIN — PHENYLEPHRINE HYDROCHLORIDE 45 MCG/MIN: 10 INJECTION INTRAVENOUS at 08:31

## 2022-03-04 RX ADMIN — SODIUM CHLORIDE, SODIUM LACTATE, POTASSIUM CHLORIDE, AND CALCIUM CHLORIDE 125 ML/HR: 600; 310; 30; 20 INJECTION, SOLUTION INTRAVENOUS at 06:31

## 2022-03-04 RX ADMIN — PROPOFOL 140 MG: 10 INJECTION, EMULSION INTRAVENOUS at 07:40

## 2022-03-04 RX ADMIN — SODIUM CHLORIDE, SODIUM LACTATE, POTASSIUM CHLORIDE, AND CALCIUM CHLORIDE: 600; 310; 30; 20 INJECTION, SOLUTION INTRAVENOUS at 09:48

## 2022-03-04 RX ADMIN — Medication 10 MG: at 07:52

## 2022-03-04 RX ADMIN — ROPIVACAINE HYDROCHLORIDE 20 ML: 5 INJECTION, SOLUTION EPIDURAL; INFILTRATION; PERINEURAL at 07:10

## 2022-03-04 RX ADMIN — TRANEXAMIC ACID 1950 MG: 650 TABLET ORAL at 06:21

## 2022-03-04 RX ADMIN — Medication 100 MCG: at 07:52

## 2022-03-04 RX ADMIN — Medication 150 MCG: at 08:20

## 2022-03-04 RX ADMIN — ROCURONIUM BROMIDE 30 MG: 10 INJECTION, SOLUTION INTRAVENOUS at 07:53

## 2022-03-04 RX ADMIN — Medication 15 MG: at 08:11

## 2022-03-04 NOTE — DISCHARGE INSTRUCTIONS
DISCHARGE SUMMARY from Nurse    Regular diet  Drink plenty of fluids  DO NOT REMOVE DRESSING OR GET INCISION WET BEFORE FOLLOW UP APPOINTMENT WITH DOCTOR  Follow all Dr. Jn Lemos instructions    PATIENT INSTRUCTIONS:    After general anesthesia or intravenous sedation, for 24 hours or while taking prescription Narcotics:  · Limit your activities  · Do not drive and operate hazardous machinery  · Do not make important personal or business decisions  · Do  not drink alcoholic beverages  · If you have not urinated within 8 hours after discharge, please contact your surgeon on call. Report the following to your surgeon:  · Excessive pain, swelling, redness or odor of or around the surgical area  · Temperature over 100.5  · Nausea and vomiting lasting longer than 4 hours or if unable to take medications  · Any signs of decreased circulation or nerve impairment to extremity: change in color, persistent  numbness, tingling, coldness or increase pain  · Any questions    What to do at Home:  Recommended activity: Activity as tolerated and no driving for today,     If you experience any of the above symptoms , please follow up with Dr. Carlos Alberto Hayes. *  Please give a list of your current medications to your Primary Care Provider. *  Please update this list whenever your medications are discontinued, doses are      changed, or new medications (including over-the-counter products) are added. *  Please carry medication information at all times in case of emergency situations. These are general instructions for a healthy lifestyle:    No smoking/ No tobacco products/ Avoid exposure to second hand smoke  Surgeon General's Warning:  Quitting smoking now greatly reduces serious risk to your health.     Obesity, smoking, and sedentary lifestyle greatly increases your risk for illness    A healthy diet, regular physical exercise & weight monitoring are important for maintaining a healthy lifestyle    You may be retaining fluid if you have a history of heart failure or if you experience any of the following symptoms:  Weight gain of 3 pounds or more overnight or 5 pounds in a week, increased swelling in our hands or feet or shortness of breath while lying flat in bed. Please call your doctor as soon as you notice any of these symptoms; do not wait until your next office visit. Patient armband removed and shredded        The discharge information has been reviewed with the patient and caregiver. The patient and caregiver verbalized understanding. Discharge medications reviewed with the patient and caregiver and appropriate educational materials and side effects teaching were provided.   ___________________________________________________________________________________________________________________________________

## 2022-03-04 NOTE — PERIOP NOTES
Having some fluctuations of both heart rate and blood pressure. Has atrial fibrillation. No significant signs/symptoms of distress present. O2 sats remaining above 90% on room air without deep breathing or using IS. Discharge instructions given verbally to patient and wife with understanding confirmed verbally. Instructed to keep incision dry/clean till seen by Dr. Thakur Later, sponge bathe only. Instructed on use of arm sling to left arm, to leave it in place at all times and prevent elevation or dropping of left shoulder. Understanding confirmed verbally. Written instructions given to wife to take home.

## 2022-03-04 NOTE — INTERVAL H&P NOTE
Update History & Physical    The Patient's History and Physical of February 28, 2022 was reviewed with the patient and I examined the patient. There was no change. The surgical site was confirmed by the patient and me. Plan:  The risk, benefits, expected outcome, and alternative to the recommended procedure have been discussed with the patient. Patient understands and wants to proceed with the procedure.     Electronically signed by Tran Nair MD on 3/4/2022 at 7:13 AM

## 2022-03-04 NOTE — ANESTHESIA POSTPROCEDURE EVALUATION
Procedure(s):  EXACTECH REVERSE TOTAL LEFT SHOULDER ARTHROPLASTY GEN WITH SCALENE BLOCK PRN. general, regional    Anesthesia Post Evaluation        Comments: Post-Anesthesia Evaluation and Assessment    Cardiovascular Function/Vital Signs  /80   Pulse 91   Temp 36.3 °C (97.4 °F)   Resp 15   Ht 5' 10.75\" (1.797 m)   Wt 93.8 kg (206 lb 11.2 oz)   SpO2 97%   BMI 29.03 kg/m²     Patient is status post Procedure(s):  EXACTECH REVERSE TOTAL LEFT SHOULDER ARTHROPLASTY GEN WITH SCALENE BLOCK PRN. Nausea/Vomiting: Controlled. Postoperative hydration reviewed and adequate. Pain:  Pain Scale 1: Numeric (0 - 10) (03/04/22 1050)  Pain Intensity 1: 0 (03/04/22 1050)   Managed. Neurological Status:   Neuro (WDL): Exceptions to WDL (03/04/22 1015)   At baseline. Mental Status and Level of Consciousness: Arousable. Pulmonary Status:   O2 Device: Nasal cannula (03/04/22 1050)   Adequate oxygenation and airway patent. Complications related to anesthesia: None    Post-anesthesia assessment completed. No concerns. Patient has met all discharge requirements. Signed By: Long Cali MD    March 4, 2022                   INITIAL Post-op Vital signs:   Vitals Value Taken Time   /98 03/04/22 1105   Temp 36.3 °C (97.4 °F) 03/04/22 1015   Pulse 96 03/04/22 1108   Resp 18 03/04/22 1108   SpO2 93 % 03/04/22 1108   Vitals shown include unvalidated device data.

## 2022-03-04 NOTE — OP NOTES
PREOPERATIVE DIAGNOSIS: Cuff Tear Arthropathy, left shoulder. POSTOPERATIVE DIAGNOSIS: Cuff Tear Arthropathy, left shoulder. PROCEDURE: Cemented left Reverse Total Shoulder Arthroplasty [employer]    IMPLANTS:   Implant Name Type Inv.  Item Serial No.  Lot No. LRB No. Used Action   SCR LCK CAP KIT 4.5X26MM ORG -- EQUINOXE - [id number]  SCR LCK CAP KIT 4.5X26MM ORG -- EQUINOXE [id number] [employer]  Left 1 Implanted   KIT BONE SCR L18MM DIA4.5MM CURTIS SHLDR WHT COMPR LCK CAP RVS - [id number]  KIT BONE SCR L18MM DIA4.5MM CURTIS SHLDR WHT COMPR [address] CAP RVS [id number] [facility] [facility] [place]  Left 1 Implanted   SCR TORQUE DEFINING KIT -- EQUINOXE - [id number]  SCR TORQUE DEFINING KIT -- [place] [id number] [employer]  Left 1 Implanted   PLATE CURTIS STD SHLDR LEOBARDO REV EQUINOXE - [id number]  PLATE CURTIS STD SHLDR LEOBARDO REV [facility]OXE [id number] [facility]  Left 1 Implanted   SCR BNE LCK GLENOSPHERE -- EQUINOXE - [id number]  SCR BNE LCK GLENOSPHERE -- EQUINOXE [id number] [employer]  Left 1 Implanted   Reverse shoulder glenosphere   [id number]   Left 1 Implanted   SCR BNE LCK GLENOSPHERE -- EQUINOXE - [id number]  SCR BNE LCK GLENOSPHERE -- EQUINOXE [id number] [employer]  Left 1 Implanted   TRAY HUM ADPT REV +0 -- EQUINOXE - [id number]  TRAY HUM ADPT REV +0 -- EQUINOXE [id number] [employer]  Left 1 Implanted   STEM HUM [id number] SHLDR LADARIUS PRESSFIT EQUINOXE - [id number]  STEM HUM [id number] SHLDR LADARIUS PRESSFIT EQU[employer]E [id number] [employer] [facility] [place]  Left 1 Implanted   CEMENT BNE 20GM HALF DOSE PMMA W/ GENT M VISC RADPQ FAST - [id number]  CEMENT BNE 20GM HALF DOSE PMMA W/ GENT M VISC RADPQ FAST  JN incrediblue ORTHOPEDICS_ [id number] Left 1 Implanted   LINER HUM [id number] +2.5MM OFFSET STD POLY FOR RVS SHLDR SYS - [id number]  LINER HUM [id number] +2.5MM OFFSET STD POLY FOR RVS [facility] SYS [id number] [employer]_WD  Left 1 Implanted       SURGEON: Flores Horne MD    ANESTHESIOLOGIST: Anesthesiologist: Cynthia Smith MD  CRNA: Kee Linda CRNA     ASSISTANTS: Circ-1: Elvis Steen RN  Circ-2: Davi Urbina RN  Circ-Relief: Rossi Infante  Scrub Tech-1: Ran Waters  Scrub Tech-2: Baron Manrique  Scrub Private/Assistant: Dilma Kinsey CNA    ANAESTHESIA: general anesthesia after scalene block. COMPLICATIONS: None. ESTIMATED BLOOD LOSS: 200 cc    SPECIMENS: None     DRAINS:  None. DESCRIPTION OF PROCEDURE: This 67 y. o.year-old male  with a history of persistent left shoulder pain and weakness, had a history of an unrepairable rotator cuff tear and severe DJD of the glenohumeral joint. The patient then opted to proceed with reconstructive surgery. The patient was taken to the operating room and placed in the beach-chair position, and the left shoulder was prepped and draped in a normal manner. The skin was injected with 0.5% Marcaine with epinephrine. An oblique incision was made over the anterior aspect of the shoulder. The incision was carried through the subcutaneous tissue through the deep fascia and the cephalic vein was retracted laterally. The dissection was carried around the humeral head. The biceps was dissected free, tenodesed distally with #2 FiberWire suture and the intraarticular portion was excised. The lesser tuberosity was osteotomized and the subscapularis was freed circumferentially and allowed to retract medially with stay sutures of #2 FiberWire. The humeral head was dislocated, and it was noted to have severe arthritic changes, along with a massive unrepairable tear of the rotator cuff. The head was osteotomized using a saw guide. The shaft was prepared using sequential reamers. The humeral head was retracted inferiorly and posteriorly. Attention was then turned to the glenoid, which showed severe arthritic changes. The labrum was excised circumferentially. The glenoid reamer was then used and finally the metaglene was placed in position, it was then secured using 3 screws, all of which were locking.  The  glenosphere was then screwed in position. The proximal humerus was then prepared and trials were placed in position with sequential reductions. The trials were removed. The shaft was irrigated using antiobiotic solution via pulsed lavage. Trial reductions were again performed. The final humeral  component was cemented in position. The shoulder was reduced. Good motion was confirmed with no sign of instability. The wound was thoroughly irrigated with antibiotic solution. Hemostasis was assured using a Bovie. The delto-pectoral interval was closed using 0 Vicryl suture. The subcutaneous tissue was closed with 2-0 vicryl and the skin with a Quill stitch. A sterile compressive dressing was applied, along with a sling. The patient left the operating room in satisfactory condition.

## 2022-03-04 NOTE — ANESTHESIA PROCEDURE NOTES
Peripheral Block    Start time: 3/4/2022 7:10 AM  End time: 3/4/2022 7:13 AM  Performed by: Marilyn Seaman MD  Authorized by: Marilyn Seaman MD       Pre-procedure: Indications: at surgeon's request and procedure for pain    Preanesthetic Checklist: patient identified, risks and benefits discussed, site marked, timeout performed, anesthesia consent given and patient being monitored    Timeout Time: 07:10 EST          Block Type:   Block Type:   Interscalene  Laterality:  Left  Monitoring:  Standard ASA monitoring, continuous pulse ox, frequent vital sign checks, heart rate, responsive to questions and oxygen  Injection Technique:  Single shot  Procedures: ultrasound guided    Patient Position: seated  Prep: chlorhexidine    Location:  Interscalene  Needle Type:  Stimuplex  Needle Gauge:  22 G  Needle Localization:  Anatomical landmarks, ultrasound guidance and nerve stimulator  Medication Injected:  Midazolam (VERSED) injection, 2 mg  ropivacaine (PF) (NAROPIN) 5 mg/mL (0.5 %) injection, 20 mL  dexmedeTOMidine (PRECEDEX) 100 mcg/mL iv solution, 20 mcg  Med Admin Time: 3/4/2022 7:10 AM    Assessment:  Number of attempts:  1  Injection Assessment:  Incremental injection every 5 mL, local visualized surrounding nerve on ultrasound, negative aspiration for CSF, negative aspiration for blood, no paresthesia, no intravascular symptoms and ultrasound image on chart  Patient tolerance:  Patient tolerated the procedure well with no immediate complications  Patient could lift arm immediately after and no pain on injection

## 2022-03-04 NOTE — PERIOP NOTES
Received in Phase II awake/alert. Assisted from stretcher to recliner with help X 2 persons. Back and knees stiff, moving very slowly, but safely.

## 2022-03-04 NOTE — PERIOP NOTES
Reviewed PTA medication list with patient/caregiver and patient/caregiver denies any additional medications. Patient admits to having a responsible adult care for them at home for at least 24 hours after surgery. Patient encouraged to use gown warming system and informed that using said warming gown to regulate body temperature prior to a procedure has been shown to help reduce the risks of blood clots and infection. Patient's pharmacy of choice verified and documented in PTA medication section. Dual skin assessment & fall risk band verification completed with Yaw Enrique RN.

## 2022-03-04 NOTE — ANESTHESIA PREPROCEDURE EVALUATION
Relevant Problems   CARDIOVASCULAR   (+) A-fib (HCC)   (+) Hypertension       Anesthetic History   No history of anesthetic complications            Review of Systems / Medical History  Patient summary reviewed, nursing notes reviewed and pertinent labs reviewed    Pulmonary  Within defined limits                 Neuro/Psych   Within defined limits           Cardiovascular    Hypertension        Dysrhythmias : atrial fibrillation      Exercise tolerance: >4 METS     GI/Hepatic/Renal  Within defined limits              Endo/Other    Diabetes    Arthritis     Other Findings              Physical Exam    Airway  Mallampati: III  TM Distance: 4 - 6 cm  Neck ROM: normal range of motion   Mouth opening: Normal     Cardiovascular    Rhythm: irregular  Rate: normal         Dental      Comments: Broken right upper tooth and angled right upper incisor   Pulmonary  Breath sounds clear to auscultation               Abdominal  GI exam deferred       Other Findings            Anesthetic Plan    ASA: 3  Anesthesia type: general and regional - interscalene block      Post-op pain plan if not by surgeon: peripheral nerve block single    Induction: Intravenous  Anesthetic plan and risks discussed with: Patient      Risk of a block include nerve injury, bleeding, infection, and failure as the most common ones although they rare.   Ga/oett/block

## 2022-03-18 PROBLEM — R10.9 ABDOMINAL PAIN: Status: ACTIVE | Noted: 2017-06-19

## 2022-03-19 PROBLEM — M54.9 BACK PAIN: Status: ACTIVE | Noted: 2017-06-19

## 2022-03-19 PROBLEM — R79.89 ABNORMAL LFTS: Status: ACTIVE | Noted: 2017-06-19

## 2022-03-19 PROBLEM — M19.012 DJD OF LEFT SHOULDER: Status: ACTIVE | Noted: 2022-03-04

## 2022-03-19 PROBLEM — K80.50 CHOLEDOCHOLITHIASIS: Status: ACTIVE | Noted: 2017-06-19

## 2023-02-27 ENCOUNTER — HOSPITAL ENCOUNTER (OUTPATIENT)
Facility: HOSPITAL | Age: 78
Discharge: HOME | End: 2023-02-27
Payer: MEDICARE

## 2023-02-27 VITALS
TEMPERATURE: 98.3 F | OXYGEN SATURATION: 99 % | DIASTOLIC BLOOD PRESSURE: 85 MMHG | HEART RATE: 113 BPM | RESPIRATION RATE: 18 BRPM | SYSTOLIC BLOOD PRESSURE: 125 MMHG

## 2023-02-27 DIAGNOSIS — L97.211 CALF ULCER, RIGHT, LIMITED TO BREAKDOWN OF SKIN (HCC): ICD-10-CM

## 2023-02-27 DIAGNOSIS — L97.212 CALF ULCER, RIGHT, WITH FAT LAYER EXPOSED (HCC): Primary | ICD-10-CM

## 2023-02-27 DIAGNOSIS — L97.812 VARICOSE VEINS OF RIGHT LOWER EXTREMITY WITH ULCER OF OTHER PART OF LOWER LEG WITH FAT LAYER EXPOSED (HCC): ICD-10-CM

## 2023-02-27 DIAGNOSIS — I83.018 VARICOSE VEINS OF RIGHT LOWER EXTREMITY WITH ULCER OF OTHER PART OF LOWER LEG WITH FAT LAYER EXPOSED (HCC): ICD-10-CM

## 2023-02-27 PROBLEM — I83.019 VARICOSE VEINS OF RIGHT LOWER EXTREMITY WITH ULCER (HCC): Status: ACTIVE | Noted: 2023-02-27

## 2023-02-27 PROBLEM — L97.919 VARICOSE VEINS OF RIGHT LOWER EXTREMITY WITH ULCER (HCC): Status: ACTIVE | Noted: 2023-02-27

## 2023-02-27 PROCEDURE — 11042 DBRDMT SUBQ TIS 1ST 20SQCM/<: CPT

## 2023-02-27 RX ORDER — LIDOCAINE HYDROCHLORIDE 40 MG/ML
SOLUTION TOPICAL ONCE
OUTPATIENT
Start: 2023-02-27 | End: 2023-02-27

## 2023-02-27 RX ORDER — BACITRACIN ZINC AND POLYMYXIN B SULFATE 500; 1000 [USP'U]/G; [USP'U]/G
OINTMENT TOPICAL ONCE
Status: CANCELLED | OUTPATIENT
Start: 2023-02-27 | End: 2023-02-27

## 2023-02-27 RX ORDER — LIDOCAINE HYDROCHLORIDE 20 MG/ML
JELLY TOPICAL ONCE
OUTPATIENT
Start: 2023-02-27 | End: 2023-02-27

## 2023-02-27 RX ORDER — BACITRACIN, NEOMYCIN, POLYMYXIN B 400; 3.5; 5 [USP'U]/G; MG/G; [USP'U]/G
OINTMENT TOPICAL ONCE
OUTPATIENT
Start: 2023-02-27 | End: 2023-02-27

## 2023-02-27 RX ORDER — LIDOCAINE 40 MG/G
CREAM TOPICAL ONCE
OUTPATIENT
Start: 2023-02-27 | End: 2023-02-27

## 2023-02-27 RX ORDER — BETAMETHASONE DIPROPIONATE 0.05 %
OINTMENT (GRAM) TOPICAL ONCE
OUTPATIENT
Start: 2023-02-27 | End: 2023-02-27

## 2023-02-27 RX ORDER — LIDOCAINE HYDROCHLORIDE 20 MG/ML
JELLY TOPICAL ONCE
Status: CANCELLED | OUTPATIENT
Start: 2023-02-27 | End: 2023-02-27

## 2023-02-27 RX ORDER — GENTAMICIN SULFATE 1 MG/G
OINTMENT TOPICAL ONCE
OUTPATIENT
Start: 2023-02-27 | End: 2023-02-27

## 2023-02-27 RX ORDER — LIDOCAINE HYDROCHLORIDE 40 MG/ML
SOLUTION TOPICAL ONCE
Status: CANCELLED | OUTPATIENT
Start: 2023-02-27 | End: 2023-02-27

## 2023-02-27 RX ORDER — CLOBETASOL PROPIONATE 0.5 MG/G
OINTMENT TOPICAL ONCE
OUTPATIENT
Start: 2023-02-27 | End: 2023-02-27

## 2023-02-27 RX ORDER — BACITRACIN ZINC AND POLYMYXIN B SULFATE 500; 1000 [USP'U]/G; [USP'U]/G
OINTMENT TOPICAL ONCE
OUTPATIENT
Start: 2023-02-27 | End: 2023-02-27

## 2023-02-27 RX ORDER — LIDOCAINE 50 MG/G
OINTMENT TOPICAL ONCE
Status: CANCELLED | OUTPATIENT
Start: 2023-02-27 | End: 2023-02-27

## 2023-02-27 RX ORDER — LIDOCAINE 50 MG/G
OINTMENT TOPICAL ONCE
OUTPATIENT
Start: 2023-02-27 | End: 2023-02-27

## 2023-02-27 RX ORDER — GINSENG 100 MG
CAPSULE ORAL ONCE
Status: CANCELLED | OUTPATIENT
Start: 2023-02-27 | End: 2023-02-27

## 2023-02-27 RX ORDER — BETAMETHASONE DIPROPIONATE 0.05 %
OINTMENT (GRAM) TOPICAL ONCE
Status: CANCELLED | OUTPATIENT
Start: 2023-02-27 | End: 2023-02-27

## 2023-02-27 RX ORDER — CLOBETASOL PROPIONATE 0.5 MG/G
OINTMENT TOPICAL ONCE
Status: CANCELLED | OUTPATIENT
Start: 2023-02-27 | End: 2023-02-27

## 2023-02-27 RX ORDER — BACITRACIN, NEOMYCIN, POLYMYXIN B 400; 3.5; 5 [USP'U]/G; MG/G; [USP'U]/G
OINTMENT TOPICAL ONCE
Status: CANCELLED | OUTPATIENT
Start: 2023-02-27 | End: 2023-02-27

## 2023-02-27 RX ORDER — GENTAMICIN SULFATE 1 MG/G
OINTMENT TOPICAL ONCE
Status: CANCELLED | OUTPATIENT
Start: 2023-02-27 | End: 2023-02-27

## 2023-02-27 RX ORDER — LIDOCAINE 40 MG/G
CREAM TOPICAL ONCE
Status: CANCELLED | OUTPATIENT
Start: 2023-02-27 | End: 2023-02-27

## 2023-02-27 RX ORDER — GINSENG 100 MG
CAPSULE ORAL ONCE
OUTPATIENT
Start: 2023-02-27 | End: 2023-02-27

## 2023-02-27 NOTE — FLOWSHEET NOTE
02/27/23 1102   Wound 02/27/23 Leg Right;Lateral   Date First Assessed/Time First Assessed: 02/27/23 1057   Present on Hospital Admission: Yes  Wound Approximate Age at First Assessment (Weeks): 8 weeks  Primary Wound Type: Venous Ulcer  Location: Leg  Wound Location Orientation: Right;Lateral   Wound Image     Wound Etiology Venous   Dressing Status Intact   Wound Cleansed Wound cleanser   Dressing/Treatment Collagen with Ag;Gauze dressing/dressing sponge;Silicone border;Tubular bandage   Offloading for Diabetic Foot Ulcers Offloading not required   Dressing Change Due 03/06/23   Wound Length (cm) 1.6 cm   Wound Width (cm) 1 cm   Wound Depth (cm) 0.1 cm   Wound Surface Area (cm^2) 1.6 cm^2   Wound Volume (cm^3) 0.16 cm^3   Post-Procedure Length (cm) 1.6 cm   Post-Procedure Width (cm) 1 cm   Post-Procedure Depth (cm) 0.2 cm   Post-Procedure Surface Area (cm^2) 1.6 cm^2   Post-Procedure Volume (cm^3) 0.32 cm^3   Wound Assessment Devitalized tissue;Pink/red   Drainage Amount Small   Drainage Description Serosanguinous   Odor None   Guillermina-wound Assessment Edematous; Intact   Margins Defined edges   Wound Thickness Description not for Pressure Injury Full thickness   Wound 02/27/23 Leg Right; Lower superior   Date First Assessed/Time First Assessed: 02/27/23 1059   Present on Hospital Admission: Yes  Wound Approximate Age at First Assessment (Weeks): 8 weeks  Primary Wound Type: Venous Ulcer  Location: Leg  Wound Location Orientation: Right; Lower  Wound De. ..    Wound Image     Wound Etiology Venous   Dressing Status Intact   Wound Cleansed Wound cleanser   Dressing/Treatment Collagen with Ag;Gauze dressing/dressing sponge;Silicone border;Tubular bandage   Offloading for Diabetic Foot Ulcers Offloading not required   Dressing Change Due 03/06/23   Wound Length (cm) 1.7 cm   Wound Width (cm) 1.4 cm   Wound Depth (cm) 0.1 cm   Wound Surface Area (cm^2) 2.38 cm^2   Wound Volume (cm^3) 0.238 cm^3   Post-Procedure Length (cm) 1.7 cm   Post-Procedure Width (cm) 1.4 cm   Post-Procedure Depth (cm) 0.3 cm   Post-Procedure Surface Area (cm^2) 2.38 cm^2   Post-Procedure Volume (cm^3) 0.714 cm^3   Wound Assessment Devitalized tissue;Pink/red   Drainage Amount Small   Drainage Description Serosanguinous   Odor None   Guillermina-wound Assessment Edematous; Intact   Margins Defined edges   Wound Thickness Description not for Pressure Injury Full thickness   Wound 02/27/23 Leg Lower;Right inferior   Date First Assessed: 02/27/23   Wound Approximate Age at First Assessment (Weeks): 8 weeks  Primary Wound Type: Venous Ulcer  Location: Leg  Wound Location Orientation: Lower;Right  Wound Description (Comments): inferior   Wound Image     Wound Etiology Venous   Dressing Status Intact   Wound Cleansed Wound cleanser   Dressing/Treatment Collagen with Ag;Gauze dressing/dressing sponge;Silicone border;Tubular bandage   Offloading for Diabetic Foot Ulcers Offloading not required   Dressing Change Due 03/06/23   Wound Length (cm) 1 cm   Wound Width (cm) 1.4 cm   Wound Depth (cm) 0.1 cm   Wound Surface Area (cm^2) 1.4 cm^2   Wound Volume (cm^3) 0.14 cm^3   Post-Procedure Length (cm) 1 cm   Post-Procedure Width (cm) 1.4 cm   Post-Procedure Depth (cm) 0.1 cm   Post-Procedure Surface Area (cm^2) 1.4 cm^2   Post-Procedure Volume (cm^3) 0.14 cm^3   Wound Assessment Devitalized tissue;Pink/red   Drainage Amount Small   Drainage Description Serosanguinous   Odor None   Guillermina-wound Assessment Edematous   Margins Defined edges   Wound Thickness Description not for Pressure Injury Full thickness   Stevan Scale   Sensory Perceptions 4   Moisture 4   Activity 4   Mobility 4   Nutrition 4   Friction and Shear 3   Stevan Scale Score 23   Wound Follow Up   Require Follow Up Yes

## 2023-02-27 NOTE — DISCHARGE INSTR - COC
Continuity of Care Form    Patient Name: Priya Braun   :  1945  MRN:  [id number]    Admit date:  2023  Discharge date:  ***    Code Status Order: No Order   Advance Directives:     Admitting Physician:  No admitting provider for patient encounter. PCP: Bruno Hagen MD    Discharging Nurse: [facility] Unit/Room#: No information available for this encounter. Discharging Unit Phone Number: ***    Emergency Contact:   Extended Emergency Contact Information  Primary Emergency Contact: [facility]  Address:  Rafat Dyer [address] [place] [address] Phone: [phone]  Mobile Phone: [phone]  Relation: Spouse    Past Surgical History:  Past Surgical History:   Procedure Laterality Date    CHOLECYSTECTOMY  2017    ORTHOPEDIC SURGERY Right 2007    r shoulder replacement    TONSILLECTOMY         Immunization History: There is no immunization history on file for this patient.     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 ([address] I83.019, L97.919       Isolation/Infection:   Isolation            No Isolation          Patient Infection Status       None to display            Nurse Assessment:  Last Vital Signs: /85   Pulse (!) 113   Temp 98.3 °F (36.8 °C) (Oral)   Resp 18   SpO2 99%     Last documented pain score (0-10 scale):    Last Weight:   Wt Readings from Last 1 Encounters:   22 195 lb (88.5 kg)     Mental Status:  {IP PT MENTAL STATUS:93461}    IV Access:  { ANASTASIYA IV ACCESS:386038359}    Nursing Mobility/ADLs:  Walking   {[facility] AUDW:725921125}  Transfer  {[facility] DME [facility]:986297574}  Bathing  {[facility] DME DDHD:151245924}  Dressing  {[facility] DME GUZJ:890411876}  Toileting  {[facility] DME VXDN:036814406}  Feeding  {[facility] DME PQES:856463156}  Med Admin  {[facility] DME XQSY:449854832}  Med Delivery   { ANASTASIYA MED Delivery:232795990}    Wound Care Documentation and Therapy:  Wound 02/27/23 Leg Right;Lateral (Active)   Wound Image    02/27/23 1102   Wound Etiology Venous 02/27/23 1102   Dressing Status Intact 02/27/23 1102   Wound Cleansed Wound cleanser 02/27/23 1102   Dressing/Treatment Collagen with Ag;Gauze dressing/dressing sponge;Silicone border;Tubular bandage 02/27/23 1102   Offloading for Diabetic Foot Ulcers Offloading not required 02/27/23 1102   Dressing Change Due 03/06/23 02/27/23 1102   Wound Length (cm) 1.6 cm 02/27/23 1102   Wound Width (cm) 1 cm 02/27/23 1102   Wound Depth (cm) 0.1 cm 02/27/23 1102   Wound Surface Area (cm^2) 1.6 cm^2 02/27/23 1102   Wound Volume (cm^3) 0.16 cm^3 02/27/23 1102   Post-Procedure Length (cm) 1.6 cm 02/27/23 1102   Post-Procedure Width (cm) 1 cm 02/27/23 1102   Post-Procedure Depth (cm) 0.2 cm 02/27/23 1102   Post-Procedure Surface Area (cm^2) 1.6 cm^2 02/27/23 1102   Post-Procedure Volume (cm^3) 0.32 cm^3 02/27/23 1102   Wound Assessment Devitalized tissue;Pink/red 02/27/23 1102   Drainage Amount Small 02/27/23 1102   Drainage Description Serosanguinous 02/27/23 1102   Odor None 02/27/23 1102   Guillermina-wound Assessment Edematous; Intact 02/27/23 1102   Margins Defined edges 02/27/23 1102   Wound Thickness Description not for Pressure Injury Full thickness 02/27/23 1102   Number of days: 0       Wound 02/27/23 Leg Right; Lower superior (Active)   Wound Image    02/27/23 1102   Wound Etiology Venous 02/27/23 1102   Dressing Status Intact 02/27/23 1102   Wound Cleansed Wound cleanser 02/27/23 1102   Dressing/Treatment Collagen with Ag;Gauze dressing/dressing sponge;Silicone border;Tubular bandage 02/27/23 1102   Offloading for Diabetic Foot Ulcers Offloading not required 02/27/23 1102   Dressing Change Due 03/06/23 02/27/23 1102   Wound Length (cm) 1.7 cm 02/27/23 1102   Wound Width (cm) 1.4 cm 02/27/23 1102   Wound Depth (cm) 0.1 cm 02/27/23 1102   Wound Surface Area (cm^2) 2.38 cm^2 02/27/23 1102   Wound Volume (cm^3) 0.238 cm^3 02/27/23 1102   Post-Procedure Length (cm) 1.7 cm 02/27/23 1102   Post-Procedure Width (cm) 1.4 cm 02/27/23 1102   Post-Procedure Depth (cm) 0.3 cm 02/27/23 1102   Post-Procedure Surface Area (cm^2) 2.38 cm^2 02/27/23 1102   Post-Procedure Volume (cm^3) 0.714 cm^3 02/27/23 1102   Wound Assessment Devitalized tissue;Pink/red 02/27/23 1102   Drainage Amount Small 02/27/23 1102   Drainage Description Serosanguinous 02/27/23 1102   Odor None 02/27/23 1102   Guillermina-wound Assessment Edematous; Intact 02/27/23 1102   Margins Defined edges 02/27/23 1102   Wound Thickness Description not for Pressure Injury Full thickness 02/27/23 1102   Number of days: 0       Wound 02/27/23 Leg Lower;Right inferior (Active)   Wound Image    02/27/23 1102   Wound Etiology Venous 02/27/23 1102   Dressing Status Intact 02/27/23 1102   Wound Cleansed Wound cleanser 02/27/23 1102   Dressing/Treatment Collagen with Ag;Gauze dressing/dressing sponge;Silicone border;Tubular bandage 02/27/23 1102   Offloading for Diabetic Foot Ulcers Offloading not required 02/27/23 1102   Dressing Change Due 03/06/23 02/27/23 1102   Wound Length (cm) 1 cm 02/27/23 1102   Wound Width (cm) 1.4 cm 02/27/23 1102   Wound Depth (cm) 0.1 cm 02/27/23 1102   Wound Surface Area (cm^2) 1.4 cm^2 02/27/23 1102   Wound Volume (cm^3) 0.14 cm^3 02/27/23 1102   Post-Procedure Length (cm) 1 cm 02/27/23 1102   Post-Procedure Width (cm) 1.4 cm 02/27/23 1102   Post-Procedure Depth (cm) 0.1 cm 02/27/23 1102   Post-Procedure Surface Area (cm^2) 1.4 cm^2 02/27/23 1102   Post-Procedure Volume (cm^3) 0.14 cm^3 02/27/23 1102   Wound Assessment Devitalized tissue;Pink/red 02/27/23 1102   Drainage Amount Small 02/27/23 1102 Drainage Description Serosanguinous 23 1102   Odor None 23 1102   Guillermina-wound Assessment Edematous 23 1102   Margins Defined edges 23 1102   Wound Thickness Description not for Pressure Injury Full thickness 23 1102   Number of days: 0        Elimination:  Continence: Bowel: {YES / QZ:14339}  Bladder: {YES / SY:87418}  Urinary Catheter: {Urinary Catheter:374801393}   Colostomy/Ileostomy/Ileal Conduit: {YES / XY:55003}       Date of Last BM: ***  No intake or output data in the 24 hours ending 23 1517  No intake/output data recorded.     Safety Concerns:     [address] Safety Concerns:967636509}    Impairments/Disabilities:      [address] Impairments/Disabilities:270598298}    Nutrition Therapy:  Current Nutrition Therapy:   [address] Diet List:697395344}    Routes of Feeding: {CHP DME Other Feedings:300959070}  Liquids: {Slp liquid thickness:04474}  Daily Fluid Restriction: {CHP DME Yes amt example:126126350}  Last Modified Barium Swallow with Video (Video Swallowing Test): {Done Not Done FULA:262427399}    Treatments at the Time of Hospital Discharge:   Respiratory Treatments: ***  Oxygen Therapy:  {Therapy; copd oxygen:79293}  Ventilator:    { CC Vent OKIZ:705456494}    Rehab Therapies: {THERAPEUTIC INTERVENTION:0065656783}  Weight Bearing Status/Restrictions: [address] Ka[address]  Weight Bearin}  Other Medical Equipment (for information only, NOT a DME order):  {EQUIPMENT:846537738}  Other Treatments: ***    Patient's personal belongings (please select all that are sent with patient):  {CHP DME Belongings:375470059}    RN SIGNATURE:  {Esignature:362547154}    CASE MANAGEMENT/SOCIAL WORK SECTION    Inpatient Status Date: ***    Readmission Risk Assessment Score:  Readmission Risk              Risk of Unplanned Readmission:  0           Discharging to Facility/ Agency   Name:   Address:  Phone:  Fax:    Dialysis Facility (if applicable)   Name:  Address:  Dialysis Schedule:  Phone:  Fax:    / signature: {Esignature:189366121}    PHYSICIAN SECTION    Prognosis: {Prognosis:0421160660}    Condition at Discharge: Cong Richey Patient Condition:222262724}    Rehab Potential (if transferring to Rehab): {Prognosis:3908310678}    Recommended Labs or Other Treatments After Discharge: ***    Physician Certification: I certify the above information and transfer of Katalina Queen  is necessary for the continuing treatment of the diagnosis listed and that he requires {Admit to Appropriate Level of Care:73264} for {GREATER/LESS:892724323} 30 days.      Update Admission H&P: {CHP DME Changes in CDFOH:579031675}    PHYSICIAN SIGNATURE:  {Esignature:011227876}

## 2023-02-27 NOTE — PROGRESS NOTES
Wound Center  Progress Note / Procedure Note      Chief Complaint:  Radha Staff is a 65 y.o.  male  with R lower leg anterior wound of few months duration. Referred by:  Dr Brian Hauser      Assessment/Plan     65 y.o. male with Dm2    -R lower leg anterior venous stasis ulcer. Full thickness  Slough/granular  necrotic  Necessitates debridement  for wound healing and to prevent/heal infection  See below    -R lower leg lateral venous stasis ulcer. Full thickness  Slough/granular  necrotic  Necessitates debridement  for wound healing and to prevent/heal infection  See below    -R lower leg inferior venous stasis ulcer. Partial thickness  Healing well    -Dm2  A1c 6.5  Does not check at home  Controlled with metformin    -Leg swelling/varicosities  Has arthritis  Unable to put on compression   Has a hard time with socks too    Following discussed with patient   Needs :  Serial debridement- prn    Good local wound care  Dressing: collagen  Frequency : three times a week     Continue Home Health    -Edema management  Do not get dressing wet    Edema management:  Elevate leg(s) throughout the day starting in the morning  Compression: Tubigrip x 2 layer  Avoid prolonged standing      -Good Diabetic control      Patient/  understood and agrees with plan. Questions answered. Serial visits and serial debridements also discussed.     Follow up with me in 1 week    Subjective:       HPI:     Acquired wounds a few months ago  Local trauma  Not healing, being managed by [place]  Adv to come here      History/Chart/Medications reviewed    Wound caused by:   Current wound care:See flowsheet  Offloading wound:   Appetite: good  Wound associated pain: See flowsheet  Diabetic: yes  Smoker: no  ROS: no N/V/D, no T/chills; no local rash, no chest pain or shortness of breath, no headache or dizzyness      Objective:     Physical Exam:   See flowsheet / nursing notes for vitals  There were no vitals filed for this visit. General: NAD. Hygiene good  Psych: cooperative. No anxiety or depression. Normal mood and affect. Neuro: alert and oriented to person/place/situation. Otherwise nonfocal.  Derm: Normal  turgor for age, dry skin  HEENT: Normocephalic, atraumatic. EOMI. Conjunctiva clear. No scleral icterus. Neck: Normal range of motion. No masses. Chest: Respirations nonlabored  Lower extremities: color normal; temperature normal.  Calves are supple, nontender, approximately equally sized in comparison. Focussed Lower Extremity Exam:      Ulcer Description:   See Flowsheet           Data Review:              Procedure:     Ulcer assessment: Due to presence of necrotic tissue within the wound bed, ulcer requires debridement. Procedure: Debridement:   The indication for debridement was reviewed with patient. Risks of procedure (bleeding, infection, pain) were discussed with patient/ and consent signed on first visit.  Questions were answered      Subcutaneous excisional debridement right lower leg anterior (superior)and lateral leg ulcers   Indication: to remove necrotic tissue/ vitalized and devitalized tissue/ infected tissue/through skin and subcutaneous layer of wound bed  Time out done  Consent in chart   Anesthesia: Topical  lidocaine   Instrument: curette   Residual Necrosis: Present and scored   Bleeding: <1ml   Hemostasis: Pressure   Patient tolerated procedure well   Procedural Pain: none  Post - procedural pain: none    Post debridement measurements: see below  Surface area debrided: <20  sq. cm    Wound 02/27/23 Leg Right;Lateral (Active)   Wound Image    02/27/23 1102   Wound Etiology Venous 02/27/23 1102   Dressing Status Intact 02/27/23 1102   Wound Cleansed Wound cleanser 02/27/23 1102   Dressing/Treatment Collagen with Ag;Gauze dressing/dressing sponge;Silicone border;Tubular bandage 02/27/23 1102   Offloading for Diabetic Foot Ulcers Offloading not required 02/27/23 1102   Dressing Change Due 03/06/23 02/27/23 1102   Wound Length (cm) 1.6 cm 02/27/23 1102   Wound Width (cm) 1 cm 02/27/23 1102   Wound Depth (cm) 0.1 cm 02/27/23 1102   Wound Surface Area (cm^2) 1.6 cm^2 02/27/23 1102   Wound Volume (cm^3) 0.16 cm^3 02/27/23 1102   Post-Procedure Length (cm) 1.6 cm 02/27/23 1102   Post-Procedure Width (cm) 1 cm 02/27/23 1102   Post-Procedure Depth (cm) 0.2 cm 02/27/23 1102   Post-Procedure Surface Area (cm^2) 1.6 cm^2 02/27/23 1102   Post-Procedure Volume (cm^3) 0.32 cm^3 02/27/23 1102   Wound Assessment Devitalized tissue;Pink/red 02/27/23 1102   Drainage Amount Small 02/27/23 1102   Drainage Description Serosanguinous 02/27/23 1102   Odor None 02/27/23 1102   Guillermina-wound Assessment Edematous; Intact 02/27/23 1102   Margins Defined edges 02/27/23 1102   Wound Thickness Description not for Pressure Injury Full thickness 02/27/23 1102   Number of days: 0       Wound 02/27/23 Leg Right; Lower superior (Active)   Wound Image    02/27/23 1102   Wound Etiology Venous 02/27/23 1102   Dressing Status Intact 02/27/23 1102   Wound Cleansed Wound cleanser 02/27/23 1102   Dressing/Treatment Collagen with Ag;Gauze dressing/dressing sponge;Silicone border;Tubular bandage 02/27/23 1102   Offloading for Diabetic Foot Ulcers Offloading not required 02/27/23 1102   Dressing Change Due 03/06/23 02/27/23 1102   Wound Length (cm) 1.7 cm 02/27/23 1102   Wound Width (cm) 1.4 cm 02/27/23 1102   Wound Depth (cm) 0.1 cm 02/27/23 1102   Wound Surface Area (cm^2) 2.38 cm^2 02/27/23 1102   Wound Volume (cm^3) 0.238 cm^3 02/27/23 1102   Post-Procedure Length (cm) 1.7 cm 02/27/23 1102   Post-Procedure Width (cm) 1.4 cm 02/27/23 1102   Post-Procedure Depth (cm) 0.3 cm 02/27/23 1102   Post-Procedure Surface Area (cm^2) 2.38 cm^2 02/27/23 1102   Post-Procedure Volume (cm^3) 0.714 cm^3 02/27/23 1102   Wound Assessment Devitalized tissue;Pink/red 02/27/23 1102   Drainage Amount Small 02/27/23 1102   Drainage Description Serosanguinous 02/27/23 1102 Odor None 02/27/23 1102   Guillermina-wound Assessment Edematous; Intact 02/27/23 1102   Margins Defined edges 02/27/23 1102   Wound Thickness Description not for Pressure Injury Full thickness 02/27/23 1102   Number of days: 0       Wound 02/27/23 Leg Lower;Right inferior (Active)   Wound Image    02/27/23 1102   Wound Etiology Venous 02/27/23 1102   Dressing Status Intact 02/27/23 1102   Wound Cleansed Wound cleanser 02/27/23 1102   Dressing/Treatment Collagen with Ag;Gauze dressing/dressing sponge;Silicone border;Tubular bandage 02/27/23 1102   Offloading for Diabetic Foot Ulcers Offloading not required 02/27/23 1102   Dressing Change Due 03/06/23 02/27/23 1102   Wound Length (cm) 1 cm 02/27/23 1102   Wound Width (cm) 1.4 cm 02/27/23 1102   Wound Depth (cm) 0.1 cm 02/27/23 1102   Wound Surface Area (cm^2) 1.4 cm^2 02/27/23 1102   Wound Volume (cm^3) 0.14 cm^3 02/27/23 1102   Post-Procedure Length (cm) 1 cm 02/27/23 1102   Post-Procedure Width (cm) 1.4 cm 02/27/23 1102   Post-Procedure Depth (cm) 0.1 cm 02/27/23 1102   Post-Procedure Surface Area (cm^2) 1.4 cm^2 02/27/23 1102   Post-Procedure Volume (cm^3) 0.14 cm^3 02/27/23 1102   Wound Assessment Devitalized tissue;Pink/red 02/27/23 1102   Drainage Amount Small 02/27/23 1102   Drainage Description Serosanguinous 02/27/23 1102   Odor None 02/27/23 1102   Guillermina-wound Assessment Edematous 02/27/23 1102   Margins Defined edges 02/27/23 1102   Wound Thickness Description not for Pressure Injury Full thickness 02/27/23 1102   Number of days: 0          -------    Past Medical History:   Diagnosis Date    A-fib ([facility] [address]     Arthritis     Cancer ([facility] [address]     skin    Chronic kidney disease     kidney stones    Coagulation disorder ([facility] [address]     excess iron; erthythrocytosis; hemochromatosis    Diabetes ([facility] [address]     Hypertension 2007    Ill-defined condition     kidney stones     Past Surgical History:   Procedure Laterality Date    CHOLECYSTECTOMY  07/2017    ORTHOPEDIC SURGERY Right 2007 r shoulder replacement    TONSILLECTOMY       No family history on file. Social History     Socioeconomic History    Marital status:    Tobacco Use    Smoking status: Never    Smokeless tobacco: Never   Substance and Sexual Activity    Alcohol use: Yes     Alcohol/week: 3.0 standard drinks    Drug use: No        Prior to Admission medications    Medication Sig Start Date End Date Taking? Authorizing Provider   Calcium Carb-Cholecalciferol 600-10 MG-MCG CAPS Take by mouth 2 times daily    Ar Automatic Reconciliation   dabigatran (PRADAXA) 75 MG capsule Take 75 mg by mouth in the morning and 75 mg in the evening. 6/20/17   Ar Automatic Reconciliation   dilTIAZem HCl ER Coated Beads 360 MG TB24 Take 180 mg by mouth daily    Ar Automatic Reconciliation   ibandronate (BONIVA) 150 MG tablet Take 150 mg by mouth every 30 days    Ar Automatic Reconciliation   meloxicam (MOBIC) 15 MG tablet Take 10 mg by mouth daily    Ar Automatic Reconciliation   metFORMIN (GLUCOPHAGE) 1000 MG tablet Take 1,000 mg by mouth 2 times daily (with meals)    Ar Automatic Reconciliation   mometasone (NASONEX) 50 MCG/ACT nasal spray 2 sprays by Nasal route daily    Ar Automatic Reconciliation   montelukast (SINGULAIR) 10 MG tablet Take 10 mg by mouth    Ar Automatic Reconciliation   nebivolol (BYSTOLIC) 10 MG tablet Take 10 mg by mouth daily    Ar Automatic Reconciliation   tadalafil (CIALIS) 5 MG tablet Take 5 mg by mouth    Ar Automatic Reconciliation   testosterone (ANDROGEL; TESTIM) 50 MG/5GM (1%) GEL 1% gel Place 5 g onto the skin.     Ar Automatic Reconciliation      No Known Allergies       Signed By: Amari Bedoya MD

## 2023-02-27 NOTE — DISCHARGE INSTRUCTIONS
Discharge Instructions from  [facility]  [address] [place], [facility]  [phone] Fax [phone]    NAME:  Alexx Albert OF BIRTH:  1945  MEDICAL RECORD NUMBER:  [id number]  DATE: 2/27/2023    Wound Cleansing:   Do not scrub or use excessive force. Cleanse wound prior to applying a clean dressing with:  [x] Normal Saline or   [x] Wound Cleanser   [] Cleanse wound with Mild Soap & Water  [] May Shower at Discharge   [] Other:      Topical Treatments:  Do not apply lotions, creams, or ointments to wound bed unless directed. [] Apply moisturizing lotion to skin surrounding the wound prior to dressing change.  [] Apply antifungal ointment to skin surrounding the wound prior to dressing change.  [] Apply thin film of moisture barrier ointment to skin immediately around wound. [] Other:       Dressings:           Wound Location Right leg   [] Apply Primary Dressing:       [] MediHoney Gel [] Alginate with Silver [] Alginate   [] Collagen [x] Collagen with Silver   [] Santyl with Moisten saline gauze     [] Hydrocolloid   [] MediHoney Alginate [] Foam with Silver   [] Foam   [] Hydrofera Blue    [x] Mepilex Border    [] Moisten with Saline [] Hydrogel [] Mepitel     [] Bactroban/Mupirocin [] Polysporin  [x] Other:  4 x 4's underneath Border  [] Pack wound loosely with  [] Iodoform   [] Plain Packing  [] Other   [] Cover and Secure with:     [] Gauze [] Tamanna [] Kerlix   [] Ace Wrap [] Cover Roll Tape [] ABD     [x] Other: Double tubigrip size E and F   Avoid contact of tape with skin.   [x] Change dressing: [] Daily    [] Every Other Day [] Three times per week   [] Once a week [] Do Not Change Dressing   [x] Other: 2 times per week (Pt to come to Wound Clinic on Monday's)          Edema Control:  Apply: [] Compression Stocking []Right Leg []Left Leg   [x] Tubigrip [x]Right Leg Double Layer []Left Leg Double Layer       []Right Leg Single Layer []Left Leg Single Layer   [] SpandaGrip []Right Leg  []Left Leg      []Low compression 5-10 mm/Hg      []Medium compression 10-20 mm/Hg     []High compression  20-30 mm/Hg   every morning immediately when getting up should be applied to affected leg(s) from mid foot to knee making sure to cover the heel. Remove every night before going to bed. [x] Elevate leg(s) above the level of the heart when sitting. [x] Avoid prolonged standing in one place. Dietary:  [x] Diet as tolerated: [] Calorie Diabetic Diet: [] No Added Salt:  [] Increase Protein: [] Other:       Activity:  [x] Activity as tolerated:  [] Patient has no activity restrictions     [] Strict Bedrest: [] Remain off Work:     [] May return to full duty work:                                   [] Return to work with restrictions:             Return Appointment:  [] Wound and dressing supply provider:   [] ECF or Home Healthcare:  [] Wound Assessment: [] Physician or NP scheduled for Wound Assessment:   [x] Return Appointment: With Dr. Hickey Click  in  [facility])  [] Ordered tests:      Electronically signed Mariano Valdez RN on 2/27/2023 at 3:11 PM     Rosalinda August 281: Should you experience any significant changes in your wound(s) or have questions about your wound care, please contact the [facility] at [address] [place] [address] 8:00 am - 4:30. If you need help with your wound outside these hours and cannot wait until we are again available, contact your PCP or go to the hospital emergency room. PLEASE NOTE: IF YOU ARE UNABLE TO OBTAIN WOUND SUPPLIES, CONTINUE TO USE THE SUPPLIES YOU HAVE AVAILABLE UNTIL YOU ARE ABLE TO REACH US. IT IS MOST IMPORTANT TO KEEP THE WOUND COVERED AT ALL TIMES.      Physician Signature:_______________________    Date: ___________ Time:  ____________

## 2023-02-27 NOTE — WOUND CARE
Discharge Instructions from  [address] [facility]  [address] [place], [place], [facility]  [phone] Fax [phone]    NAME:  Maggie Conway OF BIRTH:  1945  MEDICAL RECORD NUMBER:  [id number]  DATE: 2/27/2023    Wound Cleansing:   Do not scrub or use excessive force. Cleanse wound prior to applying a clean dressing with:  [x] Normal Saline or   [x] Wound Cleanser   [] Cleanse wound with Mild Soap & Water  [] May Shower at Discharge   [] Other:      Topical Treatments:  Do not apply lotions, creams, or ointments to wound bed unless directed. [] Apply moisturizing lotion to skin surrounding the wound prior to dressing change.  [] Apply antifungal ointment to skin surrounding the wound prior to dressing change.  [] Apply thin film of moisture barrier ointment to skin immediately around wound. [] Other:       Dressings:           Wound Location Right leg   [] Apply Primary Dressing:       [] MediHoney Gel [] Alginate with Silver [] Alginate   [] Collagen [x] Collagen with Silver   [] Santyl with Moisten saline gauze     [] Hydrocolloid   [] MediHoney Alginate [] Foam with Silver   [] Foam   [] Hydrofera Blue    [x] Mepilex Border    [] Moisten with Saline [] Hydrogel [] Mepitel     [] Bactroban/Mupirocin [] Polysporin  [x] Other:  4 x 4's underneath Border  [] Pack wound loosely with  [] Iodoform   [] Plain Packing  [] Other   [] Cover and Secure with:     [] Gauze [] Tamanna [] Kerlix   [] Ace Wrap [] Cover Roll Tape [] ABD     [x] Other: Double tubigrip size E and F   Avoid contact of tape with skin.   [x] Change dressing: [] Daily    [] Every Other Day [] Three times per week   [] Once a week [] Do Not Change Dressing   [x] Other: 2 times per week (Pt to come to Wound Clinic on Monday's)          Edema Control:  Apply: [] Compression Stocking []Right Leg []Left Leg   [x] Tubigrip [x]Right Leg Double Layer []Left Leg Double Layer       []Right Leg Single Layer []Left Leg Single Layer   [] SpandaGrip []Right Leg  []Left Leg      []Low compression 5-10 mm/Hg      []Medium compression 10-20 mm/Hg     []High compression  20-30 mm/Hg   every morning immediately when getting up should be applied to affected leg(s) from mid foot to knee making sure to cover the heel. Remove every night before going to bed. [x] Elevate leg(s) above the level of the heart when sitting. [x] Avoid prolonged standing in one place. Dietary:  [x] Diet as tolerated: [] Calorie Diabetic Diet: [] No Added Salt:  [] Increase Protein: [] Other:       Activity:  [x] Activity as tolerated:  [] Patient has no activity restrictions     [] Strict Bedrest: [] Remain off Work:     [] May return to full duty work:                                   [] Return to work with restrictions:             Return Appointment:  [] Wound and dressing supply provider:   [] ECF or Home Healthcare:  [] Wound Assessment: [] Physician or NP scheduled for Wound Assessment:   [x] Return Appointment: With Dr. Alexei Hubbard  in  [address] [place] [address])  [] Ordered tests:      Electronically signed Marlen Mayberry RN on 2/27/2023 at 3:11 PM     Rosalinda August 281: Should you experience any significant changes in your wound(s) or have questions about your wound care, please contact the [facility] at [address] [place] [address] 8:00 am - 4:30. If you need help with your wound outside these hours and cannot wait until we are again available, contact your PCP or go to the hospital emergency room. PLEASE NOTE: IF YOU ARE UNABLE TO OBTAIN WOUND SUPPLIES, CONTINUE TO USE THE SUPPLIES YOU HAVE AVAILABLE UNTIL YOU ARE ABLE TO REACH US. IT IS MOST IMPORTANT TO KEEP THE WOUND COVERED AT ALL TIMES.      Physician Signature:_______________________    Date: ___________ Time:  ____________

## 2023-03-06 ENCOUNTER — HOSPITAL ENCOUNTER (OUTPATIENT)
Facility: HOSPITAL | Age: 78
Discharge: HOME | End: 2023-03-06
Payer: MEDICARE

## 2023-03-06 VITALS
TEMPERATURE: 97.7 F | SYSTOLIC BLOOD PRESSURE: 138 MMHG | HEART RATE: 83 BPM | OXYGEN SATURATION: 98 % | DIASTOLIC BLOOD PRESSURE: 70 MMHG | RESPIRATION RATE: 18 BRPM

## 2023-03-06 DIAGNOSIS — L97.212 CALF ULCER, RIGHT, WITH FAT LAYER EXPOSED (HCC): ICD-10-CM

## 2023-03-06 DIAGNOSIS — L97.211 CALF ULCER, RIGHT, LIMITED TO BREAKDOWN OF SKIN (HCC): ICD-10-CM

## 2023-03-06 DIAGNOSIS — L97.812 VARICOSE VEINS OF RIGHT LOWER EXTREMITY WITH ULCER OF OTHER PART OF LOWER LEG WITH FAT LAYER EXPOSED (HCC): Primary | ICD-10-CM

## 2023-03-06 DIAGNOSIS — I83.018 VARICOSE VEINS OF RIGHT LOWER EXTREMITY WITH ULCER OF OTHER PART OF LOWER LEG WITH FAT LAYER EXPOSED (HCC): Primary | ICD-10-CM

## 2023-03-06 PROCEDURE — 11042 DBRDMT SUBQ TIS 1ST 20SQCM/<: CPT

## 2023-03-06 RX ORDER — BACITRACIN, NEOMYCIN, POLYMYXIN B 400; 3.5; 5 [USP'U]/G; MG/G; [USP'U]/G
OINTMENT TOPICAL ONCE
OUTPATIENT
Start: 2023-03-06 | End: 2023-03-06

## 2023-03-06 RX ORDER — GENTAMICIN SULFATE 1 MG/G
OINTMENT TOPICAL ONCE
OUTPATIENT
Start: 2023-03-06 | End: 2023-03-06

## 2023-03-06 RX ORDER — LIDOCAINE HYDROCHLORIDE 20 MG/ML
JELLY TOPICAL ONCE
OUTPATIENT
Start: 2023-03-06 | End: 2023-03-06

## 2023-03-06 RX ORDER — BACITRACIN ZINC AND POLYMYXIN B SULFATE 500; 1000 [USP'U]/G; [USP'U]/G
OINTMENT TOPICAL ONCE
OUTPATIENT
Start: 2023-03-06 | End: 2023-03-06

## 2023-03-06 RX ORDER — COLCHICINE 0.6 MG/1
TABLET ORAL
COMMUNITY
Start: 2023-03-01

## 2023-03-06 RX ORDER — GINSENG 100 MG
CAPSULE ORAL ONCE
OUTPATIENT
Start: 2023-03-06 | End: 2023-03-06

## 2023-03-06 RX ORDER — CLOBETASOL PROPIONATE 0.5 MG/G
OINTMENT TOPICAL ONCE
OUTPATIENT
Start: 2023-03-06 | End: 2023-03-06

## 2023-03-06 RX ORDER — LIDOCAINE HYDROCHLORIDE 40 MG/ML
SOLUTION TOPICAL ONCE
OUTPATIENT
Start: 2023-03-06 | End: 2023-03-06

## 2023-03-06 RX ORDER — LIDOCAINE 40 MG/G
CREAM TOPICAL ONCE
OUTPATIENT
Start: 2023-03-06 | End: 2023-03-06

## 2023-03-06 RX ORDER — LIDOCAINE 50 MG/G
OINTMENT TOPICAL ONCE
OUTPATIENT
Start: 2023-03-06 | End: 2023-03-06

## 2023-03-06 RX ORDER — ALLOPURINOL 300 MG/1
TABLET ORAL
COMMUNITY
Start: 2023-03-01

## 2023-03-06 RX ORDER — BETAMETHASONE DIPROPIONATE 0.05 %
OINTMENT (GRAM) TOPICAL ONCE
OUTPATIENT
Start: 2023-03-06 | End: 2023-03-06

## 2023-03-06 NOTE — PROGRESS NOTES
Wound Center  Progress Note / Procedure Note      Chief Complaint:  Jassi Craig is a 65 y.o.  male  with R lower leg anterior wound of few months duration. Assessment/Plan     65 y.o. male with Dm2    -R lower leg anterior venous stasis ulcer. Full thickness  Slough/granular  necrotic  Necessitates debridement  for wound healing and to prevent/heal infection  See below    -R lower leg lateral venous stasis ulcer. Full thickness  Slough/granular  necrotic  Necessitates debridement  for wound healing and to prevent/heal infection  See below    -R lower leg inferior venous stasis ulcer. Partial thickness  Healing well    -Dm2  A1c 6.5  Does not check at home  Controlled with metformin    -Leg swelling/varicosities  Has arthritis  Unable to put on compression   Has a hard time with socks too    Following discussed with patient   Needs :  Serial debridement- prn    Good local wound care  Dressing: collagen  Frequency : three times a week     Continue Home Health    -Edema management  Do not get dressing wet    Edema management:  Elevate leg(s) throughout the day starting in the morning  Compression: Tubigrip x 2 layer  Avoid prolonged standing      -Good Diabetic control      Patient/  understood and agrees with plan. Questions answered. Serial visits and serial debridements also discussed.     Follow up with me in 1 week    Subjective:     Since last visit  No new issues  Going away for a week in 2 weeks    HPI:     Acquired wounds a few months ago  Local trauma  Not healing, being managed by [facility]  Adv to come here      History/Chart/Medications reviewed    Wound caused by:   Current wound care:See flowsheet  Offloading wound:   Appetite: good  Wound associated pain: See flowsheet  Diabetic: yes  Smoker: no  ROS: no N/V/D, no T/chills; no local rash, no chest pain or shortness of breath, no headache or dizzyness      Objective:     Physical Exam:   See flowsheet / nursing notes for vitals  Vitals: 03/06/23 0930   BP: 138/70   Pulse: 83   Resp: 18   Temp: 97.7 °F (36.5 °C)   SpO2: 98%     General: NAD. Hygiene good  Psych: cooperative. No anxiety or depression. Normal mood and affect. Neuro: alert and oriented to person/place/situation. Otherwise nonfocal.  Derm: Normal  turgor for age, dry skin  HEENT: Normocephalic, atraumatic. EOMI. Conjunctiva clear. No scleral icterus. Neck: Normal range of motion. No masses. Chest: Respirations nonlabored  Lower extremities: color normal; temperature normal.  Calves are supple, nontender, approximately equally sized in comparison. Focussed Lower Extremity Exam:      Ulcer Description:   See Flowsheet           Data Review:              Procedure:     Ulcer assessment: Due to presence of necrotic tissue within the wound bed, ulcer requires debridement. Procedure: Debridement:   The indication for debridement was reviewed with patient. Risks of procedure (bleeding, infection, pain) were discussed with patient/ and consent signed on first visit. Questions were answered      Selective debridement right lower leg anterior (inferior)and lateral leg ulcers   Indication: to remove necrotic tissue/ vitalized and devitalized tissue/ infected tissue/through skin layer of wound bed  Time out done  Consent in chart   Anesthesia: Topical  lidocaine   Instrument: curette   Residual Necrosis: Present and scored   Bleeding: <1ml   Hemostasis: Pressure   Patient tolerated procedure well   Procedural Pain: none  Post - procedural pain: none    Post debridement measurements: see below  Surface area debrided: <20  sq.  Cm    Subcutaneous excisional debridement right lower leg anterior (superior)  Indication: to remove necrotic tissue/ vitalized and devitalized tissue/ infected tissue/through skin and subcutaneous layer of wound bed  Time out done  Consent in chart   Anesthesia: Topical  lidocaine   Instrument: curette   Residual Necrosis: Present and scored   Bleeding: <1ml Hemostasis: Pressure   Patient tolerated procedure well   Procedural Pain: none  Post - procedural pain: none    Post debridement measurements: see below  Surface area debrided: <20  sq. cm    Wound 02/27/23 Leg Right;Lateral (Active)   Wound Image    03/06/23 0942   Wound Etiology Venous 03/06/23 0942   Dressing Status Intact 03/06/23 0942   Wound Cleansed Wound cleanser 03/06/23 0942   Dressing/Treatment Collagen with Ag;Dry dressing;Silicone border;Tubular bandage 03/06/23 0942   Offloading for Diabetic Foot Ulcers Offloading not required 03/06/23 0942   Dressing Change Due 03/13/23 03/06/23 0942   Wound Length (cm) 1.8 cm 03/06/23 0942   Wound Width (cm) 1 cm 03/06/23 0942   Wound Depth (cm) 0.1 cm 03/06/23 0942   Wound Surface Area (cm^2) 1.8 cm^2 03/06/23 0942   Change in Wound Size % (l*w) -12.5 03/06/23 0942   Wound Volume (cm^3) 0.18 cm^3 03/06/23 0942   Wound Healing % -13 03/06/23 0942   Post-Procedure Length (cm) 1.8 cm 03/06/23 0942   Post-Procedure Width (cm) 1 cm 03/06/23 0942   Post-Procedure Depth (cm) 0.3 cm 03/06/23 0942   Post-Procedure Surface Area (cm^2) 1.8 cm^2 03/06/23 0942   Post-Procedure Volume (cm^3) 0.54 cm^3 03/06/23 0942   Wound Assessment Devitalized tissue;Pink/red 03/06/23 0942   Drainage Amount Small 03/06/23 0942   Drainage Description Serosanguinous 03/06/23 0942   Odor None 03/06/23 0942   Guillermina-wound Assessment Intact 03/06/23 0942   Margins Defined edges 03/06/23 0942   Wound Thickness Description not for Pressure Injury Full thickness 03/06/23 0942   Number of days: 6       Wound 02/27/23 Leg Right; Lower superior (Active)   Wound Image    03/06/23 0942   Wound Etiology Venous 03/06/23 0942   Dressing Status Intact 03/06/23 0942   Wound Cleansed Wound cleanser 03/06/23 0942   Dressing/Treatment Collagen with Ag;Dry dressing;Silicone border;Tubular bandage 03/06/23 0942   Offloading for Diabetic Foot Ulcers Offloading not required 03/06/23 0942   Dressing Change Due 03/13/23 03/06/23 0942   Wound Length (cm) 1.1 cm 03/06/23 0942   Wound Width (cm) 1.5 cm 03/06/23 0942   Wound Depth (cm) 0.1 cm 03/06/23 0942   Wound Surface Area (cm^2) 1.65 cm^2 03/06/23 0942   Change in Wound Size % (l*w) 30.67 03/06/23 0942   Wound Volume (cm^3) 0.165 cm^3 03/06/23 0942   Wound Healing % 31 03/06/23 0942   Post-Procedure Length (cm) 1.1 cm 03/06/23 0942   Post-Procedure Width (cm) 1.5 cm 03/06/23 0942   Post-Procedure Depth (cm) 0.3 cm 03/06/23 0942   Post-Procedure Surface Area (cm^2) 1.65 cm^2 03/06/23 0942   Post-Procedure Volume (cm^3) 0.495 cm^3 03/06/23 0942   Wound Assessment Devitalized tissue 03/06/23 0942   Drainage Amount Moderate 03/06/23 0942   Drainage Description Serosanguinous 03/06/23 0942   Odor None 03/06/23 0942   Guillermina-wound Assessment Intact 03/06/23 0942   Margins Defined edges 03/06/23 0942   Wound Thickness Description not for Pressure Injury Full thickness 03/06/23 0942   Number of days: 6       Wound 02/27/23 Leg Lower;Right inferior (Active)   Wound Image    03/06/23 0942   Wound Etiology Venous 03/06/23 0942   Dressing Status Intact 03/06/23 0942   Wound Cleansed Wound cleanser 03/06/23 0942   Dressing/Treatment Collagen with Ag;Dry dressing;Silicone border;Tubular bandage 03/06/23 0942   Offloading for Diabetic Foot Ulcers Offloading not required 03/06/23 0942   Dressing Change Due 03/13/23 03/06/23 0942   Wound Length (cm) 0.7 cm 03/06/23 0942   Wound Width (cm) 1.3 cm 03/06/23 0942   Wound Depth (cm) 0.1 cm 03/06/23 0942   Wound Surface Area (cm^2) 0.91 cm^2 03/06/23 0942   Change in Wound Size % (l*w) 35 03/06/23 0942   Wound Volume (cm^3) 0.091 cm^3 03/06/23 0942   Wound Healing % 35 03/06/23 0942   Post-Procedure Length (cm) 0.7 cm 03/06/23 0942   Post-Procedure Width (cm) 1.3 cm 03/06/23 0942   Post-Procedure Depth (cm) 0.2 cm 03/06/23 0942   Post-Procedure Surface Area (cm^2) 0.91 cm^2 03/06/23 0942   Post-Procedure Volume (cm^3) 0.182 cm^3 03/06/23 0942   Wound Assessment Devitalized tissue;Pink/red 03/06/23 0942   Drainage Amount Small 03/06/23 0942   Drainage Description Serosanguinous 03/06/23 0942   Odor None 03/06/23 0942   Guillermina-wound Assessment Intact 03/06/23 0942   Margins Defined edges 03/06/23 0942   Wound Thickness Description not for Pressure Injury Full thickness 03/06/23 0942   Number of days: 7          -------    Past Medical History:   Diagnosis Date    A-fib ([facility]     Arthritis     Cancer ([facility]     skin    Chronic kidney disease     kidney stones    Coagulation disorder ([facility]     excess iron; erthythrocytosis; hemochromatosis    Diabetes ([facility]     Hypertension 2007    Ill-defined condition     kidney stones     Past Surgical History:   Procedure Laterality Date    CHOLECYSTECTOMY  07/2017    ORTHOPEDIC SURGERY Right 2007    r shoulder replacement    TONSILLECTOMY       No family history on file. Social History     Socioeconomic History    Marital status:    Tobacco Use    Smoking status: Never    Smokeless tobacco: Never   Substance and Sexual Activity    Alcohol use: Yes     Alcohol/week: 3.0 standard drinks    Drug use: No        Prior to Admission medications    Medication Sig Start Date End Date Taking?  Authorizing Provider   allopurinol (ZYLOPRIM) 300 MG tablet take 1 tablet by mouth once daily 3/1/23   Historical Provider, MD   colchicine (COLCRYS) 0.6 MG tablet take 1 tablet by mouth once daily 3/1/23   Historical Provider, MD   Calcium Carb-Cholecalciferol 600-10 MG-MCG CAPS Take by mouth 2 times daily    Ar Automatic Reconciliation   dabigatran (PRADAXA) 75 MG capsule Take 75 mg by mouth in the morning and 75 mg in the evening. 6/20/17   Ar Automatic Reconciliation   dilTIAZem HCl ER Coated Beads 360 MG TB24 Take 180 mg by mouth daily    Ar Automatic Reconciliation   ibandronate (BONIVA) 150 MG tablet Take 150 mg by mouth every 30 days    Ar Automatic Reconciliation   meloxicam (MOBIC) 15 MG tablet Take 10 mg by mouth daily    Ar Automatic Reconciliation   metFORMIN (GLUCOPHAGE) 1000 MG tablet Take 1,000 mg by mouth 2 times daily (with meals)    Ar Automatic Reconciliation   mometasone (NASONEX) 50 MCG/ACT nasal spray 2 sprays by Nasal route daily    Ar Automatic Reconciliation   montelukast (SINGULAIR) 10 MG tablet Take 10 mg by mouth    Ar Automatic Reconciliation   nebivolol (BYSTOLIC) 10 MG tablet Take 10 mg by mouth daily    Ar Automatic Reconciliation   tadalafil (CIALIS) 5 MG tablet Take 5 mg by mouth    Ar Automatic Reconciliation   testosterone (ANDROGEL; TESTIM) 50 MG/5GM (1%) GEL 1% gel Place 5 g onto the skin.     Ar Automatic Reconciliation      No Known Allergies       Signed By: Aldair Barrientos MD

## 2023-03-06 NOTE — FLOWSHEET NOTE
03/06/23 0942   Wound 02/27/23 Leg Right;Lateral   Date First Assessed/Time First Assessed: 02/27/23 1057   Present on Hospital Admission: Yes  Wound Approximate Age at First Assessment (Weeks): 8 weeks  Primary Wound Type: Venous Ulcer  Location: Leg  Wound Location Orientation: Right;Lateral   Wound Image     Wound Etiology Venous   Dressing Status Intact   Wound Cleansed Wound cleanser   Dressing/Treatment Collagen with Ag;Dry dressing;Silicone border;Tubular bandage   Offloading for Diabetic Foot Ulcers Offloading not required   Dressing Change Due 03/13/23   Wound Length (cm) 1.8 cm   Wound Width (cm) 1 cm   Wound Depth (cm) 0.1 cm   Wound Surface Area (cm^2) 1.8 cm^2   Change in Wound Size % (l*w) -12.5   Wound Volume (cm^3) 0.18 cm^3   Wound Healing % -13   Post-Procedure Length (cm) 1.8 cm   Post-Procedure Width (cm) 1 cm   Post-Procedure Depth (cm) 0.3 cm   Post-Procedure Surface Area (cm^2) 1.8 cm^2   Post-Procedure Volume (cm^3) 0.54 cm^3   Wound Assessment Devitalized tissue;Pink/red   Drainage Amount Small   Drainage Description Serosanguinous   Odor None   Guillermina-wound Assessment Intact   Margins Defined edges   Wound Thickness Description not for Pressure Injury Full thickness   Wound 02/27/23 Leg Right; Lower superior   Date First Assessed/Time First Assessed: 02/27/23 1059   Present on Hospital Admission: Yes  Wound Approximate Age at First Assessment (Weeks): 8 weeks  Primary Wound Type: Venous Ulcer  Location: Leg  Wound Location Orientation: Right; Lower  Wound De. ..    Wound Image     Wound Etiology Venous   Dressing Status Intact   Wound Cleansed Wound cleanser   Dressing/Treatment Collagen with Ag;Dry dressing;Silicone border;Tubular bandage   Offloading for Diabetic Foot Ulcers Offloading not required   Dressing Change Due 03/13/23   Wound Length (cm) 1.1 cm   Wound Width (cm) 1.5 cm   Wound Depth (cm) 0.1 cm   Wound Surface Area (cm^2) 1.65 cm^2   Change in Wound Size % (l*w) 30.67   Wound Volume (cm^3) 0.165 cm^3   Wound Healing % 31   Post-Procedure Length (cm) 1.1 cm   Post-Procedure Width (cm) 1.5 cm   Post-Procedure Depth (cm) 0.3 cm   Post-Procedure Surface Area (cm^2) 1.65 cm^2   Post-Procedure Volume (cm^3) 0.495 cm^3   Wound Assessment Devitalized tissue   Drainage Amount Moderate   Drainage Description Serosanguinous   Odor None   Guillermina-wound Assessment Intact   Margins Defined edges   Wound Thickness Description not for Pressure Injury Full thickness   Wound 02/27/23 Leg Lower;Right inferior   Date First Assessed: 02/27/23   Wound Approximate Age at First Assessment (Weeks): 8 weeks  Primary Wound Type: Venous Ulcer  Location: Leg  Wound Location Orientation: Lower;Right  Wound Description (Comments): inferior   Wound Image     Wound Etiology Venous   Dressing Status Intact   Wound Cleansed Wound cleanser   Dressing/Treatment Collagen with Ag;Dry dressing;Silicone border;Tubular bandage   Offloading for Diabetic Foot Ulcers Offloading not required   Dressing Change Due 03/13/23   Wound Length (cm) 0.7 cm   Wound Width (cm) 1.3 cm   Wound Depth (cm) 0.1 cm   Wound Surface Area (cm^2) 0.91 cm^2   Change in Wound Size % (l*w) 35   Wound Volume (cm^3) 0.091 cm^3   Wound Healing % 35   Post-Procedure Length (cm) 0.7 cm   Post-Procedure Width (cm) 1.3 cm   Post-Procedure Depth (cm) 0.2 cm   Post-Procedure Surface Area (cm^2) 0.91 cm^2   Post-Procedure Volume (cm^3) 0.182 cm^3   Wound Assessment Devitalized tissue;Pink/red   Drainage Amount Small   Drainage Description Serosanguinous   Odor None   Guillermina-wound Assessment Intact   Margins Defined edges   Wound Thickness Description not for Pressure Injury Full thickness   Stevan Scale   Sensory Perceptions 4   Moisture 4   Activity 4   Mobility 4   Nutrition 4   Friction and Shear 3   Stevan Scale Score 23   Wound Follow Up   Require Follow Up Yes

## 2023-03-06 NOTE — DISCHARGE INSTRUCTIONS
Discharge Instructions from  [address] [facility]  [address] [place] [facility]  [phone] Fax [phone]    NAME:  Ferny Granados OF BIRTH:  1945  MEDICAL RECORD NUMBER:  [id number]  DATE: 3/6/2023    Wound Cleansing:   Do not scrub or use excessive force. Cleanse wound prior to applying a clean dressing with:  [x] Normal Saline [] Keep Wound Dry in Shower    [x] Wound Cleanser   [] Cleanse wound with Mild Soap & Water  [] May Shower at Discharge   [] Other:      :       Dressings:           Wound Location Right Leg   [x] Apply Primary Dressing:       [] MediHoney Gel [] Alginate with Silver [] Alginate   [] Collagen [x] Collagen with Silver   [] Santyl with Moisten saline gauze     [] Hydrocolloid   [] MediHoney Alginate [] Foam with Silver   [] Foam   [] Hydrofera Blue    [] Mepilex Border    [] Moisten with Saline [] Hydrogel [] Mepitel     [] Bactroban/Mupirocin [] Polysporin  [] Other:    [] Pack wound loosely with  [] Iodoform   [] Plain Packing  [] Other   [] Cover and Secure with:     [x] Gauze [] Tamanna [] Kerlix   [] Ace Wrap [] Cover Roll Tape [] ABD     [x] Other: Silicone border   Avoid contact of tape with skin. [] Change dressing: [] Daily    [] Every Other Day [] Three times per week   [] Once a week [] Do Not Change Dressing   [x] Other: Fridays           Edema Control:  Apply: [] Compression Stocking []Right Leg []Left Leg   [x] Tubigrip [x]Right Leg Double Layer []Left Leg Double Layer       []Right Leg Single Layer []Left Leg Single Layer   [] SpandaGrip []Right Leg  []Left Leg      []Low compression 5-10 mm/Hg      []Medium compression 10-20 mm/Hg     []High compression  20-30 mm/Hg   every morning immediately when getting up should be applied to affected leg(s) from mid foot to knee making sure to cover the heel. Remove every night before going to bed. [x] Elevate leg(s) above the level of the heart when sitting.     [x] Avoid prolonged standing in one place. Dietary:  [x] Diet as tolerated: [] Calorie Diabetic Diet: [] No Added Salt:  [] Increase Protein: [] Other:       Activity:  [x] Activity as tolerated:  [] Patient has no activity restrictions     [] Strict Bedrest: [] Remain off Work:     [] May return to full duty work:                                   [] Return to work with restrictions:             Return Appointment:  [] Wound and dressing supply provider:   [] ECF or Home Healthcare:  [] Wound Assessment: [] Physician or NP scheduled for Wound Assessment:   [x] Return Appointment: With Dr. Cristi Guillen  in  [facility])  [] Ordered tests:      Electronically signed Regine Barker RN on 3/6/2023 at 10:28 AM     Rosalinda August 281: Should you experience any significant changes in your wound(s) or have questions about your wound care, please contact the [facility] at [address] [place] [address] 8:00 am - 4:30. If you need help with your wound outside these hours and cannot wait until we are again available, contact your PCP or go to the hospital emergency room. PLEASE NOTE: IF YOU ARE UNABLE TO OBTAIN WOUND SUPPLIES, CONTINUE TO USE THE SUPPLIES YOU HAVE AVAILABLE UNTIL YOU ARE ABLE TO REACH US. IT IS MOST IMPORTANT TO KEEP THE WOUND COVERED AT ALL TIMES.      Physician Signature:_______________________    Date: ___________ Time:  ____________

## 2023-03-13 ENCOUNTER — HOSPITAL ENCOUNTER (OUTPATIENT)
Facility: HOSPITAL | Age: 78
Discharge: HOME | End: 2023-03-13
Payer: MEDICARE

## 2023-03-13 VITALS
SYSTOLIC BLOOD PRESSURE: 132 MMHG | RESPIRATION RATE: 18 BRPM | HEART RATE: 105 BPM | TEMPERATURE: 97.7 F | OXYGEN SATURATION: 99 % | DIASTOLIC BLOOD PRESSURE: 63 MMHG

## 2023-03-13 DIAGNOSIS — L97.812 VARICOSE VEINS OF RIGHT LOWER EXTREMITY WITH ULCER OF OTHER PART OF LOWER LEG WITH FAT LAYER EXPOSED (HCC): ICD-10-CM

## 2023-03-13 DIAGNOSIS — L97.212 CALF ULCER, RIGHT, WITH FAT LAYER EXPOSED (HCC): Primary | ICD-10-CM

## 2023-03-13 DIAGNOSIS — L97.211 CALF ULCER, RIGHT, LIMITED TO BREAKDOWN OF SKIN (HCC): ICD-10-CM

## 2023-03-13 DIAGNOSIS — I83.018 VARICOSE VEINS OF RIGHT LOWER EXTREMITY WITH ULCER OF OTHER PART OF LOWER LEG WITH FAT LAYER EXPOSED (HCC): ICD-10-CM

## 2023-03-13 PROCEDURE — 11042 DBRDMT SUBQ TIS 1ST 20SQCM/<: CPT

## 2023-03-13 RX ORDER — LIDOCAINE 40 MG/G
CREAM TOPICAL ONCE
OUTPATIENT
Start: 2023-03-13 | End: 2023-03-13

## 2023-03-13 RX ORDER — GENTAMICIN SULFATE 1 MG/G
OINTMENT TOPICAL ONCE
OUTPATIENT
Start: 2023-03-13 | End: 2023-03-13

## 2023-03-13 RX ORDER — CLOBETASOL PROPIONATE 0.5 MG/G
OINTMENT TOPICAL ONCE
OUTPATIENT
Start: 2023-03-13 | End: 2023-03-13

## 2023-03-13 RX ORDER — LIDOCAINE HYDROCHLORIDE 20 MG/ML
JELLY TOPICAL ONCE
OUTPATIENT
Start: 2023-03-13 | End: 2023-03-13

## 2023-03-13 RX ORDER — LIDOCAINE HYDROCHLORIDE 40 MG/ML
SOLUTION TOPICAL ONCE
OUTPATIENT
Start: 2023-03-13 | End: 2023-03-13

## 2023-03-13 RX ORDER — BETAMETHASONE DIPROPIONATE 0.05 %
OINTMENT (GRAM) TOPICAL ONCE
OUTPATIENT
Start: 2023-03-13 | End: 2023-03-13

## 2023-03-13 RX ORDER — BACITRACIN ZINC AND POLYMYXIN B SULFATE 500; 1000 [USP'U]/G; [USP'U]/G
OINTMENT TOPICAL ONCE
OUTPATIENT
Start: 2023-03-13 | End: 2023-03-13

## 2023-03-13 RX ORDER — LIDOCAINE 50 MG/G
OINTMENT TOPICAL ONCE
OUTPATIENT
Start: 2023-03-13 | End: 2023-03-13

## 2023-03-13 RX ORDER — GINSENG 100 MG
CAPSULE ORAL ONCE
OUTPATIENT
Start: 2023-03-13 | End: 2023-03-13

## 2023-03-13 RX ORDER — BACITRACIN, NEOMYCIN, POLYMYXIN B 400; 3.5; 5 [USP'U]/G; MG/G; [USP'U]/G
OINTMENT TOPICAL ONCE
OUTPATIENT
Start: 2023-03-13 | End: 2023-03-13

## 2023-03-13 NOTE — FLOWSHEET NOTE
03/13/23 0952   Wound 02/27/23 Leg Right; Lower superior   Date First Assessed/Time First Assessed: 02/27/23 1059   Present on Hospital Admission: Yes  Wound Approximate Age at First Assessment (Weeks): 8 weeks  Primary Wound Type: Venous Ulcer  Location: Leg  Wound Location Orientation: Right; Lower  Wound De. ..    Wound Image     Wound Etiology Venous   Dressing Status Intact   Wound Cleansed Wound cleanser   Dressing/Treatment Collagen;Dry dressing   Offloading for Diabetic Foot Ulcers Offloading not required   Dressing Change Due 03/27/23  (going out of town next week)   Wound Length (cm) 1.2 cm   Wound Width (cm) 1.5 cm   Wound Depth (cm) 0.2 cm   Wound Surface Area (cm^2) 1.8 cm^2   Change in Wound Size % (l*w) 24.37   Wound Volume (cm^3) 0.36 cm^3   Wound Healing % -51   Post-Procedure Length (cm) 1.2 cm   Post-Procedure Width (cm) 1.5 cm   Post-Procedure Depth (cm) 0.4 cm   Post-Procedure Surface Area (cm^2) 1.8 cm^2   Post-Procedure Volume (cm^3) 0.72 cm^3   Wound Assessment Devitalized tissue   Drainage Amount Small   Drainage Description Serosanguinous   Odor None   Guillermina-wound Assessment Intact   Margins Defined edges   Wound Thickness Description not for Pressure Injury Full thickness   Wound 02/27/23 Leg Right;Lateral   Date First Assessed/Time First Assessed: 02/27/23 1057   Present on Hospital Admission: Yes  Wound Approximate Age at First Assessment (Weeks): 8 weeks  Primary Wound Type: Venous Ulcer  Location: Leg  Wound Location Orientation: Right;Lateral   Wound Image     Wound Etiology Venous   Dressing Status Intact   Wound Cleansed Wound cleanser   Dressing/Treatment Collagen with Ag;Dry dressing;Silicone border;Tubular bandage   Offloading for Diabetic Foot Ulcers Offloading not required   Dressing Change Due 03/27/23  (going out of town next week)   Wound Length (cm) 1.7 cm   Wound Width (cm) 1 cm   Wound Depth (cm) 0.1 cm   Wound Surface Area (cm^2) 1.7 cm^2   Change in Wound Size % (l*w) -6.25   Wound Volume (cm^3) 0.17 cm^3   Wound Healing % -6   Post-Procedure Length (cm) 1.7 cm   Post-Procedure Width (cm) 1 cm   Post-Procedure Depth (cm) 0.1 cm   Post-Procedure Surface Area (cm^2) 1.7 cm^2   Post-Procedure Volume (cm^3) 0.17 cm^3   Wound Assessment Pink/red   Drainage Amount Small   Drainage Description Serosanguinous   Odor None   Guillermina-wound Assessment Intact   Margins Defined edges   Wound Thickness Description not for Pressure Injury Partial thickness   Wound 02/27/23 Leg Lower;Right inferior   Date First Assessed: 02/27/23   Wound Approximate Age at First Assessment (Weeks): 8 weeks  Primary Wound Type: Venous Ulcer  Location: Leg  Wound Location Orientation: Lower;Right  Wound Description (Comments): inferior   Wound Image     Wound Etiology Venous   Dressing Status Intact   Wound Cleansed Wound cleanser   Dressing/Treatment Collagen with Ag;Dry dressing;Silicone border;Tubular bandage   Offloading for Diabetic Foot Ulcers Offloading not required   Dressing Change Due 03/27/23  (going out of town next week)   Wound Length (cm) 0.6 cm   Wound Width (cm) 1 cm   Wound Depth (cm) 0.1 cm   Wound Surface Area (cm^2) 0.6 cm^2   Change in Wound Size % (l*w) 57.14   Wound Volume (cm^3) 0.06 cm^3   Wound Healing % 57   Post-Procedure Length (cm) 0.6 cm   Post-Procedure Width (cm) 1 cm   Post-Procedure Depth (cm) 0.2 cm   Post-Procedure Surface Area (cm^2) 0.6 cm^2   Post-Procedure Volume (cm^3) 0.12 cm^3   Wound Assessment Devitalized tissue;Pink/red   Drainage Amount Small   Drainage Description Serosanguinous   Odor None   Guillermina-wound Assessment Intact   Margins Defined edges   Wound Thickness Description not for Pressure Injury Full thickness   Stevan Scale   Sensory Perceptions 4   Moisture 4   Activity 4   Mobility 4   Nutrition 4   Friction and Shear 3   Stevan Scale Score 23   Wound Follow Up   Require Follow Up Yes

## 2023-03-13 NOTE — PROGRESS NOTES
Wound Center  Progress Note / Procedure Note      Chief Complaint:  Julia Arambula is a 65 y.o.  male  with R lower leg anterior wound of few months duration. Assessment/Plan     65 y.o. male with Dm2    -R lower leg anterior venous stasis ulcer. Full thickness  Slough/granular  Thinning necrotic  Necessitates debridement  for wound healing and to prevent/heal infection  See below    -R lower leg lateral venous stasis ulcer. Full thickness  Smaller  Slough/granular  Necessitates debridement  for wound healing and to prevent/heal infection  See below    -R lower leg inferior venous stasis ulcer. Full thickness  Smaller  Slough/granular  Necessitates debridement  for wound healing and to prevent/heal infection  See below    -Dm2  A1c 6.5  Does not check at home  Controlled with metformin    -Leg swelling/varicosities  Has arthritis  Unable to put on compression   Has a hard time with socks too    Following discussed with patient   Needs :  Serial debridement- prn    Good local wound care  Dressing: collagen  Frequency : three times a week     Continue Home Health    -Edema management  Do not get dressing wet    Edema management:  Elevate leg(s) throughout the day starting in the morning  Compression: Tubigrip x 2 layer  Avoid prolonged standing      -Good Diabetic control      Patient/  understood and agrees with plan. Questions answered.       Follow up with me in 2 week    Subjective:     Since last visit  No new issues  Going away for a week    HPI:     Acquired wounds a few months ago  Local trauma  Not healing, being managed by [facility]  Adv to come here      History/Chart/Medications reviewed    Wound caused by:   Current wound care:See flowsheet  Offloading wound:   Appetite: good  Wound associated pain: See flowsheet  Diabetic: yes  Smoker: no  ROS: no N/V/D, no T/chills; no local rash, no chest pain or shortness of breath, no headache or dizzyness      Objective:     Physical Exam:   See flowsheet / nursing notes for vitals  Vitals:    03/13/23 0945   BP: 132/63   Pulse: (!) 105   Resp: 18   Temp: 97.7 °F (36.5 °C)   SpO2: 99%     General: NAD. Hygiene good  Psych: cooperative. No anxiety or depression. Normal mood and affect. Neuro: alert and oriented to person/place/situation. Otherwise nonfocal.  Derm: Normal  turgor for age, dry skin  HEENT: Normocephalic, atraumatic. EOMI. Conjunctiva clear. No scleral icterus. Neck: Normal range of motion. No masses. Chest: Respirations nonlabored  Lower extremities: color normal; temperature normal.  Calves are supple, nontender, approximately equally sized in comparison. Focussed Lower Extremity Exam:      Ulcer Description:   See Flowsheet           Data Review:              Procedure:     Ulcer assessment: Due to presence of necrotic tissue within the wound bed, ulcer requires debridement. Procedure: Debridement:   The indication for debridement was reviewed with patient. Risks of procedure (bleeding, infection, pain) were discussed with patient/ and consent signed on first visit.  Questions were answered      Subcutaneous excisional debridement right lower leg anterior (superior), right lower leg anterior (inferior)and lateral leg ulcers   Indication: to remove necrotic tissue/ vitalized and devitalized tissue/ infected tissue/through skin and subcutaneous layer of wound bed  Time out done  Consent in chart   Anesthesia: Topical  lidocaine   Instrument: curette   Residual Necrosis: Present and scored   Bleeding: <1ml   Hemostasis: Pressure   Patient tolerated procedure well   Procedural Pain: none  Post - procedural pain: none    Post debridement measurements: see below  Surface area debrided: <20  sq. cm  Wound 02/27/23 Leg Right;Lateral (Active)   Wound Image    03/13/23 0952   Wound Etiology Venous 03/13/23 0952   Dressing Status Intact 03/13/23 0952   Wound Cleansed Wound cleanser 03/13/23 0952   Dressing/Treatment Collagen with Ag;Dry dressing;Silicone border;Tubular bandage 03/13/23 0952   Offloading for Diabetic Foot Ulcers Offloading not required 03/13/23 0952   Dressing Change Due 03/27/23 03/13/23 0952   Wound Length (cm) 1.7 cm 03/13/23 0952   Wound Width (cm) 1 cm 03/13/23 0952   Wound Depth (cm) 0.1 cm 03/13/23 0952   Wound Surface Area (cm^2) 1.7 cm^2 03/13/23 0952   Change in Wound Size % (l*w) -6.25 03/13/23 0952   Wound Volume (cm^3) 0.17 cm^3 03/13/23 0952   Wound Healing % -6 03/13/23 0952   Post-Procedure Length (cm) 1.7 cm 03/13/23 0952   Post-Procedure Width (cm) 1 cm 03/13/23 0952   Post-Procedure Depth (cm) 0.1 cm 03/13/23 0952   Post-Procedure Surface Area (cm^2) 1.7 cm^2 03/13/23 0952   Post-Procedure Volume (cm^3) 0.17 cm^3 03/13/23 0952   Wound Assessment Pink/red 03/13/23 0952   Drainage Amount Small 03/13/23 0952   Drainage Description Serosanguinous 03/13/23 0952   Odor None 03/13/23 0952   Guillermina-wound Assessment Intact 03/13/23 0952   Margins Defined edges 03/13/23 0952   Wound Thickness Description not for Pressure Injury Partial thickness 03/13/23 0952   Number of days: 16       Wound 02/27/23 Leg Right; Lower superior (Active)   Wound Image    03/13/23 0952   Wound Etiology Venous 03/13/23 0952   Dressing Status Intact 03/13/23 0952   Wound Cleansed Wound cleanser 03/13/23 0952   Dressing/Treatment Collagen;Dry dressing 03/13/23 0952   Offloading for Diabetic Foot Ulcers Offloading not required 03/13/23 0952   Dressing Change Due 03/27/23 03/13/23 0952   Wound Length (cm) 1.2 cm 03/13/23 0952   Wound Width (cm) 1.5 cm 03/13/23 0952   Wound Depth (cm) 0.2 cm 03/13/23 0952   Wound Surface Area (cm^2) 1.8 cm^2 03/13/23 0952   Change in Wound Size % (l*w) 24.37 03/13/23 0952   Wound Volume (cm^3) 0.36 cm^3 03/13/23 0952   Wound Healing % -51 03/13/23 0952   Post-Procedure Length (cm) 1.2 cm 03/13/23 0952   Post-Procedure Width (cm) 1.5 cm 03/13/23 0952   Post-Procedure Depth (cm) 0.4 cm 03/13/23 0952   Post-Procedure Surface Area (cm^2) 1.8 cm^2 03/13/23 0952   Post-Procedure Volume (cm^3) 0.72 cm^3 03/13/23 0952   Wound Assessment Devitalized tissue 03/13/23 0952   Drainage Amount Small 03/13/23 0952   Drainage Description Serosanguinous 03/13/23 0952   Odor None 03/13/23 0952   Guillermina-wound Assessment Intact 03/13/23 0952   Margins Defined edges 03/13/23 0952   Wound Thickness Description not for Pressure Injury Full thickness 03/13/23 0952   Number of days: 16       Wound 02/27/23 Leg Lower;Right inferior (Active)   Wound Image    03/13/23 0952   Wound Etiology Venous 03/13/23 0952   Dressing Status Intact 03/13/23 0952   Wound Cleansed Wound cleanser 03/13/23 0952   Dressing/Treatment Collagen with Ag;Dry dressing;Silicone border;Tubular bandage 03/13/23 0952   Offloading for Diabetic Foot Ulcers Offloading not required 03/13/23 0952   Dressing Change Due 03/27/23 03/13/23 0952   Wound Length (cm) 0.6 cm 03/13/23 0952   Wound Width (cm) 1 cm 03/13/23 0952   Wound Depth (cm) 0.1 cm 03/13/23 0952   Wound Surface Area (cm^2) 0.6 cm^2 03/13/23 0952   Change in Wound Size % (l*w) 57.14 03/13/23 0952   Wound Volume (cm^3) 0.06 cm^3 03/13/23 0952   Wound Healing % 57 03/13/23 0952   Post-Procedure Length (cm) 0.6 cm 03/13/23 0952   Post-Procedure Width (cm) 1 cm 03/13/23 0952   Post-Procedure Depth (cm) 0.2 cm 03/13/23 0952   Post-Procedure Surface Area (cm^2) 0.6 cm^2 03/13/23 0952   Post-Procedure Volume (cm^3) 0.12 cm^3 03/13/23 0952   Wound Assessment Devitalized tissue;Pink/red 03/13/23 0952   Drainage Amount Small 03/13/23 0952   Drainage Description Serosanguinous 03/13/23 0952   Odor None 03/13/23 0952   Guillermina-wound Assessment Intact 03/13/23 0952   Margins Defined edges 03/13/23 0952   Wound Thickness Description not for Pressure Injury Full thickness 03/13/23 0952   Number of days: 16        -------    Past Medical History:   Diagnosis Date    A-fib ([facility]     Arthritis     Cancer ([facility]     skin    Chronic kidney disease     kidney stones Coagulation disorder ([facility]     excess iron; erthythrocytosis; hemochromatosis    Diabetes ([facility]     Hypertension 2007    Ill-defined condition     kidney stones     Past Surgical History:   Procedure Laterality Date    CHOLECYSTECTOMY  07/2017    ORTHOPEDIC SURGERY Right 2007    r shoulder replacement    TONSILLECTOMY       No family history on file. Social History     Socioeconomic History    Marital status:    Tobacco Use    Smoking status: Never    Smokeless tobacco: Never   Substance and Sexual Activity    Alcohol use: Yes     Alcohol/week: 3.0 standard drinks    Drug use: No        Prior to Admission medications    Medication Sig Start Date End Date Taking?  Authorizing Provider   allopurinol (ZYLOPRIM) 300 MG tablet take 1 tablet by mouth once daily 3/1/23   Historical Provider, MD   colchicine (COLCRYS) 0.6 MG tablet take 1 tablet by mouth once daily 3/1/23   Historical Provider, MD   Calcium Carb-Cholecalciferol 600-10 MG-MCG CAPS Take by mouth 2 times daily    Ar Automatic Reconciliation   dabigatran (PRADAXA) 75 MG capsule Take 75 mg by mouth in the morning and 75 mg in the evening. 6/20/17   Ar Automatic Reconciliation   dilTIAZem HCl ER Coated Beads 360 MG TB24 Take 180 mg by mouth daily    Ar Automatic Reconciliation   ibandronate (BONIVA) 150 MG tablet Take 150 mg by mouth every 30 days    Ar Automatic Reconciliation   meloxicam (MOBIC) 15 MG tablet Take 10 mg by mouth daily    Ar Automatic Reconciliation   metFORMIN (GLUCOPHAGE) 1000 MG tablet Take 1,000 mg by mouth 2 times daily (with meals)    Ar Automatic Reconciliation   mometasone (NASONEX) 50 MCG/ACT nasal spray 2 sprays by Nasal route daily    Ar Automatic Reconciliation   montelukast (SINGULAIR) 10 MG tablet Take 10 mg by mouth    Ar Automatic Reconciliation   nebivolol (BYSTOLIC) 10 MG tablet Take 10 mg by mouth daily    Ar Automatic Reconciliation   tadalafil (CIALIS) 5 MG tablet Take 5 mg by mouth    Ar Automatic Reconciliation testosterone (ANDROGEL; TESTIM) 50 MG/5GM (1%) GEL 1% gel Place 5 g onto the skin.     Ar Automatic Reconciliation      No Known Allergies       Signed By: Lincoln Tyson MD

## 2023-03-14 NOTE — DISCHARGE INSTRUCTIONS
Discharge Instructions from  [address] [facility]  [address], [facility]  [phone] Fax [phone]    NAME:  Nancy Alcantar OF BIRTH:  1945  MEDICAL RECORD NUMBER:  [id number]  DATE: 3/13/2023    Wound Cleansing:   Do not scrub or use excessive force. Cleanse wound prior to applying a clean dressing with:  [] Normal Saline [] Keep Wound Dry in Shower    [] Wound Cleanser   [] Cleanse wound with Mild Soap & Water  [] May Shower at Discharge   [] Other:      Topical Treatments:  Do not apply lotions, creams, or ointments to wound bed unless directed. [] Apply moisturizing lotion to skin surrounding the wound prior to dressing change.  [] Apply antifungal ointment to skin surrounding the wound prior to dressing change.  [] Apply thin film of moisture barrier ointment to skin immediately around wound. [] Other:       Dressings:           Wound Location Right Leg   [] Apply Primary Dressing:       [] MediHoney Gel [] Alginate with Silver [] Alginate   [x] Collagen [] Collagen with Silver   [] Santyl with Moisten saline gauze     [] Hydrocolloid   [] MediHoney Alginate [] Foam with Silver   [] Foam   [] Hydrofera Blue    [] Mepilex Border    [] Moisten with Saline [] Hydrogel [] Mepitel     [] Bactroban/Mupirocin [] Polysporin  [] Other:    [] Pack wound loosely with  [] Iodoform   [] Plain Packing  [] Other   [] Cover and Secure with:     [x] Gauze [] Tamanna [] Kerlix   [] Ace Wrap [] Cover Roll Tape [] ABD     [x] Other: Silicone border   Avoid contact of tape with skin.   [] Change dressing: [] Daily    [] Every Other Day [] Three times per week   [] Once a week [] Do Not Change Dressing   [x] Other: Home health to change on Fridays          Edema Control:  Apply: [] Compression Stocking [x]Right Leg []Left Leg   [x] Tubigrip []Right Leg Double Layer []Left Leg Double Layer       []Right Leg Single Layer []Left Leg Single Layer   [] SpandaGrip []Right Leg  []Left Leg      []Low compression 5-10 mm/Hg      []Medium compression 10-20 mm/Hg     []High compression  20-30 mm/Hg   every morning immediately when getting up should be applied to affected leg(s) from mid foot to knee making sure to cover the heel. Remove every night before going to bed. [x] Elevate leg(s) above the level of the heart when sitting. [x] Avoid prolonged standing in one place. Dietary:  [x] Diet as tolerated: [] Calorie Diabetic Diet: [] No Added Salt:  [] Increase Protein: [] Other:       Activity:  [x] Activity as tolerated:  [] Patient has no activity restrictions     [] Strict Bedrest: [] Remain off Work:     [] May return to full duty work:                                   [] Return to work with restrictions:             Return Appointment:  [] Wound and dressing supply provider:   [] ECF or Home Healthcare:  [] Wound Assessment: [] Physician or NP scheduled for Wound Assessment:   [x] Return Appointment: With Dr. Reyes Sethi  in  [address] [place] [address])  [] Ordered tests:      Electronically signed Carey Carroll RN on 3/13/2023 at 11:30 PM    [facility] [address]: Should you experience any significant changes in your wound(s) or have questions about your wound care, please contact the [facility] at [address] [place] [address] 8:00 am - 4:30. If you need help with your wound outside these hours and cannot wait until we are again available, contact your PCP or go to the hospital emergency room. PLEASE NOTE: IF YOU ARE UNABLE TO OBTAIN WOUND SUPPLIES, CONTINUE TO USE THE SUPPLIES YOU HAVE AVAILABLE UNTIL YOU ARE ABLE TO REACH US. IT IS MOST IMPORTANT TO KEEP THE WOUND COVERED AT ALL TIMES.      Physician Signature:_______________________    Date: ___________ Time:  ____________

## 2023-04-03 ENCOUNTER — HOSPITAL ENCOUNTER (OUTPATIENT)
Facility: HOSPITAL | Age: 78
Discharge: HOME | End: 2023-04-03
Payer: MEDICARE

## 2023-04-03 VITALS
DIASTOLIC BLOOD PRESSURE: 82 MMHG | RESPIRATION RATE: 18 BRPM | OXYGEN SATURATION: 95 % | HEART RATE: 107 BPM | TEMPERATURE: 97.4 F | SYSTOLIC BLOOD PRESSURE: 147 MMHG

## 2023-04-03 DIAGNOSIS — L97.211 CALF ULCER, RIGHT, LIMITED TO BREAKDOWN OF SKIN (HCC): ICD-10-CM

## 2023-04-03 DIAGNOSIS — L97.212 CALF ULCER, RIGHT, WITH FAT LAYER EXPOSED (HCC): ICD-10-CM

## 2023-04-03 DIAGNOSIS — I83.018 VARICOSE VEINS OF RIGHT LOWER EXTREMITY WITH ULCER OF OTHER PART OF LOWER LEG WITH FAT LAYER EXPOSED (HCC): Primary | ICD-10-CM

## 2023-04-03 DIAGNOSIS — L97.812 VARICOSE VEINS OF RIGHT LOWER EXTREMITY WITH ULCER OF OTHER PART OF LOWER LEG WITH FAT LAYER EXPOSED (HCC): Primary | ICD-10-CM

## 2023-04-03 PROCEDURE — 11042 DBRDMT SUBQ TIS 1ST 20SQCM/<: CPT

## 2023-04-03 NOTE — DISCHARGE INSTRUCTIONS
Discharge Instructions from  [facility]  [address] [facility] [place], [facility]  [phone] Fax [phone]    Do not remove dressing     Return Appointment:  [] Wound and dressing supply provider:   [] ECF or Home Healthcare:  [] Wound Assessment: [] Physician or NP scheduled for Wound Assessment:   [x] Return Appointment: With MD  in  1 Week(s)  [] Ordered tests:       [facility] Information: Should you experience any significant changes in your wound(s) or have questions about your wound care, please contact the [address] [place] [address] at [address] [place] [address] 8:00 am - 4:30. If you need help with your wound outside these hours and cannot wait until we are again available, contact your PCP or go to the hospital emergency room. PLEASE NOTE: IF YOU ARE UNABLE TO OBTAIN WOUND SUPPLIES, CONTINUE TO USE THE SUPPLIES YOU HAVE AVAILABLE UNTIL YOU ARE ABLE TO REACH US. IT IS MOST IMPORTANT TO KEEP THE WOUND COVERED AT ALL TIMES.

## 2023-04-06 ENCOUNTER — HOSPITAL ENCOUNTER (OUTPATIENT)
Facility: HOSPITAL | Age: 78
Discharge: HOME | End: 2023-04-06
Payer: MEDICARE

## 2023-04-06 LAB
ALBUMIN SERPL-MCNC: 3.7 G/DL (ref 3.4–5)
ALBUMIN/GLOB SERPL: 1.2 (ref 0.8–1.7)
ALP SERPL-CCNC: 85 U/L (ref 45–117)
ALT SERPL-CCNC: 25 U/L (ref 16–61)
ANION GAP SERPL CALC-SCNC: 3 MMOL/L (ref 3–18)
APPEARANCE UR: CLEAR
APTT PPP: 30.4 SEC (ref 23–36.4)
AST SERPL-CCNC: 20 U/L (ref 10–38)
BACTERIA URNS QL MICRO: ABNORMAL /HPF
BILIRUB SERPL-MCNC: 0.8 MG/DL (ref 0.2–1)
BILIRUB UR QL: NEGATIVE
BUN SERPL-MCNC: 27 MG/DL (ref 7–18)
BUN/CREAT SERPL: 23 (ref 12–20)
CALCIUM SERPL-MCNC: 9.1 MG/DL (ref 8.5–10.1)
CHLORIDE SERPL-SCNC: 114 MMOL/L (ref 100–111)
CO2 SERPL-SCNC: 28 MMOL/L (ref 21–32)
COLOR UR: ABNORMAL
CREAT SERPL-MCNC: 1.15 MG/DL (ref 0.6–1.3)
EPITH CASTS URNS QL MICRO: ABNORMAL /LPF (ref 0–5)
ERYTHROCYTE [DISTWIDTH] IN BLOOD BY AUTOMATED COUNT: 15.9 % (ref 11.6–14.5)
ERYTHROCYTE [SEDIMENTATION RATE] IN BLOOD: 5 MM/HR (ref 0–20)
GLOBULIN SER CALC-MCNC: 3 G/DL (ref 2–4)
GLUCOSE SERPL-MCNC: 97 MG/DL (ref 74–99)
GLUCOSE UR STRIP.AUTO-MCNC: NEGATIVE MG/DL
HCT VFR BLD AUTO: 43.1 % (ref 36–48)
HGB BLD-MCNC: 13.5 G/DL (ref 13–16)
HGB UR QL STRIP: NEGATIVE
INR PPP: 1.3 (ref 0.8–1.2)
KETONES UR QL STRIP.AUTO: NEGATIVE MG/DL
LEUKOCYTE ESTERASE UR QL STRIP.AUTO: ABNORMAL
MCH RBC QN AUTO: 30.3 PG (ref 24–34)
MCHC RBC AUTO-ENTMCNC: 31.3 G/DL (ref 31–37)
MCV RBC AUTO: 96.9 FL (ref 78–100)
NITRITE UR QL STRIP.AUTO: NEGATIVE
NRBC # BLD: 0 K/UL (ref 0–0.01)
NRBC BLD-RTO: 0 PER 100 WBC
PH UR STRIP: 5 (ref 5–8)
PLATELET # BLD AUTO: 179 K/UL (ref 135–420)
PMV BLD AUTO: 9.8 FL (ref 9.2–11.8)
POTASSIUM SERPL-SCNC: 4.5 MMOL/L (ref 3.5–5.5)
PROT SERPL-MCNC: 6.7 G/DL (ref 6.4–8.2)
PROT UR STRIP-MCNC: ABNORMAL MG/DL
PROTHROMBIN TIME: 16.6 SEC (ref 11.5–15.2)
RBC # BLD AUTO: 4.45 M/UL (ref 4.35–5.65)
RBC #/AREA URNS HPF: ABNORMAL /HPF (ref 0–5)
SODIUM SERPL-SCNC: 145 MMOL/L (ref 136–145)
SP GR UR REFRACTOMETRY: 1.02 (ref 1–1.03)
UROBILINOGEN UR QL STRIP.AUTO: 0.2 EU/DL (ref 0.2–1)
WBC # BLD AUTO: 6.4 K/UL (ref 4.6–13.2)
WBC URNS QL MICRO: ABNORMAL /HPF (ref 0–5)

## 2023-04-06 PROCEDURE — 85730 THROMBOPLASTIN TIME PARTIAL: CPT

## 2023-04-06 PROCEDURE — 87086 URINE CULTURE/COLONY COUNT: CPT

## 2023-04-06 PROCEDURE — 81001 URINALYSIS AUTO W/SCOPE: CPT

## 2023-04-06 PROCEDURE — 80053 COMPREHEN METABOLIC PANEL: CPT

## 2023-04-06 PROCEDURE — 85610 PROTHROMBIN TIME: CPT

## 2023-04-06 PROCEDURE — 85027 COMPLETE CBC AUTOMATED: CPT

## 2023-04-06 PROCEDURE — 83036 HEMOGLOBIN GLYCOSYLATED A1C: CPT

## 2023-04-06 PROCEDURE — 93005 ELECTROCARDIOGRAM TRACING: CPT | Performed by: ORTHOPAEDIC SURGERY

## 2023-04-06 PROCEDURE — 36415 COLL VENOUS BLD VENIPUNCTURE: CPT

## 2023-04-06 PROCEDURE — 85652 RBC SED RATE AUTOMATED: CPT

## 2023-04-07 LAB
BACTERIA SPEC CULT: NORMAL
EKG DIAGNOSIS: NORMAL
EKG Q-T INTERVAL: 378 MS
EKG QRS DURATION: 140 MS
EKG QTC CALCULATION (BAZETT): 457 MS
EKG R AXIS: 84 DEGREES
EKG T AXIS: 7 DEGREES
EKG VENTRICULAR RATE: 88 BPM
EST. AVERAGE GLUCOSE BLD GHB EST-MCNC: 134 MG/DL
HBA1C MFR BLD: 6.3 % (ref 4.2–5.6)
SERVICE CMNT-IMP: NORMAL
SERVICE CMNT-IMP: NORMAL

## 2023-05-01 ENCOUNTER — HOSPITAL ENCOUNTER (OUTPATIENT)
Facility: HOSPITAL | Age: 78
Discharge: HOME | End: 2023-05-01
Payer: MEDICARE

## 2023-05-01 VITALS
HEART RATE: 78 BPM | OXYGEN SATURATION: 98 % | DIASTOLIC BLOOD PRESSURE: 71 MMHG | TEMPERATURE: 97.9 F | SYSTOLIC BLOOD PRESSURE: 122 MMHG | RESPIRATION RATE: 16 BRPM

## 2023-05-01 DIAGNOSIS — L97.212 CALF ULCER, RIGHT, WITH FAT LAYER EXPOSED (HCC): ICD-10-CM

## 2023-05-01 DIAGNOSIS — L97.812 VARICOSE VEINS OF RIGHT LOWER EXTREMITY WITH ULCER OF OTHER PART OF LOWER LEG WITH FAT LAYER EXPOSED (HCC): Primary | ICD-10-CM

## 2023-05-01 DIAGNOSIS — I83.018 VARICOSE VEINS OF RIGHT LOWER EXTREMITY WITH ULCER OF OTHER PART OF LOWER LEG WITH FAT LAYER EXPOSED (HCC): Primary | ICD-10-CM

## 2023-05-01 DIAGNOSIS — L97.211 CALF ULCER, RIGHT, LIMITED TO BREAKDOWN OF SKIN (HCC): ICD-10-CM

## 2023-05-01 PROCEDURE — 11042 DBRDMT SUBQ TIS 1ST 20SQCM/<: CPT

## 2023-05-01 PROCEDURE — 6370000000 HC RX 637 (ALT 250 FOR IP): Performed by: FAMILY MEDICINE

## 2023-05-01 RX ORDER — LIDOCAINE 50 MG/G
OINTMENT TOPICAL ONCE
OUTPATIENT
Start: 2023-05-01 | End: 2023-05-01

## 2023-05-01 RX ORDER — LIDOCAINE HYDROCHLORIDE 40 MG/ML
SOLUTION TOPICAL ONCE
OUTPATIENT
Start: 2023-05-01 | End: 2023-05-01

## 2023-05-01 RX ORDER — CLOBETASOL PROPIONATE 0.5 MG/G
OINTMENT TOPICAL ONCE
OUTPATIENT
Start: 2023-05-01 | End: 2023-05-01

## 2023-05-01 RX ORDER — LIDOCAINE 40 MG/G
CREAM TOPICAL ONCE
OUTPATIENT
Start: 2023-05-01 | End: 2023-05-01

## 2023-05-01 RX ORDER — GINSENG 100 MG
CAPSULE ORAL ONCE
OUTPATIENT
Start: 2023-05-01 | End: 2023-05-01

## 2023-05-01 RX ORDER — LIDOCAINE HYDROCHLORIDE 20 MG/ML
JELLY TOPICAL ONCE
OUTPATIENT
Start: 2023-05-01 | End: 2023-05-01

## 2023-05-01 RX ORDER — BACITRACIN ZINC AND POLYMYXIN B SULFATE 500; 1000 [USP'U]/G; [USP'U]/G
OINTMENT TOPICAL ONCE
OUTPATIENT
Start: 2023-05-01 | End: 2023-05-01

## 2023-05-01 RX ORDER — LIDOCAINE HYDROCHLORIDE 20 MG/ML
JELLY TOPICAL ONCE
Status: COMPLETED | OUTPATIENT
Start: 2023-05-01 | End: 2023-05-01

## 2023-05-01 RX ORDER — GENTAMICIN SULFATE 1 MG/G
OINTMENT TOPICAL ONCE
OUTPATIENT
Start: 2023-05-01 | End: 2023-05-01

## 2023-05-01 RX ORDER — BETAMETHASONE DIPROPIONATE 0.05 %
OINTMENT (GRAM) TOPICAL ONCE
OUTPATIENT
Start: 2023-05-01 | End: 2023-05-01

## 2023-05-01 RX ORDER — BACITRACIN, NEOMYCIN, POLYMYXIN B 400; 3.5; 5 [USP'U]/G; MG/G; [USP'U]/G
OINTMENT TOPICAL ONCE
OUTPATIENT
Start: 2023-05-01 | End: 2023-05-01

## 2023-05-01 RX ADMIN — LIDOCAINE HYDROCHLORIDE: 20 JELLY TOPICAL at 10:07

## 2023-05-01 ASSESSMENT — PAIN SCALES - GENERAL: PAINLEVEL_OUTOF10: 0

## 2023-05-01 NOTE — WOUND CARE
Discharge Instructions from  [address] [facility]  [address] [place], [place], [facility]  [phone] Fax [phone]    NAME:  Luisa How OF BIRTH:  1945  MEDICAL RECORD NUMBER:  [id number]  DATE:  5/1/2023       Wound Cleansing:   Do not scrub or use excessive force. Cleanse wound prior to applying a clean dressing with:  [] Normal Saline        [x] Keep Wound Dry in Shower    [x] Wound Cleanser   [] Cleanse wound with Mild Soap & Water  [] May Shower at Discharge   [] Other:       Topical Treatments:  Do not apply lotions, creams, or ointments to wound bed unless directed. [x] May apply moisturizing lotion to skin surrounding the wound prior to dressing change.  [] Apply antifungal ointment to skin surrounding the wound prior to dressing change.  [] Apply thin film of moisture barrier ointment to skin immediately around wound. [] Other:                  Dressings:                  Wound Location: Right lower leg    [x] Apply Primary Dressing:                                          [] MediHoney Gel      [] Alginate with Silver            [] Alginate              [x] Collagen     [] Collagen with Silver   [] Santyl with Moisten saline gauze                [] Hydrocolloid             [] MediHoney Alginate        [] Foam with Silver              [] Foam   [x] Hydrofera Blue             [] Mepilex Border                    [] Moisten with Saline           [] Hydrogel     [] Mepitel                      [] Bactroban/Mupirocin         [] Polysporin              [] Other:    [x] Cover and Secure with:                   [] Gauze        [] Tamanna           [] Kerlix              [] Ace Wrap   [] Cover Roll Tape     [] ABD                                      [x] Other: Mextra superabsorbent dressing or equivalent superabsorbent dressing              Avoid contact of tape with skin.   [x] Change dressing:  [] Daily           [] Every Other Day    [] Three times per

## 2023-05-01 NOTE — FLOWSHEET NOTE
05/01/23 1001   Wound 02/27/23 Leg Right;Lateral   Date First Assessed/Time First Assessed: 02/27/23 1057   Present on Hospital Admission: Yes  Wound Approximate Age at First Assessment (Weeks): 8 weeks  Primary Wound Type: Venous Ulcer  Location: Leg  Wound Location Orientation: Right;Lateral   Wound Image    Wound Etiology Venous   Dressing Status Old drainage noted   Wound Cleansed Wound cleanser   Dressing/Treatment Collagen  (2 layer wrap)   Offloading for Diabetic Foot Ulcers Offloading not required   Dressing Change Due 05/08/23   Wound Length (cm) 0.4 cm   Wound Width (cm) 0.4 cm   Wound Depth (cm) 0.1 cm   Wound Surface Area (cm^2) 0.16 cm^2   Change in Wound Size % (l*w) 90   Wound Volume (cm^3) 0.016 cm^3   Wound Healing % 90   Post-Procedure Length (cm) 0.4 cm   Post-Procedure Width (cm) 0.4 cm   Post-Procedure Depth (cm) 0.3 cm   Post-Procedure Surface Area (cm^2) 0.16 cm^2   Post-Procedure Volume (cm^3) 0.048 cm^3   Wound Assessment Pink/red   Drainage Amount Scant   Drainage Description Serosanguinous   Odor None   Guillermina-wound Assessment Intact   Margins Defined edges   Wound Thickness Description not for Pressure Injury Full thickness   Wound 02/27/23 Leg Right; Lower superior   Date First Assessed/Time First Assessed: 02/27/23 1059   Present on Hospital Admission: Yes  Wound Approximate Age at First Assessment (Weeks): 8 weeks  Primary Wound Type: Venous Ulcer  Location: Leg  Wound Location Orientation: Right; Lower  Wound De. ..    Wound Image    Wound Etiology Venous   Dressing Status Old drainage noted   Wound Cleansed Wound cleanser   Dressing/Treatment Collagen;Hydrofera blue  (2 layer wrap)   Offloading for Diabetic Foot Ulcers Offloading not required   Dressing Change Due 05/08/23   Wound Length (cm) 1.7 cm   Wound Width (cm) 1.5 cm   Wound Depth (cm) 0.2 cm   Wound Surface Area (cm^2) 2.55 cm^2   Change in Wound Size % (l*w) -7.14   Wound Volume (cm^3) 0.51 cm^3   Wound Healing % -114

## 2023-05-08 ENCOUNTER — HOSPITAL ENCOUNTER (OUTPATIENT)
Facility: HOSPITAL | Age: 78
Discharge: HOME | End: 2023-05-08
Payer: MEDICARE

## 2023-05-08 DIAGNOSIS — L97.211 CALF ULCER, RIGHT, LIMITED TO BREAKDOWN OF SKIN (HCC): ICD-10-CM

## 2023-05-08 DIAGNOSIS — L97.212 CALF ULCER, RIGHT, WITH FAT LAYER EXPOSED (HCC): Primary | ICD-10-CM

## 2023-05-08 DIAGNOSIS — I83.018 VARICOSE VEINS OF RIGHT LOWER EXTREMITY WITH ULCER OF OTHER PART OF LOWER LEG WITH FAT LAYER EXPOSED (HCC): ICD-10-CM

## 2023-05-08 DIAGNOSIS — L97.812 VARICOSE VEINS OF RIGHT LOWER EXTREMITY WITH ULCER OF OTHER PART OF LOWER LEG WITH FAT LAYER EXPOSED (HCC): ICD-10-CM

## 2023-05-08 PROCEDURE — 6370000000 HC RX 637 (ALT 250 FOR IP): Performed by: FAMILY MEDICINE

## 2023-05-08 PROCEDURE — 11042 DBRDMT SUBQ TIS 1ST 20SQCM/<: CPT

## 2023-05-08 RX ORDER — GINSENG 100 MG
CAPSULE ORAL ONCE
OUTPATIENT
Start: 2023-05-08 | End: 2023-05-08

## 2023-05-08 RX ORDER — LIDOCAINE HYDROCHLORIDE 20 MG/ML
JELLY TOPICAL ONCE
OUTPATIENT
Start: 2023-05-08 | End: 2023-05-08

## 2023-05-08 RX ORDER — LIDOCAINE 50 MG/G
OINTMENT TOPICAL ONCE
OUTPATIENT
Start: 2023-05-08 | End: 2023-05-08

## 2023-05-08 RX ORDER — CLOBETASOL PROPIONATE 0.5 MG/G
OINTMENT TOPICAL ONCE
OUTPATIENT
Start: 2023-05-08 | End: 2023-05-08

## 2023-05-08 RX ORDER — BACITRACIN, NEOMYCIN, POLYMYXIN B 400; 3.5; 5 [USP'U]/G; MG/G; [USP'U]/G
OINTMENT TOPICAL ONCE
OUTPATIENT
Start: 2023-05-08 | End: 2023-05-08

## 2023-05-08 RX ORDER — LIDOCAINE HYDROCHLORIDE 40 MG/ML
SOLUTION TOPICAL ONCE
OUTPATIENT
Start: 2023-05-08 | End: 2023-05-08

## 2023-05-08 RX ORDER — GENTAMICIN SULFATE 1 MG/G
OINTMENT TOPICAL ONCE
OUTPATIENT
Start: 2023-05-08 | End: 2023-05-08

## 2023-05-08 RX ORDER — BETAMETHASONE DIPROPIONATE 0.05 %
OINTMENT (GRAM) TOPICAL ONCE
OUTPATIENT
Start: 2023-05-08 | End: 2023-05-08

## 2023-05-08 RX ORDER — BACITRACIN ZINC AND POLYMYXIN B SULFATE 500; 1000 [USP'U]/G; [USP'U]/G
OINTMENT TOPICAL ONCE
OUTPATIENT
Start: 2023-05-08 | End: 2023-05-08

## 2023-05-08 RX ORDER — LIDOCAINE 40 MG/G
CREAM TOPICAL ONCE
OUTPATIENT
Start: 2023-05-08 | End: 2023-05-08

## 2023-05-08 RX ORDER — LIDOCAINE HYDROCHLORIDE 20 MG/ML
JELLY TOPICAL ONCE
Status: COMPLETED | OUTPATIENT
Start: 2023-05-08 | End: 2023-05-08

## 2023-05-08 RX ADMIN — LIDOCAINE HYDROCHLORIDE: 20 JELLY TOPICAL at 10:05

## 2023-05-08 NOTE — PROGRESS NOTES
Wound Center  Progress Note / Procedure Note      Chief Complaint:  Paige Isaac is a 65 y.o.  male  with R lower leg anterior wound of few months duration. Assessment/Plan     65 y.o. male with Dm2    -R lower leg anterior venous stasis ulcer. Full thickness  Slough/granular  Improving; more granular; filling in  Necessitates debridement  for wound healing and to prevent/heal infection  See below    -R lower leg lateral venous stasis ulcer. Full thickness  healed      -R lower leg inferior venous stasis ulcer. healed    -Dm2  A1c 6.5  Does not check at home  Controlled with metformin    -Leg swelling/varicosities  Has arthritis  Unable to put on compression   Has a hard time with socks too    Following discussed with patient   Needs :  Serial debridement- prn    Good local wound care  Dressing: collagen, H Blue  Frequency : two times a week     Continue Home Health    -Edema management  Do not get dressing wet    Edema management:  Elevate leg(s) throughout the day starting in the morning  Compression: 2 layer unnas  Avoid prolonged standing      -Good Diabetic control      Patient/  understood and agrees with plan. Questions answered. Follow up with me  June 12, won't be able to come for few weeks after knee procedure    Subjective:     Since last visit  No new issues  Knee procedure 5/18     HPI:     Acquired wounds a few months ago  Local trauma  Not healing, being managed by [facility]  Adv to come here      History/Chart/Medications reviewed    Wound caused by:   Current wound care:See flowsheet  Offloading wound:   Appetite: good  Wound associated pain: See flowsheet  Diabetic: yes  Smoker: no  ROS: no N/V/D, no T/chills; no local rash, no chest pain or shortness of breath, no headache or dizzyness      Objective:     Physical Exam:   See flowsheet / nursing notes for vitals  There were no vitals filed for this visit. General: NAD. Hygiene good  Psych: cooperative. No anxiety or depression.

## 2023-05-08 NOTE — FLOWSHEET NOTE
05/08/23 0958   Wound 02/27/23 Leg Right; Lower superior   Date First Assessed/Time First Assessed: 02/27/23 1059   Present on Hospital Admission: Yes  Wound Approximate Age at First Assessment (Weeks): 8 weeks  Primary Wound Type: Venous Ulcer  Location: Leg  Wound Location Orientation: Right; Lower  Wound De. .. Wound Image     Wound Etiology Venous   Dressing Status New dressing applied   Wound Cleansed Wound cleanser   Dressing/Treatment Collagen;Hydrofera blue; Roll gauze  (2 layer wrap)   Offloading for Diabetic Foot Ulcers Offloading not required   Dressing Change Due 06/12/23   Wound Length (cm) 1.7 cm   Wound Width (cm) 1.4 cm   Wound Depth (cm) 0.1 cm   Wound Surface Area (cm^2) 2.38 cm^2   Change in Wound Size % (l*w) 0   Wound Volume (cm^3) 0.238 cm^3   Wound Healing % 0   Post-Procedure Length (cm) 1.8 cm   Post-Procedure Width (cm) 1.5 cm   Post-Procedure Depth (cm) 0.2 cm   Post-Procedure Surface Area (cm^2) 2.7 cm^2   Post-Procedure Volume (cm^3) 0.54 cm^3   Wound Assessment Pink/red   Drainage Amount Small   Drainage Description Serosanguinous   Odor None   Guillermina-wound Assessment Intact   Margins Defined edges   Wound Thickness Description not for Pressure Injury Full thickness     Multilayer Compression Wrap   (Not Unna) Below the Knee    NAME:  Arpit Menjivar  YOB: 1945  MEDICAL RECORD NUMBER:  [id number]  DATE:  5/8/2023    Multilayer compression wrap: Removed old Multilayer wrap if indicated and wash leg with mild soap/water. Applied moisturizing agent to dry skin as needed. Applied primary and secondary dressing as ordered. Applied multilayered dressing below the knee to right lower leg. Instructed patient/caregiver not to remove dressing and to keep it clean and dry. Instructed patient/caregiver on complications to report to provider, such as pain, numbness in toes, heavy drainage, and slippage of dressing.   Instructed patient on purpose of compression dressing and on

## 2023-05-17 NOTE — H&P
Patient Name:  Duke Councilman    YOB: 1945      Chief Complaint:  Bilateral knees pain. History of Chief Complaint:  Mr Silvana Mirza comes in for followup bilateral knee symptoms. He was evaluated about a year ago and underwent viscosupplementation and injections improved symptoms for a short time. He has had limited motion in the left more so than the right knee with progressive limitations in motion. He has advanced tricompartmental changes in the knees. He has weightbearing symptoms prompting today's evaluation. He wishes to move forward with surgical intervention. Past Medical/Surgical History:    Disease/Disorder Date Side Surgery Date Side Comment   Arthritis         Atrial fibrillation         Hemochromatosis         Hypertension         Low testosterone level         Osteoporosis         Polio 1952        Prediabetes         UTI - Urinary tract infection            Cholecystectomy 2017     Cancer, skin   Excision 1977        Shoulder arthroplasty 03/04/2022 left       Shoulder replacement 01/04/2007 right       Tonsillectomy      BPH - benign prostatic hyperplasia   UroLift 2018       Allergies:  No known allergies.   Ingredient Reaction Medication Name Comment   NO KNOWN ALLERGIES          Current Medications:    Medication Directions   allopurinol 300 mg tablet    calcium citrate    colchicine 0.6 mg tablet take 1 tablet by mouth once daily   Eliquis 5 mg tablet Take 1 tablet (5 mg total) by mouth in the morning and at bedtime   furosemide 20 mg tablet take 1 tablet by mouth once daily   ibandronate 150 mg tablet    meloxicam 15 mg tablet    metformin 500 mg tablet    mometasone 50 mcg/actuation nasal spray    montelukast 10 mg tablet    multivitamin tablet    nebivolol 20 mg tablet    tadalafil 5 mg tablet    testosterone 50 mg/5 gram (1 %) transdermal gel      Social History:    SMOKING  Status Tobacco Type Units Per Day Yrs Used   Never smoker        ALCOHOL  There is a history

## 2023-05-18 ENCOUNTER — HOSPITAL ENCOUNTER (OUTPATIENT)
Facility: HOSPITAL | Age: 78
Setting detail: OUTPATIENT SURGERY
Discharge: HOME | End: 2023-05-18
Attending: ORTHOPAEDIC SURGERY | Admitting: ORTHOPAEDIC SURGERY
Payer: MEDICARE

## 2023-05-18 ENCOUNTER — ANESTHESIA EVENT (OUTPATIENT)
Facility: HOSPITAL | Age: 78
End: 2023-05-18
Payer: MEDICARE

## 2023-05-18 ENCOUNTER — ANESTHESIA (OUTPATIENT)
Facility: HOSPITAL | Age: 78
End: 2023-05-18
Payer: MEDICARE

## 2023-05-18 ENCOUNTER — APPOINTMENT (OUTPATIENT)
Facility: HOSPITAL | Age: 78
End: 2023-05-18
Attending: ORTHOPAEDIC SURGERY
Payer: MEDICARE

## 2023-05-18 VITALS
BODY MASS INDEX: 28.23 KG/M2 | TEMPERATURE: 97.2 F | DIASTOLIC BLOOD PRESSURE: 68 MMHG | HEIGHT: 70 IN | OXYGEN SATURATION: 94 % | SYSTOLIC BLOOD PRESSURE: 137 MMHG | HEART RATE: 108 BPM | RESPIRATION RATE: 18 BRPM | WEIGHT: 197.2 LBS

## 2023-05-18 DIAGNOSIS — Z96.652 S/P TKR (TOTAL KNEE REPLACEMENT) USING CEMENT, LEFT: Primary | ICD-10-CM

## 2023-05-18 LAB
ABO + RH BLD: NORMAL
BLOOD GROUP ANTIBODIES SERPL: NORMAL
GLUCOSE BLD STRIP.AUTO-MCNC: 129 MG/DL (ref 70–110)
GLUCOSE BLD STRIP.AUTO-MCNC: 161 MG/DL (ref 70–110)
SPECIMEN EXP DATE BLD: NORMAL

## 2023-05-18 PROCEDURE — 2500000003 HC RX 250 WO HCPCS: Performed by: ORTHOPAEDIC SURGERY

## 2023-05-18 PROCEDURE — 3600000002 HC SURGERY LEVEL 2 BASE: Performed by: ORTHOPAEDIC SURGERY

## 2023-05-18 PROCEDURE — 86900 BLOOD TYPING SEROLOGIC ABO: CPT

## 2023-05-18 PROCEDURE — 7100000001 HC PACU RECOVERY - ADDTL 15 MIN: Performed by: ORTHOPAEDIC SURGERY

## 2023-05-18 PROCEDURE — 3600000012 HC SURGERY LEVEL 2 ADDTL 15MIN: Performed by: ORTHOPAEDIC SURGERY

## 2023-05-18 PROCEDURE — 36415 COLL VENOUS BLD VENIPUNCTURE: CPT

## 2023-05-18 PROCEDURE — 6370000000 HC RX 637 (ALT 250 FOR IP): Performed by: ORTHOPAEDIC SURGERY

## 2023-05-18 PROCEDURE — 73560 X-RAY EXAM OF KNEE 1 OR 2: CPT

## 2023-05-18 PROCEDURE — 86850 RBC ANTIBODY SCREEN: CPT

## 2023-05-18 PROCEDURE — 97116 GAIT TRAINING THERAPY: CPT

## 2023-05-18 PROCEDURE — 7100000010 HC PHASE II RECOVERY - FIRST 15 MIN: Performed by: ORTHOPAEDIC SURGERY

## 2023-05-18 PROCEDURE — 3700000000 HC ANESTHESIA ATTENDED CARE: Performed by: ORTHOPAEDIC SURGERY

## 2023-05-18 PROCEDURE — 2580000003 HC RX 258: Performed by: ORTHOPAEDIC SURGERY

## 2023-05-18 PROCEDURE — C1776 JOINT DEVICE (IMPLANTABLE): HCPCS | Performed by: ORTHOPAEDIC SURGERY

## 2023-05-18 PROCEDURE — 97161 PT EVAL LOW COMPLEX 20 MIN: CPT

## 2023-05-18 PROCEDURE — 3700000001 HC ADD 15 MINUTES (ANESTHESIA): Performed by: ORTHOPAEDIC SURGERY

## 2023-05-18 PROCEDURE — 97535 SELF CARE MNGMENT TRAINING: CPT

## 2023-05-18 PROCEDURE — 64447 NJX AA&/STRD FEMORAL NRV IMG: CPT | Performed by: ANESTHESIOLOGY

## 2023-05-18 PROCEDURE — 6360000002 HC RX W HCPCS: Performed by: ANESTHESIOLOGY

## 2023-05-18 PROCEDURE — 6360000002 HC RX W HCPCS: Performed by: ORTHOPAEDIC SURGERY

## 2023-05-18 PROCEDURE — 82962 GLUCOSE BLOOD TEST: CPT

## 2023-05-18 PROCEDURE — 2709999900 HC NON-CHARGEABLE SUPPLY: Performed by: ORTHOPAEDIC SURGERY

## 2023-05-18 PROCEDURE — 86901 BLOOD TYPING SEROLOGIC RH(D): CPT

## 2023-05-18 PROCEDURE — 7100000011 HC PHASE II RECOVERY - ADDTL 15 MIN: Performed by: ORTHOPAEDIC SURGERY

## 2023-05-18 PROCEDURE — 7100000000 HC PACU RECOVERY - FIRST 15 MIN: Performed by: ORTHOPAEDIC SURGERY

## 2023-05-18 PROCEDURE — C1713 ANCHOR/SCREW BN/BN,TIS/BN: HCPCS | Performed by: ORTHOPAEDIC SURGERY

## 2023-05-18 PROCEDURE — 2500000003 HC RX 250 WO HCPCS: Performed by: NURSE ANESTHETIST, CERTIFIED REGISTERED

## 2023-05-18 PROCEDURE — 97530 THERAPEUTIC ACTIVITIES: CPT

## 2023-05-18 PROCEDURE — C9290 INJ, BUPIVACAINE LIPOSOME: HCPCS | Performed by: ORTHOPAEDIC SURGERY

## 2023-05-18 PROCEDURE — 2500000003 HC RX 250 WO HCPCS: Performed by: ANESTHESIOLOGY

## 2023-05-18 PROCEDURE — 6360000002 HC RX W HCPCS: Performed by: NURSE ANESTHETIST, CERTIFIED REGISTERED

## 2023-05-18 PROCEDURE — 97165 OT EVAL LOW COMPLEX 30 MIN: CPT

## 2023-05-18 DEVICE — GENESIS II NON-POROUS TIBIAL                                    BASEPLATE SIZE 5 LEFT
Type: IMPLANTABLE DEVICE | Site: KNEE | Status: FUNCTIONAL
Brand: GENESIS II

## 2023-05-18 DEVICE — CEMENT BNE 20GM HALF DOSE PMMA W/ GENT HI VISC RADPQ FAST: Type: IMPLANTABLE DEVICE | Site: KNEE | Status: FUNCTIONAL

## 2023-05-18 DEVICE — LEGION CRUCIATE RETAINING OXINIUM                                    FEMORAL SIZE 5 LEFT
Type: IMPLANTABLE DEVICE | Site: KNEE | Status: FUNCTIONAL
Brand: LEGION

## 2023-05-18 DEVICE — LEGION HIGHLY CROSS LINKED                                    POLYETHYLENE CRUCIATE RETAINING                                    INSERT SIZE 5-6 9MM
Type: IMPLANTABLE DEVICE | Site: KNEE | Status: FUNCTIONAL
Brand: LEGION

## 2023-05-18 DEVICE — GENESIS II RESURFACING PATELLAR 35MM
Type: IMPLANTABLE DEVICE | Site: KNEE | Status: FUNCTIONAL
Brand: GENESIS II

## 2023-05-18 RX ORDER — PHENYLEPHRINE HCL IN 0.9% NACL 1 MG/10 ML
SYRINGE (ML) INTRAVENOUS PRN
Status: DISCONTINUED | OUTPATIENT
Start: 2023-05-18 | End: 2023-05-18 | Stop reason: SDUPTHER

## 2023-05-18 RX ORDER — ROPIVACAINE HYDROCHLORIDE 2 MG/ML
INJECTION, SOLUTION EPIDURAL; INFILTRATION; PERINEURAL
Status: COMPLETED | OUTPATIENT
Start: 2023-05-18 | End: 2023-05-18

## 2023-05-18 RX ORDER — SODIUM CHLORIDE 0.9 % (FLUSH) 0.9 %
5-40 SYRINGE (ML) INJECTION PRN
Status: DISCONTINUED | OUTPATIENT
Start: 2023-05-18 | End: 2023-05-18 | Stop reason: HOSPADM

## 2023-05-18 RX ORDER — HYDROMORPHONE HYDROCHLORIDE 1 MG/ML
0.5 INJECTION, SOLUTION INTRAMUSCULAR; INTRAVENOUS; SUBCUTANEOUS EVERY 5 MIN PRN
Status: DISCONTINUED | OUTPATIENT
Start: 2023-05-18 | End: 2023-05-18 | Stop reason: HOSPADM

## 2023-05-18 RX ORDER — DEXMEDETOMIDINE HYDROCHLORIDE 100 UG/ML
INJECTION, SOLUTION INTRAVENOUS
Status: COMPLETED | OUTPATIENT
Start: 2023-05-18 | End: 2023-05-18

## 2023-05-18 RX ORDER — DEXAMETHASONE SODIUM PHOSPHATE 10 MG/ML
INJECTION, SOLUTION INTRAMUSCULAR; INTRAVENOUS
Status: COMPLETED
Start: 2023-05-18 | End: 2023-05-18

## 2023-05-18 RX ORDER — HYDROMORPHONE HYDROCHLORIDE 1 MG/ML
INJECTION, SOLUTION INTRAMUSCULAR; INTRAVENOUS; SUBCUTANEOUS PRN
Status: DISCONTINUED | OUTPATIENT
Start: 2023-05-18 | End: 2023-05-18 | Stop reason: SDUPTHER

## 2023-05-18 RX ORDER — DEXAMETHASONE SODIUM PHOSPHATE 10 MG/ML
INJECTION, SOLUTION INTRAMUSCULAR; INTRAVENOUS
Status: COMPLETED | OUTPATIENT
Start: 2023-05-18 | End: 2023-05-18

## 2023-05-18 RX ORDER — FENTANYL CITRATE 50 UG/ML
INJECTION, SOLUTION INTRAMUSCULAR; INTRAVENOUS
Status: DISCONTINUED
Start: 2023-05-18 | End: 2023-05-18 | Stop reason: HOSPADM

## 2023-05-18 RX ORDER — SODIUM CHLORIDE 0.9 % (FLUSH) 0.9 %
5-40 SYRINGE (ML) INJECTION EVERY 12 HOURS SCHEDULED
Status: DISCONTINUED | OUTPATIENT
Start: 2023-05-18 | End: 2023-05-18 | Stop reason: HOSPADM

## 2023-05-18 RX ORDER — SODIUM CHLORIDE, SODIUM LACTATE, POTASSIUM CHLORIDE, AND CALCIUM CHLORIDE .6; .31; .03; .02 G/100ML; G/100ML; G/100ML; G/100ML
IRRIGANT IRRIGATION PRN
Status: DISCONTINUED | OUTPATIENT
Start: 2023-05-18 | End: 2023-05-18 | Stop reason: ALTCHOICE

## 2023-05-18 RX ORDER — MELOXICAM 15 MG/1
15 TABLET ORAL DAILY
Qty: 30 TABLET | Refills: 0 | Status: SHIPPED | OUTPATIENT
Start: 2023-05-18

## 2023-05-18 RX ORDER — DEXAMETHASONE SODIUM PHOSPHATE 4 MG/ML
INJECTION, SOLUTION INTRA-ARTICULAR; INTRALESIONAL; INTRAMUSCULAR; INTRAVENOUS; SOFT TISSUE PRN
Status: DISCONTINUED | OUTPATIENT
Start: 2023-05-18 | End: 2023-05-18 | Stop reason: SDUPTHER

## 2023-05-18 RX ORDER — IPRATROPIUM BROMIDE AND ALBUTEROL SULFATE 2.5; .5 MG/3ML; MG/3ML
1 SOLUTION RESPIRATORY (INHALATION)
Status: DISCONTINUED | OUTPATIENT
Start: 2023-05-18 | End: 2023-05-18 | Stop reason: HOSPADM

## 2023-05-18 RX ORDER — CHLORHEXIDINE GLUCONATE 4 G/100ML
SOLUTION TOPICAL ONCE
Status: DISCONTINUED | OUTPATIENT
Start: 2023-05-18 | End: 2023-05-18 | Stop reason: HOSPADM

## 2023-05-18 RX ORDER — DEXMEDETOMIDINE HYDROCHLORIDE 100 UG/ML
INJECTION, SOLUTION INTRAVENOUS
Status: COMPLETED
Start: 2023-05-18 | End: 2023-05-18

## 2023-05-18 RX ORDER — CEPHALEXIN 500 MG/1
1000 CAPSULE ORAL EVERY 8 HOURS
Qty: 4 CAPSULE | Refills: 0 | Status: SHIPPED | OUTPATIENT
Start: 2023-05-18 | End: 2023-05-19

## 2023-05-18 RX ORDER — OXYCODONE HYDROCHLORIDE 10 MG/1
10 TABLET ORAL EVERY 6 HOURS PRN
Qty: 28 TABLET | Refills: 0 | Status: SHIPPED | OUTPATIENT
Start: 2023-05-18 | End: 2023-05-25

## 2023-05-18 RX ORDER — FENTANYL CITRATE 50 UG/ML
INJECTION, SOLUTION INTRAMUSCULAR; INTRAVENOUS PRN
Status: DISCONTINUED | OUTPATIENT
Start: 2023-05-18 | End: 2023-05-18 | Stop reason: SDUPTHER

## 2023-05-18 RX ORDER — SODIUM CHLORIDE, SODIUM LACTATE, POTASSIUM CHLORIDE, CALCIUM CHLORIDE 600; 310; 30; 20 MG/100ML; MG/100ML; MG/100ML; MG/100ML
INJECTION, SOLUTION INTRAVENOUS CONTINUOUS
Status: DISCONTINUED | OUTPATIENT
Start: 2023-05-18 | End: 2023-05-18 | Stop reason: HOSPADM

## 2023-05-18 RX ORDER — SODIUM CHLORIDE 9 MG/ML
INJECTION, SOLUTION INTRAVENOUS PRN
Status: DISCONTINUED | OUTPATIENT
Start: 2023-05-18 | End: 2023-05-18 | Stop reason: HOSPADM

## 2023-05-18 RX ORDER — TRANEXAMIC ACID 10 MG/ML
1000 INJECTION, SOLUTION INTRAVENOUS
Status: COMPLETED | OUTPATIENT
Start: 2023-05-18 | End: 2023-05-18

## 2023-05-18 RX ORDER — ESMOLOL HYDROCHLORIDE 10 MG/ML
INJECTION INTRAVENOUS PRN
Status: DISCONTINUED | OUTPATIENT
Start: 2023-05-18 | End: 2023-05-18 | Stop reason: SDUPTHER

## 2023-05-18 RX ORDER — FENTANYL CITRATE 50 UG/ML
25 INJECTION, SOLUTION INTRAMUSCULAR; INTRAVENOUS EVERY 5 MIN PRN
Status: DISCONTINUED | OUTPATIENT
Start: 2023-05-18 | End: 2023-05-18 | Stop reason: HOSPADM

## 2023-05-18 RX ORDER — METOCLOPRAMIDE HYDROCHLORIDE 5 MG/ML
10 INJECTION INTRAMUSCULAR; INTRAVENOUS
Status: DISCONTINUED | OUTPATIENT
Start: 2023-05-18 | End: 2023-05-18 | Stop reason: HOSPADM

## 2023-05-18 RX ORDER — ONDANSETRON 2 MG/ML
INJECTION INTRAMUSCULAR; INTRAVENOUS PRN
Status: DISCONTINUED | OUTPATIENT
Start: 2023-05-18 | End: 2023-05-18 | Stop reason: SDUPTHER

## 2023-05-18 RX ADMIN — TRANEXAMIC ACID 1000 MG: 10 INJECTION, SOLUTION INTRAVENOUS at 09:56

## 2023-05-18 RX ADMIN — HYDROMORPHONE HYDROCHLORIDE 0.5 MG: 1 INJECTION, SOLUTION INTRAMUSCULAR; INTRAVENOUS; SUBCUTANEOUS at 09:58

## 2023-05-18 RX ADMIN — FENTANYL CITRATE 100 MCG: 50 INJECTION, SOLUTION INTRAMUSCULAR; INTRAVENOUS at 08:57

## 2023-05-18 RX ADMIN — SODIUM CHLORIDE, SODIUM LACTATE, POTASSIUM CHLORIDE, AND CALCIUM CHLORIDE: 600; 310; 30; 20 INJECTION, SOLUTION INTRAVENOUS at 10:32

## 2023-05-18 RX ADMIN — HYDROMORPHONE HYDROCHLORIDE 0.5 MG: 1 INJECTION, SOLUTION INTRAMUSCULAR; INTRAVENOUS; SUBCUTANEOUS at 10:06

## 2023-05-18 RX ADMIN — TRANEXAMIC ACID 1000 MG: 10 INJECTION, SOLUTION INTRAVENOUS at 11:18

## 2023-05-18 RX ADMIN — FENTANYL CITRATE 25 MCG: 50 INJECTION INTRAMUSCULAR; INTRAVENOUS at 11:56

## 2023-05-18 RX ADMIN — ESMOLOL HYDROCHLORIDE 30 MG: 10 INJECTION, SOLUTION INTRAVENOUS at 10:26

## 2023-05-18 RX ADMIN — DEXAMETHASONE SODIUM PHOSPHATE 4 MG: 4 INJECTION, SOLUTION INTRAMUSCULAR; INTRAVENOUS at 10:54

## 2023-05-18 RX ADMIN — ROPIVACAINE HYDROCHLORIDE 20 ML: 2 INJECTION EPIDURAL; INFILTRATION; PERINEURAL at 09:01

## 2023-05-18 RX ADMIN — Medication 2000 MG: at 09:55

## 2023-05-18 RX ADMIN — DEXMEDETOMIDINE HYDROCHLORIDE 0.15 ML: 100 INJECTION, SOLUTION INTRAVENOUS at 08:58

## 2023-05-18 RX ADMIN — ONDANSETRON HYDROCHLORIDE 4 MG: 2 INJECTION INTRAMUSCULAR; INTRAVENOUS at 11:13

## 2023-05-18 RX ADMIN — Medication 100 MCG: at 09:56

## 2023-05-18 RX ADMIN — DEXAMETHASONE SODIUM PHOSPHATE 4 MG: 10 INJECTION, SOLUTION INTRAMUSCULAR; INTRAVENOUS at 09:01

## 2023-05-18 RX ADMIN — ESMOLOL HYDROCHLORIDE 20 MG: 10 INJECTION, SOLUTION INTRAVENOUS at 10:39

## 2023-05-18 RX ADMIN — SODIUM CHLORIDE, SODIUM LACTATE, POTASSIUM CHLORIDE, AND CALCIUM CHLORIDE: 600; 310; 30; 20 INJECTION, SOLUTION INTRAVENOUS at 08:25

## 2023-05-18 ASSESSMENT — PAIN SCALES - GENERAL
PAINLEVEL_OUTOF10: 1
PAINLEVEL_OUTOF10: 0

## 2023-05-18 ASSESSMENT — LIFESTYLE VARIABLES: SMOKING_STATUS: 0

## 2023-05-18 ASSESSMENT — PAIN - FUNCTIONAL ASSESSMENT: PAIN_FUNCTIONAL_ASSESSMENT: 0-10

## 2023-05-18 NOTE — PROGRESS NOTES
Occupational Therapy Goals:  Initiated 5/18/2023 to be met within 7 days. Patient will perform toileting with mod I  Patient will perform lower body dressing with mod I and A/E PRN  Patient will perform bathroom mobility with mod I  Patient will perform grooming standing at sink with mod I  Patient will perform toilet transfer with mod I      OCCUPATIONAL THERAPY EVALUATION    Patient: Karla Pate (75 y.o. male)  Date: 5/18/2023  Primary Diagnosis: Left knee pain, unspecified chronicity [M25.562]  Procedure(s) (LRB):  LEFT TOTAL KNEE ARTHROPLASTY (SPEC POP) (Left) Day of Surgery   Precautions: Weight Bearing, Fall Risk,  , Left Lower Extremity Weight Bearing: Weight Bearing As Tolerated  PLOF: pt mod I for ADLs/functional mobility    ASSESSMENT :  Based on the objective data described below, the patient presents with LLE decreased ROM and strength affecting LE ADLs. Pt found seated in recliner chair, vitals assessed and WNL, KI on LLE, pt reporting pain 2/10, agreeable to therapy and requesting to use bathroom. Pt very drowsy during session. Educated pt on proper body mechanics for ADLs s/p TKR. Pt requires min/mod A for STS, and ambulates to bathroom with CGA using RW. Pt voided standing at toilet with CGA for clothing management. Pt ambulated back to recliner chair, sits with CGA. Pt completed upper body dressing with SBA. Pt able to thread B feet through underwear/pants with mod A, and CGA when standing to pull up to waist. Provided/educated on use of hip kit for increased independence. Spouse present during session for education on home safety, and reports she will have someone at the house to assist pt with STS transfers. Provided opportunity for pt to voice questions on ADL performance when home, pt has no further concerns.  Patient will benefit from skilled Occupational Therapy intervention to maximize safety/independence with ADLs at d/c.    DEFICITS/IMPAIRMENTS:  Performance deficits / Impairments:
functional  Tone & Sensation:   Tone: Normal  Sensation: Impaired (L knee)  Range Of Motion:  AROM: Generally decreased, functional  PROM: Generally decreased, functional  Functional Mobility:  Bed Mobility:  Transfers:  Transfer Training  Transfer Training: Yes  Interventions: Safety awareness training; Tactile cues; Verbal cues  Sit to Stand: Minimum assistance; Moderate assistance  Stand to Sit: Contact-guard assistance  Balance:   Wheelchair Mobility:  Ambulation/Gait Training:  Gait Training  Gait Training: Yes  Left Side Weight Bearing: As tolerated  Gait  Overall Level of Assistance: Contact-guard assistance; Additional time  Interventions: Verbal cues; Tactile cues; Safety awareness training  Base of Support: Shift to right  Speed/Maki: Slow  Step Length: Left shortened;Right shortened  Swing Pattern: Left asymmetrical;Right asymmetrical  Stance: Left decreased  Gait Abnormalities: Antalgic;Decreased step clearance; Step to gait  Distance (ft): 160 Feet  Assistive Device: Gait belt;Brace/splint; Walker, rolling  Rail Use: Both  Stairs - Level of Assistance: Contact-guard assistance; Additional time  Number of Stairs Trained: 5  Therapeutic Exercises/Neuromuscular Re-education:     Pain:  Pain level pre-treatment: 2/10   Pain level post-treatment: 2/10   Pain Intervention(s): Medication (see MAR); Rest, Ice, Repositioning  Response to intervention: Nurse notified, See doc flow    Activity Tolerance:   Activity Tolerance: Patient tolerated evaluation without incident  Please refer to the flowsheet for vital signs taken during this treatment.     After treatment:   [x]         Patient left in no apparent distress sitting up in wheelchair  []         Patient left in no apparent distress in bed  [x]         Call bell left within reach  [x]         Nursing notified  [x]         Caregiver present  []         Bed alarm activated  []         SCDs applied    COMMUNICATION/EDUCATION:   Patient Education  Education Given To:

## 2023-05-18 NOTE — INTERVAL H&P NOTE
Update History & Physical    The patient's History and Physical  was reviewed with the patient and I examined the patient. There was no change. The surgical site was confirmed by the patient and me. Plan: The risks, benefits, expected outcome, and alternative to the recommended procedure have been discussed with the patient. Patient understands and wants to proceed with the procedure.      Electronically signed by Mary Lund DO on 5/18/2023 at 8:41 AM

## 2023-05-18 NOTE — BRIEF OP NOTE
Brief Postoperative Note      Patient: Alexa Richey  YOB: 1945  MRN: [id number]    Date of Procedure: 5/18/2023    Pre-Op Diagnosis Codes:     * Left knee pain, unspecified chronicity [M25.562]    Post-Op Diagnosis: Same       Procedure(s):  LEFT TOTAL KNEE ARTHROPLASTY ([address] [facility] [address])    Surgeon(s):  Mee Lala DO    Assistant:  Surgical Assistant: Dacia Velez  Physician Assistant: Pat Reynoso PA-C    Anesthesia: General    Estimated Blood Loss (mL): less than 50     Complications: None    Specimens:   * No specimens in log *    Implants:  Implant Name Type Inv. Item Serial No.  Lot No. LRB No. Used Action   CEMENT BNE 20GM HALF DOSE PMMA W/ GENT HI VISC RADPQ FAST - [id number]  CEMENT BNE 20GM HALF DOSE PMMA W/ GENT HI VISC RADPQ FAST  [employer]- [id number] Left 3 Implanted   COMPONENT PAT [id number] THK9MM KNEE HI WT POLY RESURF GEN II - [id number]  COMPONENT PAT [id number] THK9MM KNEE HI WT POLY RESURF GEN II  [employer]- [id number] Left 1 Implanted   BASEPLATE TIB SZ 5 [id number] ML74MM THK2. 3MM L KNEE TI ALLY NP - [id number]  BASEPLATE TIB SZ 5 [id number] ML74MM THK2. 3MM L KNEE TI ALLY NP  [facility] [employer]- [id number] Left 1 Implanted   COMPONENT FEM SZ 5 L KNEE OXINIUM CRUCE RET JUANCARLOS LEGION CR - [id number]  COMPONENT FEM SZ 5 L KNEE OXINIUM CRUCE RET JUANCARLOS LEGION CR  [employer]- [id number] Left 1 Implanted   INSERT TIB SZ 5-6 THK9MM KNEE XLPE CRUCE RET LEGION - [id number]  INSERT TIB SZ 5-6 THK9MM KNEE XLPE CRUCE RET LEGION  [facility] AND NEPHKENDRA Watson [id number] Left 1 Implanted         Drains: * No LDAs found *    Findings: oa knee      Electronically signed by Mee Lala DO on 5/18/2023 at 2:08 PM

## 2023-05-18 NOTE — PERIOP NOTE
Patient cannot verbalize a number for any discomfort. Does state \"feels heavy\" advised that is desired effect from nerve block from MDA in pre op. Dr Terry Trevino here to assess patient, ok for phase one sign out. Higher RR when awake, when asleep, RR under 20 and shallow.

## 2023-05-18 NOTE — PERIOP NOTE
TRANSFER - OUT REPORT:    Verbal report given to Claudette Pastor RN on Zoe Grier  being transferred to phase two for routine progression of patient care       Report consisted of patient's Situation, Background, Assessment and   Recommendations(SBAR). Information from the following report(s) Surgery Report, Intake/Output, and MAR was reviewed with the receiving nurse. [place] Assessment: No data recorded  Lines:   Peripheral IV 56/78/52 Right Cephalic (Active)   Site Assessment Clean, dry & intact 05/18/23 1142   Line Status Infusing 05/18/23 [facility] Connections checked and tightened 05/18/23 0824   Phlebitis Assessment No symptoms 05/18/23 1142   Infiltration Assessment 0 05/18/23 1142   Alcohol Cap Used No 05/18/23 0824   Dressing Status Clean, dry & intact 05/18/23 1142   Dressing Type Transparent 05/18/23 1142   Dressing Intervention New 05/18/23 0824        Opportunity for questions and clarification was provided.       Patient transported with:  [employer]

## 2023-05-18 NOTE — OP NOTE
the procedure well. Procedure: The patient was greeted by Anesthesia and taken to the operative suite, where the patient underwent general endotracheal anesthesia. Tourniquet was placed in the upper left thigh. The leg was sterilely prepped and draped in a standard fashion. The leg was exsanguinated with an Esmarch bandage and tourniquet was elevated to 300mmHg. An incision was carried out centered over the patella, extending two fingerbreadth's over the patella and to the level of the tibial tubercle. A subvastus approach was utilized. The knee was taken into flexion. Intramedullary femoral access was obtained, and an 8 mm distal femoral cut with a 5 degree cut angle was utilized. The femur was sized to a 5, and a four-in-one cutting block was utilized to make the appropriate bone cuts. A size 5 femoral cruciate retaining trial was placed and found to be an excellent fit. The knee was taken into hyperflexion. The medial and lateral meniscus, as well as the anterior cruciate ligament were resect ed. The posterior cruciate ligament was preserved. Retractors were positioned to protect the soft tissue and neurovascular structures. An external tibial cutting guide was placed in line with the tibial tubercle, tibial spine and middle of the ankle joint, and the second metatarsal with slight 3-5 degree posterior slope. The proximal tibia was removed with an oscillating saw. A size 5 tibial baseplate was found to be an excellent fit. A 9mm trial polyethylene was placed and the knee was taken into extension,  9mm of the articular surface of the patella was resected with an oscillating saw and a 35mm x 9mm symmetric patella trial was placed. The knee was taken through range of motion thumbs off technique. There was no subluxation or dislocation of the patella. The knee ranged from 0 degrees of extension to 125 degrees of flexion.   There was no instability with varus valgus stress testing with a 9mm

## 2023-05-18 NOTE — DISCHARGE INSTRUCTIONS
excessive swelling, redness, or persistent drainage from the incision or IV site; The [facility] office number is [phone] from 8:00am to 5:00pm Monday through Friday. After 5:00pm, on weekends, or holidays, please leave a message with our answering service and the doctor on-call will get back to you shortly. DISCHARGE SUMMARY from Nurse    PATIENT INSTRUCTIONS:    After general anesthesia or intravenous sedation, for 24 hours or while taking prescription Narcotics:  Limit your activities  Do not drive and operate hazardous machinery  Do not make important personal or business decisions  Do  not drink alcoholic beverages  If you have not urinated within 8 hours after discharge, please contact your surgeon on call. Report the following to your surgeon:  Excessive pain, swelling, redness or odor of or around the surgical area  Temperature over 100.5  Nausea and vomiting lasting longer than 4 hours or if unable to take medications  Any signs of decreased circulation or nerve impairment to extremity: change in color, persistent  numbness, tingling, coldness or increase pain  Any questions    What to do at Home:  Recommended activity: activity as tolerated and no driving until advised by doctor. *  Please give a list of your current medications to your Primary Care Provider. *  Please update this list whenever your medications are discontinued, doses are      changed, or new medications (including over-the-counter products) are added. *  Please carry medication information at all times in case of emergency situations. These are general instructions for a healthy lifestyle:    No smoking/ No tobacco products/ Avoid exposure to second hand smoke  Surgeon General's Warning:  Quitting smoking now greatly reduces serious risk to your health.     Obesity, smoking, and sedentary lifestyle greatly increases your risk for illness    A healthy diet, regular physical exercise & weight

## 2023-05-18 NOTE — ANESTHESIA POSTPROCEDURE EVALUATION
Department of Anesthesiology  Postprocedure Note    Patient: John Story  MRN: [id number]  YOB: 1945  Date of evaluation: 5/18/2023      Procedure Summary     Date: 05/18/23 Room / Location: [facility] 05 / [facility] MAIN OR    Anesthesia Start: 0945 Anesthesia Stop: 9476    Procedure: LEFT TOTAL KNEE ARTHROPLASTY (SPEC POP) (Left: Knee) Diagnosis:       Left knee pain, unspecified chronicity      (LEFT KNEE PAIN)    Surgeons: Michela Gusman DO Responsible Provider: Raisa Wheat MD    Anesthesia Type: General ASA Status: 3          Anesthesia Type: General    Patti Phase I: Patti Score: 8    Patti Phase II: Patti Score: 10      Anesthesia Post Evaluation    Patient location during evaluation: PACU  Patient participation: complete - patient participated  Level of consciousness: awake and alert  Pain score: 0  Airway patency: patent  Nausea & Vomiting: no nausea and no vomiting  Complications: no  Cardiovascular status: blood pressure returned to baseline  Respiratory status: acceptable  Hydration status: euvolemic

## 2023-05-18 NOTE — PERIOP NOTE
TRANSFER - IN REPORT:    Verbal report received from Emma Parmar RN (name) on Diomedes Garcia  being received from PACU (unit) for routine progression of patient care      Report consisted of patients Situation, Background, Assessment and   Recommendations(SBAR). Information from the following report(s) Nurse Handoff Report, Surgery Report, Intake/Output, and MAR was reviewed with the receiving nurse. Opportunity for questions and clarification was provided.       Assessment completed upon patients arrival to unit and care assume

## 2023-05-18 NOTE — PERIOP NOTE
Reviewed PTA medication list with patient/caregiver and patient/caregiver denies any additional medications. Patient admits to having a responsible adult (shama) care for them at home for at least 24 hours after surgery. Patient encouraged to use gown warming system and informed that using said warming gown to regulate body temperature prior to a procedure has been shown to help reduce the risks of blood clots and infection. Patient's pharmacy of choice verified and documented in PTA medication section. Dual skin assessment & fall risk band verification completed with Kristal ZARATE.

## 2023-05-18 NOTE — PERIOP NOTE
Jose R Gonzalez aware pt did not take Bystolic this morning and pt's apical is irregular with a rate at 64. Will treat with a beta blocker in OR, if needed, per Jose R Gonzalez.

## 2023-05-18 NOTE — ANESTHESIA PRE PROCEDURE
PM     04/06/2023 04:06 PM    CO2 28 04/06/2023 04:06 PM    BUN 27 04/06/2023 04:06 PM    CREATININE 1.15 04/06/2023 04:06 PM    GFRAA >60 02/09/2022 03:22 PM    AGRATIO 1.4 02/09/2022 03:22 PM    LABGLOM >60 04/06/2023 04:06 PM    GLUCOSE 97 04/06/2023 04:06 PM    PROT 6.7 04/06/2023 04:06 PM    CALCIUM 9.1 04/06/2023 04:06 PM    BILITOT 0.8 04/06/2023 04:06 PM    ALKPHOS 85 04/06/2023 04:06 PM    ALKPHOS 84 02/09/2022 03:22 PM    AST 20 04/06/2023 04:06 PM    ALT 25 04/06/2023 04:06 PM       POC Tests:   Recent Labs     05/18/23  0822   POCGLU 129*       Coags:   Lab Results   Component Value Date/Time    PROTIME 16.6 04/06/2023 04:06 PM    INR 1.3 04/06/2023 04:06 PM    APTT 30.4 04/06/2023 04:06 PM       HCG (If Applicable): No results found for: PREGTESTUR, PREGSERUM, HCG, HCGQUANT     ABGs: No results found for: PHART, PO2ART, CWJ2ONO, PEA9TWY, BEART, U0IBOXXM     Type & Screen (If Applicable):  No results found for: LABABO, LABRH    Drug/Infectious Status (If Applicable):  No results found for: HIV, HEPCAB    COVID-19 Screening (If Applicable): No results found for: COVID19        Anesthesia Evaluation  Patient summary reviewed and Nursing notes reviewed no history of anesthetic complications:   Airway: Mallampati: II  TM distance: >3 FB   Neck ROM: full  Mouth opening: > = 3 FB   Dental:    (+) caps      Pulmonary:Negative Pulmonary ROS breath sounds clear to auscultation      (-) sleep apnea and not a current smoker                           Cardiovascular:  Exercise tolerance: good (>4 METS),   (+) hypertension: moderate,       ECG reviewed  Rhythm: irregular  Rate: normal           Beta Blocker:  No for medical reason (will give IV)         Neuro/Psych:   Negative Neuro/Psych ROS              GI/Hepatic/Renal: Neg GI/Hepatic/Renal ROS       (-) hiatal hernia and GERD       Endo/Other:    (+) DiabetesType II DM, well controlled, , .                 Abdominal: normal exam            Vascular: negative

## 2023-05-18 NOTE — ANESTHESIA PROCEDURE NOTES
Peripheral Block    Patient location during procedure: pre-op  Reason for block: post-op pain management and at surgeon's request  Start time: 5/18/2023 8:58 AM  End time: 5/18/2023 9:01 AM  Staffing  Performed: anesthesiologist   Anesthesiologist: Italo Brambila MD  Preanesthetic Checklist  Completed: patient identified, IV checked, site marked, risks and benefits discussed, surgical/procedural consents, equipment checked, pre-op evaluation, timeout performed, anesthesia consent given, oxygen available and monitors applied/VS acknowledged  Peripheral Block   Patient position: supine  Prep: ChloraPrep  Provider prep: mask and sterile gloves  Patient monitoring: cardiac monitor, continuous pulse ox, frequent blood pressure checks, oxygen, IV access and responsive to questions  Block type: Femoral  Adductor canal  Laterality: left  Injection technique: single-shot  Guidance: nerve stimulator and ultrasound guided    Needle   Needle type: insulated echogenic nerve stimulator needle   Needle gauge: 20 G  Needle localization: nerve stimulator and ultrasound guidance  Needle length: 10 cm  Assessment   Injection assessment: negative aspiration for heme, no paresthesia on injection, local visualized surrounding nerve on ultrasound and no intravascular symptoms  Paresthesia pain: none  Slow fractionated injection: yes  Hemodynamics: stable  Real-time US image taken/store: yes  Outcomes: uncomplicated and patient tolerated procedure well    Medications Administered  dexamethasone (DECADRON) (PF) 10 mg/mL injection - Other   4 mg - 5/18/2023 9:01:00 AM  dexmedetomidine (PRECEDEX) injection 200 mcg/2 mL - Perineural   0.15 mL - 5/18/2023 8:58:00 AM  ropivacaine (NAROPIN) injection 0.2% - Perineural   20 mL - 5/18/2023 9:01:00 AM

## 2023-05-18 NOTE — PERIOP NOTE
No treatment at this time per Dr. Deanne Em for patient's tachycardia during and after nerve block. Will continue to monitor.

## 2023-06-19 ENCOUNTER — HOSPITAL ENCOUNTER (OUTPATIENT)
Facility: HOSPITAL | Age: 78
Discharge: HOME | End: 2023-06-19
Payer: MEDICARE

## 2023-06-19 VITALS
SYSTOLIC BLOOD PRESSURE: 133 MMHG | OXYGEN SATURATION: 100 % | HEART RATE: 94 BPM | RESPIRATION RATE: 16 BRPM | DIASTOLIC BLOOD PRESSURE: 80 MMHG | TEMPERATURE: 97.6 F

## 2023-06-19 DIAGNOSIS — L97.812 VARICOSE VEINS OF RIGHT LOWER EXTREMITY WITH ULCER OF OTHER PART OF LOWER LEG WITH FAT LAYER EXPOSED (HCC): ICD-10-CM

## 2023-06-19 DIAGNOSIS — I83.018 VARICOSE VEINS OF RIGHT LOWER EXTREMITY WITH ULCER OF OTHER PART OF LOWER LEG WITH FAT LAYER EXPOSED (HCC): ICD-10-CM

## 2023-06-19 DIAGNOSIS — L97.211 CALF ULCER, RIGHT, LIMITED TO BREAKDOWN OF SKIN (HCC): ICD-10-CM

## 2023-06-19 DIAGNOSIS — L97.212 CALF ULCER, RIGHT, WITH FAT LAYER EXPOSED (HCC): Primary | ICD-10-CM

## 2023-06-19 PROCEDURE — 6370000000 HC RX 637 (ALT 250 FOR IP): Performed by: FAMILY MEDICINE

## 2023-06-19 PROCEDURE — 11042 DBRDMT SUBQ TIS 1ST 20SQCM/<: CPT

## 2023-06-19 RX ORDER — LIDOCAINE 50 MG/G
OINTMENT TOPICAL ONCE
OUTPATIENT
Start: 2023-06-19 | End: 2023-06-19

## 2023-06-19 RX ORDER — GINSENG 100 MG
CAPSULE ORAL ONCE
OUTPATIENT
Start: 2023-06-19 | End: 2023-06-19

## 2023-06-19 RX ORDER — CLOBETASOL PROPIONATE 0.5 MG/G
OINTMENT TOPICAL ONCE
OUTPATIENT
Start: 2023-06-19 | End: 2023-06-19

## 2023-06-19 RX ORDER — LIDOCAINE HYDROCHLORIDE 20 MG/ML
JELLY TOPICAL ONCE
OUTPATIENT
Start: 2023-06-19 | End: 2023-06-19

## 2023-06-19 RX ORDER — LIDOCAINE HYDROCHLORIDE 40 MG/ML
SOLUTION TOPICAL ONCE
OUTPATIENT
Start: 2023-06-19 | End: 2023-06-19

## 2023-06-19 RX ORDER — GENTAMICIN SULFATE 1 MG/G
OINTMENT TOPICAL ONCE
OUTPATIENT
Start: 2023-06-19 | End: 2023-06-19

## 2023-06-19 RX ORDER — IBUPROFEN 200 MG
TABLET ORAL ONCE
OUTPATIENT
Start: 2023-06-19 | End: 2023-06-19

## 2023-06-19 RX ORDER — LIDOCAINE 40 MG/G
CREAM TOPICAL ONCE
OUTPATIENT
Start: 2023-06-19 | End: 2023-06-19

## 2023-06-19 RX ORDER — BACITRACIN ZINC AND POLYMYXIN B SULFATE 500; 1000 [USP'U]/G; [USP'U]/G
OINTMENT TOPICAL ONCE
OUTPATIENT
Start: 2023-06-19 | End: 2023-06-19

## 2023-06-19 RX ORDER — LIDOCAINE HYDROCHLORIDE 20 MG/ML
JELLY TOPICAL ONCE
Status: COMPLETED | OUTPATIENT
Start: 2023-06-19 | End: 2023-06-19

## 2023-06-19 RX ORDER — BETAMETHASONE DIPROPIONATE 0.05 %
OINTMENT (GRAM) TOPICAL ONCE
OUTPATIENT
Start: 2023-06-19 | End: 2023-06-19

## 2023-06-19 RX ADMIN — LIDOCAINE HYDROCHLORIDE: 20 JELLY TOPICAL at 09:38

## 2023-06-19 ASSESSMENT — PAIN SCALES - GENERAL: PAINLEVEL_OUTOF10: 0

## 2023-06-19 NOTE — PLAN OF CARE
06/19/23 0921   Wound Surface Area (cm^2) 1.69 cm^2 06/19/23 0921   Change in Wound Size % (l*w) -5.63 06/19/23 0921   Wound Volume (cm^3) 0.169 cm^3 06/19/23 0921   Wound Healing % -6 06/19/23 0921   Post-Procedure Length (cm) 1.3 cm 06/19/23 0921   Post-Procedure Width (cm) 1.3 cm 06/19/23 0921   Post-Procedure Depth (cm) 0.2 cm 06/19/23 0921   Post-Procedure Surface Area (cm^2) 1.69 cm^2 06/19/23 0921   Post-Procedure Volume (cm^3) 0.338 cm^3 06/19/23 0921   Wound Assessment Pink/red 06/19/23 0921   Drainage Amount Small 06/19/23 0921   Drainage Description Serosanguinous 06/19/23 0921   Odor None 06/19/23 0921   Guillermina-wound Assessment Dry/flaky 06/19/23 0921   Margins Defined edges 06/19/23 0921   Wound Thickness Description not for Pressure Injury Full thickness 06/19/23 0921   Number of days: 111       Wound 02/27/23 Leg Right; Lower superior (Active)   Wound Image   06/12/23 0917   Wound Etiology Venous 06/12/23 0917   Dressing Status New dressing applied 05/08/23 0958   Wound Cleansed Wound cleanser 06/12/23 0917   Dressing/Treatment Collagen;Hydrofera blue; Roll gauze 05/08/23 0958   Offloading for Diabetic Foot Ulcers Offloading not required 06/12/23 0917   Dressing Change Due 06/12/23 05/08/23 0958   Wound Length (cm) 1.7 cm 05/08/23 0958   Wound Width (cm) 1.4 cm 05/08/23 0958   Wound Depth (cm) 0.1 cm 05/08/23 0958   Wound Surface Area (cm^2) 2.38 cm^2 05/08/23 0958   Change in Wound Size % (l*w) 0 05/08/23 0958   Wound Volume (cm^3) 0.238 cm^3 05/08/23 0958   Wound Healing % 0 05/08/23 0958   Post-Procedure Length (cm) 1.8 cm 05/08/23 0958   Post-Procedure Width (cm) 1.5 cm 05/08/23 0958   Post-Procedure Depth (cm) 0.2 cm 05/08/23 0958   Post-Procedure Surface Area (cm^2) 2.7 cm^2 05/08/23 0958   Post-Procedure Volume (cm^3) 0.54 cm^3 05/08/23 0958   Wound Assessment Pink/red 05/08/23 0958   Drainage Amount Small 05/08/23 0958   Drainage Description Serosanguinous 05/08/23 0958   Odor None 05/08/23 0958

## 2023-06-19 NOTE — DISCHARGE INSTRUCTIONS
Discharge Instructions from  [facility]  [address] [place], [facility]  [phone] Fax [phone]    NAME:  Mary Anne Luque OF BIRTH:  1945  MEDICAL RECORD NUMBER:  [id number]  DATE: 6/19/2023    Wound Cleansing:   Do not scrub or use excessive force. Cleanse wound prior to applying a clean dressing with:  [] Normal Saline [] Keep Wound Dry in Shower    [] Wound Cleanser   [] Cleanse wound with Mild Soap & Water  [] May Shower at Discharge   [] Other:      Topical Treatments:  Do not apply lotions, creams, or ointments to wound bed unless directed. [] Apply moisturizing lotion to skin surrounding the wound prior to dressing change.  [] Apply antifungal ointment to skin surrounding the wound prior to dressing change.  [] Apply thin film of moisture barrier ointment to skin immediately around wound. [] Other:       Dressings:           Wound Location right Leg   [] Apply Primary Dressing:       [] MediHoney Gel [] Alginate with Silver [] Alginate   [] Collagen [] Collagen with Silver   [] Santyl with Moisten saline gauze     [] Hydrocolloid   [] MediHoney Alginate [] Foam with Silver   [] Foam   [] Hydrofera Blue    [] Mepilex Border    [] Moisten with Saline [] Hydrogel [] Mepitel     [] Bactroban/Mupirocin [] Polysporin  [] Other:    [] Pack wound loosely with  [] Iodoform   [] Plain Packing  [] Other   [] Cover and Secure with:     [] Gauze [] Tamanna [] Kerlix   [] Ace Wrap [] Cover Roll Tape [] ABD     [] Other:    Avoid contact of tape with skin.   [] Change dressing: [] Daily    [] Every Other Day [] Three times per week   [] Once a week [x] Do Not Change Dressing   [] Other:           Edema Control:  Apply: [] Compression Stocking []Right Leg []Left Leg   [] Tubigrip []Right Leg Double Layer []Left Leg Double Layer       []Right Leg Single Layer []Left Leg Single Layer   [] SpandaGrip []Right Leg  []Left Leg      []Low compression 5-10

## 2023-06-19 NOTE — FLOWSHEET NOTE
exercise recommendations.       Electronically signed by Florentin Yarbrough RN on 6/19/2023 at 9:50 AM

## 2023-06-19 NOTE — PROGRESS NOTES
Wound Center  Progress Note / Procedure Note      Chief Complaint:  Lesley Castañeda is a 65 y.o.  male  with R lower leg anterior wound of few months duration. Assessment/Plan     65 y.o. male with Dm2    -R lower leg anterior venous stasis ulcer. Full thickness  Slough/granular  Smaller  Small Slough/mostly granular  Necessitates debridement  for wound healing and to prevent/heal infection  See below        -Dm2  A1c 6.5  Does not check at home  Controlled with metformin    -Leg swelling/varicosities  Has arthritis  Unable to put on compression   Has a hard time with socks too    Following discussed with patient   Needs :  Serial debridement- prn    Good local wound care  Dressing: collagen, H Blue  Frequency : once a week         -Edema management  Do not get dressing wet    Edema management:  Elevate leg(s) throughout the day starting in the morning- re-enforced++  Compression: 2 layer unnas  Avoid prolonged standing      -Good Diabetic control      Patient/  understood and agrees with plan. Questions answered. Follow up with me  1 week    Subjective:     Since last visit  No new issues  Elevated when he is able- not consistent    HPI:     Acquired wounds a few months ago  Local trauma  Not healing, being managed by [facility]  Adv to come here      History/Chart/Medications reviewed    Wound caused by:   Current wound care:See flowsheet  Offloading wound:   Appetite: good  Wound associated pain: See flowsheet  Diabetic: yes  Smoker: no  ROS: no N/V/D, no T/chills; no local rash, no chest pain or shortness of breath, no headache or dizzyness      Objective:     Physical Exam:   See flowsheet / nursing notes for vitals  Vitals:    06/19/23 0915   BP: 133/80   Pulse: 94   Resp: 16   Temp: 97.6 °F (36.4 °C)   SpO2: 100%       General: NAD. Hygiene good  Psych: cooperative. No anxiety or depression. Normal mood and affect. Neuro: alert and oriented to person/place/situation.  Otherwise nonfocal.  Derm:

## 2023-06-26 ENCOUNTER — HOSPITAL ENCOUNTER (OUTPATIENT)
Facility: HOSPITAL | Age: 78
Discharge: HOME | End: 2023-06-26
Payer: MEDICARE

## 2023-06-26 VITALS
RESPIRATION RATE: 18 BRPM | TEMPERATURE: 97.7 F | OXYGEN SATURATION: 100 % | DIASTOLIC BLOOD PRESSURE: 67 MMHG | HEART RATE: 115 BPM | SYSTOLIC BLOOD PRESSURE: 128 MMHG

## 2023-06-26 DIAGNOSIS — L97.211 CALF ULCER, RIGHT, LIMITED TO BREAKDOWN OF SKIN (HCC): ICD-10-CM

## 2023-06-26 DIAGNOSIS — L97.212 CALF ULCER, RIGHT, WITH FAT LAYER EXPOSED (HCC): Primary | ICD-10-CM

## 2023-06-26 DIAGNOSIS — L97.812 VARICOSE VEINS OF RIGHT LOWER EXTREMITY WITH ULCER OF OTHER PART OF LOWER LEG WITH FAT LAYER EXPOSED (HCC): ICD-10-CM

## 2023-06-26 DIAGNOSIS — I83.018 VARICOSE VEINS OF RIGHT LOWER EXTREMITY WITH ULCER OF OTHER PART OF LOWER LEG WITH FAT LAYER EXPOSED (HCC): ICD-10-CM

## 2023-06-26 PROCEDURE — 97597 DBRDMT OPN WND 1ST 20 CM/<: CPT

## 2023-06-26 RX ORDER — LIDOCAINE HYDROCHLORIDE 20 MG/ML
JELLY TOPICAL ONCE
OUTPATIENT
Start: 2023-06-26 | End: 2023-06-26

## 2023-06-26 RX ORDER — LIDOCAINE HYDROCHLORIDE 20 MG/ML
JELLY TOPICAL ONCE
Status: COMPLETED | OUTPATIENT
Start: 2023-06-26 | End: 2023-06-26

## 2023-06-26 RX ORDER — LIDOCAINE 50 MG/G
OINTMENT TOPICAL ONCE
OUTPATIENT
Start: 2023-06-26 | End: 2023-06-26

## 2023-06-26 RX ORDER — GINSENG 100 MG
CAPSULE ORAL ONCE
OUTPATIENT
Start: 2023-06-26 | End: 2023-06-26

## 2023-06-26 RX ORDER — LIDOCAINE 40 MG/G
CREAM TOPICAL ONCE
OUTPATIENT
Start: 2023-06-26 | End: 2023-06-26

## 2023-06-26 RX ORDER — CLOBETASOL PROPIONATE 0.5 MG/G
OINTMENT TOPICAL ONCE
OUTPATIENT
Start: 2023-06-26 | End: 2023-06-26

## 2023-06-26 RX ORDER — BACITRACIN ZINC AND POLYMYXIN B SULFATE 500; 1000 [USP'U]/G; [USP'U]/G
OINTMENT TOPICAL ONCE
OUTPATIENT
Start: 2023-06-26 | End: 2023-06-26

## 2023-06-26 RX ORDER — GENTAMICIN SULFATE 1 MG/G
OINTMENT TOPICAL ONCE
OUTPATIENT
Start: 2023-06-26 | End: 2023-06-26

## 2023-06-26 RX ORDER — IBUPROFEN 200 MG
TABLET ORAL ONCE
OUTPATIENT
Start: 2023-06-26 | End: 2023-06-26

## 2023-06-26 RX ORDER — LIDOCAINE HYDROCHLORIDE 40 MG/ML
SOLUTION TOPICAL ONCE
OUTPATIENT
Start: 2023-06-26 | End: 2023-06-26

## 2023-06-26 RX ORDER — BETAMETHASONE DIPROPIONATE 0.05 %
OINTMENT (GRAM) TOPICAL ONCE
OUTPATIENT
Start: 2023-06-26 | End: 2023-06-26

## 2023-06-26 RX ADMIN — LIDOCAINE HYDROCHLORIDE: 20 JELLY TOPICAL at 14:51

## 2023-07-17 ENCOUNTER — HOSPITAL ENCOUNTER (OUTPATIENT)
Facility: HOSPITAL | Age: 78
Discharge: HOME | End: 2023-07-17
Payer: MEDICARE

## 2023-07-17 VITALS
DIASTOLIC BLOOD PRESSURE: 60 MMHG | SYSTOLIC BLOOD PRESSURE: 130 MMHG | HEART RATE: 117 BPM | RESPIRATION RATE: 16 BRPM | TEMPERATURE: 98.4 F | OXYGEN SATURATION: 100 %

## 2023-07-17 DIAGNOSIS — L97.812 VARICOSE VEINS OF RIGHT LOWER EXTREMITY WITH ULCER OF OTHER PART OF LOWER LEG WITH FAT LAYER EXPOSED (HCC): ICD-10-CM

## 2023-07-17 DIAGNOSIS — I83.018 VARICOSE VEINS OF RIGHT LOWER EXTREMITY WITH ULCER OF OTHER PART OF LOWER LEG WITH FAT LAYER EXPOSED (HCC): ICD-10-CM

## 2023-07-17 DIAGNOSIS — L97.211 CALF ULCER, RIGHT, LIMITED TO BREAKDOWN OF SKIN (HCC): ICD-10-CM

## 2023-07-17 DIAGNOSIS — L97.212 CALF ULCER, RIGHT, WITH FAT LAYER EXPOSED (HCC): Primary | ICD-10-CM

## 2023-07-17 PROCEDURE — 11042 DBRDMT SUBQ TIS 1ST 20SQCM/<: CPT

## 2023-07-17 RX ORDER — LIDOCAINE HYDROCHLORIDE 40 MG/ML
SOLUTION TOPICAL ONCE
OUTPATIENT
Start: 2023-07-17 | End: 2023-07-17

## 2023-07-17 RX ORDER — BACITRACIN ZINC AND POLYMYXIN B SULFATE 500; 1000 [USP'U]/G; [USP'U]/G
OINTMENT TOPICAL ONCE
OUTPATIENT
Start: 2023-07-17 | End: 2023-07-17

## 2023-07-17 RX ORDER — GINSENG 100 MG
CAPSULE ORAL ONCE
OUTPATIENT
Start: 2023-07-17 | End: 2023-07-17

## 2023-07-17 RX ORDER — LIDOCAINE HYDROCHLORIDE 20 MG/ML
JELLY TOPICAL ONCE
OUTPATIENT
Start: 2023-07-17 | End: 2023-07-17

## 2023-07-17 RX ORDER — CLOBETASOL PROPIONATE 0.5 MG/G
OINTMENT TOPICAL ONCE
OUTPATIENT
Start: 2023-07-17 | End: 2023-07-17

## 2023-07-17 RX ORDER — LIDOCAINE 40 MG/G
CREAM TOPICAL ONCE
OUTPATIENT
Start: 2023-07-17 | End: 2023-07-17

## 2023-07-17 RX ORDER — IBUPROFEN 200 MG
TABLET ORAL ONCE
OUTPATIENT
Start: 2023-07-17 | End: 2023-07-17

## 2023-07-17 RX ORDER — LIDOCAINE 50 MG/G
OINTMENT TOPICAL ONCE
OUTPATIENT
Start: 2023-07-17 | End: 2023-07-17

## 2023-07-17 RX ORDER — GENTAMICIN SULFATE 1 MG/G
OINTMENT TOPICAL ONCE
OUTPATIENT
Start: 2023-07-17 | End: 2023-07-17

## 2023-07-17 RX ORDER — LIDOCAINE HYDROCHLORIDE 20 MG/ML
JELLY TOPICAL ONCE
Status: DISCONTINUED | OUTPATIENT
Start: 2023-07-17 | End: 2023-07-18 | Stop reason: HOSPADM

## 2023-07-17 RX ORDER — BETAMETHASONE DIPROPIONATE 0.05 %
OINTMENT (GRAM) TOPICAL ONCE
OUTPATIENT
Start: 2023-07-17 | End: 2023-07-17

## 2023-07-17 NOTE — PROGRESS NOTES
Wound Center  Progress Note / Procedure Note      Chief Complaint:  Andria Velazquez is a 65 y.o.  male  with R lower leg anterior wound of few months duration. Assessment/Plan     65 y.o. male with Dm2    -R lower leg anterior venous stasis ulcer. Full thickness  Slough/granular  Measuring sl bigger  Small areas in periwound and periwound irritation  Slough/mostly granular  Necessitates debridement  for wound healing and to prevent/heal infection  See below    -elevation and comprssion discusse din detail    -Dm2  A1c 6.5  Does not check at home  Controlled with metformin    -Leg swelling/varicosities  Has arthritis  O    Following discussed with patient   Needs :  Serial debridement- prn    Good local wound care  Dressing: collagen, alginate; zinc to periowund  Frequency : once a week        -Edema management  Do not get dressing wet    Edema management:  Elevate leg(s) throughout the day starting in the morning  Compression: 2 layer unnas  Avoid prolonged standing      -Good Diabetic control      Patient/  understood and agrees with plan. Questions answered. Follow up with me  1weeks. Wife to come. Also bring supplies so can show them the order of wound dressings    Subjective:     Since last visit  Used coban directly on wound? Periwound got irriated, also got new spots. Going out of town next week for another 2 weeks    HPI:     Acquired wounds a few months ago  Local trauma  Not healing, being managed by [facility]  Adv to come here      History/Chart/Medications reviewed    Wound caused by:   Current wound care:See flowsheet  Offloading wound:   Appetite: good  Wound associated pain: See flowsheet  Diabetic: yes  Smoker: no  ROS: no N/V/D, no T/chills; no local rash, no chest pain or shortness of breath, no headache or dizzyness      Objective:     Physical Exam:   See flowsheet / nursing notes for vitals  Vitals:    07/17/23 0931   BP:    Pulse:    Resp:    Temp:    SpO2: 100%       General: NAD.

## 2023-07-17 NOTE — FLOWSHEET NOTE
07/17/23 0953   Anesthetic   Anesthetic 2% Lidocaine Gel Topical   Right Leg Edema Point of Measurement   Compression Therapy Tubular elastic support bandage   Left Leg Edema Point of Measurement   Compression Therapy Other   Wound 02/27/23 Leg Right;Lateral   Date First Assessed/Time First Assessed: 02/27/23 1057   Present on Hospital Admission: Yes  Wound Approximate Age at First Assessment (Weeks): 8 weeks  Primary Wound Type: Venous Ulcer  Location: Leg  Wound Location Orientation: Right;Lateral   Wound Image     Wound Etiology Venous   Dressing Status Intact   Wound Cleansed Wound cleanser   Dressing/Treatment Alginate;Collagen with Ag;Zinc paste; Other (comment)  (2 Layer Compression wrap)   Offloading for Diabetic Foot Ulcers Offloading not required  (2 layer compression wrap.)   Dressing Change Due 07/24/23   Wound Length (cm) 1.5 cm   Wound Width (cm) 1.5 cm   Wound Depth (cm) 0.1 cm   Wound Surface Area (cm^2) 2.25 cm^2   Change in Wound Size % (l*w) -40.63   Wound Volume (cm^3) 0.225 cm^3   Wound Healing % -41   Post-Procedure Length (cm) 1.5 cm   Post-Procedure Width (cm) 1.5 cm   Post-Procedure Depth (cm) 0.3 cm   Post-Procedure Surface Area (cm^2) 2.25 cm^2   Post-Procedure Volume (cm^3) 0.675 cm^3   Wound Assessment Devitalized tissue   Drainage Amount Small   Drainage Description Serosanguinous   Odor None   Guillermina-wound Assessment Blanchable erythema   Margins Attached edges   Wound Thickness Description not for Pressure Injury Full thickness

## 2023-07-21 NOTE — DISCHARGE INSTRUCTIONS
Discharge Instructions from  [address] [place] [address] Dr  305 [facility], [address] [place] [address]  [phone] Fax [phone]    NAME:  Erla Schirmer OF BIRTH:  1945  MEDICAL RECORD NUMBER:  [id number]  DATE:  @ED@    Wound Cleansing:   Do not scrub or use excessive force. Cleanse wound prior to applying a clean dressing with:  [] Normal Saline [] Keep Wound Dry in Shower    [x] Wound Cleanser   [] Cleanse wound with Mild Soap & Water  [] May Shower at Discharge   [] Other:      Topical Treatments:  Do not apply lotions, creams, or ointments to wound bed unless directed. [] Apply moisturizing lotion to skin surrounding the wound prior to dressing change.  [] Apply antifungal ointment to skin surrounding the wound prior to dressing change. [x] Apply thin film of moisture barrier ointment to skin immediately around wound. [] Other:       Dressings:           Wound Location Leg   [x] Apply Primary Dressing:       [] MediHoney Gel [] Alginate with Silver [] Alginate   [] Collagen [] Collagen with Silver   [] Santyl with Moisten saline gauze     [] Hydrocolloid   [] MediHoney Alginate [] Foam with Silver   [] Foam   [] Hydrofera Blue    [] Mepilex Border    [] Moisten with Saline [] Hydrogel [] Mepitel     [] Bactroban/Mupirocin [] Polysporin  [] Other:    [] Pack wound loosely with  [] Iodoform   [] Plain Packing  [] Other   [x] Cover and Secure with:     [x] Gauze [x] Tamanna [] Kerlix   [] Ace Wrap [] Cover Roll Tape [x] ABD     [] Other:    Avoid contact of tape with skin.   [] Change dressing: [] Daily    [] Every Other Day [] Three times per week   [] Once a week [] Do Not Change Dressing   [] Other:     Negative Pressure:           Wound Location:   [] Pressure@           mm/Hg  []Continuous []Intermittent   [] Black  [] White Foam [] Other:   []Change dressing: []Three times per week    []Other:     Pressure Relief:  [] When sitting, shift position or do seat lifts every

## 2023-07-24 ENCOUNTER — HOSPITAL ENCOUNTER (OUTPATIENT)
Facility: HOSPITAL | Age: 78
Discharge: HOME | End: 2023-07-24
Payer: MEDICARE

## 2023-07-24 VITALS
DIASTOLIC BLOOD PRESSURE: 58 MMHG | OXYGEN SATURATION: 99 % | HEART RATE: 103 BPM | SYSTOLIC BLOOD PRESSURE: 106 MMHG | RESPIRATION RATE: 18 BRPM | TEMPERATURE: 98.2 F

## 2023-07-24 DIAGNOSIS — L97.812 VARICOSE VEINS OF RIGHT LOWER EXTREMITY WITH ULCER OF OTHER PART OF LOWER LEG WITH FAT LAYER EXPOSED (HCC): ICD-10-CM

## 2023-07-24 DIAGNOSIS — L97.211 CALF ULCER, RIGHT, LIMITED TO BREAKDOWN OF SKIN (HCC): ICD-10-CM

## 2023-07-24 DIAGNOSIS — I83.018 VARICOSE VEINS OF RIGHT LOWER EXTREMITY WITH ULCER OF OTHER PART OF LOWER LEG WITH FAT LAYER EXPOSED (HCC): ICD-10-CM

## 2023-07-24 DIAGNOSIS — L97.212 CALF ULCER, RIGHT, WITH FAT LAYER EXPOSED (HCC): Primary | ICD-10-CM

## 2023-07-24 PROCEDURE — 11042 DBRDMT SUBQ TIS 1ST 20SQCM/<: CPT

## 2023-07-24 PROCEDURE — 6370000000 HC RX 637 (ALT 250 FOR IP): Performed by: FAMILY MEDICINE

## 2023-07-24 RX ORDER — LIDOCAINE HYDROCHLORIDE 20 MG/ML
JELLY TOPICAL ONCE
OUTPATIENT
Start: 2023-07-24 | End: 2023-07-24

## 2023-07-24 RX ORDER — LIDOCAINE 40 MG/G
CREAM TOPICAL ONCE
OUTPATIENT
Start: 2023-07-24 | End: 2023-07-24

## 2023-07-24 RX ORDER — GINSENG 100 MG
CAPSULE ORAL ONCE
OUTPATIENT
Start: 2023-07-24 | End: 2023-07-24

## 2023-07-24 RX ORDER — GENTAMICIN SULFATE 1 MG/G
OINTMENT TOPICAL ONCE
OUTPATIENT
Start: 2023-07-24 | End: 2023-07-24

## 2023-07-24 RX ORDER — BACITRACIN ZINC AND POLYMYXIN B SULFATE 500; 1000 [USP'U]/G; [USP'U]/G
OINTMENT TOPICAL ONCE
OUTPATIENT
Start: 2023-07-24 | End: 2023-07-24

## 2023-07-24 RX ORDER — LIDOCAINE HYDROCHLORIDE 20 MG/ML
JELLY TOPICAL ONCE
Status: COMPLETED | OUTPATIENT
Start: 2023-07-24 | End: 2023-07-24

## 2023-07-24 RX ORDER — LIDOCAINE 50 MG/G
OINTMENT TOPICAL ONCE
OUTPATIENT
Start: 2023-07-24 | End: 2023-07-24

## 2023-07-24 RX ORDER — IBUPROFEN 200 MG
TABLET ORAL ONCE
OUTPATIENT
Start: 2023-07-24 | End: 2023-07-24

## 2023-07-24 RX ORDER — LIDOCAINE HYDROCHLORIDE 40 MG/ML
SOLUTION TOPICAL ONCE
OUTPATIENT
Start: 2023-07-24 | End: 2023-07-24

## 2023-07-24 RX ORDER — BETAMETHASONE DIPROPIONATE 0.05 %
OINTMENT (GRAM) TOPICAL ONCE
OUTPATIENT
Start: 2023-07-24 | End: 2023-07-24

## 2023-07-24 RX ORDER — CLOBETASOL PROPIONATE 0.5 MG/G
OINTMENT TOPICAL ONCE
OUTPATIENT
Start: 2023-07-24 | End: 2023-07-24

## 2023-07-24 RX ADMIN — LIDOCAINE HYDROCHLORIDE: 20 JELLY TOPICAL at 11:25

## 2023-07-24 ASSESSMENT — PAIN SCALES - GENERAL: PAINLEVEL_OUTOF10: 0

## 2023-07-24 NOTE — FLOWSHEET NOTE
07/24/23 1113   Wound 02/27/23 Leg Right;Lateral   Date First Assessed/Time First Assessed: 02/27/23 1057   Present on Hospital Admission: Yes  Wound Approximate Age at First Assessment (Weeks): 8 weeks  Primary Wound Type: Venous Ulcer  Location: Leg  Wound Location Orientation: Right;Lateral   Wound Image     Wound Etiology Venous   Dressing Status Intact   Wound Cleansed Wound cleanser   Dressing/Treatment Collagen;Hydrofera blue;Dry dressing;Roll gauze; Tubular bandage  (farrow wrap)   Offloading for Diabetic Foot Ulcers Offloading not required   Dressing Change Due   (pt to return in 3 weeks)   Wound Length (cm) 2 cm   Wound Width (cm) 1.6 cm   Wound Depth (cm) 0.1 cm   Wound Surface Area (cm^2) 3.2 cm^2   Change in Wound Size % (l*w) -100   Wound Volume (cm^3) 0.32 cm^3   Wound Healing % -100   Post-Procedure Length (cm) 2 cm   Post-Procedure Width (cm) 1.6 cm   Post-Procedure Depth (cm) 0.3 cm   Post-Procedure Surface Area (cm^2) 3.2 cm^2   Post-Procedure Volume (cm^3) 0.96 cm^3   Wound Assessment Devitalized tissue   Drainage Amount Moderate   Drainage Description Serosanguinous   Odor None   Guillermina-wound Assessment Blanchable erythema   Margins Defined edges   Wound Thickness Description not for Pressure Injury Full thickness         Tubigrip size F applied followed by Babita johnson

## 2023-07-24 NOTE — PROGRESS NOTES
Wound Center  Progress Note / Procedure Note      Chief Complaint:  Gonzales Bermudez is a 65 y.o.  male  with R lower leg anterior wound of few months duration. Assessment/Plan     65 y.o. male with Dm2    -R lower leg anterior venous stasis ulcer. Full thickness  Slough/granular  Measuring sl bigger  Small areas in periwound and periwound irritation  Slough/mostly granular  Necessitates debridement  for wound healing and to prevent/heal infection  See below    -elevation and comprssion discusse din detail    -Dm2  A1c 6.5  Does not check at home  Controlled with metformin    -Leg swelling/varicosities  Has arthritis  O    Following discussed with patient   Needs :  Serial debridement- prn    Good local wound care  Dressing: collagen, alginate; zinc to periowund  Frequency : once a week        -Edema management  Do not get dressing wet    Edema management:  Elevate leg(s) throughout the day starting in the morning  Compression: 2 layer unnas  Avoid prolonged standing      -Good Diabetic control      Patient/  understood and agrees with plan. Questions answered. Follow up with me  1weeks. Wife to come. Also bring supplies so can show them the order of wound dressings    Subjective:     Since last visit  Used coban directly on wound? Periwound got irriated, also got new spots. Going out of town next week for another 2 weeks    HPI:     Acquired wounds a few months ago  Local trauma  Not healing, being managed by [facility]  Adv to come here      History/Chart/Medications reviewed    Wound caused by:   Current wound care:See flowsheet  Offloading wound:   Appetite: good  Wound associated pain: See flowsheet  Diabetic: yes  Smoker: no  ROS: no N/V/D, no T/chills; no local rash, no chest pain or shortness of breath, no headache or dizzyness      Objective:     Physical Exam:   See flowsheet / nursing notes for vitals  There were no vitals filed for this visit. General: NAD. Hygiene good  Psych: cooperative.

## 2023-07-24 NOTE — DISCHARGE INSTRUCTIONS
mm/Hg      []Medium compression 10-20 mm/Hg     []High compression  20-30 mm/Hg   every morning immediately when getting up should be applied to affected leg(s) from mid foot to knee making sure to cover the heel. Remove every night before going to bed. [x] Elevate leg(s) above the level of the heart when sitting. [x] Avoid prolonged standing in one place. Dietary:  [x] Diet as tolerated: [] Calorie Diabetic Diet: [] No Added Salt:  [] Increase Protein: [] Other:       Activity:  [x] Activity as tolerated:  [] Patient has no activity restrictions     [] Strict Bedrest: [] Remain off Work:     [] May return to full duty work:                                   [] Return to work with restrictions:             Return Appointment:  [] Wound and dressing supply provider:   [] ECF or Home Healthcare:  [] Wound Assessment: [] Physician or NP scheduled for Wound Assessment:   [x] Return Appointment: With Dr. Esequiel Lowery  in  [facility])  [] Ordered tests:      Electronically signed Yarelis Del Rosario RN on 7/24/2023 at 12:41 PM     [address]: Should you experience any significant changes in your wound(s) or have questions about your wound care, please contact the [address] [place] [address] at [facility] 8:00 am - 4:30. If you need help with your wound outside these hours and cannot wait until we are again available, contact your PCP or go to the hospital emergency room. PLEASE NOTE: IF YOU ARE UNABLE TO OBTAIN WOUND SUPPLIES, CONTINUE TO USE THE SUPPLIES YOU HAVE AVAILABLE UNTIL YOU ARE ABLE TO REACH US. IT IS MOST IMPORTANT TO KEEP THE WOUND COVERED AT ALL TIMES.      Physician Signature:_______________________    Date: ___________ Time:  ____________

## 2023-08-14 ENCOUNTER — HOSPITAL ENCOUNTER (OUTPATIENT)
Facility: HOSPITAL | Age: 78
Discharge: HOME | End: 2023-08-14
Payer: MEDICARE

## 2023-08-14 VITALS
TEMPERATURE: 98.4 F | OXYGEN SATURATION: 99 % | SYSTOLIC BLOOD PRESSURE: 130 MMHG | RESPIRATION RATE: 16 BRPM | HEART RATE: 89 BPM | DIASTOLIC BLOOD PRESSURE: 49 MMHG

## 2023-08-14 DIAGNOSIS — L97.211 CALF ULCER, RIGHT, LIMITED TO BREAKDOWN OF SKIN (HCC): ICD-10-CM

## 2023-08-14 DIAGNOSIS — L97.812 VARICOSE VEINS OF RIGHT LOWER EXTREMITY WITH ULCER OF OTHER PART OF LOWER LEG WITH FAT LAYER EXPOSED (HCC): ICD-10-CM

## 2023-08-14 DIAGNOSIS — L97.212 CALF ULCER, RIGHT, WITH FAT LAYER EXPOSED (HCC): Primary | ICD-10-CM

## 2023-08-14 DIAGNOSIS — I83.018 VARICOSE VEINS OF RIGHT LOWER EXTREMITY WITH ULCER OF OTHER PART OF LOWER LEG WITH FAT LAYER EXPOSED (HCC): ICD-10-CM

## 2023-08-14 PROCEDURE — 11042 DBRDMT SUBQ TIS 1ST 20SQCM/<: CPT

## 2023-08-14 RX ORDER — GENTAMICIN SULFATE 1 MG/G
OINTMENT TOPICAL ONCE
OUTPATIENT
Start: 2023-08-14 | End: 2023-08-14

## 2023-08-14 RX ORDER — LIDOCAINE HYDROCHLORIDE 20 MG/ML
JELLY TOPICAL ONCE
OUTPATIENT
Start: 2023-08-14 | End: 2023-08-14

## 2023-08-14 RX ORDER — BACITRACIN ZINC AND POLYMYXIN B SULFATE 500; 1000 [USP'U]/G; [USP'U]/G
OINTMENT TOPICAL ONCE
OUTPATIENT
Start: 2023-08-14 | End: 2023-08-14

## 2023-08-14 RX ORDER — BETAMETHASONE DIPROPIONATE 0.05 %
OINTMENT (GRAM) TOPICAL ONCE
OUTPATIENT
Start: 2023-08-14 | End: 2023-08-14

## 2023-08-14 RX ORDER — LIDOCAINE HYDROCHLORIDE 40 MG/ML
SOLUTION TOPICAL ONCE
OUTPATIENT
Start: 2023-08-14 | End: 2023-08-14

## 2023-08-14 RX ORDER — LIDOCAINE 50 MG/G
OINTMENT TOPICAL ONCE
OUTPATIENT
Start: 2023-08-14 | End: 2023-08-14

## 2023-08-14 RX ORDER — CLOBETASOL PROPIONATE 0.5 MG/G
OINTMENT TOPICAL ONCE
OUTPATIENT
Start: 2023-08-14 | End: 2023-08-14

## 2023-08-14 RX ORDER — GINSENG 100 MG
CAPSULE ORAL ONCE
OUTPATIENT
Start: 2023-08-14 | End: 2023-08-14

## 2023-08-14 RX ORDER — LIDOCAINE HYDROCHLORIDE 20 MG/ML
JELLY TOPICAL ONCE
Status: DISCONTINUED | OUTPATIENT
Start: 2023-08-14 | End: 2023-08-15 | Stop reason: HOSPADM

## 2023-08-14 RX ORDER — IBUPROFEN 200 MG
TABLET ORAL ONCE
OUTPATIENT
Start: 2023-08-14 | End: 2023-08-14

## 2023-08-14 RX ORDER — LIDOCAINE 40 MG/G
CREAM TOPICAL ONCE
OUTPATIENT
Start: 2023-08-14 | End: 2023-08-14

## 2023-08-14 NOTE — PROGRESS NOTES
(dry) 08/14/23 1053   Odor None 08/14/23 1053   Guillermina-wound Assessment Blanchable erythema 08/14/23 1053   Margins Attached edges; Defined edges 08/14/23 1053   Wound Thickness Description not for Pressure Injury Full thickness 08/14/23 1053   Number of days: 0     Incision 05/18/23 Knee Left (Active)   Number of days: 88       -------    Past Medical History:   Diagnosis Date    A-fib [facility])     Cardio Dr. Efe Durbin directive discussed with patient 04/06/2023    pt states he will bring [address] ([address]) 2021    melanoma removed from nose and face, pt denies chemo or readiation    Chronic kidney disease     kidney stones    Coagulation disorder ([address])     excess iron; erthythrocytosis; hemochromatosis- [facility] yearly exams    Diabetes ([address])     PcP  manages    Enlarged prostate     managed by Dr. Lupis Gutierrez    Exercise tolerance finding     pt states able to climb 2 flights stairs without CP or SOB    Hypertension 2007    cardio Dr. Collin Romo    Ill-defined condition     ulcer - shin- Started around Christmas 2022- followed by [facility]    Kidney stones     Dr. Lupis Gutierrez    Wears glasses 04/06/2023     Past Surgical History:   Procedure Laterality Date    BLADDER SURGERY  2017    urolift    CHOLECYSTECTOMY  07/2017    COLONOSCOPY  2021 2020, hx polyps    ORTHOPEDIC SURGERY Right 2007    r shoulder replacement    SHOULDER SURGERY Left 2022    replacement    TONSILLECTOMY      TOTAL KNEE ARTHROPLASTY Left 5/18/2023    LEFT TOTAL KNEE ARTHROPLASTY ([place] POP) performed by Beulah Higgins DO at [facility] MAIN OR    WISDOM TOOTH EXTRACTION       No family history on file. Social History     Socioeconomic History    Marital status:    Tobacco Use    Smoking status: Never     Passive exposure: Past    Smokeless tobacco: Never   Vaping Use    Vaping Use: Never used   Substance and Sexual Activity    Alcohol use:  Yes     Alcohol/week: 2.0 standard drinks     Types: 2 Cans of beer per

## 2023-08-14 NOTE — FLOWSHEET NOTE
08/14/23 1053   Right Leg Edema Point of Measurement   Compression Therapy Velcro compression wrap   Left Leg Edema Point of Measurement   Compression Therapy Other   Wound 08/14/23 Leg Medial;Left   Date First Assessed/Time First Assessed: 08/14/23 1118   Present on Hospital Admission: Yes  Wound Approximate Age at First Assessment (Weeks): 1 weeks  Primary Wound Type: Traumatic  Location: Leg  Wound Location Orientation: Medial;Left   Wound Image    Wound Etiology Traumatic   Dressing Status Intact   Wound Cleansed Wound cleanser   Dressing/Treatment Hydrofera blue; Tubular bandage;Collagen with Ag; Other (comment)  (applied one layer of patient farrow wrap to left lower leg.)   Dressing Change Due 08/21/23   Wound Length (cm) 2 cm   Wound Width (cm) 2 cm   Wound Depth (cm) 0.1 cm   Wound Surface Area (cm^2) 4 cm^2   Wound Volume (cm^3) 0.4 cm^3   Post-Procedure Length (cm) 2.1 cm   Post-Procedure Width (cm) 2.1 cm   Post-Procedure Depth (cm) 0.2 cm   Post-Procedure Surface Area (cm^2) 4.41 cm^2   Post-Procedure Volume (cm^3) 0.882 cm^3   Wound Assessment Dry;Erythema   Drainage Amount Small (< 25%)   Drainage Description Serosanguinous   Odor None   Guillermina-wound Assessment Blanchable erythema   Margins Attached edges   Wound Thickness Description not for Pressure Injury Full thickness   Wound 08/14/23 Leg Anterior; Left   Date First Assessed/Time First Assessed: 08/14/23 1121   Present on Hospital Admission: Yes  Wound Approximate Age at First Assessment (Weeks): 1 weeks  Primary Wound Type: Traumatic  Location: Leg  Wound Location Orientation: Anterior; Left   Wound Image    Wound Etiology Traumatic   Dressing Status New dressing applied   Wound Cleansed Wound cleanser   Dressing/Treatment Collagen with Ag;Tubular bandage   Wound Length (cm) 1.1 cm   Wound Width (cm) 1.1 cm   Wound Depth (cm) 0.2 cm   Wound Surface Area (cm^2) 1.21 cm^2   Wound Volume (cm^3) 0.242 cm^3   Post-Procedure Length (cm) 1.2 cm

## 2023-08-14 NOTE — DISCHARGE INSTRUCTIONS
Discharge Instructions from  [address] [place] [address] Dr  305 [facility], [address] [place] [address]  [phone] Fax [phone]    NAME:  Pia Thakkar OF BIRTH:  1945  MEDICAL RECORD NUMBER:  [id number]  DATE:  @ED@    Wound Cleansing:   Do not scrub or use excessive force. Cleanse wound prior to applying a clean dressing with:  [] Normal Saline [] Keep Wound Dry in Shower    [] Wound Cleanser   [] Cleanse wound with Mild Soap & Water  [] May Shower at Discharge   [] Other:      Topical Treatments:  Do not apply lotions, creams, or ointments to wound bed unless directed.    [] Apply moisturizing lotion to skin surrounding the wound prior to dressing change.  [] Apply antifungal ointment to skin surrounding the wound prior to dressing

## 2023-08-21 ENCOUNTER — HOSPITAL ENCOUNTER (OUTPATIENT)
Facility: HOSPITAL | Age: 78
Discharge: HOME | End: 2023-08-21
Payer: MEDICARE

## 2023-08-21 VITALS
HEART RATE: 69 BPM | SYSTOLIC BLOOD PRESSURE: 124 MMHG | TEMPERATURE: 97.9 F | RESPIRATION RATE: 16 BRPM | DIASTOLIC BLOOD PRESSURE: 67 MMHG | OXYGEN SATURATION: 98 %

## 2023-08-21 DIAGNOSIS — L97.211 CALF ULCER, RIGHT, LIMITED TO BREAKDOWN OF SKIN (HCC): ICD-10-CM

## 2023-08-21 DIAGNOSIS — I83.018 VARICOSE VEINS OF RIGHT LOWER EXTREMITY WITH ULCER OF OTHER PART OF LOWER LEG WITH FAT LAYER EXPOSED (HCC): ICD-10-CM

## 2023-08-21 DIAGNOSIS — L97.812 VARICOSE VEINS OF RIGHT LOWER EXTREMITY WITH ULCER OF OTHER PART OF LOWER LEG WITH FAT LAYER EXPOSED (HCC): ICD-10-CM

## 2023-08-21 DIAGNOSIS — L97.212 CALF ULCER, RIGHT, WITH FAT LAYER EXPOSED (HCC): Primary | ICD-10-CM

## 2023-08-21 PROCEDURE — 99213 OFFICE O/P EST LOW 20 MIN: CPT

## 2023-08-21 PROCEDURE — 6370000000 HC RX 637 (ALT 250 FOR IP): Performed by: FAMILY MEDICINE

## 2023-08-21 RX ORDER — LIDOCAINE HYDROCHLORIDE 20 MG/ML
JELLY TOPICAL ONCE
Status: COMPLETED | OUTPATIENT
Start: 2023-08-21 | End: 2023-08-21

## 2023-08-21 RX ORDER — CLOBETASOL PROPIONATE 0.5 MG/G
OINTMENT TOPICAL ONCE
OUTPATIENT
Start: 2023-08-21 | End: 2023-08-21

## 2023-08-21 RX ORDER — LIDOCAINE 40 MG/G
CREAM TOPICAL ONCE
OUTPATIENT
Start: 2023-08-21 | End: 2023-08-21

## 2023-08-21 RX ORDER — LIDOCAINE 50 MG/G
OINTMENT TOPICAL ONCE
OUTPATIENT
Start: 2023-08-21 | End: 2023-08-21

## 2023-08-21 RX ORDER — LIDOCAINE HYDROCHLORIDE 40 MG/ML
SOLUTION TOPICAL ONCE
OUTPATIENT
Start: 2023-08-21 | End: 2023-08-21

## 2023-08-21 RX ORDER — LIDOCAINE HYDROCHLORIDE 20 MG/ML
JELLY TOPICAL ONCE
OUTPATIENT
Start: 2023-08-21 | End: 2023-08-21

## 2023-08-21 RX ORDER — GENTAMICIN SULFATE 1 MG/G
OINTMENT TOPICAL ONCE
OUTPATIENT
Start: 2023-08-21 | End: 2023-08-21

## 2023-08-21 RX ORDER — IBUPROFEN 200 MG
TABLET ORAL ONCE
OUTPATIENT
Start: 2023-08-21 | End: 2023-08-21

## 2023-08-21 RX ORDER — BETAMETHASONE DIPROPIONATE 0.05 %
OINTMENT (GRAM) TOPICAL ONCE
OUTPATIENT
Start: 2023-08-21 | End: 2023-08-21

## 2023-08-21 RX ORDER — GINSENG 100 MG
CAPSULE ORAL ONCE
OUTPATIENT
Start: 2023-08-21 | End: 2023-08-21

## 2023-08-21 RX ORDER — BACITRACIN ZINC AND POLYMYXIN B SULFATE 500; 1000 [USP'U]/G; [USP'U]/G
OINTMENT TOPICAL ONCE
OUTPATIENT
Start: 2023-08-21 | End: 2023-08-21

## 2023-08-21 RX ADMIN — LIDOCAINE HYDROCHLORIDE: 20 JELLY TOPICAL at 10:15

## 2023-08-21 ASSESSMENT — PAIN SCALES - GENERAL: PAINLEVEL_OUTOF10: 0

## 2023-08-21 NOTE — FLOWSHEET NOTE
Date First Assessed/Time First Assessed: 02/27/23 1057   Present on Hospital Admission: Yes  Wound Approximate Age at First Assessment (Weeks): 8 weeks  Primary Wound Type: Venous Ulcer  Location: Leg  Wound Location Orientation: Right;Lateral   Wound Image    Wound Etiology Venous   Dressing Status Intact   Wound Cleansed Wound cleanser   Dressing/Treatment Collagen;Hydrofera blue;Silicone border;Tubular bandage   Offloading for Diabetic Foot Ulcers Offloading not required   Dressing Change Due 08/28/23   Wound Length (cm) 1.5 cm   Wound Width (cm) 1 cm   Wound Depth (cm) 0.1 cm   Wound Surface Area (cm^2) 1.5 cm^2   Change in Wound Size % (l*w) 6.25   Wound Volume (cm^3) 0.15 cm^3   Wound Healing % 6   Wound Assessment Pink/red   Drainage Amount Moderate (25-50%)   Drainage Description Serosanguinous   Odor None   Guillermina-wound Assessment Intact   Margins Defined edges   Wound Thickness Description not for Pressure Injury Full thickness   Wound 08/14/23 Leg Anterior; Left   Date First Assessed/Time First Assessed: 08/14/23 1121   Present on Hospital Admission: Yes  Wound Approximate Age at First Assessment (Weeks): 1 weeks  Primary Wound Type: Traumatic  Location: Leg  Wound Location Orientation: Anterior; Left   Wound Image    Wound Etiology Traumatic   Dressing Status Intact   Wound Cleansed Wound cleanser   Dressing/Treatment Collagen;Tubular bandage   Offloading for Diabetic Foot Ulcers Offloading not required   Dressing Change Due 08/28/23   Wound Length (cm) 1 cm   Wound Width (cm) 0.7 cm   Wound Depth (cm) 0.1 cm   Wound Surface Area (cm^2) 0.7 cm^2   Change in Wound Size % (l*w) 42.15   Wound Volume (cm^3) 0.07 cm^3   Wound Healing % 71   Wound Assessment Pink/red   Drainage Amount Scant (moist but unmeasurable)   Drainage Description Serosanguinous   Odor None   Guillermina-wound Assessment Intact   Margins Defined edges   Wound Thickness Description not for Pressure Injury Full thickness

## 2023-08-21 NOTE — PROGRESS NOTES
Tobacco Use    Smoking status: Never     Passive exposure: Past    Smokeless tobacco: Never   Vaping Use    Vaping Use: Never used   Substance and Sexual Activity    Alcohol use: Yes     Alcohol/week: 2.0 standard drinks     Types: 2 Cans of beer per week    Drug use: No    Sexual activity: Defer        Prior to Admission medications    Medication Sig Start Date End Date Taking? Authorizing Provider   meloxicam (MOBIC) 15 MG tablet Take 1 tablet by mouth daily 5/18/23   Ney Walls PA-C   furosemide (LASIX) 20 MG tablet Take 1 tablet by mouth as needed Indications: edema 7/1/22   Historical Provider, MD   apixaban (ELIQUIS) 5 MG TABS tablet 1 tablet 2 times daily Indications: a-fib 3/15/22   Historical Provider, MD   allopurinol (ZYLOPRIM) 300 MG tablet 1 tablet daily Indications: gout 3/1/23   Historical Provider, MD   Calcium Carb-Cholecalciferol 600-10 MG-MCG CAPS Take by mouth 2 times daily    Ar Automatic Reconciliation   ibandronate (BONIVA) 150 MG tablet Take 1 tablet by mouth every 30 days Indications: Osteoporosis    Ar Automatic Reconciliation   metFORMIN (GLUCOPHAGE) 1000 MG tablet Take 0.5 tablets by mouth 2 times daily (with meals) Indications: DM    Ar Automatic Reconciliation   mometasone (NASONEX) 50 MCG/ACT nasal spray 2 sprays by Nasal route daily Indications: seasonal allergies    Ar Automatic Reconciliation   montelukast (SINGULAIR) 10 MG tablet Take 1 tablet by mouth nightly Indications: allergies    Ar Automatic Reconciliation   nebivolol (BYSTOLIC) 10 MG tablet Take 3 tablets by mouth daily Indications: b/p    Ar Automatic Reconciliation   tadalafil (CIALIS) 5 MG tablet Take 1 tablet by mouth daily Indications: BPH    Ar Automatic Reconciliation   testosterone (ANDROGEL; TESTIM) 50 MG/5GM (1%) GEL 1% gel Apply 5 g topically four times a week.     Ar Automatic Reconciliation      No Known Allergies       Signed By: Mariela Petersen MD      08/21/23

## 2023-08-21 NOTE — DISCHARGE INSTRUCTIONS
Discharge Instructions from  [address] [place] [address] Dr  305 [facility], [address] [place] [address]  [phone] Fax [phone]    NAME:  Helen Lyon OF BIRTH:  1945  MEDICAL RECORD NUMBER:  [id number]  DATE:  8/21/2023    Wound Cleansing:   Do not scrub or use excessive force. Cleanse wound prior to applying a clean dressing with:  [] Normal Saline [] Keep Wound Dry in Shower    [] Wound Cleanser   [] Cleanse wound with Mild Soap & Water  [] May Shower at Discharge   [] Other:      Topical Treatments:  Do not apply lotions, creams, or ointments to wound bed unless directed. [] Apply moisturizing lotion to skin surrounding the wound prior to dressing change.  [] Apply antifungal ointment to skin surrounding the wound prior to dressing change.  [] Apply thin film of moisture barrier ointment to skin immediately around wound. [] Other:       Dressings:           Wound Location Bilateral legs   [] Apply Primary Dressing:       [] MediHoney Gel [] Alginate with Silver [] Alginate   [] Collagen [] Collagen with Silver   [] Santyl with Moisten saline gauze     [] Hydrocolloid   [] MediHoney Alginate [] Foam with Silver   [] Foam   [] Hydrofera Blue    [] Mepilex Border    [] Moisten with Saline [] Hydrogel [] Mepitel     [] Bactroban/Mupirocin [] Polysporin  [] Other:    [] Pack wound loosely with  [] Iodoform   [] Plain Packing  [] Other   [] Cover and Secure with:     [] Gauze [] Tamanna [] Kerlix   [] Ace Wrap [] Cover Roll Tape [] ABD     [] Other:    Avoid contact of tape with skin.   [] Change dressing: [] Daily    [] Every Other Day [] Three times per week   [] Once a week [x] Do Not Change Dressing   [] Other:          Edema Control:  Apply: [x] Farrow wrap 4000 [x]Right Leg [x]Left Leg   [x] Tubigrip []Right Leg Double Layer []Left Leg Double Layer       [x]Right Leg Single Layer [x]Left Leg Single Layer   [] SpandaGrip []Right Leg  []Left Leg      []Low compression 5-10

## 2023-08-28 ENCOUNTER — HOSPITAL ENCOUNTER (OUTPATIENT)
Facility: HOSPITAL | Age: 78
Discharge: HOME | End: 2023-08-28
Payer: MEDICARE

## 2023-08-28 VITALS
DIASTOLIC BLOOD PRESSURE: 82 MMHG | TEMPERATURE: 98.2 F | RESPIRATION RATE: 18 BRPM | OXYGEN SATURATION: 98 % | SYSTOLIC BLOOD PRESSURE: 129 MMHG | HEART RATE: 96 BPM

## 2023-08-28 DIAGNOSIS — I83.018 VARICOSE VEINS OF RIGHT LOWER EXTREMITY WITH ULCER OF OTHER PART OF LOWER LEG WITH FAT LAYER EXPOSED (HCC): ICD-10-CM

## 2023-08-28 DIAGNOSIS — L97.211 CALF ULCER, RIGHT, LIMITED TO BREAKDOWN OF SKIN (HCC): ICD-10-CM

## 2023-08-28 DIAGNOSIS — L97.212 CALF ULCER, RIGHT, WITH FAT LAYER EXPOSED (HCC): Primary | ICD-10-CM

## 2023-08-28 DIAGNOSIS — L97.812 VARICOSE VEINS OF RIGHT LOWER EXTREMITY WITH ULCER OF OTHER PART OF LOWER LEG WITH FAT LAYER EXPOSED (HCC): ICD-10-CM

## 2023-08-28 PROCEDURE — 99213 OFFICE O/P EST LOW 20 MIN: CPT

## 2023-08-28 RX ORDER — LIDOCAINE HYDROCHLORIDE 40 MG/ML
SOLUTION TOPICAL ONCE
OUTPATIENT
Start: 2023-08-28 | End: 2023-08-28

## 2023-08-28 RX ORDER — GENTAMICIN SULFATE 1 MG/G
OINTMENT TOPICAL ONCE
OUTPATIENT
Start: 2023-08-28 | End: 2023-08-28

## 2023-08-28 RX ORDER — LIDOCAINE 50 MG/G
OINTMENT TOPICAL ONCE
OUTPATIENT
Start: 2023-08-28 | End: 2023-08-28

## 2023-08-28 RX ORDER — CLOBETASOL PROPIONATE 0.5 MG/G
OINTMENT TOPICAL ONCE
OUTPATIENT
Start: 2023-08-28 | End: 2023-08-28

## 2023-08-28 RX ORDER — GINSENG 100 MG
CAPSULE ORAL ONCE
OUTPATIENT
Start: 2023-08-28 | End: 2023-08-28

## 2023-08-28 RX ORDER — IBUPROFEN 200 MG
TABLET ORAL ONCE
OUTPATIENT
Start: 2023-08-28 | End: 2023-08-28

## 2023-08-28 RX ORDER — LIDOCAINE 40 MG/G
CREAM TOPICAL ONCE
OUTPATIENT
Start: 2023-08-28 | End: 2023-08-28

## 2023-08-28 RX ORDER — LIDOCAINE HYDROCHLORIDE 20 MG/ML
JELLY TOPICAL ONCE
OUTPATIENT
Start: 2023-08-28 | End: 2023-08-28

## 2023-08-28 RX ORDER — BACITRACIN ZINC AND POLYMYXIN B SULFATE 500; 1000 [USP'U]/G; [USP'U]/G
OINTMENT TOPICAL ONCE
OUTPATIENT
Start: 2023-08-28 | End: 2023-08-28

## 2023-08-28 RX ORDER — BETAMETHASONE DIPROPIONATE 0.05 %
OINTMENT (GRAM) TOPICAL ONCE
OUTPATIENT
Start: 2023-08-28 | End: 2023-08-28

## 2023-08-28 ASSESSMENT — PAIN SCALES - GENERAL: PAINLEVEL_OUTOF10: 0

## 2023-08-28 NOTE — DISCHARGE INSTRUCTIONS
mm/Hg      []Medium compression 10-20 mm/Hg     []High compression  20-30 mm/Hg   every morning immediately when getting up should be applied to affected leg(s) from mid foot to knee making sure to cover the heel. Remove every night before going to bed. [x] Elevate leg(s) above the level of the heart when sitting. [x] Avoid prolonged standing in one place. Dietary:  [x] Diet as tolerated: [] Calorie Diabetic Diet: [] No Added Salt:  [] Increase Protein: [] Other:       Activity:  [x] Activity as tolerated:  [] Patient has no activity restrictions     [] Strict Bedrest: [] Remain off Work:     [] May return to full duty work:                                   [] Return to work with restrictions:             Return Appointment:  [] Wound and dressing supply provider:   [] ECF or Home Healthcare:  [] Wound Assessment: [] Physician or NP scheduled for Wound Assessment:   [x] Return Appointment: With Dr. Josie Reina  in  2 [facility])  [] Ordered tests:      Electronically signed Trung Becerra RN on 8/28/2023 at [address]: Should you experience any significant changes in your wound(s) or have questions about your wound care, please contact the [facility] at [address] [place] 8:00 am - 4:30. If you need help with your wound outside these hours and cannot wait until we are again available, contact your PCP or go to the hospital emergency room. PLEASE NOTE: IF YOU ARE UNABLE TO OBTAIN WOUND SUPPLIES, CONTINUE TO USE THE SUPPLIES YOU HAVE AVAILABLE UNTIL YOU ARE ABLE TO REACH US. IT IS MOST IMPORTANT TO KEEP THE WOUND COVERED AT ALL TIMES.      Physician Signature:_______________________    Date: ___________ Time:  ____________

## 2023-08-28 NOTE — FLOWSHEET NOTE
08/28/23 0928   Wound 02/27/23 Leg Right;Lateral   Date First Assessed/Time First Assessed: 02/27/23 1057   Present on Hospital Admission: Yes  Wound Approximate Age at First Assessment (Weeks): 8 weeks  Primary Wound Type: Venous Ulcer  Location: Leg  Wound Location Orientation: Right;Lateral   Wound Image    Wound Etiology Venous   Dressing Status Intact   Wound Cleansed Wound cleanser   Dressing/Treatment Alginate with Ag;Silicone border   Offloading for Diabetic Foot Ulcers Offloading not required   Dressing Change Due 09/11/23   Wound Length (cm) 0.7 cm   Wound Width (cm) 0.4 cm   Wound Depth (cm) 0.1 cm   Wound Surface Area (cm^2) 0.28 cm^2   Change in Wound Size % (l*w) 82.5   Wound Volume (cm^3) 0.028 cm^3   Wound Healing % 83   Wound Assessment Pink/red   Drainage Amount Scant (moist but unmeasurable)   Drainage Description Serosanguinous   Odor None   Guillermina-wound Assessment Intact   Margins Attached edges   Wound Thickness Description not for Pressure Injury Partial thickness   Wound 08/14/23 Leg Anterior; Left   Date First Assessed/Time First Assessed: 08/14/23 1121   Present on Hospital Admission: Yes  Wound Approximate Age at First Assessment (Weeks): 1 weeks  Primary Wound Type: Traumatic  Location: Leg  Wound Location Orientation: Anterior; Left   Wound Image    Wound Etiology Traumatic   Dressing Status Intact; New dressing applied   Wound Cleansed Wound cleanser   Dressing/Treatment Dry dressing;Silicone border   Offloading for Diabetic Foot Ulcers Offloading not required   Dressing Change Due 09/11/23   Wound Length (cm)   (clinically closed)   Wound Assessment Epithelialization   Odor None   Guillermina-wound Assessment Intact   Wound 08/14/23 Leg Medial;Left   Date First Assessed/Time First Assessed: 08/14/23 1118   Present on Hospital Admission: Yes  Wound Approximate Age at First Assessment (Weeks): 1 weeks  Primary Wound Type: Traumatic  Location: Leg  Wound Location Orientation: Medial;Left   Wound

## 2023-08-28 NOTE — PROGRESS NOTES
drinks     Types: 2 Cans of beer per week    Drug use: No    Sexual activity: Defer        Prior to Admission medications    Medication Sig Start Date End Date Taking? Authorizing Provider   meloxicam (MOBIC) 15 MG tablet Take 1 tablet by mouth daily 5/18/23   Alexia Gomez PA-C   furosemide (LASIX) 20 MG tablet Take 1 tablet by mouth as needed Indications: edema 7/1/22   Historical Provider, MD   apixaban (ELIQUIS) 5 MG TABS tablet 1 tablet 2 times daily Indications: a-fib 3/15/22   Historical Provider, MD   allopurinol (ZYLOPRIM) 300 MG tablet 1 tablet daily Indications: gout 3/1/23   Historical Provider, MD   Calcium Carb-Cholecalciferol 600-10 MG-MCG CAPS Take by mouth 2 times daily    Ar Automatic Reconciliation   ibandronate (BONIVA) 150 MG tablet Take 1 tablet by mouth every 30 days Indications: Osteoporosis    Ar Automatic Reconciliation   metFORMIN (GLUCOPHAGE) 1000 MG tablet Take 0.5 tablets by mouth 2 times daily (with meals) Indications: DM    Ar Automatic Reconciliation   mometasone (NASONEX) 50 MCG/ACT nasal spray 2 sprays by Nasal route daily Indications: seasonal allergies    Ar Automatic Reconciliation   montelukast (SINGULAIR) 10 MG tablet Take 1 tablet by mouth nightly Indications: allergies    Ar Automatic Reconciliation   nebivolol (BYSTOLIC) 10 MG tablet Take 3 tablets by mouth daily Indications: b/p    Ar Automatic Reconciliation   tadalafil (CIALIS) 5 MG tablet Take 1 tablet by mouth daily Indications: BPH    Ar Automatic Reconciliation   testosterone (ANDROGEL; TESTIM) 50 MG/5GM (1%) GEL 1% gel Apply 5 g topically four times a week.     Ar Automatic Reconciliation      No Known Allergies       Signed By: Brett Osorio MD      08/28/23

## 2023-09-11 ENCOUNTER — HOSPITAL ENCOUNTER (OUTPATIENT)
Facility: HOSPITAL | Age: 78
Discharge: HOME | End: 2023-09-11
Payer: MEDICARE

## 2023-09-11 VITALS
RESPIRATION RATE: 18 BRPM | HEART RATE: 107 BPM | DIASTOLIC BLOOD PRESSURE: 77 MMHG | TEMPERATURE: 97.5 F | OXYGEN SATURATION: 98 % | SYSTOLIC BLOOD PRESSURE: 125 MMHG

## 2023-09-11 DIAGNOSIS — L97.812 VARICOSE VEINS OF RIGHT LOWER EXTREMITY WITH ULCER OF OTHER PART OF LOWER LEG WITH FAT LAYER EXPOSED (HCC): ICD-10-CM

## 2023-09-11 DIAGNOSIS — L97.212 CALF ULCER, RIGHT, WITH FAT LAYER EXPOSED (HCC): Primary | ICD-10-CM

## 2023-09-11 DIAGNOSIS — L97.211 CALF ULCER, RIGHT, LIMITED TO BREAKDOWN OF SKIN (HCC): ICD-10-CM

## 2023-09-11 DIAGNOSIS — I83.018 VARICOSE VEINS OF RIGHT LOWER EXTREMITY WITH ULCER OF OTHER PART OF LOWER LEG WITH FAT LAYER EXPOSED (HCC): ICD-10-CM

## 2023-09-11 PROCEDURE — 99213 OFFICE O/P EST LOW 20 MIN: CPT

## 2023-09-11 RX ORDER — LIDOCAINE HYDROCHLORIDE 20 MG/ML
JELLY TOPICAL ONCE
OUTPATIENT
Start: 2023-09-11 | End: 2023-09-11

## 2023-09-11 RX ORDER — BACITRACIN ZINC AND POLYMYXIN B SULFATE 500; 1000 [USP'U]/G; [USP'U]/G
OINTMENT TOPICAL ONCE
OUTPATIENT
Start: 2023-09-11 | End: 2023-09-11

## 2023-09-11 RX ORDER — IBUPROFEN 200 MG
TABLET ORAL ONCE
OUTPATIENT
Start: 2023-09-11 | End: 2023-09-11

## 2023-09-11 RX ORDER — GINSENG 100 MG
CAPSULE ORAL ONCE
OUTPATIENT
Start: 2023-09-11 | End: 2023-09-11

## 2023-09-11 RX ORDER — GENTAMICIN SULFATE 1 MG/G
OINTMENT TOPICAL ONCE
OUTPATIENT
Start: 2023-09-11 | End: 2023-09-11

## 2023-09-11 RX ORDER — LIDOCAINE 40 MG/G
CREAM TOPICAL ONCE
OUTPATIENT
Start: 2023-09-11 | End: 2023-09-11

## 2023-09-11 RX ORDER — LIDOCAINE HYDROCHLORIDE 40 MG/ML
SOLUTION TOPICAL ONCE
OUTPATIENT
Start: 2023-09-11 | End: 2023-09-11

## 2023-09-11 RX ORDER — BETAMETHASONE DIPROPIONATE 0.05 %
OINTMENT (GRAM) TOPICAL ONCE
OUTPATIENT
Start: 2023-09-11 | End: 2023-09-11

## 2023-09-11 RX ORDER — LIDOCAINE 50 MG/G
OINTMENT TOPICAL ONCE
OUTPATIENT
Start: 2023-09-11 | End: 2023-09-11

## 2023-09-11 RX ORDER — CLOBETASOL PROPIONATE 0.5 MG/G
OINTMENT TOPICAL ONCE
OUTPATIENT
Start: 2023-09-11 | End: 2023-09-11

## 2023-09-11 ASSESSMENT — PAIN SCALES - GENERAL: PAINLEVEL_OUTOF10: 0

## 2023-09-11 NOTE — FLOWSHEET NOTE
09/11/23 0914   Wound 02/27/23 Leg Right;Lateral   Date First Assessed/Time First Assessed: 02/27/23 1057   Present on Hospital Admission: Yes  Wound Approximate Age at First Assessment (Weeks): 8 weeks  Primary Wound Type: Venous Ulcer  Location: Leg  Wound Location Orientation: Right;Lateral   Wound Image     Wound Etiology Venous   Dressing Status Intact   Wound Cleansed Wound cleanser   Dressing/Treatment Xeroform   Offloading for Diabetic Foot Ulcers Offloading not required   Dressing Change Due 10/09/23   Wound Length (cm) 0.5 cm   Wound Width (cm) 0.6 cm   Wound Depth (cm) 0.1 cm   Wound Surface Area (cm^2) 0.3 cm^2   Change in Wound Size % (l*w) 81.25   Wound Volume (cm^3) 0.03 cm^3   Wound Healing % 81   Wound Assessment Pink/red   Drainage Amount Small (< 25%)   Drainage Description Serosanguinous   Odor None   Guillermina-wound Assessment Dry/flaky; Intact   Margins Attached edges     Tubigrip size F applied

## 2023-09-11 NOTE — PLAN OF CARE
Problem: Chronic Conditions and Co-morbidities  Goal: Patient's chronic conditions and co-morbidity symptoms are monitored and maintained or improved  Outcome: Progressing     Problem: Cognitive:  Goal: Knowledge of wound care  Description: Knowledge of wound care  Outcome: Progressing

## 2023-09-11 NOTE — DISCHARGE INSTRUCTIONS
Discharge Instructions from  [address] [place] [address] Dr  305 [facility], [address] [place] [address]  [phone] Fax [phone]    NAME:  Iwona Sim OF BIRTH:  1945  MEDICAL RECORD NUMBER:  [id number]  DATE: 9/11/2023    Wound Cleansing:   Do not scrub or use excessive force. Cleanse wound prior to applying a clean dressing with:  [x] Normal Saline [] Keep Wound Dry in Shower    [] Wound Cleanser   [] Cleanse wound with Mild Soap & Water  [] May Shower at Discharge   [] Other:      Topical Treatments:  Do not apply lotions, creams, or ointments to wound bed unless directed. [] Apply moisturizing lotion to skin surrounding the wound prior to dressing change.  [] Apply antifungal ointment to skin surrounding the wound prior to dressing change.  [] Apply thin film of moisture barrier ointment to skin immediately around wound. [] Other:       Dressings:           Wound Location Right Leg   [x] Apply Primary Dressing:       [] MediHoney Gel [] Alginate with Silver [] Alginate   [] Collagen [] Collagen with Silver   [] Santyl with Moisten saline gauze     [] Hydrocolloid   [] MediHoney Alginate [] Foam with Silver   [] Foam   [] Hydrofera Blue    [] Mepilex Border    [] Moisten with Saline [] Hydrogel [] Mepitel     [] Bactroban/Mupirocin [] Polysporin  [x] Other:  xeroform to wound  [] Pack wound loosely with  [] Iodoform   [] Plain Packing  [] Other   [x] Cover and Secure with:     [] Gauze [] Tamanna [] Kerlix   [] Ace Wrap [] Cover Roll Tape [] ABD     [x] Other: Cover dressing / Band aid   Avoid contact of tape with skin.   [x] Change dressing: [] Daily    [] Every Other Day [] Three times per week   [x] Once a week [] Do Not Change Dressing   [] Other:         Edema Control:  Apply: [x] Farrow wraps [x]Right Leg [x]Left Leg   [x] Tubigrip [x]Right Leg Double Layer []Left Leg Double Layer       []Right Leg Single Layer [x]Left Leg Single Layer   [] SpandaGrip []Right Leg

## 2023-10-02 ENCOUNTER — HOSPITAL ENCOUNTER (OUTPATIENT)
Facility: HOSPITAL | Age: 78
Discharge: HOME | End: 2023-10-02
Payer: MEDICARE

## 2023-10-02 VITALS
OXYGEN SATURATION: 98 % | SYSTOLIC BLOOD PRESSURE: 130 MMHG | HEART RATE: 77 BPM | DIASTOLIC BLOOD PRESSURE: 48 MMHG | TEMPERATURE: 97.7 F | RESPIRATION RATE: 18 BRPM

## 2023-10-02 DIAGNOSIS — I83.018 VARICOSE VEINS OF RIGHT LOWER EXTREMITY WITH ULCER OF OTHER PART OF LOWER LEG WITH FAT LAYER EXPOSED (HCC): ICD-10-CM

## 2023-10-02 DIAGNOSIS — L97.212 CALF ULCER, RIGHT, WITH FAT LAYER EXPOSED (HCC): Primary | ICD-10-CM

## 2023-10-02 DIAGNOSIS — L97.812 VARICOSE VEINS OF RIGHT LOWER EXTREMITY WITH ULCER OF OTHER PART OF LOWER LEG WITH FAT LAYER EXPOSED (HCC): ICD-10-CM

## 2023-10-02 DIAGNOSIS — L97.211 CALF ULCER, RIGHT, LIMITED TO BREAKDOWN OF SKIN (HCC): ICD-10-CM

## 2023-10-02 PROCEDURE — 6370000000 HC RX 637 (ALT 250 FOR IP): Performed by: FAMILY MEDICINE

## 2023-10-02 PROCEDURE — 11042 DBRDMT SUBQ TIS 1ST 20SQCM/<: CPT

## 2023-10-02 RX ORDER — TRIAMCINOLONE ACETONIDE 1 MG/G
OINTMENT TOPICAL ONCE
OUTPATIENT
Start: 2023-10-02 | End: 2023-10-02

## 2023-10-02 RX ORDER — LIDOCAINE 40 MG/G
CREAM TOPICAL ONCE
OUTPATIENT
Start: 2023-10-02 | End: 2023-10-02

## 2023-10-02 RX ORDER — CLOBETASOL PROPIONATE 0.5 MG/G
OINTMENT TOPICAL ONCE
OUTPATIENT
Start: 2023-10-02 | End: 2023-10-02

## 2023-10-02 RX ORDER — LIDOCAINE HYDROCHLORIDE 20 MG/ML
JELLY TOPICAL ONCE
Status: COMPLETED | OUTPATIENT
Start: 2023-10-02 | End: 2023-10-02

## 2023-10-02 RX ORDER — LIDOCAINE HYDROCHLORIDE 20 MG/ML
JELLY TOPICAL ONCE
OUTPATIENT
Start: 2023-10-02 | End: 2023-10-02

## 2023-10-02 RX ORDER — GINSENG 100 MG
CAPSULE ORAL ONCE
OUTPATIENT
Start: 2023-10-02 | End: 2023-10-02

## 2023-10-02 RX ORDER — IBUPROFEN 200 MG
TABLET ORAL ONCE
OUTPATIENT
Start: 2023-10-02 | End: 2023-10-02

## 2023-10-02 RX ORDER — BETAMETHASONE DIPROPIONATE 0.05 %
OINTMENT (GRAM) TOPICAL ONCE
OUTPATIENT
Start: 2023-10-02 | End: 2023-10-02

## 2023-10-02 RX ORDER — GENTAMICIN SULFATE 1 MG/G
OINTMENT TOPICAL ONCE
OUTPATIENT
Start: 2023-10-02 | End: 2023-10-02

## 2023-10-02 RX ORDER — BACITRACIN ZINC AND POLYMYXIN B SULFATE 500; 1000 [USP'U]/G; [USP'U]/G
OINTMENT TOPICAL ONCE
OUTPATIENT
Start: 2023-10-02 | End: 2023-10-02

## 2023-10-02 RX ORDER — LIDOCAINE 50 MG/G
OINTMENT TOPICAL ONCE
OUTPATIENT
Start: 2023-10-02 | End: 2023-10-02

## 2023-10-02 RX ORDER — LIDOCAINE HYDROCHLORIDE 40 MG/ML
SOLUTION TOPICAL ONCE
OUTPATIENT
Start: 2023-10-02 | End: 2023-10-02

## 2023-10-02 RX ADMIN — LIDOCAINE HYDROCHLORIDE: 20 JELLY TOPICAL at 09:21

## 2023-10-02 ASSESSMENT — PAIN SCALES - GENERAL: PAINLEVEL_OUTOF10: 0

## 2023-10-02 NOTE — FLOWSHEET NOTE
10/02/23 0914   Wound 02/27/23 Leg Right;Lateral   Date First Assessed/Time First Assessed: 02/27/23 1057   Present on Hospital Admission: Yes  Wound Approximate Age at First Assessment (Weeks): 8 weeks  Primary Wound Type: Venous Ulcer  Location: Leg  Wound Location Orientation: Right;Lateral   Wound Image    Wound Etiology Venous   Dressing Status Old drainage noted   Wound Cleansed Wound cleanser   Dressing/Treatment Collagen;Alginate with Ag   Offloading for Diabetic Foot Ulcers Offloading not required   Dressing Change Due 10/09/23   Wound Length (cm) 2 cm   Wound Width (cm) 1.9 cm   Wound Depth (cm) 0.1 cm   Wound Surface Area (cm^2) 3.8 cm^2   Change in Wound Size % (l*w) -137.5   Wound Volume (cm^3) 0.38 cm^3   Wound Healing % -138   Post-Procedure Length (cm) 2.1 cm   Post-Procedure Width (cm) 2 cm   Post-Procedure Depth (cm) 0.2 cm   Post-Procedure Surface Area (cm^2) 4.2 cm^2   Post-Procedure Volume (cm^3) 0.84 cm^3   Wound Assessment Pink/red;Devitalized tissue   Drainage Amount Moderate (25-50%)   Drainage Description Serosanguinous   Odor None   Guillermina-wound Assessment Maceration   Margins Defined edges   Wound Thickness Description not for Pressure Injury Full thickness   Wound 08/14/23 Leg Medial;Left   Date First Assessed/Time First Assessed: 08/14/23 1118   Present on Hospital Admission: Yes  Wound Approximate Age at First Assessment (Weeks): 1 weeks  Primary Wound Type: Traumatic  Location: Leg  Wound Location Orientation: Medial;Left   Wound Image    Wound Etiology Traumatic   Dressing Status Intact   Wound Cleansed Wound cleanser   Dressing/Treatment Alginate with Ag   Offloading for Diabetic Foot Ulcers Offloading not required   Dressing Change Due 10/09/23   Wound Length (cm) 1 cm   Wound Width (cm) 0.8 cm   Wound Depth (cm) 0.1 cm   Wound Surface Area (cm^2) 0.8 cm^2   Change in Wound Size % (l*w) 80   Wound Volume (cm^3) 0.08 cm^3   Wound Healing % 80   Wound Assessment Devitalized tissue

## 2023-10-02 NOTE — PLAN OF CARE
[address]:     Other [facility]  - F- [phone]; P - [address]:     [address] [place]  [address] [facility] [address] [phone]  WOUND CARE Dept: [address] [facility] [phone]    Patient Information:      Zane Spencer [id number]   [phone]   : 1945  AGE: 77 y.o. GENDER: male   EPISODE DATE: 10/2/2023    Insurance:      PRIMARY INSURANCE:  Plan: MEDICARE PART A AND B  Coverage: MEDICARE  Effective Date: 2009  Group Number: [unfilled]  Subscriber Number: [id number] - (Medicare)    Payer/Plan Subscr  Sex Relation Sub. Ins. ID Effective Group Num   1. MEDICARE - [employer] 1945 Male Self [id number]                                    [address] [address]   2.  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 ([address])    Relevant Orders    Initiate Outpatient Wound Care Protocol       Other    Calf ulcer, right, with fat layer exposed ([address]) - Primary    Relevant Orders    Initiate Outpatient Wound Care Protocol    Calf ulcer, right, limited to breakdown of skin ([address])    Relevant Orders    Initiate Outpatient Wound Care Protocol       WOUNDS REQUIRING DRESSING SUPPLIES:     Wound 23 Leg Right;Lateral (Active)   Wound Image   10/02/23 0914   Wound Etiology Venous 10/02/23 0914   Dressing Status Old drainage noted 10/02/23 0914   Wound Cleansed Wound cleanser 10/02/23 0914   Dressing/Treatment Collagen;Alginate with Ag 10/02/23 0914   Offloading for Diabetic Foot Ulcers Offloading not required 10/02/23 0914   Dressing Change Due 10/09/23 10/02/23 0914   Wound Length (cm) 2 cm 10/02/23 0914   Wound Width (cm) 1.9 cm 10/02/23 0914   Wound Depth (cm) 0.1 cm

## 2023-10-09 ENCOUNTER — HOSPITAL ENCOUNTER (OUTPATIENT)
Facility: HOSPITAL | Age: 78
Discharge: HOME | End: 2023-10-09
Attending: FAMILY MEDICINE | Admitting: FAMILY MEDICINE
Payer: MEDICARE

## 2023-10-09 VITALS
DIASTOLIC BLOOD PRESSURE: 59 MMHG | TEMPERATURE: 98.2 F | HEART RATE: 116 BPM | OXYGEN SATURATION: 98 % | RESPIRATION RATE: 16 BRPM | SYSTOLIC BLOOD PRESSURE: 128 MMHG

## 2023-10-09 PROCEDURE — 99213 OFFICE O/P EST LOW 20 MIN: CPT

## 2023-10-09 ASSESSMENT — PAIN SCALES - GENERAL: PAINLEVEL_OUTOF10: 0

## 2023-10-09 NOTE — DISCHARGE INSTRUCTIONS
Discharge Instructions from  [address] [place] [address] Dr  305 [facility], [address] [place] [address]  [phone] Fax [phone]    NAME:  Sj Corral OF BIRTH:  1945  MEDICAL RECORD NUMBER:  [id number]  DATE:  10/9/2023    Wound Cleansing:   Do not scrub or use excessive force. Cleanse wound prior to applying a clean dressing with:  [] Normal Saline [] Keep Wound Dry in Shower    [] Wound Cleanser   [] Cleanse wound with Mild Soap & Water  [] May Shower at Discharge   [] Other:      Topical Treatments:  Do not apply lotions, creams, or ointments to wound bed unless directed. [] Apply moisturizing lotion to skin surrounding the wound prior to dressing change.  [] Apply antifungal ointment to skin surrounding the wound prior to dressing change.  [] Apply thin film of moisture barrier ointment to skin immediately around wound. [] Other:       Dressings:           Wound Location Left and Right Leg   [] Apply Primary Dressing:       [] MediHoney Gel [] Alginate with Silver [] Alginate   [] Collagen [] Collagen with Silver   [] Santyl with Moisten saline gauze     [] Hydrocolloid   [] MediHoney Alginate [] Foam with Silver   [] Foam   [] Hydrofera Blue    [] Mepilex Border    [] Moisten with Saline [] Hydrogel [] Mepitel     [] Bactroban/Mupirocin [] Polysporin  [] Other:    [] Pack wound loosely with  [] Iodoform   [] Plain Packing  [] Other   [] Cover and Secure with:     [] Gauze [] Tamanna [] Kerlix   [] Ace Wrap [] Cover Roll Tape [] ABD     [] Other:    Avoid contact of tape with skin.   [x] Change dressing: [] Daily    [] Every Other Day [] Three times per week   [x] Once a week [] Do Not Change Dressing   [] Other:            Edema Control:  Apply: [] Compression Stocking []Right Leg []Left Leg   [x] Tubigrip []Right Leg Double Layer []Left Leg Double Layer       [x]Right Leg Single Layer [x]Left Leg Single Layer   [] SpandaGrip []Right Leg  []Left Leg      []Low

## 2023-10-09 NOTE — FLOWSHEET NOTE
10/09/23 0929   Wound 08/14/23 Leg Medial;Left   Date First Assessed/Time First Assessed: 08/14/23 1118   Present on Hospital Admission: Yes  Wound Approximate Age at First Assessment (Weeks): 1 weeks  Primary Wound Type: Traumatic  Location: Leg  Wound Location Orientation: Medial;Left   Wound Image    Wound Etiology Traumatic   Dressing Status Intact   Wound Cleansed Wound cleanser   Dressing/Treatment Alginate with Ag;Silicone border   Offloading for Diabetic Foot Ulcers Offloading not required   Dressing Change Due 10/16/23   Wound Length (cm) 0.6 cm   Wound Width (cm) 0.5 cm   Wound Depth (cm) 0.1 cm   Wound Surface Area (cm^2) 0.3 cm^2   Change in Wound Size % (l*w) 92.5   Wound Volume (cm^3) 0.03 cm^3   Wound Healing % 93   Wound Assessment Devitalized tissue   Drainage Amount Small (< 25%)   Drainage Description Serosanguinous   Odor None   Guillermina-wound Assessment Intact   Margins Defined edges   Wound Thickness Description not for Pressure Injury Partial thickness   Wound 02/27/23 Leg Right;Lateral   Date First Assessed/Time First Assessed: 02/27/23 1057   Present on Hospital Admission: Yes  Wound Approximate Age at First Assessment (Weeks): 8 weeks  Primary Wound Type: Venous Ulcer  Location: Leg  Wound Location Orientation: Right;Lateral   Wound Image    Wound Etiology Venous   Dressing Status Old drainage noted   Wound Cleansed Wound cleanser   Dressing/Treatment Collagen;Alginate with Ag   Offloading for Diabetic Foot Ulcers Offloading not required   Dressing Change Due 10/16/23   Wound Length (cm) 2.1 cm   Wound Width (cm) 1.7 cm   Wound Depth (cm) 0.1 cm   Wound Surface Area (cm^2) 3.57 cm^2   Change in Wound Size % (l*w) -123.13   Wound Volume (cm^3) 0.357 cm^3   Wound Healing % -123   Wound Assessment Pink/red;Devitalized tissue   Drainage Amount Moderate (25-50%)   Drainage Description Serosanguinous   Odor None   Guillermina-wound Assessment Maceration   Margins Defined edges   Wound Thickness

## 2023-10-16 ENCOUNTER — HOSPITAL ENCOUNTER (OUTPATIENT)
Facility: HOSPITAL | Age: 78
Discharge: HOME | End: 2023-10-16
Attending: FAMILY MEDICINE
Payer: MEDICARE

## 2023-10-16 VITALS
RESPIRATION RATE: 18 BRPM | OXYGEN SATURATION: 97 % | DIASTOLIC BLOOD PRESSURE: 62 MMHG | SYSTOLIC BLOOD PRESSURE: 129 MMHG | HEART RATE: 83 BPM | TEMPERATURE: 97.6 F

## 2023-10-16 DIAGNOSIS — L97.212 CALF ULCER, RIGHT, WITH FAT LAYER EXPOSED (HCC): Primary | ICD-10-CM

## 2023-10-16 DIAGNOSIS — L97.812 VARICOSE VEINS OF RIGHT LOWER EXTREMITY WITH ULCER OF OTHER PART OF LOWER LEG WITH FAT LAYER EXPOSED (HCC): ICD-10-CM

## 2023-10-16 DIAGNOSIS — I83.018 VARICOSE VEINS OF RIGHT LOWER EXTREMITY WITH ULCER OF OTHER PART OF LOWER LEG WITH FAT LAYER EXPOSED (HCC): ICD-10-CM

## 2023-10-16 DIAGNOSIS — L97.211 CALF ULCER, RIGHT, LIMITED TO BREAKDOWN OF SKIN (HCC): ICD-10-CM

## 2023-10-16 PROCEDURE — 6370000000 HC RX 637 (ALT 250 FOR IP): Performed by: FAMILY MEDICINE

## 2023-10-16 PROCEDURE — 11042 DBRDMT SUBQ TIS 1ST 20SQCM/<: CPT

## 2023-10-16 RX ORDER — LIDOCAINE HYDROCHLORIDE 20 MG/ML
JELLY TOPICAL ONCE
OUTPATIENT
Start: 2023-10-16 | End: 2023-10-16

## 2023-10-16 RX ORDER — LIDOCAINE 40 MG/G
CREAM TOPICAL ONCE
OUTPATIENT
Start: 2023-10-16 | End: 2023-10-16

## 2023-10-16 RX ORDER — GINSENG 100 MG
CAPSULE ORAL ONCE
OUTPATIENT
Start: 2023-10-16 | End: 2023-10-16

## 2023-10-16 RX ORDER — LIDOCAINE HYDROCHLORIDE 20 MG/ML
JELLY TOPICAL ONCE
Status: COMPLETED | OUTPATIENT
Start: 2023-10-16 | End: 2023-10-16

## 2023-10-16 RX ORDER — LIDOCAINE 50 MG/G
OINTMENT TOPICAL ONCE
OUTPATIENT
Start: 2023-10-16 | End: 2023-10-16

## 2023-10-16 RX ORDER — BETAMETHASONE DIPROPIONATE 0.05 %
OINTMENT (GRAM) TOPICAL ONCE
OUTPATIENT
Start: 2023-10-16 | End: 2023-10-16

## 2023-10-16 RX ORDER — LIDOCAINE HYDROCHLORIDE 40 MG/ML
SOLUTION TOPICAL ONCE
OUTPATIENT
Start: 2023-10-16 | End: 2023-10-16

## 2023-10-16 RX ORDER — TRIAMCINOLONE ACETONIDE 1 MG/G
OINTMENT TOPICAL ONCE
OUTPATIENT
Start: 2023-10-16 | End: 2023-10-16

## 2023-10-16 RX ORDER — GENTAMICIN SULFATE 1 MG/G
OINTMENT TOPICAL ONCE
OUTPATIENT
Start: 2023-10-16 | End: 2023-10-16

## 2023-10-16 RX ORDER — CLOBETASOL PROPIONATE 0.5 MG/G
OINTMENT TOPICAL ONCE
OUTPATIENT
Start: 2023-10-16 | End: 2023-10-16

## 2023-10-16 RX ORDER — IBUPROFEN 200 MG
TABLET ORAL ONCE
OUTPATIENT
Start: 2023-10-16 | End: 2023-10-16

## 2023-10-16 RX ORDER — BACITRACIN ZINC AND POLYMYXIN B SULFATE 500; 1000 [USP'U]/G; [USP'U]/G
OINTMENT TOPICAL ONCE
OUTPATIENT
Start: 2023-10-16 | End: 2023-10-16

## 2023-10-16 RX ADMIN — LIDOCAINE HYDROCHLORIDE: 20 JELLY TOPICAL at 09:18

## 2023-10-16 ASSESSMENT — PAIN SCALES - GENERAL: PAINLEVEL_OUTOF10: 0

## 2023-10-16 NOTE — PROGRESS NOTES
0915   Drainage Description Serosanguinous 10/16/23 0915   Odor None 10/16/23 0915   Guillermina-wound Assessment Intact 10/16/23 0915   Margins Defined edges 10/16/23 0915   Wound Thickness Description not for Pressure Injury Full thickness 10/16/23 0915   Number of days: 62            -------    Past Medical History:   Diagnosis Date    A-fib [facility])     Cardio Dr. Chasity Peterson directive discussed with patient 04/06/2023    pt states he will bring [address] ([address]) 2021    melanoma removed from nose and face, pt denies chemo or readiation    Chronic kidney disease     kidney stones    Coagulation disorder ([address])     excess iron; erthythrocytosis; hemochromatosis- [facility] yearly exams    Diabetes ([address])     PcP  manages    Enlarged prostate     managed by Dr. Panchito Beyer    Exercise tolerance finding     pt states able to climb 2 flights stairs without CP or SOB    Hypertension 2007    cardio Dr. Jerome Maurer    Ill-defined condition     ulcer - shin- Started around [place] 2022- followed by [facility]    Kidney stones     Dr. Panchito Beyer    Wears glasses 04/06/2023     Past Surgical History:   Procedure Laterality Date    BLADDER SURGERY  2017    urolift    CHOLECYSTECTOMY  07/2017    COLONOSCOPY  2021 2020, hx polyps    ORTHOPEDIC SURGERY Right 2007    r shoulder replacement    SHOULDER SURGERY Left 2022    replacement    TONSILLECTOMY      TOTAL KNEE ARTHROPLASTY Left 5/18/2023    LEFT TOTAL KNEE ARTHROPLASTY ([place] POP) performed by Genetta Goltz, DO at THE [facility] MAIN OR    WISDOM TOOTH EXTRACTION       No family history on file. Social History     Socioeconomic History    Marital status:    Tobacco Use    Smoking status: Never     Passive exposure: Past    Smokeless tobacco: Never   Vaping Use    Vaping Use: Never used   Substance and Sexual Activity    Alcohol use:  Yes     Alcohol/week: 2.0 standard drinks of alcohol     Types: 2 Cans of beer per week    Drug use: No    Sexual

## 2023-10-16 NOTE — FLOWSHEET NOTE
10/16/23 0915   Wound 08/14/23 Leg Medial;Left   Date First Assessed/Time First Assessed: 08/14/23 1118   Present on Hospital Admission: Yes  Wound Approximate Age at First Assessment (Weeks): 1 weeks  Primary Wound Type: Traumatic  Location: Leg  Wound Location Orientation: Medial;Left   Wound Image     Wound Etiology Traumatic   Dressing Status Intact; Old drainage noted   Wound Cleansed Wound cleanser   Dressing/Treatment Collagen;Silver dressing;Silicone border  (tubigrip size F)   Offloading for Diabetic Foot Ulcers Offloading not required   Dressing Change Due 10/23/23   Wound Length (cm) 1.2 cm   Wound Width (cm) 1 cm   Wound Depth (cm) 0.1 cm   Wound Surface Area (cm^2) 1.2 cm^2   Change in Wound Size % (l*w) 70   Wound Volume (cm^3) 0.12 cm^3   Wound Healing % 70   Post-Procedure Length (cm) 1.3 cm   Post-Procedure Width (cm) 1.1 cm   Post-Procedure Depth (cm) 0.2 cm   Post-Procedure Surface Area (cm^2) 1.43 cm^2   Post-Procedure Volume (cm^3) 0.286 cm^3   Wound Assessment Devitalized tissue   Drainage Amount Small (< 25%)   Drainage Description Serosanguinous   Odor None   Guillermina-wound Assessment Intact   Margins Defined edges   Wound Thickness Description not for Pressure Injury Full thickness   Wound 02/27/23 Leg Right;Lateral   Date First Assessed/Time First Assessed: 02/27/23 1057   Present on Hospital Admission: Yes  Wound Approximate Age at First Assessment (Weeks): 8 weeks  Primary Wound Type: Venous Ulcer  Location: Leg  Wound Location Orientation: Right;Lateral   Wound Image     Wound Etiology Venous   Dressing Status Old drainage noted; Intact   Wound Cleansed Wound cleanser   Dressing/Treatment Collagen;Silver dressing;Silicone border  (tubigrip size F)   Offloading for Diabetic Foot Ulcers Offloading not required   Dressing Change Due 10/23/23   Wound Length (cm) 2.5 cm   Wound Width (cm) 2 cm   Wound Depth (cm) 0.1 cm   Wound Surface Area (cm^2) 5 cm^2   Change in Wound Size % (l*w) -212.5

## 2023-10-16 NOTE — DISCHARGE INSTRUCTIONS
Discharge Instructions from  [address] [place] [address] Dr  305 [facility], [address] [place] [address]  [phone] Fax [phone]    NAME:  Fidel Mccord OF BIRTH:  1945  MEDICAL RECORD NUMBER:  [id number]  DATE: 10/16/2023    Wound Cleansing:   Do not scrub or use excessive force. Cleanse wound prior to applying a clean dressing with:  [] Normal Saline [] Keep Wound Dry in Shower    [] Wound Cleanser   [] Cleanse wound with Mild Soap & Water  [] May Shower at Discharge   [] Other:      Topical Treatments:  Do not apply lotions, creams, or ointments to wound bed unless directed. [] Apply moisturizing lotion to skin surrounding the wound prior to dressing change.  [] Apply antifungal ointment to skin surrounding the wound prior to dressing change.  [] Apply thin film of moisture barrier ointment to skin immediately around wound. [] Other:       Dressings:           Wound Location Legs   [] Apply Primary Dressing:       [] MediHoney Gel [] Alginate with Silver [] Alginate   [] Collagen [] Collagen with Silver   [] Santyl with Moisten saline gauze     [] Hydrocolloid   [] MediHoney Alginate [] Foam with Silver   [] Foam   [] Hydrofera Blue    [] Mepilex Border    [] Moisten with Saline [] Hydrogel [] Mepitel     [] Bactroban/Mupirocin [] Polysporin  [] Other:    [] Pack wound loosely with  [] Iodoform   [] Plain Packing  [] Other   [] Cover and Secure with:     [] Gauze [] Tamanna [] Kerlix   [] Ace Wrap [] Cover Roll Tape [] ABD     [] Other:    Avoid contact of tape with skin.   [] Change dressing: [] Daily    [] Every Other Day [] Three times per week   [] Once a week [x] Do Not Change Dressing   [] Other:            Edema Control:  Apply: [] Compression Stocking []Right Leg []Left Leg   [x] Tubigrip []Right Leg Double Layer []Left Leg Double Layer       [x]Right Leg Single Layer [x]Left Leg Single Layer   [] SpandaGrip []Right Leg  []Left Leg      []Low compression 5-10

## 2023-10-23 ENCOUNTER — HOSPITAL ENCOUNTER (OUTPATIENT)
Facility: HOSPITAL | Age: 78
Discharge: HOME | End: 2023-10-23
Attending: FAMILY MEDICINE
Payer: MEDICARE

## 2023-10-23 VITALS
SYSTOLIC BLOOD PRESSURE: 125 MMHG | TEMPERATURE: 97.7 F | DIASTOLIC BLOOD PRESSURE: 59 MMHG | HEART RATE: 85 BPM | RESPIRATION RATE: 16 BRPM | OXYGEN SATURATION: 98 %

## 2023-10-23 DIAGNOSIS — L97.812 VARICOSE VEINS OF RIGHT LOWER EXTREMITY WITH ULCER OF OTHER PART OF LOWER LEG WITH FAT LAYER EXPOSED (HCC): ICD-10-CM

## 2023-10-23 DIAGNOSIS — L97.211 CALF ULCER, RIGHT, LIMITED TO BREAKDOWN OF SKIN (HCC): ICD-10-CM

## 2023-10-23 DIAGNOSIS — L97.212 CALF ULCER, RIGHT, WITH FAT LAYER EXPOSED (HCC): Primary | ICD-10-CM

## 2023-10-23 DIAGNOSIS — I83.018 VARICOSE VEINS OF RIGHT LOWER EXTREMITY WITH ULCER OF OTHER PART OF LOWER LEG WITH FAT LAYER EXPOSED (HCC): ICD-10-CM

## 2023-10-23 PROCEDURE — 6370000000 HC RX 637 (ALT 250 FOR IP): Performed by: FAMILY MEDICINE

## 2023-10-23 PROCEDURE — 87186 SC STD MICRODIL/AGAR DIL: CPT

## 2023-10-23 PROCEDURE — 87070 CULTURE OTHR SPECIMN AEROBIC: CPT

## 2023-10-23 PROCEDURE — 87147 CULTURE TYPE IMMUNOLOGIC: CPT

## 2023-10-23 PROCEDURE — 87075 CULTR BACTERIA EXCEPT BLOOD: CPT

## 2023-10-23 PROCEDURE — 11042 DBRDMT SUBQ TIS 1ST 20SQCM/<: CPT

## 2023-10-23 PROCEDURE — 87077 CULTURE AEROBIC IDENTIFY: CPT

## 2023-10-23 PROCEDURE — 87205 SMEAR GRAM STAIN: CPT

## 2023-10-23 RX ORDER — LIDOCAINE HYDROCHLORIDE 40 MG/ML
SOLUTION TOPICAL ONCE
OUTPATIENT
Start: 2023-10-23 | End: 2023-10-23

## 2023-10-23 RX ORDER — LIDOCAINE 50 MG/G
OINTMENT TOPICAL ONCE
OUTPATIENT
Start: 2023-10-23 | End: 2023-10-23

## 2023-10-23 RX ORDER — GINSENG 100 MG
CAPSULE ORAL ONCE
OUTPATIENT
Start: 2023-10-23 | End: 2023-10-23

## 2023-10-23 RX ORDER — LIDOCAINE HYDROCHLORIDE 20 MG/ML
JELLY TOPICAL ONCE
OUTPATIENT
Start: 2023-10-23 | End: 2023-10-23

## 2023-10-23 RX ORDER — BETAMETHASONE DIPROPIONATE 0.05 %
OINTMENT (GRAM) TOPICAL ONCE
OUTPATIENT
Start: 2023-10-23 | End: 2023-10-23

## 2023-10-23 RX ORDER — LIDOCAINE 40 MG/G
CREAM TOPICAL ONCE
OUTPATIENT
Start: 2023-10-23 | End: 2023-10-23

## 2023-10-23 RX ORDER — IBUPROFEN 200 MG
TABLET ORAL ONCE
OUTPATIENT
Start: 2023-10-23 | End: 2023-10-23

## 2023-10-23 RX ORDER — CLOBETASOL PROPIONATE 0.5 MG/G
OINTMENT TOPICAL ONCE
OUTPATIENT
Start: 2023-10-23 | End: 2023-10-23

## 2023-10-23 RX ORDER — BACITRACIN ZINC AND POLYMYXIN B SULFATE 500; 1000 [USP'U]/G; [USP'U]/G
OINTMENT TOPICAL ONCE
OUTPATIENT
Start: 2023-10-23 | End: 2023-10-23

## 2023-10-23 RX ORDER — TRIAMCINOLONE ACETONIDE 1 MG/G
OINTMENT TOPICAL ONCE
OUTPATIENT
Start: 2023-10-23 | End: 2023-10-23

## 2023-10-23 RX ORDER — GENTAMICIN SULFATE 1 MG/G
OINTMENT TOPICAL ONCE
OUTPATIENT
Start: 2023-10-23 | End: 2023-10-23

## 2023-10-23 RX ORDER — LIDOCAINE HYDROCHLORIDE 20 MG/ML
JELLY TOPICAL ONCE
Status: COMPLETED | OUTPATIENT
Start: 2023-10-23 | End: 2023-10-23

## 2023-10-23 RX ADMIN — LIDOCAINE HYDROCHLORIDE: 20 JELLY TOPICAL at 10:09

## 2023-10-23 ASSESSMENT — PAIN SCALES - GENERAL: PAINLEVEL_OUTOF10: 0

## 2023-10-23 NOTE — PROGRESS NOTES
Wound Center  Progress Note / Procedure Note      Chief Complaint:  Gonzales Bermudez is a 77 y.o.  male  with R lower leg anterior wound of few months duration. Assessment/Plan     77 y.o. male with Dm2    -R lower leg anterior venous stasis ulcer. Full thickness  NEW area latero-distal to this, imeasured  into this wound- cluster of 2  Slough/granular  Necessitates debridement  for wound healing and to prevent/heal infection  Ulcer needs debridement- see note below  Culture done- d/t increase drainage, stagnated, new wound in the area    -Left leg ulcer,  Full thickness  Slough/granular  Necessitates debridement  for wound healing and to prevent/heal infection  Ulcer needs debridement- see note below    -Dm2  A1c 6.5  Does not check at home  Controlled with metformin    -Leg swelling/varicosities  Has arthritis      Following discussed with patient   Needs :  Serial debridement- prn    Good local wound care  Dressing: collagen, alginate;   Frequency : few times a week        -Edema management  Do not get dressing wet    Edema management:  Elevate leg(s) throughout the day starting in the morning  Compression: 2 layer tubis/ velcros  Avoid prolonged standing    Re-enforced- changing dressings, elevating, using compression    -Good Diabetic control      Patient/  understood and agrees with plan. Questions answered.       Follow up with me 1 week     Subjective:     Since last visit  Has been more consistent with elevation and compression  Did not change dressing all week    HPI:     Acquired wounds a few months ago  Local trauma  Not healing, being managed by [facility]  Adv to come here      History/Chart/Medications reviewed    Wound caused by:   Current wound care:See flowsheet  Offloading wound:   Appetite: good  Wound associated pain: See flowsheet  Diabetic: yes  Smoker: no  ROS: no N/V/D, no T/chills; no local rash, no chest pain or shortness of breath, no headache or dizzyness      Objective:     Physical

## 2023-10-23 NOTE — DISCHARGE INSTRUCTIONS
minutes.  [] Wheelchair cushion [] Specialty Bed/Mattress  [] Turn every 2 hours when in bed. Avoid position directing pressure on wound site. Limit side lying to 30 degree tilt. Limit HOB elevation to 30 degrees. Edema Control:  Apply: [] Compression Stocking []Right Leg []Left Leg   [] Tubigrip []Right Leg Double Layer []Left Leg Double Layer       []Right Leg Single Layer []Left Leg Single Layer   [] SpandaGrip []Right Leg  []Left Leg      []Low compression 5-10 mm/Hg      []Medium compression 10-20 mm/Hg     []High compression  20-30 mm/Hg   every morning immediately when getting up should be applied to affected leg(s) from mid foot to knee making sure to cover the heel. Remove every night before going to bed. [] Elevate leg(s) above the level of the heart when sitting. [] Avoid prolonged standing in one place. Compression:  Apply: [] Multilayer Compression Wrap Applied in Clinic []RightLeg []Left Leg   [] Multi-layer compression. Do not get leg(s) with wrap wet. If wraps become too tight call the center or completely remove the wrap. [] Elevate leg(s) above the level of the heart when sitting. [] Avoid prolonged standing in one place. Off-Loading:   [] Off-loading when [] walking  [] in bed [] sitting  [] Total non-weight bearing  [] Right Leg  [] Left Leg   [] Assistive Device [] Walker [] Cane  [] Wheelchair  [] Crutches   [] Surgical shoe    [] Podus Boot(s)   [] Foam Boot(s)  [] Roll About    [] Cast Boot [] CROW Boot  [] Other:    Contact Cast:  Apply: [] Total Contact Cast Applied in Clinic []RightLeg []Left Leg   [] Do not get cast wet. Contact center or go to emergency room if there is a foul odor or becomes uncomfortable due to feeling tight or swelling. Do not use objects inside of cast to scratch.       Dietary:  [] Diet as tolerated: [] Calorie Diabetic Diet: [] No Added Salt:  [] Increase Protein: [] Other:   Activity:  [x] Activity as tolerated:  [] Patient has no

## 2023-10-25 LAB
BACTERIA SPEC CULT: NORMAL
SERVICE CMNT-IMP: NORMAL

## 2023-10-26 LAB
BACTERIA SPEC CULT: ABNORMAL
GRAM STN SPEC: ABNORMAL
GRAM STN SPEC: ABNORMAL
SERVICE CMNT-IMP: ABNORMAL

## 2023-10-30 ENCOUNTER — HOSPITAL ENCOUNTER (OUTPATIENT)
Facility: HOSPITAL | Age: 78
Discharge: HOME | End: 2023-10-30
Attending: FAMILY MEDICINE
Payer: MEDICARE

## 2023-10-30 VITALS
TEMPERATURE: 97.6 F | DIASTOLIC BLOOD PRESSURE: 74 MMHG | OXYGEN SATURATION: 97 % | SYSTOLIC BLOOD PRESSURE: 111 MMHG | RESPIRATION RATE: 16 BRPM | HEART RATE: 73 BPM

## 2023-10-30 DIAGNOSIS — I83.018 VARICOSE VEINS OF RIGHT LOWER EXTREMITY WITH ULCER OF OTHER PART OF LOWER LEG WITH FAT LAYER EXPOSED (HCC): ICD-10-CM

## 2023-10-30 DIAGNOSIS — L97.212 CALF ULCER, RIGHT, WITH FAT LAYER EXPOSED (HCC): Primary | ICD-10-CM

## 2023-10-30 DIAGNOSIS — L97.211 CALF ULCER, RIGHT, LIMITED TO BREAKDOWN OF SKIN (HCC): ICD-10-CM

## 2023-10-30 DIAGNOSIS — L97.812 VARICOSE VEINS OF RIGHT LOWER EXTREMITY WITH ULCER OF OTHER PART OF LOWER LEG WITH FAT LAYER EXPOSED (HCC): ICD-10-CM

## 2023-10-30 PROCEDURE — 6370000000 HC RX 637 (ALT 250 FOR IP): Performed by: FAMILY MEDICINE

## 2023-10-30 PROCEDURE — 11042 DBRDMT SUBQ TIS 1ST 20SQCM/<: CPT

## 2023-10-30 RX ORDER — LIDOCAINE HYDROCHLORIDE 20 MG/ML
JELLY TOPICAL ONCE
OUTPATIENT
Start: 2023-10-30 | End: 2023-10-30

## 2023-10-30 RX ORDER — GINSENG 100 MG
CAPSULE ORAL ONCE
OUTPATIENT
Start: 2023-10-30 | End: 2023-10-30

## 2023-10-30 RX ORDER — BACITRACIN ZINC AND POLYMYXIN B SULFATE 500; 1000 [USP'U]/G; [USP'U]/G
OINTMENT TOPICAL ONCE
OUTPATIENT
Start: 2023-10-30 | End: 2023-10-30

## 2023-10-30 RX ORDER — LIDOCAINE 40 MG/G
CREAM TOPICAL ONCE
OUTPATIENT
Start: 2023-10-30 | End: 2023-10-30

## 2023-10-30 RX ORDER — DOXYCYCLINE HYCLATE 100 MG
100 TABLET ORAL 2 TIMES DAILY
Qty: 20 TABLET | Refills: 0 | Status: SHIPPED | OUTPATIENT
Start: 2023-10-30 | End: 2023-11-09

## 2023-10-30 RX ORDER — IBUPROFEN 200 MG
TABLET ORAL ONCE
OUTPATIENT
Start: 2023-10-30 | End: 2023-10-30

## 2023-10-30 RX ORDER — TRIAMCINOLONE ACETONIDE 1 MG/G
OINTMENT TOPICAL ONCE
OUTPATIENT
Start: 2023-10-30 | End: 2023-10-30

## 2023-10-30 RX ORDER — LIDOCAINE HYDROCHLORIDE 40 MG/ML
SOLUTION TOPICAL ONCE
OUTPATIENT
Start: 2023-10-30 | End: 2023-10-30

## 2023-10-30 RX ORDER — BETAMETHASONE DIPROPIONATE 0.05 %
OINTMENT (GRAM) TOPICAL ONCE
OUTPATIENT
Start: 2023-10-30 | End: 2023-10-30

## 2023-10-30 RX ORDER — CLOBETASOL PROPIONATE 0.5 MG/G
OINTMENT TOPICAL ONCE
OUTPATIENT
Start: 2023-10-30 | End: 2023-10-30

## 2023-10-30 RX ORDER — LIDOCAINE 50 MG/G
OINTMENT TOPICAL ONCE
OUTPATIENT
Start: 2023-10-30 | End: 2023-10-30

## 2023-10-30 RX ORDER — LIDOCAINE HYDROCHLORIDE 20 MG/ML
JELLY TOPICAL ONCE
Status: COMPLETED | OUTPATIENT
Start: 2023-10-30 | End: 2023-10-30

## 2023-10-30 RX ORDER — GENTAMICIN SULFATE 1 MG/G
OINTMENT TOPICAL ONCE
OUTPATIENT
Start: 2023-10-30 | End: 2023-10-30

## 2023-10-30 RX ADMIN — LIDOCAINE HYDROCHLORIDE: 20 JELLY TOPICAL at 09:27

## 2023-10-30 ASSESSMENT — PAIN SCALES - GENERAL: PAINLEVEL_OUTOF10: 1

## 2023-10-30 NOTE — DISCHARGE INSTRUCTIONS
Discharge Instructions from  [address] [place] [address] Dr  305 [facility], [address] [place] [address]  [phone] Fax [phone]    NAME:  Israel Case OF BIRTH:  1945  MEDICAL RECORD NUMBER:  [id number]  DATE: 10/30/2023    Wound Cleansing:   Do not scrub or use excessive force. Cleanse wound prior to applying a clean dressing with:  [] Normal Saline [] Keep Wound Dry in Shower    [] Wound Cleanser   [] Cleanse wound with Mild Soap & Water  [] May Shower at Discharge   [] Other:      Topical Treatments:  Do not apply lotions, creams, or ointments to wound bed unless directed. [] Apply moisturizing lotion to skin surrounding the wound prior to dressing change.  [] Apply antifungal ointment to skin surrounding the wound prior to dressing change.  [] Apply thin film of moisture barrier ointment to skin immediately around wound. [] Other:       Dressings:           Wound Location Right and Left leg   [] Apply Primary Dressing:       [] MediHoney Gel [] Alginate with Silver [] Alginate   [] Collagen [] Collagen with Silver   [] Santyl with Moisten saline gauze     [] Hydrocolloid   [] MediHoney Alginate [] Foam with Silver   [] Foam   [] Hydrofera Blue    [] Mepilex Border    [] Moisten with Saline [] Hydrogel [] Mepitel     [] Bactroban/Mupirocin [] Polysporin  [] Other:    [] Pack wound loosely with  [] Iodoform   [] Plain Packing  [] Other   [] Cover and Secure with:     [] Gauze [] Tamanna [] Kerlix   [] Ace Wrap [] Cover Roll Tape [] ABD     [] Other:    Avoid contact of tape with skin.   [] Change dressing: [] Daily    [] Every Other Day [] Three times per week   [] Once a week [x] Do Not Change Dressing   [] Other:            Edema Control:  Apply: [] Compression Stocking []Right Leg []Left Leg   [x] Tubigrip []Right Leg Double Layer []Left Leg Double Layer       []Right Leg Single Layer []Left Leg Single Layer   [] SpandaGrip [x]Right Leg  [x]Left Leg      []Low
Anemia due to blood loss
Uterine Cancer

## 2023-10-30 NOTE — FLOWSHEET NOTE
Wound Healing % -423   Post-Procedure Length (cm) 2.2 cm   Post-Procedure Width (cm) 1.9 cm   Post-Procedure Depth (cm) 0.3 cm   Post-Procedure Surface Area (cm^2) 4.18 cm^2   Post-Procedure Volume (cm^3) 1. 254 cm^3   Wound Assessment Devitalized tissue   Drainage Amount Moderate (25-50%)   Drainage Description Serosanguinous   Odor None   Guillermina-wound Assessment Dry/flaky   Margins Defined edges   Wound Thickness Description not for Pressure Injury Full thickness

## 2023-11-06 ENCOUNTER — HOSPITAL ENCOUNTER (OUTPATIENT)
Facility: HOSPITAL | Age: 78
Discharge: HOME | End: 2023-11-06
Attending: FAMILY MEDICINE
Payer: MEDICARE

## 2023-11-06 VITALS
TEMPERATURE: 97.5 F | HEART RATE: 98 BPM | OXYGEN SATURATION: 98 % | DIASTOLIC BLOOD PRESSURE: 80 MMHG | SYSTOLIC BLOOD PRESSURE: 129 MMHG | RESPIRATION RATE: 16 BRPM

## 2023-11-06 DIAGNOSIS — L97.212 CALF ULCER, RIGHT, WITH FAT LAYER EXPOSED (HCC): Primary | ICD-10-CM

## 2023-11-06 DIAGNOSIS — I83.018 VARICOSE VEINS OF RIGHT LOWER EXTREMITY WITH ULCER OF OTHER PART OF LOWER LEG WITH FAT LAYER EXPOSED (HCC): ICD-10-CM

## 2023-11-06 DIAGNOSIS — L97.211 CALF ULCER, RIGHT, LIMITED TO BREAKDOWN OF SKIN (HCC): ICD-10-CM

## 2023-11-06 DIAGNOSIS — L97.812 VARICOSE VEINS OF RIGHT LOWER EXTREMITY WITH ULCER OF OTHER PART OF LOWER LEG WITH FAT LAYER EXPOSED (HCC): ICD-10-CM

## 2023-11-06 PROCEDURE — 6370000000 HC RX 637 (ALT 250 FOR IP): Performed by: FAMILY MEDICINE

## 2023-11-06 PROCEDURE — 11042 DBRDMT SUBQ TIS 1ST 20SQCM/<: CPT

## 2023-11-06 RX ORDER — GENTAMICIN SULFATE 1 MG/G
OINTMENT TOPICAL ONCE
OUTPATIENT
Start: 2023-11-06 | End: 2023-11-06

## 2023-11-06 RX ORDER — LIDOCAINE HYDROCHLORIDE 40 MG/ML
SOLUTION TOPICAL ONCE
OUTPATIENT
Start: 2023-11-06 | End: 2023-11-06

## 2023-11-06 RX ORDER — LIDOCAINE 40 MG/G
CREAM TOPICAL ONCE
OUTPATIENT
Start: 2023-11-06 | End: 2023-11-06

## 2023-11-06 RX ORDER — CLOBETASOL PROPIONATE 0.5 MG/G
OINTMENT TOPICAL ONCE
OUTPATIENT
Start: 2023-11-06 | End: 2023-11-06

## 2023-11-06 RX ORDER — BETAMETHASONE DIPROPIONATE 0.05 %
OINTMENT (GRAM) TOPICAL ONCE
OUTPATIENT
Start: 2023-11-06 | End: 2023-11-06

## 2023-11-06 RX ORDER — LIDOCAINE HYDROCHLORIDE 20 MG/ML
JELLY TOPICAL ONCE
OUTPATIENT
Start: 2023-11-06 | End: 2023-11-06

## 2023-11-06 RX ORDER — TRIAMCINOLONE ACETONIDE 1 MG/G
OINTMENT TOPICAL ONCE
OUTPATIENT
Start: 2023-11-06 | End: 2023-11-06

## 2023-11-06 RX ORDER — LIDOCAINE HYDROCHLORIDE 20 MG/ML
JELLY TOPICAL ONCE
Status: COMPLETED | OUTPATIENT
Start: 2023-11-06 | End: 2023-11-06

## 2023-11-06 RX ORDER — LIDOCAINE 50 MG/G
OINTMENT TOPICAL ONCE
OUTPATIENT
Start: 2023-11-06 | End: 2023-11-06

## 2023-11-06 RX ORDER — IBUPROFEN 200 MG
TABLET ORAL ONCE
OUTPATIENT
Start: 2023-11-06 | End: 2023-11-06

## 2023-11-06 RX ORDER — BACITRACIN ZINC AND POLYMYXIN B SULFATE 500; 1000 [USP'U]/G; [USP'U]/G
OINTMENT TOPICAL ONCE
OUTPATIENT
Start: 2023-11-06 | End: 2023-11-06

## 2023-11-06 RX ORDER — GINSENG 100 MG
CAPSULE ORAL ONCE
OUTPATIENT
Start: 2023-11-06 | End: 2023-11-06

## 2023-11-06 RX ADMIN — LIDOCAINE HYDROCHLORIDE: 20 JELLY TOPICAL at 09:18

## 2023-11-06 ASSESSMENT — PAIN SCALES - GENERAL: PAINLEVEL_OUTOF10: 0

## 2023-11-06 NOTE — DISCHARGE INSTRUCTIONS
Discharge Instructions from  [address] [place] [address] Dr  305 [facility], [address] [place] [address]  [phone] Fax [phone]    NAME:  Priti Villalobos OF BIRTH:  1945  MEDICAL RECORD NUMBER:  [id number]  DATE:11/6/2023    Wound Cleansing:   Do not scrub or use excessive force. Cleanse wound prior to applying a clean dressing with:  [] Normal Saline [] Keep Wound Dry in Shower    [] Wound Cleanser   [] Cleanse wound with Mild Soap & Water  [] May Shower at Discharge   [] Other:      Topical Treatments:  Do not apply lotions, creams, or ointments to wound bed unless directed. [] Apply moisturizing lotion to skin surrounding the wound prior to dressing change.  [] Apply antifungal ointment to skin surrounding the wound prior to dressing change.  [] Apply thin film of moisture barrier ointment to skin immediately around wound. [] Other:       Dressings:           Wound Location Bilateral Legs   [] Apply Primary Dressing:       [] MediHoney Gel [] Alginate with Silver [] Alginate   [] Collagen [] Collagen with Silver   [] Santyl with Moisten saline gauze     [] Hydrocolloid   [] MediHoney Alginate [] Foam with Silver   [] Foam   [] Hydrofera Blue    [] Mepilex Border    [] Moisten with Saline [] Hydrogel [] Mepitel     [] Bactroban/Mupirocin [] Polysporin  [] Other:    [] Pack wound loosely with  [] Iodoform   [] Plain Packing  [] Other   [] Cover and Secure with:     [] Gauze [] Tamanna [] Kerlix   [] Ace Wrap [] Cover Roll Tape [] ABD     [] Other:    Avoid contact of tape with skin.   [] Change dressing: [] Daily    [] Every Other Day [] Three times per week   [] Once a week [x] Do Not Change Dressing   [] Other:           Edema Control:  Apply: [] Compression Stocking []Right Leg []Left Leg   [x] Tubigrip []Right Leg Double Layer []Left Leg Double Layer       [x]Right Leg Single Layer [x]Left Leg Single Layer   [] SpandaGrip []Right Leg  []Left Leg      []Low compression 5-10

## 2023-11-06 NOTE — PROGRESS NOTES
per week    Drug use: No    Sexual activity: Defer        Prior to Admission medications    Medication Sig Start Date End Date Taking? Authorizing Provider   doxycycline hyclate (VIBRA-TABS) 100 MG tablet Take 1 tablet by mouth 2 times daily for 10 days 10/30/23 11/9/23  May Jerez MD   meloxicam (MOBIC) 15 MG tablet Take 1 tablet by mouth daily 5/18/23   Manjinder Enamorado PA-C   furosemide (LASIX) 20 MG tablet Take 1 tablet by mouth as needed Indications: edema 7/1/22   Martin Boothe MD   apixaban (ELIQUIS) 5 MG TABS tablet 1 tablet 2 times daily Indications: a-fib 3/15/22   Martin Boothe MD   allopurinol (ZYLOPRIM) 300 MG tablet 1 tablet daily Indications: gout 3/1/23   ProviderMartin MD   Calcium Carb-Cholecalciferol 600-10 MG-MCG CAPS Take by mouth 2 times daily    Automatic Reconciliation, Ar   ibandronate (BONIVA) 150 MG tablet Take 1 tablet by mouth every 30 days Indications: Osteoporosis    Automatic Reconciliation, Ar   metFORMIN (GLUCOPHAGE) 1000 MG tablet Take 0.5 tablets by mouth 2 times daily (with meals) Indications: DM    Automatic Reconciliation, Ar   mometasone (NASONEX) 50 MCG/ACT nasal spray 2 sprays by Nasal route daily Indications: seasonal allergies    Automatic Reconciliation, Ar   montelukast (SINGULAIR) 10 MG tablet Take 1 tablet by mouth nightly Indications: allergies    Automatic Reconciliation, Ar   nebivolol (BYSTOLIC) 10 MG tablet Take 3 tablets by mouth daily Indications: b/p    Automatic Reconciliation, Ar   tadalafil (CIALIS) 5 MG tablet Take 1 tablet by mouth daily Indications: BPH    Automatic Reconciliation, Ar   testosterone (ANDROGEL; TESTIM) 50 MG/5GM (1%) GEL 1% gel Apply 5 g topically four times a week.     Automatic Reconciliation, Ar      No Known Allergies       Signed By: May Jerez MD      11/06/23

## 2023-11-06 NOTE — FLOWSHEET NOTE
Post-Procedure Length (cm) 2 cm   Post-Procedure Width (cm) 1.9 cm   Post-Procedure Depth (cm) 0.3 cm   Post-Procedure Surface Area (cm^2) 3.8 cm^2   Post-Procedure Volume (cm^3) 1.14 cm^3   Wound Assessment Devitalized tissue   Drainage Amount Moderate (25-50%)   Drainage Description Serosanguinous   Odor None   Guillermina-wound Assessment Dry/flaky   Margins Defined edges   Wound Thickness Description not for Pressure Injury Full thickness

## 2023-11-13 ENCOUNTER — HOSPITAL ENCOUNTER (OUTPATIENT)
Facility: HOSPITAL | Age: 78
Discharge: HOME | End: 2023-11-13
Attending: FAMILY MEDICINE
Payer: MEDICARE

## 2023-11-13 VITALS
SYSTOLIC BLOOD PRESSURE: 123 MMHG | HEART RATE: 80 BPM | DIASTOLIC BLOOD PRESSURE: 89 MMHG | TEMPERATURE: 97.9 F | RESPIRATION RATE: 16 BRPM | OXYGEN SATURATION: 100 %

## 2023-11-13 DIAGNOSIS — L97.211 CALF ULCER, RIGHT, LIMITED TO BREAKDOWN OF SKIN (HCC): ICD-10-CM

## 2023-11-13 DIAGNOSIS — I83.018 VARICOSE VEINS OF RIGHT LOWER EXTREMITY WITH ULCER OF OTHER PART OF LOWER LEG WITH FAT LAYER EXPOSED (HCC): ICD-10-CM

## 2023-11-13 DIAGNOSIS — L97.212 CALF ULCER, RIGHT, WITH FAT LAYER EXPOSED (HCC): Primary | ICD-10-CM

## 2023-11-13 DIAGNOSIS — L97.812 VARICOSE VEINS OF RIGHT LOWER EXTREMITY WITH ULCER OF OTHER PART OF LOWER LEG WITH FAT LAYER EXPOSED (HCC): ICD-10-CM

## 2023-11-13 PROCEDURE — 6370000000 HC RX 637 (ALT 250 FOR IP): Performed by: FAMILY MEDICINE

## 2023-11-13 PROCEDURE — 11042 DBRDMT SUBQ TIS 1ST 20SQCM/<: CPT

## 2023-11-13 RX ORDER — LIDOCAINE 50 MG/G
OINTMENT TOPICAL ONCE
OUTPATIENT
Start: 2023-11-13 | End: 2023-11-13

## 2023-11-13 RX ORDER — TRIAMCINOLONE ACETONIDE 1 MG/G
OINTMENT TOPICAL ONCE
OUTPATIENT
Start: 2023-11-13 | End: 2023-11-13

## 2023-11-13 RX ORDER — BACITRACIN ZINC AND POLYMYXIN B SULFATE 500; 1000 [USP'U]/G; [USP'U]/G
OINTMENT TOPICAL ONCE
OUTPATIENT
Start: 2023-11-13 | End: 2023-11-13

## 2023-11-13 RX ORDER — BETAMETHASONE DIPROPIONATE 0.05 %
OINTMENT (GRAM) TOPICAL ONCE
OUTPATIENT
Start: 2023-11-13 | End: 2023-11-13

## 2023-11-13 RX ORDER — GINSENG 100 MG
CAPSULE ORAL ONCE
OUTPATIENT
Start: 2023-11-13 | End: 2023-11-13

## 2023-11-13 RX ORDER — LIDOCAINE HYDROCHLORIDE 20 MG/ML
JELLY TOPICAL ONCE
OUTPATIENT
Start: 2023-11-13 | End: 2023-11-13

## 2023-11-13 RX ORDER — LIDOCAINE 40 MG/G
CREAM TOPICAL ONCE
OUTPATIENT
Start: 2023-11-13 | End: 2023-11-13

## 2023-11-13 RX ORDER — LIDOCAINE HYDROCHLORIDE 20 MG/ML
JELLY TOPICAL ONCE
Status: COMPLETED | OUTPATIENT
Start: 2023-11-13 | End: 2023-11-13

## 2023-11-13 RX ORDER — IBUPROFEN 200 MG
TABLET ORAL ONCE
OUTPATIENT
Start: 2023-11-13 | End: 2023-11-13

## 2023-11-13 RX ORDER — LIDOCAINE HYDROCHLORIDE 40 MG/ML
SOLUTION TOPICAL ONCE
OUTPATIENT
Start: 2023-11-13 | End: 2023-11-13

## 2023-11-13 RX ORDER — GENTAMICIN SULFATE 1 MG/G
OINTMENT TOPICAL ONCE
OUTPATIENT
Start: 2023-11-13 | End: 2023-11-13

## 2023-11-13 RX ORDER — CLOBETASOL PROPIONATE 0.5 MG/G
OINTMENT TOPICAL ONCE
OUTPATIENT
Start: 2023-11-13 | End: 2023-11-13

## 2023-11-13 RX ADMIN — LIDOCAINE HYDROCHLORIDE: 20 JELLY TOPICAL at 09:13

## 2023-11-13 ASSESSMENT — PAIN SCALES - GENERAL: PAINLEVEL_OUTOF10: 0

## 2023-11-13 NOTE — DISCHARGE INSTRUCTIONS
Discharge Instructions from  [address] [place] [address] Dr  305 [facility], [address] [place] [address]  [phone] Fax [phone]    NAME:  Tabby Elias OF BIRTH:  1945  MEDICAL RECORD NUMBER:  [id number]  DATE: 11/13/2023    Wound Cleansing:   Do not scrub or use excessive force. Cleanse wound prior to applying a clean dressing with:  [x] Normal Saline [] Keep Wound Dry in Shower    [] Wound Cleanser   [] Cleanse wound with Mild Soap & Water  [] May Shower at Discharge   [] Other:      Topical Treatments:  Do not apply lotions, creams, or ointments to wound bed unless directed. [] Apply moisturizing lotion to skin surrounding the wound prior to dressing change.  [] Apply antifungal ointment to skin surrounding the wound prior to dressing change.  [] Apply thin film of moisture barrier ointment to skin immediately around wound. [] Other:         Dressings:           Wound Location Right and Left leg   [] Apply Primary Dressing:       [] MediHoney Gel [x] Alginate with Silver [] Alginate   [] Collagen [x] Collagen with Silver(small pieces)   [] Santyl with Moisten saline gauze     [] Hydrocolloid   [] MediHoney Alginate [] Foam with Silver   [] Foam   [] Hydrofera Blue    [] Mepilex Border    [] Moisten with Saline [] Hydrogel [] Mepitel     [] Bactroban/Mupirocin [] Polysporin  [] Other:    [] Pack wound loosely with  [] Iodoform   [] Plain Packing  [] Other   [] Cover and Secure with:     [] Gauze [] Tamanna [] Kerlix   [] Ace Wrap [] Cover Roll Tape [] ABD     [x] Other: Silicone border   Avoid contact of tape with skin.   [x] Change dressing:    [x] Once a week         Edema Control:  Apply: [x] Farrow wraps [x]Right Leg [x]Left Leg   [x] Tubigrip []Right Leg Double Layer []Left Leg Double Layer       [x]Right Leg Single Layer [x]Left Leg Single Layer   [] SpandaGrip []Right Leg  []Left Leg      []Low compression 5-10 mm/Hg      []Medium compression 10-20 mm/Hg     []High

## 2023-11-13 NOTE — PROGRESS NOTES
Wound Center  Progress Note / Procedure Note      Chief Complaint:  Cass Zhou is a 77 y.o.  male  with R lower leg anterior wound of few months duration. Assessment/Plan     77 y.o. male with Dm2    -R lower leg anterior venous stasis ulcer. Full thickness  cluster of 2, infection improved; size about same  Slough/granular  Necessitates debridement  for wound healing and to prevent/heal infection  Ulcer needs debridement- see note below  Culture reviewed, doxycycline sent to pharmacy, SE discussed    -Left leg ulcer,  Full thickness  infection improved; size about same  Slough/granular  Necessitates debridement  for wound healing and to prevent/heal infection  Ulcer needs debridement- see note below    Discussed use of skin subs  Out of town almost every other week, will start in Jan if still needed    -Dm2  A1c 6.5  Does not check at home  Controlled with metformin    -Leg swelling/varicosities  Has arthritis      Following discussed with patient   Needs :  Serial debridement- prn    Good local wound care  Dressing: collagen, alginate;   Frequency : few times a week        -Edema management  Do not get dressing wet    Edema management:  Elevate leg(s) throughout the day starting in the morning  Compression: 2 layer tubis/ velcros  Avoid prolonged standing      -Good Diabetic control      Patient/  understood and agrees with plan. Questions answered.       Follow up with me 2 week  (out of town next week)    Subjective:     Since last visit  Tolerating abx- few days left    HPI:     Acquired wounds a few months ago  Local trauma  Not healing, being managed by [facility]  Adv to come here      History/Chart/Medications reviewed    Wound caused by:   Current wound care:See flowsheet  Offloading wound:   Appetite: good  Wound associated pain: See flowsheet  Diabetic: yes  Smoker: no  ROS: no N/V/D, no T/chills; no local rash, no chest pain or shortness of breath, no headache or dizzyness      Objective:

## 2023-11-13 NOTE — FLOWSHEET NOTE
11/13/23 0926   Wound 08/14/23 Leg Medial;Left   Date First Assessed/Time First Assessed: 08/14/23 1118   Present on Hospital Admission: Yes  Wound Approximate Age at First Assessment (Weeks): 1 weeks  Primary Wound Type: Traumatic  Location: Leg  Wound Location Orientation: Medial;Left   Wound Image     Wound Etiology Venous   Dressing Status Intact   Wound Cleansed Wound cleanser   Dressing/Treatment Collagen with Ag;Alginate with Ag;Silicone border;Tubular bandage   Offloading for Diabetic Foot Ulcers Offloading not required   Dressing Change Due 11/27/23   Wound Length (cm) 1 cm   Wound Width (cm) 1 cm   Wound Depth (cm) 0.2 cm   Wound Surface Area (cm^2) 1 cm^2   Change in Wound Size % (l*w) 75   Wound Volume (cm^3) 0.2 cm^3   Wound Healing % 50   Post-Procedure Length (cm) 1.1 cm   Post-Procedure Width (cm) 1.1 cm   Post-Procedure Depth (cm) 0.3 cm   Post-Procedure Surface Area (cm^2) 1.21 cm^2   Post-Procedure Volume (cm^3) 0.363 cm^3   Wound Assessment Devitalized tissue   Drainage Amount Small (< 25%)   Drainage Description Serosanguinous   Odor None   Guillermina-wound Assessment Intact   Margins Defined edges   Wound Thickness Description not for Pressure Injury Full thickness   Wound 02/27/23 Leg Right;Lateral   Date First Assessed/Time First Assessed: 02/27/23 1057   Present on Hospital Admission: Yes  Wound Approximate Age at First Assessment (Weeks): 8 weeks  Primary Wound Type: Venous Ulcer  Location: Leg  Wound Location Orientation: Right;Lateral   Wound Image     Wound Etiology Venous   Dressing Status Intact   Wound Cleansed Wound cleanser   Dressing/Treatment Collagen with Ag;Alginate with Ag;Silicone border;Tubular bandage   Offloading for Diabetic Foot Ulcers Offloading not required   Dressing Change Due 11/27/23   Wound Length (cm) 2.1 cm   Wound Width (cm) 1.9 cm   Wound Depth (cm) 0.1 cm   Wound Surface Area (cm^2) 3.99 cm^2   Change in Wound Size % (l*w) -149.38   Wound Volume (cm^3) 0.399 cm^3

## 2023-11-27 ENCOUNTER — HOSPITAL ENCOUNTER (OUTPATIENT)
Facility: HOSPITAL | Age: 78
Discharge: HOME | End: 2023-11-27
Attending: FAMILY MEDICINE
Payer: MEDICARE

## 2023-11-27 VITALS
RESPIRATION RATE: 16 BRPM | OXYGEN SATURATION: 98 % | HEART RATE: 95 BPM | TEMPERATURE: 98 F | DIASTOLIC BLOOD PRESSURE: 54 MMHG | SYSTOLIC BLOOD PRESSURE: 133 MMHG

## 2023-11-27 DIAGNOSIS — L97.212 CALF ULCER, RIGHT, WITH FAT LAYER EXPOSED (HCC): Primary | ICD-10-CM

## 2023-11-27 DIAGNOSIS — I83.018 VARICOSE VEINS OF RIGHT LOWER EXTREMITY WITH ULCER OF OTHER PART OF LOWER LEG WITH FAT LAYER EXPOSED (HCC): ICD-10-CM

## 2023-11-27 DIAGNOSIS — L97.211 CALF ULCER, RIGHT, LIMITED TO BREAKDOWN OF SKIN (HCC): ICD-10-CM

## 2023-11-27 DIAGNOSIS — L97.812 VARICOSE VEINS OF RIGHT LOWER EXTREMITY WITH ULCER OF OTHER PART OF LOWER LEG WITH FAT LAYER EXPOSED (HCC): ICD-10-CM

## 2023-11-27 PROCEDURE — 11042 DBRDMT SUBQ TIS 1ST 20SQCM/<: CPT

## 2023-11-27 PROCEDURE — 6370000000 HC RX 637 (ALT 250 FOR IP): Performed by: FAMILY MEDICINE

## 2023-11-27 RX ORDER — LIDOCAINE HYDROCHLORIDE 20 MG/ML
JELLY TOPICAL ONCE
OUTPATIENT
Start: 2023-11-27 | End: 2023-11-27

## 2023-11-27 RX ORDER — LIDOCAINE 40 MG/G
CREAM TOPICAL ONCE
OUTPATIENT
Start: 2023-11-27 | End: 2023-11-27

## 2023-11-27 RX ORDER — LIDOCAINE HYDROCHLORIDE 20 MG/ML
JELLY TOPICAL ONCE
Status: COMPLETED | OUTPATIENT
Start: 2023-11-27 | End: 2023-11-27

## 2023-11-27 RX ORDER — BETAMETHASONE DIPROPIONATE 0.05 %
OINTMENT (GRAM) TOPICAL ONCE
OUTPATIENT
Start: 2023-11-27 | End: 2023-11-27

## 2023-11-27 RX ORDER — CLOBETASOL PROPIONATE 0.5 MG/G
OINTMENT TOPICAL ONCE
OUTPATIENT
Start: 2023-11-27 | End: 2023-11-27

## 2023-11-27 RX ORDER — TRIAMCINOLONE ACETONIDE 1 MG/G
OINTMENT TOPICAL ONCE
OUTPATIENT
Start: 2023-11-27 | End: 2023-11-27

## 2023-11-27 RX ORDER — IBUPROFEN 200 MG
TABLET ORAL ONCE
OUTPATIENT
Start: 2023-11-27 | End: 2023-11-27

## 2023-11-27 RX ORDER — GINSENG 100 MG
CAPSULE ORAL ONCE
OUTPATIENT
Start: 2023-11-27 | End: 2023-11-27

## 2023-11-27 RX ORDER — GENTAMICIN SULFATE 1 MG/G
OINTMENT TOPICAL ONCE
OUTPATIENT
Start: 2023-11-27 | End: 2023-11-27

## 2023-11-27 RX ORDER — LIDOCAINE HYDROCHLORIDE 40 MG/ML
SOLUTION TOPICAL ONCE
OUTPATIENT
Start: 2023-11-27 | End: 2023-11-27

## 2023-11-27 RX ORDER — BACITRACIN ZINC AND POLYMYXIN B SULFATE 500; 1000 [USP'U]/G; [USP'U]/G
OINTMENT TOPICAL ONCE
OUTPATIENT
Start: 2023-11-27 | End: 2023-11-27

## 2023-11-27 RX ORDER — LIDOCAINE 50 MG/G
OINTMENT TOPICAL ONCE
OUTPATIENT
Start: 2023-11-27 | End: 2023-11-27

## 2023-11-27 RX ADMIN — LIDOCAINE HYDROCHLORIDE: 20 JELLY TOPICAL at 09:17

## 2023-11-27 ASSESSMENT — PAIN SCALES - GENERAL: PAINLEVEL_OUTOF10: 0

## 2023-11-27 NOTE — FLOWSHEET NOTE
11/27/23 0934   Wound 08/14/23 Leg Medial;Left   Date First Assessed/Time First Assessed: 08/14/23 1118   Present on Hospital Admission: Yes  Wound Approximate Age at First Assessment (Weeks): 1 weeks  Primary Wound Type: Traumatic  Location: Leg  Wound Location Orientation: Medial;Left   Wound Image     Wound Etiology Venous   Dressing Status Intact   Wound Cleansed Wound cleanser   Dressing/Treatment Collagen with Ag;Alginate;Silicone border;Tubular bandage   Offloading for Diabetic Foot Ulcers Offloading not required   Dressing Change Due 12/11/23   Wound Length (cm) 0.8 cm   Wound Width (cm) 0.9 cm   Wound Depth (cm) 0.1 cm   Wound Surface Area (cm^2) 0.72 cm^2   Change in Wound Size % (l*w) 82   Wound Volume (cm^3) 0.072 cm^3   Wound Healing % 82   Post-Procedure Length (cm) 0.8 cm   Post-Procedure Width (cm) 0.9 cm   Post-Procedure Depth (cm) 0.3 cm   Post-Procedure Surface Area (cm^2) 0.72 cm^2   Post-Procedure Volume (cm^3) 0.216 cm^3   Wound Assessment Devitalized tissue   Drainage Amount Small (< 25%)   Drainage Description Serosanguinous   Odor None   Guillemrina-wound Assessment Intact   Margins Defined edges   Wound Thickness Description not for Pressure Injury Full thickness   Wound 02/27/23 Leg Right;Lateral   Date First Assessed/Time First Assessed: 02/27/23 1057   Present on Hospital Admission: Yes  Wound Approximate Age at First Assessment (Weeks): 8 weeks  Primary Wound Type: Venous Ulcer  Location: Leg  Wound Location Orientation: Right;Lateral   Wound Image      Wound Etiology Venous   Dressing Status Intact   Wound Cleansed Wound cleanser   Dressing/Treatment Collagen with Ag;Alginate;Silicone border;Tubular bandage   Offloading for Diabetic Foot Ulcers Offloading not required   Dressing Change Due 12/11/23   Wound Length (cm) 2 cm   Wound Width (cm) 1.8 cm   Wound Depth (cm) 0.1 cm   Wound Surface Area (cm^2) 3.6 cm^2   Change in Wound Size % (l*w) -125   Wound Volume (cm^3) 0.36 cm^3   Wound

## 2023-11-27 NOTE — PROGRESS NOTES
Substance and Sexual Activity    Alcohol use: Yes     Alcohol/week: 2.0 standard drinks of alcohol     Types: 2 Cans of beer per week    Drug use: No    Sexual activity: Defer        Prior to Admission medications    Medication Sig Start Date End Date Taking? Authorizing Provider   meloxicam (MOBIC) 15 MG tablet Take 1 tablet by mouth daily 5/18/23   Manjinder Enamorado PA-C   furosemide (LASIX) 20 MG tablet Take 1 tablet by mouth as needed Indications: edema 7/1/22   ProviderMartin MD   apixaban (ELIQUIS) 5 MG TABS tablet 1 tablet 2 times daily Indications: a-fib 3/15/22   Martin Boothe MD   allopurinol (ZYLOPRIM) 300 MG tablet 1 tablet daily Indications: gout 3/1/23   Martin Boothe MD   Calcium Carb-Cholecalciferol 600-10 MG-MCG CAPS Take by mouth 2 times daily    Automatic Reconciliation, Ar   ibandronate (BONIVA) 150 MG tablet Take 1 tablet by mouth every 30 days Indications: Osteoporosis    Automatic Reconciliation, Ar   metFORMIN (GLUCOPHAGE) 1000 MG tablet Take 0.5 tablets by mouth 2 times daily (with meals) Indications: DM    Automatic Reconciliation, Ar   mometasone (NASONEX) 50 MCG/ACT nasal spray 2 sprays by Nasal route daily Indications: seasonal allergies    Automatic Reconciliation, Ar   montelukast (SINGULAIR) 10 MG tablet Take 1 tablet by mouth nightly Indications: allergies    Automatic Reconciliation, Ar   nebivolol (BYSTOLIC) 10 MG tablet Take 3 tablets by mouth daily Indications: b/p    Automatic Reconciliation, Ar   tadalafil (CIALIS) 5 MG tablet Take 1 tablet by mouth daily Indications: BPH    Automatic Reconciliation, Ar   testosterone (ANDROGEL; TESTIM) 50 MG/5GM (1%) GEL 1% gel Apply 5 g topically four times a week.     Automatic Reconciliation, Ar      No Known Allergies       Signed By: May Jerez MD      11/27/23

## 2023-11-27 NOTE — DISCHARGE INSTRUCTIONS
compression 5-10 mm/Hg      []Medium compression 10-20 mm/Hg     []High compression  20-30 mm/Hg   every morning immediately when getting up should be applied to affected leg(s) from mid foot to knee making sure to cover the heel. Remove every night before going to bed. [x] Elevate leg(s) above the level of the heart when sitting. [x] Avoid prolonged standing in one place. Dietary:  [x] Diet as tolerated: [] Calorie Diabetic Diet: [] No Added Salt:  [] Increase Protein: [] Other:       Activity:  [x] Activity as tolerated:  [] Patient has no activity restrictions     [] Strict Bedrest: [] Remain off Work:     [] May return to full duty work:                                   [] Return to work with restrictions:             Return Appointment:  [] Wound and dressing supply provider:   [] ECF or Home Healthcare:  [] Wound Assessment: [] Physician or NP scheduled for Wound Assessment:   [x] Return Appointment: With Dr. Catrina Mims  in  [address] [place] [address])  [] Ordered tests:      Electronically signed Jessica Rodriguez RN on 11/27/2023 at [address]: Should you experience any significant changes in your wound(s) or have questions about your wound care, please contact the [facility] at [address] [place] 8:00 am - 4:30. If you need help with your wound outside these hours and cannot wait until we are again available, contact your PCP or go to the hospital emergency room. PLEASE NOTE: IF YOU ARE UNABLE TO OBTAIN WOUND SUPPLIES, CONTINUE TO USE THE SUPPLIES YOU HAVE AVAILABLE UNTIL YOU ARE ABLE TO REACH US. IT IS MOST IMPORTANT TO KEEP THE WOUND COVERED AT ALL TIMES.      Physician Signature:_______________________    Date: ___________ Time:  ____________

## 2023-12-28 ENCOUNTER — HOSPITAL ENCOUNTER (OUTPATIENT)
Facility: HOSPITAL | Age: 78
Discharge: HOME | End: 2023-12-28
Attending: FAMILY MEDICINE
Payer: MEDICARE

## 2023-12-28 DIAGNOSIS — L97.812 VARICOSE VEINS OF RIGHT LOWER EXTREMITY WITH ULCER OF OTHER PART OF LOWER LEG WITH FAT LAYER EXPOSED (HCC): ICD-10-CM

## 2023-12-28 DIAGNOSIS — I83.018 VARICOSE VEINS OF RIGHT LOWER EXTREMITY WITH ULCER OF OTHER PART OF LOWER LEG WITH FAT LAYER EXPOSED (HCC): ICD-10-CM

## 2023-12-28 DIAGNOSIS — L97.212 CALF ULCER, RIGHT, WITH FAT LAYER EXPOSED (HCC): Primary | ICD-10-CM

## 2023-12-28 DIAGNOSIS — L97.211 CALF ULCER, RIGHT, LIMITED TO BREAKDOWN OF SKIN (HCC): ICD-10-CM

## 2023-12-28 PROCEDURE — 99213 OFFICE O/P EST LOW 20 MIN: CPT

## 2023-12-28 RX ORDER — LIDOCAINE HYDROCHLORIDE 20 MG/ML
JELLY TOPICAL ONCE
OUTPATIENT
Start: 2023-12-28 | End: 2023-12-28

## 2023-12-28 RX ORDER — BACITRACIN ZINC AND POLYMYXIN B SULFATE 500; 1000 [USP'U]/G; [USP'U]/G
OINTMENT TOPICAL ONCE
OUTPATIENT
Start: 2023-12-28 | End: 2023-12-28

## 2023-12-28 RX ORDER — LIDOCAINE 50 MG/G
OINTMENT TOPICAL ONCE
OUTPATIENT
Start: 2023-12-28 | End: 2023-12-28

## 2023-12-28 RX ORDER — LIDOCAINE 40 MG/G
CREAM TOPICAL ONCE
OUTPATIENT
Start: 2023-12-28 | End: 2023-12-28

## 2023-12-28 RX ORDER — GINSENG 100 MG
CAPSULE ORAL ONCE
OUTPATIENT
Start: 2023-12-28 | End: 2023-12-28

## 2023-12-28 RX ORDER — IBUPROFEN 200 MG
TABLET ORAL ONCE
OUTPATIENT
Start: 2023-12-28 | End: 2023-12-28

## 2023-12-28 RX ORDER — BETAMETHASONE DIPROPIONATE 0.05 %
OINTMENT (GRAM) TOPICAL ONCE
OUTPATIENT
Start: 2023-12-28 | End: 2023-12-28

## 2023-12-28 RX ORDER — TRIAMCINOLONE ACETONIDE 1 MG/G
OINTMENT TOPICAL ONCE
OUTPATIENT
Start: 2023-12-28 | End: 2023-12-28

## 2023-12-28 RX ORDER — CLOBETASOL PROPIONATE 0.5 MG/G
OINTMENT TOPICAL ONCE
OUTPATIENT
Start: 2023-12-28 | End: 2023-12-28

## 2023-12-28 RX ORDER — LIDOCAINE HYDROCHLORIDE 40 MG/ML
SOLUTION TOPICAL ONCE
OUTPATIENT
Start: 2023-12-28 | End: 2023-12-28

## 2023-12-28 RX ORDER — GENTAMICIN SULFATE 1 MG/G
OINTMENT TOPICAL ONCE
OUTPATIENT
Start: 2023-12-28 | End: 2023-12-28

## 2023-12-28 NOTE — DISCHARGE INSTRUCTIONS
Discharge Instructions from  [address] [place] [address] Dr  [address] [facility], [address] [place] [address]  [phone] Fax [phone]    NAME:  Saira Colindres OF BIRTH:  1945  MEDICAL RECORD NUMBER:  [id number]  DATE: 12/28/2023    Wound Cleansing:   Do not scrub or use excessive force. Cleanse wound prior to applying a clean dressing with:  [] Normal Saline [] Keep Wound Dry in Shower    [] Wound Cleanser   [] Cleanse wound with Mild Soap & Water  [] May Shower at Discharge   [] Other:      Topical Treatments:  Do not apply lotions, creams, or ointments to wound bed unless directed. [] Apply moisturizing lotion to skin surrounding the wound prior to dressing change.  [] Apply antifungal ointment to skin surrounding the wound prior to dressing change.  [] Apply thin film of moisture barrier ointment to skin immediately around wound. [] Other:       Dressings:           Wound Location Bilateral Legs   [] Apply Primary Dressing:       [] MediHoney Gel [] Alginate with Silver [] Alginate   [] Collagen [] Collagen with Silver   [] Santyl with Moisten saline gauze     [] Hydrocolloid   [] MediHoney Alginate [] Foam with Silver   [] Foam   [] Hydrofera Blue    [] Mepilex Border    [] Moisten with Saline [] Hydrogel [] Mepitel     [] Bactroban/Mupirocin [] Polysporin  [] Other:    [] Pack wound loosely with  [] Iodoform   [] Plain Packing  [] Other   [] Cover and Secure with:     [] Gauze [] Tamanna [] Kerlix   [] Ace Wrap [] Cover Roll Tape [] ABD     [] Other:    Avoid contact of tape with skin.   [] Change dressing: [] Daily    [] Every Other Day [] Three times per week   [] Once a week [x] Do Not Change Dressing   [] Other:        Edema Control:  Apply: [] Rubin Nelson  [x]Right Leg [x]Left Leg   [] Tubigrip []Right Leg Double Layer []Left Leg Double Layer       []Right Leg Single Layer []Left Leg Single Layer   [] SpandaGrip []Right Leg  []Left Leg      []Low compression 5-10

## 2023-12-28 NOTE — FLOWSHEET NOTE
12/28/23 1224   Wound 08/14/23 Leg Medial;Left   Date First Assessed/Time First Assessed: 08/14/23 1118   Present on Hospital Admission: Yes  Wound Approximate Age at First Assessment (Weeks): 1 weeks  Primary Wound Type: Traumatic  Location: Leg  Wound Location Orientation: Medial;Left   Wound Image    Wound Etiology Venous   Dressing Status Old drainage noted   Wound Cleansed Wound cleanser   Dressing/Treatment Collagen with Ag;Alginate; Tubular bandage;Silicone border  (Farrow wrap)   Offloading for Diabetic Foot Ulcers Offloading not required   Dressing Change Due 01/04/24   Wound Length (cm) 0.6 cm   Wound Width (cm) 0.6 cm   Wound Depth (cm) 0.2 cm   Wound Surface Area (cm^2) 0.36 cm^2   Change in Wound Size % (l*w) 91   Wound Volume (cm^3) 0.072 cm^3   Wound Healing % 82   Wound Assessment Devitalized tissue   Drainage Amount Moderate (25-50%)   Drainage Description Serosanguinous   Odor None   Guillermina-wound Assessment Excoriated   Margins Defined edges   Wound Thickness Description not for Pressure Injury Full thickness   Wound 02/27/23 Leg Right;Lateral   Date First Assessed/Time First Assessed: 02/27/23 1057   Present on Hospital Admission: Yes  Wound Approximate Age at First Assessment (Weeks): 8 weeks  Primary Wound Type: Venous Ulcer  Location: Leg  Wound Location Orientation: Right;Lateral   Wound Image    Wound Etiology Venous   Dressing Status Intact; Old drainage noted   Wound Cleansed Wound cleanser   Dressing/Treatment Collagen with Ag;Xeroform;Alginate;Roll gauze;Dry dressing;Tubular bandage  (Farrow wrap)   Offloading for Diabetic Foot Ulcers Offloading not required   Dressing Change Due 01/04/24   Wound Length (cm) 12.5 cm  (Area of ruptured blisters and wound)   Wound Width (cm) 9 cm   Wound Depth (cm) 0.1 cm   Wound Surface Area (cm^2) 112.5 cm^2   Change in Wound Size % (l*w) -8212.64   Wound Volume (cm^3) 11.25 cm^3   Wound Healing % -6931   Wound Assessment Devitalized tissue   Drainage

## 2024-01-04 ENCOUNTER — HOSPITAL ENCOUNTER (OUTPATIENT)
Facility: HOSPITAL | Age: 79
Discharge: HOME | End: 2024-01-04
Attending: FAMILY MEDICINE
Payer: MEDICARE

## 2024-01-04 VITALS
TEMPERATURE: 97.6 F | RESPIRATION RATE: 16 BRPM | SYSTOLIC BLOOD PRESSURE: 120 MMHG | DIASTOLIC BLOOD PRESSURE: 70 MMHG | HEART RATE: 105 BPM | OXYGEN SATURATION: 99 %

## 2024-01-04 PROCEDURE — 99213 OFFICE O/P EST LOW 20 MIN: CPT

## 2024-01-04 ASSESSMENT — PAIN SCALES - GENERAL: PAINLEVEL_OUTOF10: 0

## 2024-01-04 NOTE — DISCHARGE INSTRUCTIONS
Discharge Instructions from  [facility]  [facility]  [address] [place] [address]  [phone] Fax [phone]    NAME:  Malik Clarke  YOB: 1945  MEDICAL RECORD NUMBER:  [id number]  DATE: 1/4/2024    Wound Cleansing:   Do not scrub or use excessive force.  Cleanse wound prior to applying a clean dressing with:  [] Normal Saline [] Keep Wound Dry in Shower    [] Wound Cleanser   [] Cleanse wound with Mild Soap & Water  [] May Shower at Discharge   [] Other:      Topical Treatments:  Do not apply lotions, creams, or ointments to wound bed unless directed.   [] Apply moisturizing lotion to skin surrounding the wound prior to dressing change.  [] Apply antifungal ointment to skin surrounding the wound prior to dressing change.  [] Apply thin film of moisture barrier ointment to skin immediately around wound.  [] Other:       Dressings:           Wound Location Bilateral Legs   [] Apply Primary Dressing:       [] MediHoney Gel [] Alginate with Silver [] Alginate   [] Collagen [] Collagen with Silver   [] Santyl with Moisten saline gauze     [] Hydrocolloid   [] MediHoney Alginate [] Foam with Silver   [] Foam   [] Hydrofera Blue    [] Mepilex Border    [] Moisten with Saline [] Hydrogel [] Mepitel     [] Bactroban/Mupirocin [] Polysporin  [] Other:    [] Pack wound loosely with  [] Iodoform   [] Plain Packing  [] Other   [] Cover and Secure with:     [] Gauze [] Tamanna [] Kerlix   [] Ace Wrap [] Cover Roll Tape [] ABD     [] Other:    Avoid contact of tape with skin.  [] Change dressing: [] Daily    [] Every Other Day [] Three times per week   [] Once a week [x] Do Not Change Dressing   [] Other:       Edema Control:  Apply: [] Farrow wraps [x]Right Leg [x]Left Leg   [x] Tubigrip []Right Leg Double Layer []Left Leg Double Layer       [x]Right Leg Single Layer [x]Left Leg Single Layer   [] SpandaGrip []Right Leg  []Left Leg      []Low compression 5-10

## 2024-01-04 NOTE — FLOWSHEET NOTE
01/04/24 0954   Wound 08/14/23 Leg Medial;Left   Date First Assessed/Time First Assessed: 08/14/23 1118   Present on Hospital Admission: Yes  Wound Approximate Age at First Assessment (Weeks): 1 weeks  Primary Wound Type: Traumatic  Location: Leg  Wound Location Orientation: Medial;Left   Wound Image    Wound Etiology Venous   Dressing Status Intact   Wound Cleansed Wound cleanser   Dressing/Treatment Alginate;Silicone border   Offloading for Diabetic Foot Ulcers Offloading not required   Dressing Change Due 01/08/24   Wound Length (cm) 0.5 cm   Wound Width (cm) 0.4 cm   Wound Depth (cm) 0.1 cm   Wound Surface Area (cm^2) 0.2 cm^2   Change in Wound Size % (l*w) 95   Wound Volume (cm^3) 0.02 cm^3   Wound Healing % 95   Wound Assessment Epithelialization;Pink/red   Drainage Amount Scant (moist but unmeasurable)   Drainage Description Serosanguinous   Odor None   Guillermina-wound Assessment Intact   Margins Attached edges   Wound 02/27/23 Leg Right;Lateral   Date First Assessed/Time First Assessed: 02/27/23 1057   Present on Hospital Admission: Yes  Wound Approximate Age at First Assessment (Weeks): 8 weeks  Primary Wound Type: Venous Ulcer  Location: Leg  Wound Location Orientation: Right;Lateral   Wound Image    Wound Etiology Venous   Dressing Status Intact   Wound Cleansed Wound cleanser   Dressing/Treatment Collagen with Ag;Xeroform;Dry dressing;Roll gauze  (tubigrip, farrow wrap)   Offloading for Diabetic Foot Ulcers Offloading not required   Dressing Change Due 01/08/24   Wound Length (cm) 5.7 cm  (area of ruptured blisters)   Wound Width (cm) 4.5 cm   Wound Depth (cm) 0.1 cm   Wound Surface Area (cm^2) 25.65 cm^2   Change in Wound Size % (l*w) -1503.13   Wound Volume (cm^3) 2.565 cm^3   Wound Healing % -1503   Wound Assessment Pink/red   Drainage Amount Moderate (25-50%)   Drainage Description Serosanguinous   Odor None   Guillermina-wound Assessment Fragile   Margins Defined edges   Wound Thickness Description not for

## 2024-01-08 ENCOUNTER — HOSPITAL ENCOUNTER (OUTPATIENT)
Facility: HOSPITAL | Age: 79
Discharge: HOME | End: 2024-01-08
Attending: FAMILY MEDICINE
Payer: MEDICARE

## 2024-01-08 VITALS
RESPIRATION RATE: 16 BRPM | DIASTOLIC BLOOD PRESSURE: 78 MMHG | HEART RATE: 102 BPM | SYSTOLIC BLOOD PRESSURE: 146 MMHG | TEMPERATURE: 97.6 F | OXYGEN SATURATION: 97 %

## 2024-01-08 DIAGNOSIS — I83.018 VARICOSE VEINS OF RIGHT LOWER EXTREMITY WITH ULCER OF OTHER PART OF LOWER LEG WITH FAT LAYER EXPOSED (HCC): ICD-10-CM

## 2024-01-08 DIAGNOSIS — L97.211 CALF ULCER, RIGHT, LIMITED TO BREAKDOWN OF SKIN (HCC): ICD-10-CM

## 2024-01-08 DIAGNOSIS — L97.812 VARICOSE VEINS OF RIGHT LOWER EXTREMITY WITH ULCER OF OTHER PART OF LOWER LEG WITH FAT LAYER EXPOSED (HCC): ICD-10-CM

## 2024-01-08 DIAGNOSIS — L97.212 CALF ULCER, RIGHT, WITH FAT LAYER EXPOSED (HCC): Primary | ICD-10-CM

## 2024-01-08 PROCEDURE — 6370000000 HC RX 637 (ALT 250 FOR IP): Performed by: FAMILY MEDICINE

## 2024-01-08 PROCEDURE — 11042 DBRDMT SUBQ TIS 1ST 20SQCM/<: CPT

## 2024-01-08 RX ORDER — BETAMETHASONE DIPROPIONATE 0.05 %
OINTMENT (GRAM) TOPICAL ONCE
OUTPATIENT
Start: 2024-01-08 | End: 2024-01-08

## 2024-01-08 RX ORDER — LIDOCAINE HYDROCHLORIDE 20 MG/ML
JELLY TOPICAL ONCE
Status: COMPLETED | OUTPATIENT
Start: 2024-01-08 | End: 2024-01-08

## 2024-01-08 RX ORDER — LIDOCAINE HYDROCHLORIDE 40 MG/ML
SOLUTION TOPICAL ONCE
OUTPATIENT
Start: 2024-01-08 | End: 2024-01-08

## 2024-01-08 RX ORDER — LIDOCAINE 50 MG/G
OINTMENT TOPICAL ONCE
OUTPATIENT
Start: 2024-01-08 | End: 2024-01-08

## 2024-01-08 RX ORDER — CLOBETASOL PROPIONATE 0.5 MG/G
OINTMENT TOPICAL ONCE
OUTPATIENT
Start: 2024-01-08 | End: 2024-01-08

## 2024-01-08 RX ORDER — BACITRACIN ZINC AND POLYMYXIN B SULFATE 500; 1000 [USP'U]/G; [USP'U]/G
OINTMENT TOPICAL ONCE
OUTPATIENT
Start: 2024-01-08 | End: 2024-01-08

## 2024-01-08 RX ORDER — IBUPROFEN 200 MG
TABLET ORAL ONCE
OUTPATIENT
Start: 2024-01-08 | End: 2024-01-08

## 2024-01-08 RX ORDER — GENTAMICIN SULFATE 1 MG/G
OINTMENT TOPICAL ONCE
OUTPATIENT
Start: 2024-01-08 | End: 2024-01-08

## 2024-01-08 RX ORDER — LIDOCAINE 40 MG/G
CREAM TOPICAL ONCE
OUTPATIENT
Start: 2024-01-08 | End: 2024-01-08

## 2024-01-08 RX ORDER — LIDOCAINE HYDROCHLORIDE 20 MG/ML
JELLY TOPICAL ONCE
OUTPATIENT
Start: 2024-01-08 | End: 2024-01-08

## 2024-01-08 RX ORDER — GINSENG 100 MG
CAPSULE ORAL ONCE
OUTPATIENT
Start: 2024-01-08 | End: 2024-01-08

## 2024-01-08 RX ORDER — TRIAMCINOLONE ACETONIDE 1 MG/G
OINTMENT TOPICAL ONCE
OUTPATIENT
Start: 2024-01-08 | End: 2024-01-08

## 2024-01-08 RX ADMIN — LIDOCAINE HYDROCHLORIDE: 20 JELLY TOPICAL at 11:48

## 2024-01-08 ASSESSMENT — PAIN SCALES - GENERAL: PAINLEVEL_OUTOF10: 0

## 2024-01-08 NOTE — DISCHARGE INSTRUCTIONS
Discharge Instructions from  [facility]  [facility]  [address] [place] [address]  [phone] Fax [phone]    NAME:  Malik Clarke  YOB: 1945  MEDICAL RECORD NUMBER:  [id number]  DATE: 1/8/2024    Wound Cleansing:   Do not scrub or use excessive force.  Cleanse wound prior to applying a clean dressing with:  [] Normal Saline [] Keep Wound Dry in Shower    [] Wound Cleanser   [] Cleanse wound with Mild Soap & Water  [] May Shower at Discharge   [] Other:      Topical Treatments:  Do not apply lotions, creams, or ointments to wound bed unless directed.   [] Apply moisturizing lotion to skin surrounding the wound prior to dressing change.  [] Apply antifungal ointment to skin surrounding the wound prior to dressing change.  [] Apply thin film of moisture barrier ointment to skin immediately around wound.  [] Other:       Dressings:           Wound Location Right Leg   [] Apply Primary Dressing:       [] MediHoney Gel [] Alginate with Silver [] Alginate   [] Collagen [] Collagen with Silver   [] Santyl with Moisten saline gauze     [] Hydrocolloid   [] MediHoney Alginate [] Foam with Silver   [] Foam   [] Hydrofera Blue    [] Mepilex Border    [] Moisten with Saline [] Hydrogel [] Mepitel     [] Bactroban/Mupirocin [] Polysporin  [] Other:    [] Pack wound loosely with  [] Iodoform   [] Plain Packing  [] Other   [] Cover and Secure with:     [] Gauze [] Tamanna [] Kerlix   [] Ace Wrap [] Cover Roll Tape [] ABD     [] Other:    Avoid contact of tape with skin.  [] Change dressing: [] Daily    [] Every Other Day [] Three times per week   [] Once a week [x] Do Not Change Dressing   [] Other:              Edema Control:  Apply: [x] Farrow wrap [x]Right Leg [x]Left Leg   [x] Tubigrip []Right Leg Double Layer []Left Leg Double Layer       [x]Right Leg Single Layer [x]Left Leg Single Layer   [] SpandaGrip []Right Leg  []Left Leg      []Low compression 5-10

## 2024-01-08 NOTE — PROGRESS NOTES
Wound Center  Progress Note / Procedure Note      Chief Complaint:  Malik Clarke is a 78 y.o.  male  with R lower leg anterior wound of few months duration.      Assessment/Plan     78 y.o. male with Dm2    -R lower leg anterior venous stasis ulcer.  Full thickness  Improved from last week (NV last week- wound had deteriorated ++)  Slough/granular  Necessitates debridement  for wound healing and to prevent/heal infection  Ulcer needs debridement- see note below    Discussed with patient -pros/cons/indications,mechanism  To get preauthorization from insurance     Indication for skin subs.   Application of skin substitute indicated here:  Wound treated with over 30 days of conservative therapy   Wound has been serially debrided, cultured, treated for infection  Wound has made progress, but has failed to heal in the appropriate time frame.   Medications reviewed  Wound will benefit from application of skin substitute  Will use: Grafix  Wound does not involve tendon, muscle, joint capsule, bone or sinus tract.  Wound is > 1sq cm.  Wound is well granulated; free of infection.  Vacular: status: good   Diabetic: controlled  Patient is non smoker  Offloading   Compression     -Left leg ulcer,  Full thickness  healed        -Dm2  A1c 6.5  Does not check at home  Controlled with metformin    -Leg swelling/varicosities  Has arthritis      Following discussed with patient   Needs :  Serial debridement- prn    Good local wound care  Dressing: collagen, alginate; zinc to niles  Frequency : once a week        -Edema management  Do not get dressing wet    Edema management:  Elevate leg(s) throughout the day starting in the morning  Compression: 2 layer tubis/ velcros  Avoid prolonged standing      -Good Diabetic control      Patient/  understood and agrees with plan. Questions answered.      Follow up with me 1 week ; Order Grafix 2x3    Subjective:     Since last visit  Wife here today, to discuss skin subs  Knee starting

## 2024-01-08 NOTE — FLOWSHEET NOTE
01/08/24 1155   Wound 02/27/23 Leg Right;Lateral   Date First Assessed/Time First Assessed: 02/27/23 1057   Present on Hospital Admission: Yes  Wound Approximate Age at First Assessment (Weeks): 8 weeks  Primary Wound Type: Venous Ulcer  Location: Leg  Wound Location Orientation: Right;Lateral   Wound Image     Wound Etiology Venous   Dressing Status Intact   Wound Cleansed Wound cleanser   Dressing/Treatment Collagen with Ag;Alginate;Dry dressing;Silicone border;Tubular bandage   Offloading for Diabetic Foot Ulcers Offloading not required   Dressing Change Due 01/15/24   Wound Length (cm) 2 cm   Wound Width (cm) 2 cm   Wound Depth (cm) 0.1 cm   Wound Surface Area (cm^2) 4 cm^2   Change in Wound Size % (l*w) -150   Wound Volume (cm^3) 0.4 cm^3   Wound Healing % -150   Post-Procedure Length (cm) 2 cm   Post-Procedure Width (cm) 2 cm   Post-Procedure Depth (cm) 0.2 cm   Post-Procedure Surface Area (cm^2) 4 cm^2   Post-Procedure Volume (cm^3) 0.8 cm^3   Wound Assessment Pink/red;Devitalized tissue   Drainage Amount Moderate (25-50%)   Drainage Description Serosanguinous   Odor None   Guillermina-wound Assessment Intact   Margins Defined edges   Wound Thickness Description not for Pressure Injury Full thickness   Wound 08/14/23 Leg Medial;Left   Date First Assessed/Time First Assessed: 08/14/23 1118   Present on Hospital Admission: Yes  Wound Approximate Age at First Assessment (Weeks): 1 weeks  Primary Wound Type: Traumatic  Location: Leg  Wound Location Orientation: Medial;Left   Wound Image    Wound Etiology Venous   Wound Length (cm)   (wound clinically closed)   Wound Assessment Epithelialization   Odor None     Tubigrip size F applied followed by Farrow wraps

## 2024-01-15 ENCOUNTER — HOSPITAL ENCOUNTER (OUTPATIENT)
Facility: HOSPITAL | Age: 79
Discharge: HOME | End: 2024-01-15
Attending: FAMILY MEDICINE
Payer: MEDICARE

## 2024-01-15 VITALS
TEMPERATURE: 97.8 F | DIASTOLIC BLOOD PRESSURE: 51 MMHG | RESPIRATION RATE: 18 BRPM | HEART RATE: 67 BPM | SYSTOLIC BLOOD PRESSURE: 133 MMHG | OXYGEN SATURATION: 99 %

## 2024-01-15 DIAGNOSIS — L97.211 CALF ULCER, RIGHT, LIMITED TO BREAKDOWN OF SKIN (HCC): ICD-10-CM

## 2024-01-15 DIAGNOSIS — I83.018 VARICOSE VEINS OF RIGHT LOWER EXTREMITY WITH ULCER OF OTHER PART OF LOWER LEG WITH FAT LAYER EXPOSED (HCC): ICD-10-CM

## 2024-01-15 DIAGNOSIS — L97.212 CALF ULCER, RIGHT, WITH FAT LAYER EXPOSED (HCC): Primary | ICD-10-CM

## 2024-01-15 DIAGNOSIS — L97.812 VARICOSE VEINS OF RIGHT LOWER EXTREMITY WITH ULCER OF OTHER PART OF LOWER LEG WITH FAT LAYER EXPOSED (HCC): ICD-10-CM

## 2024-01-15 PROCEDURE — 15271 SKIN SUB GRAFT TRNK/ARM/LEG: CPT

## 2024-01-15 PROCEDURE — 6370000000 HC RX 637 (ALT 250 FOR IP): Performed by: FAMILY MEDICINE

## 2024-01-15 RX ORDER — LIDOCAINE HYDROCHLORIDE 20 MG/ML
JELLY TOPICAL ONCE
OUTPATIENT
Start: 2024-01-15 | End: 2024-01-15

## 2024-01-15 RX ORDER — BACITRACIN ZINC AND POLYMYXIN B SULFATE 500; 1000 [USP'U]/G; [USP'U]/G
OINTMENT TOPICAL ONCE
OUTPATIENT
Start: 2024-01-15 | End: 2024-01-15

## 2024-01-15 RX ORDER — LIDOCAINE HYDROCHLORIDE 40 MG/ML
SOLUTION TOPICAL ONCE
OUTPATIENT
Start: 2024-01-15 | End: 2024-01-15

## 2024-01-15 RX ORDER — GINSENG 100 MG
CAPSULE ORAL ONCE
OUTPATIENT
Start: 2024-01-15 | End: 2024-01-15

## 2024-01-15 RX ORDER — IBUPROFEN 200 MG
TABLET ORAL ONCE
OUTPATIENT
Start: 2024-01-15 | End: 2024-01-15

## 2024-01-15 RX ORDER — LIDOCAINE 40 MG/G
CREAM TOPICAL ONCE
OUTPATIENT
Start: 2024-01-15 | End: 2024-01-15

## 2024-01-15 RX ORDER — TRIAMCINOLONE ACETONIDE 1 MG/G
OINTMENT TOPICAL ONCE
OUTPATIENT
Start: 2024-01-15 | End: 2024-01-15

## 2024-01-15 RX ORDER — GENTAMICIN SULFATE 1 MG/G
OINTMENT TOPICAL ONCE
OUTPATIENT
Start: 2024-01-15 | End: 2024-01-15

## 2024-01-15 RX ORDER — LIDOCAINE 50 MG/G
OINTMENT TOPICAL ONCE
OUTPATIENT
Start: 2024-01-15 | End: 2024-01-15

## 2024-01-15 RX ORDER — CLOBETASOL PROPIONATE 0.5 MG/G
OINTMENT TOPICAL ONCE
OUTPATIENT
Start: 2024-01-15 | End: 2024-01-15

## 2024-01-15 RX ORDER — LIDOCAINE HYDROCHLORIDE 20 MG/ML
JELLY TOPICAL ONCE
Status: COMPLETED | OUTPATIENT
Start: 2024-01-15 | End: 2024-01-15

## 2024-01-15 RX ORDER — BETAMETHASONE DIPROPIONATE 0.05 %
OINTMENT (GRAM) TOPICAL ONCE
OUTPATIENT
Start: 2024-01-15 | End: 2024-01-15

## 2024-01-15 RX ADMIN — LIDOCAINE HYDROCHLORIDE: 20 JELLY TOPICAL at 09:48

## 2024-01-15 ASSESSMENT — PAIN SCALES - GENERAL: PAINLEVEL_OUTOF10: 0

## 2024-01-15 NOTE — PROGRESS NOTES
Wound Cleansed Wound cleanser 01/15/24 0922   Dressing/Treatment Moisture barrier;Dry dressing;Roll gauze 01/15/24 0922   Offloading for Diabetic Foot Ulcers Offloading not required 01/15/24 0922   Dressing Change Due 01/22/24 01/15/24 0922   Wound Length (cm) 2.2 cm 01/15/24 0922   Wound Width (cm) 2 cm 01/15/24 0922   Wound Depth (cm) 0.2 cm 01/15/24 0922   Wound Surface Area (cm^2) 4.4 cm^2 01/15/24 0922   Change in Wound Size % (l*w) -175 01/15/24 0922   Wound Volume (cm^3) 0.88 cm^3 01/15/24 0922   Wound Healing % -450 01/15/24 0922   Post-Procedure Length (cm) 2.2 cm 01/15/24 0922   Post-Procedure Width (cm) 2 cm 01/15/24 0922   Post-Procedure Depth (cm) 0.3 cm 01/15/24 0922   Post-Procedure Surface Area (cm^2) 4.4 cm^2 01/15/24 0922   Post-Procedure Volume (cm^3) 1.32 cm^3 01/15/24 0922   Wound Assessment Granulation tissue 01/15/24 0922   Drainage Amount Moderate (25-50%) 01/15/24 0922   Drainage Description Serosanguinous 01/15/24 0922   Odor None 01/15/24 0922   Guillermina-wound Assessment Intact 01/15/24 0922   Margins Defined edges 01/15/24 0922   Wound Thickness Description not for Pressure Injury Full thickness 01/15/24 0922   Number of days: 321            -------    Past Medical History:   Diagnosis Date    A-fib (HCC)     Cardio Dr. Cleaning    Advance directive discussed with patient 04/06/2023    pt states he will bring DOS    Arthritis     Cancer (HCC) 2021    melanoma removed from nose and face, pt denies chemo or readiation    Chronic kidney disease     kidney stones    Coagulation disorder (HCC)     excess iron; erthythrocytosis; hemochromatosis- [facility] yearly exams    Diabetes (HCC)     PcP  manages    Enlarged prostate     managed by Dr. Cole    Exercise tolerance finding     pt states able to climb 2 flights stairs without CP or SOB    Hypertension 2007    cardio Dr. Cleaning    Ill-defined condition     ulcer - shin- Started around Christmas 2022- followed by [facility]

## 2024-01-15 NOTE — FLOWSHEET NOTE
01/15/24 0922   Wound 02/27/23 Leg Right;Lateral   Date First Assessed/Time First Assessed: 02/27/23 1057   Present on Hospital Admission: Yes  Wound Approximate Age at First Assessment (Weeks): 8 weeks  Primary Wound Type: Venous Ulcer  Location: Leg  Wound Location Orientation: Right;Lateral   Wound Image     Wound Etiology Venous   Dressing Status Intact   Wound Cleansed Wound cleanser   Dressing/Treatment Moisture barrier;Dry dressing;Roll gauze  (gravix #1; 2 layer wrap)   Offloading for Diabetic Foot Ulcers Offloading not required   Dressing Change Due 01/22/24   Wound Length (cm) 2.2 cm   Wound Width (cm) 2 cm   Wound Depth (cm) 0.2 cm   Wound Surface Area (cm^2) 4.4 cm^2   Change in Wound Size % (l*w) -175   Wound Volume (cm^3) 0.88 cm^3   Wound Healing % -450   Post-Procedure Length (cm) 2.2 cm   Post-Procedure Width (cm) 2 cm   Post-Procedure Depth (cm) 0.3 cm   Post-Procedure Surface Area (cm^2) 4.4 cm^2   Post-Procedure Volume (cm^3) 1.32 cm^3   Wound Assessment Granulation tissue   Drainage Amount Moderate (25-50%)   Drainage Description Serosanguinous   Odor None   Guillermina-wound Assessment Intact   Margins Defined edges   Wound Thickness Description not for Pressure Injury Full thickness     Grafix PL Prime Treatment Note    NAME:  Malik Clarke  YOB: 1945  MEDICAL RECORD NUMBER:  [id number]  DATE:  1/15/2024    Goal:  Patient will receive safe and proper application of skin substitute. Patient will comply with caring for dressing, and reporting complications.     The expiration date is checked immediately before use., The package was intact before use, and no damage was noted., Grafix PL is stored at room temperature., Grafix PL Prime was removed from protective sterile packaging by the provider and applied to the prepared ulcer bed., Provider removed the mesh applicators from both sides of graft and discarded  the two mesh pieces.  , Grafix PL Prime was hydrated with sterile normal

## 2024-01-15 NOTE — DISCHARGE INSTRUCTIONS
Compression Wraps Discharge Instructions   Location: Right leg  Type: 2 layer wrap    Your doctor has ordered compression therapy for your wound. Compression bandages reduce the swelling, or edema, in your legs and prevent it from returning. The wound care staff will apply your compression wrap. It must be removed and re-applied at least weekly. As the swelling decreases, the boot no longer provides adequate compression and you need a new one. Once applied, you need to know how to take care of your compression wrap.     The boot must stay dry. Do not get it wet in the shower or tub. You may do a partial bath, or you can cover the boot with a large plastic bag, secured at the top, so that no water can get in.    Avoid standing in one place for long periods of time. If you must  one place, shift your weight and change positions often.    If you have CHF, consult your doctor before following the next two recommendations for leg elevation.     When sitting, elevate your legs on pillows, or put blocks under the foot of your bed. Your legs should be higher than your heart.    If your boot becomes painful, or you notice an increase in swelling in your toes, numbness or tingling, or purple color to your toes, remove the wrap and call the Wound Care Center. If it is after hours, call your doctor for instructions or go to the nearest emergency room.

## 2024-01-18 NOTE — PLAN OF CARE
Compression Garments    Supply Company for Compression Stockings:     Other [employer] Fax # [phone]; Phone # [phone]      Ordering Center:     [facility]  [address] [facility] [address] [phone]  WOUND CARE Dept: [phone]   FAX NUMBER [phone]    Patient Information:      Linda Juarez  [facility] [id number] [phone]   : 1945  AGE: 78 y.o.     GENDER: male   TODAYS DATE:  2024    Insurance:      PRIMARY INSURANCE:  Plan: MEDICARE PART A AND B  Coverage: MEDICARE  Effective Date: 2009  Group Number: [unfilled]  Subscriber Number: [id number] - (Medicare)    Payer/Plan Subscr  Sex Relation Sub. Ins. ID Effective Group Num   1. MEDICARE - ME* LINDA JUAREZ 1945 Male Self [id number]                                    [address]    2. 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)       Other    Calf ulcer, right, with fat layer exposed (HCC) - Primary    Calf ulcer, right, limited to breakdown of skin (HCC)       Wound 23 Leg Right;Lateral (Active)   Wound Image    01/15/24 0922   Wound Etiology Venous 01/15/24 0922   Dressing Status Intact 01/15/24 0922   Wound Cleansed Wound cleanser 01/15/24 0922   Dressing/Treatment Moisture barrier;Dry dressing;Roll gauze 01/15/24 0922   Offloading for Diabetic Foot Ulcers Offloading not required 01/15/24 0922   Dressing Change Due 01/22/24 01/15/24 0922   Wound Length (cm) 2.2 cm 01/15/24 0922   Wound Width (cm) 2 cm 01/15/24 0922   Wound Depth (cm) 0.2 cm 01/15/24 0922   Wound Surface Area (cm^2) 4.4 cm^2 01/15/24 0922   Change in Wound Size % (l*w) -175 01/15/24 0922   Wound Volume (cm^3) 0.88 cm^3 01/15/24 0922   Wound Healing % -450 01/15/24

## 2024-01-22 ENCOUNTER — HOSPITAL ENCOUNTER (OUTPATIENT)
Facility: HOSPITAL | Age: 79
Discharge: HOME | End: 2024-01-22
Attending: FAMILY MEDICINE
Payer: MEDICARE

## 2024-01-22 VITALS
HEART RATE: 111 BPM | DIASTOLIC BLOOD PRESSURE: 60 MMHG | SYSTOLIC BLOOD PRESSURE: 115 MMHG | RESPIRATION RATE: 18 BRPM | TEMPERATURE: 97.5 F | OXYGEN SATURATION: 99 %

## 2024-01-22 DIAGNOSIS — L97.812 VARICOSE VEINS OF RIGHT LOWER EXTREMITY WITH ULCER OF OTHER PART OF LOWER LEG WITH FAT LAYER EXPOSED (HCC): ICD-10-CM

## 2024-01-22 DIAGNOSIS — I83.018 VARICOSE VEINS OF RIGHT LOWER EXTREMITY WITH ULCER OF OTHER PART OF LOWER LEG WITH FAT LAYER EXPOSED (HCC): ICD-10-CM

## 2024-01-22 DIAGNOSIS — L97.211 CALF ULCER, RIGHT, LIMITED TO BREAKDOWN OF SKIN (HCC): ICD-10-CM

## 2024-01-22 DIAGNOSIS — L97.212 CALF ULCER, RIGHT, WITH FAT LAYER EXPOSED (HCC): Primary | ICD-10-CM

## 2024-01-22 PROCEDURE — 15271 SKIN SUB GRAFT TRNK/ARM/LEG: CPT

## 2024-01-22 PROCEDURE — 6370000000 HC RX 637 (ALT 250 FOR IP): Performed by: FAMILY MEDICINE

## 2024-01-22 RX ORDER — TRIAMCINOLONE ACETONIDE 1 MG/G
OINTMENT TOPICAL ONCE
OUTPATIENT
Start: 2024-01-22 | End: 2024-01-22

## 2024-01-22 RX ORDER — CLOBETASOL PROPIONATE 0.5 MG/G
OINTMENT TOPICAL ONCE
OUTPATIENT
Start: 2024-01-22 | End: 2024-01-22

## 2024-01-22 RX ORDER — BETAMETHASONE DIPROPIONATE 0.05 %
OINTMENT (GRAM) TOPICAL ONCE
OUTPATIENT
Start: 2024-01-22 | End: 2024-01-22

## 2024-01-22 RX ORDER — LIDOCAINE HYDROCHLORIDE 20 MG/ML
JELLY TOPICAL ONCE
OUTPATIENT
Start: 2024-01-22 | End: 2024-01-22

## 2024-01-22 RX ORDER — GINSENG 100 MG
CAPSULE ORAL ONCE
OUTPATIENT
Start: 2024-01-22 | End: 2024-01-22

## 2024-01-22 RX ORDER — LIDOCAINE 40 MG/G
CREAM TOPICAL ONCE
OUTPATIENT
Start: 2024-01-22 | End: 2024-01-22

## 2024-01-22 RX ORDER — IBUPROFEN 200 MG
TABLET ORAL ONCE
OUTPATIENT
Start: 2024-01-22 | End: 2024-01-22

## 2024-01-22 RX ORDER — LIDOCAINE HYDROCHLORIDE 40 MG/ML
SOLUTION TOPICAL ONCE
OUTPATIENT
Start: 2024-01-22 | End: 2024-01-22

## 2024-01-22 RX ORDER — LIDOCAINE 50 MG/G
OINTMENT TOPICAL ONCE
OUTPATIENT
Start: 2024-01-22 | End: 2024-01-22

## 2024-01-22 RX ORDER — LIDOCAINE HYDROCHLORIDE 20 MG/ML
JELLY TOPICAL ONCE
Status: COMPLETED | OUTPATIENT
Start: 2024-01-22 | End: 2024-01-22

## 2024-01-22 RX ORDER — GENTAMICIN SULFATE 1 MG/G
OINTMENT TOPICAL ONCE
OUTPATIENT
Start: 2024-01-22 | End: 2024-01-22

## 2024-01-22 RX ORDER — BACITRACIN ZINC AND POLYMYXIN B SULFATE 500; 1000 [USP'U]/G; [USP'U]/G
OINTMENT TOPICAL ONCE
OUTPATIENT
Start: 2024-01-22 | End: 2024-01-22

## 2024-01-22 RX ADMIN — LIDOCAINE HYDROCHLORIDE: 20 JELLY TOPICAL at 09:24

## 2024-01-22 ASSESSMENT — PAIN SCALES - GENERAL: PAINLEVEL_OUTOF10: 0

## 2024-01-22 NOTE — FLOWSHEET NOTE
01/22/24 0923   Wound 02/27/23 Leg Right;Lateral   Date First Assessed/Time First Assessed: 02/27/23 1057   Present on Hospital Admission: Yes  Wound Approximate Age at First Assessment (Weeks): 8 weeks  Primary Wound Type: Venous Ulcer  Location: Leg  Wound Location Orientation: Right;Lateral   Wound Image    Wound Etiology Venous   Dressing Status Intact   Wound Cleansed Wound cleanser   Dressing/Treatment Roll gauze;Dry dressing;Zinc paste  (grafix # 2; 2 layer wrap)   Offloading for Diabetic Foot Ulcers Offloading not required   Dressing Change Due 01/29/24   Wound Length (cm) 2 cm   Wound Width (cm) 2 cm   Wound Depth (cm) 0.1 cm   Wound Surface Area (cm^2) 4 cm^2   Change in Wound Size % (l*w) -150   Wound Volume (cm^3) 0.4 cm^3   Wound Healing % -150   Post-Procedure Length (cm) 2 cm   Post-Procedure Width (cm) 2 cm   Post-Procedure Depth (cm) 0.2 cm   Post-Procedure Surface Area (cm^2) 4 cm^2   Post-Procedure Volume (cm^3) 0.8 cm^3   Wound Assessment Granulation tissue   Drainage Amount Small (< 25%)   Drainage Description Serosanguinous   Odor None   Guillermina-wound Assessment Intact;Dry/flaky   Margins Defined edges   Wound Thickness Description not for Pressure Injury Full thickness       Grafix PL Prime Treatment Note    NAME:  Malik Clarke  YOB: 1945  MEDICAL RECORD NUMBER:  [id number]  DATE:  1/22/2024    Goal:  Patient will receive safe and proper application of skin substitute. Patient will comply with caring for dressing, and reporting complications.     The expiration date is checked immediately before use., The package was intact before use, and no damage was noted., Grafix PL is stored at room temperature., Grafix PL Prime was removed from protective sterile packaging by the provider and applied to the prepared ulcer bed., Provider removed the mesh applicators from both sides of graft and discarded  the two mesh pieces.  , Grafix PL Prime was applied to right leg ., Grafix PL Prime

## 2024-01-22 NOTE — PROGRESS NOTES
stones     Dr. Cole    Wears glasses 04/06/2023     Past Surgical History:   Procedure Laterality Date    BLADDER SURGERY  2017    urolift    CHOLECYSTECTOMY  07/2017    COLONOSCOPY  2021 2020, hx polyps    ORTHOPEDIC SURGERY Right 2007    r shoulder replacement    SHOULDER SURGERY Left 2022    replacement    TONSILLECTOMY      TOTAL KNEE ARTHROPLASTY Left 5/18/2023    LEFT TOTAL KNEE ARTHROPLASTY (SPEC POP) performed by Malik Hector DO at [facility] MAIN OR    WISDOM TOOTH EXTRACTION       No family history on file.  Social History     Socioeconomic History    Marital status:    Tobacco Use    Smoking status: Never     Passive exposure: Past    Smokeless tobacco: Never   Vaping Use    Vaping Use: Never used   Substance and Sexual Activity    Alcohol use: Yes     Alcohol/week: 2.0 standard drinks of alcohol     Types: 2 Cans of beer per week    Drug use: No    Sexual activity: Defer        Prior to Admission medications    Medication Sig Start Date End Date Taking? Authorizing Provider   meloxicam (MOBIC) 15 MG tablet Take 1 tablet by mouth daily 5/18/23   Janine Martell, PA-C   furosemide (LASIX) 20 MG tablet Take 1 tablet by mouth as needed Indications: edema 7/1/22   ProviderMartin MD   apixaban (ELIQUIS) 5 MG TABS tablet 1 tablet 2 times daily Indications: a-fib 3/15/22   ProviderMartin MD   allopurinol (ZYLOPRIM) 300 MG tablet 1 tablet daily Indications: gout 3/1/23   ProviderMartin MD   Calcium Carb-Cholecalciferol 600-10 MG-MCG CAPS Take by mouth 2 times daily    Automatic Reconciliation, Ar   ibandronate (BONIVA) 150 MG tablet Take 1 tablet by mouth every 30 days Indications: Osteoporosis    Automatic Reconciliation, Ar   metFORMIN (GLUCOPHAGE) 1000 MG tablet Take 0.5 tablets by mouth 2 times daily (with meals) Indications: DM    Automatic Reconciliation, Ar   mometasone (NASONEX) 50 MCG/ACT nasal spray 2 sprays by Nasal route daily Indications: seasonal allergies

## 2024-01-29 ENCOUNTER — HOSPITAL ENCOUNTER (OUTPATIENT)
Facility: HOSPITAL | Age: 79
Discharge: HOME | End: 2024-01-29
Attending: FAMILY MEDICINE
Payer: MEDICARE

## 2024-01-29 VITALS
OXYGEN SATURATION: 98 % | TEMPERATURE: 98 F | DIASTOLIC BLOOD PRESSURE: 72 MMHG | RESPIRATION RATE: 16 BRPM | SYSTOLIC BLOOD PRESSURE: 131 MMHG | HEART RATE: 112 BPM

## 2024-01-29 DIAGNOSIS — L97.212 CALF ULCER, RIGHT, WITH FAT LAYER EXPOSED (HCC): Primary | ICD-10-CM

## 2024-01-29 DIAGNOSIS — I83.018 VARICOSE VEINS OF RIGHT LOWER EXTREMITY WITH ULCER OF OTHER PART OF LOWER LEG WITH FAT LAYER EXPOSED (HCC): ICD-10-CM

## 2024-01-29 DIAGNOSIS — L97.812 VARICOSE VEINS OF RIGHT LOWER EXTREMITY WITH ULCER OF OTHER PART OF LOWER LEG WITH FAT LAYER EXPOSED (HCC): ICD-10-CM

## 2024-01-29 DIAGNOSIS — L97.211 CALF ULCER, RIGHT, LIMITED TO BREAKDOWN OF SKIN (HCC): ICD-10-CM

## 2024-01-29 PROCEDURE — 6370000000 HC RX 637 (ALT 250 FOR IP): Performed by: FAMILY MEDICINE

## 2024-01-29 PROCEDURE — 15271 SKIN SUB GRAFT TRNK/ARM/LEG: CPT

## 2024-01-29 RX ORDER — TRIAMCINOLONE ACETONIDE 1 MG/G
OINTMENT TOPICAL ONCE
OUTPATIENT
Start: 2024-01-29 | End: 2024-01-29

## 2024-01-29 RX ORDER — CLOBETASOL PROPIONATE 0.5 MG/G
OINTMENT TOPICAL ONCE
OUTPATIENT
Start: 2024-01-29 | End: 2024-01-29

## 2024-01-29 RX ORDER — LIDOCAINE HYDROCHLORIDE 20 MG/ML
JELLY TOPICAL ONCE
OUTPATIENT
Start: 2024-01-29 | End: 2024-01-29

## 2024-01-29 RX ORDER — IBUPROFEN 200 MG
TABLET ORAL ONCE
OUTPATIENT
Start: 2024-01-29 | End: 2024-01-29

## 2024-01-29 RX ORDER — BETAMETHASONE DIPROPIONATE 0.05 %
OINTMENT (GRAM) TOPICAL ONCE
OUTPATIENT
Start: 2024-01-29 | End: 2024-01-29

## 2024-01-29 RX ORDER — GINSENG 100 MG
CAPSULE ORAL ONCE
OUTPATIENT
Start: 2024-01-29 | End: 2024-01-29

## 2024-01-29 RX ORDER — LIDOCAINE HYDROCHLORIDE 20 MG/ML
JELLY TOPICAL ONCE
Status: COMPLETED | OUTPATIENT
Start: 2024-01-29 | End: 2024-01-29

## 2024-01-29 RX ORDER — LIDOCAINE 50 MG/G
OINTMENT TOPICAL ONCE
OUTPATIENT
Start: 2024-01-29 | End: 2024-01-29

## 2024-01-29 RX ORDER — BACITRACIN ZINC AND POLYMYXIN B SULFATE 500; 1000 [USP'U]/G; [USP'U]/G
OINTMENT TOPICAL ONCE
OUTPATIENT
Start: 2024-01-29 | End: 2024-01-29

## 2024-01-29 RX ORDER — LIDOCAINE 40 MG/G
CREAM TOPICAL ONCE
OUTPATIENT
Start: 2024-01-29 | End: 2024-01-29

## 2024-01-29 RX ORDER — LIDOCAINE HYDROCHLORIDE 40 MG/ML
SOLUTION TOPICAL ONCE
OUTPATIENT
Start: 2024-01-29 | End: 2024-01-29

## 2024-01-29 RX ORDER — GENTAMICIN SULFATE 1 MG/G
OINTMENT TOPICAL ONCE
OUTPATIENT
Start: 2024-01-29 | End: 2024-01-29

## 2024-01-29 RX ADMIN — LIDOCAINE HYDROCHLORIDE: 20 JELLY TOPICAL at 09:26

## 2024-01-29 ASSESSMENT — PAIN SCALES - GENERAL: PAINLEVEL_OUTOF10: 0

## 2024-01-29 NOTE — FLOWSHEET NOTE
01/29/24 0923   Wound 02/27/23 Leg Right;Lateral   Date First Assessed/Time First Assessed: 02/27/23 1057   Present on Hospital Admission: Yes  Wound Approximate Age at First Assessment (Weeks): 8 weeks  Primary Wound Type: Venous Ulcer  Location: Leg  Wound Location Orientation: Right;Lateral   Wound Image      Wound Etiology Venous   Dressing Status Intact   Wound Cleansed Wound cleanser   Dressing/Treatment Alginate;Dry dressing;Roll gauze  (grafix; 2 layer wrap)   Offloading for Diabetic Foot Ulcers Offloading not required   Dressing Change Due 02/05/24   Wound Length (cm) 2 cm   Wound Width (cm) 2 cm   Wound Depth (cm) 0.1 cm   Wound Surface Area (cm^2) 4 cm^2   Change in Wound Size % (l*w) -150   Wound Volume (cm^3) 0.4 cm^3   Wound Healing % -150   Post-Procedure Length (cm) 2 cm   Post-Procedure Width (cm) 2 cm   Post-Procedure Depth (cm) 0.2 cm   Post-Procedure Surface Area (cm^2) 4 cm^2   Post-Procedure Volume (cm^3) 0.8 cm^3   Wound Assessment Granulation tissue   Drainage Amount Moderate (25-50%)   Drainage Description Serosanguinous   Odor None   Guillermina-wound Assessment Maceration   Margins Defined edges   Wound Thickness Description not for Pressure Injury Full thickness       Grafix PL Prime Treatment Note    NAME:  Malik Clarke  YOB: 1945  MEDICAL RECORD NUMBER:  [id number]  DATE:  1/29/2024    Goal:  Patient will receive safe and proper application of skin substitute. Patient will comply with caring for dressing, and reporting complications.     The expiration date is checked immediately before use., The package was intact before use, and no damage was noted., Grafix PL is stored at room temperature., Grafix PL Prime was removed from protective sterile packaging by the provider and applied to the prepared ulcer bed., Provider removed the mesh applicators from both sides of graft and discarded  the two mesh pieces.  , Grafix PL Prime was covered with non-adherent ulcer dressing,

## 2024-01-29 NOTE — PROGRESS NOTES
Wound Center  Progress Note / Procedure Note      Chief Complaint:  Malik Clarke is a 78 y.o.  male  with R lower leg anterior wound of few months duration.      Assessment/Plan     78 y.o. male with Dm2    -R lower leg anterior venous stasis ulcer.  Full thickness  About the same, macerated  Slough/granular  Necessitates debridement  for wound healing and to prevent/heal infection  Ulcer needs debridement- see note below  Grafix #3 applied today      -Dm2  A1c 6.5  Does not check at home  Controlled with metformin    -Leg swelling/varicosities  Has arthritis      Following discussed with patient   Needs :  Serial debridement- prn    Good local wound care  Dressing: Grafix, used much smaller mepitel  Frequency : once a week        -Edema management  Do not get dressing wet    Edema management:  Elevate leg(s) throughout the day starting in the morning  Compression: 2 layer coflex  Avoid prolonged standing      -Good Diabetic control      Patient/  understood and agrees with plan. Questions answered.      Follow up with me 1 week ; Order Grafix 1.5x2    Subjective:     Since last visit  No new issues.       HPI:     Acquired wounds a few months ago  Local trauma  Not healing, being managed by HH  Adv to come here      History/Chart/Medications reviewed    Wound caused by:   Current wound care:See flowsheet  Offloading wound:   Appetite: good  Wound associated pain: See flowsheet  Diabetic: yes  Smoker: no  ROS: no N/V/D, no T/chills; no local rash, no chest pain or shortness of breath, no headache or dizzyness      Objective:     Physical Exam:   See flowsheet / nursing notes for vitals  Vitals:    01/29/24 0900   BP: 131/72   Pulse: (!) 112   Resp: 16   Temp: 98 °F (36.7 °C)   SpO2: 98%         General: NAD. Hygiene good  Psych: cooperative. No anxiety or depression. Normal mood and affect.  Neuro: alert and oriented to person/place/situation. Otherwise nonfocal.  Derm: Normal  turgor for age, dry

## 2024-01-29 NOTE — DISCHARGE INSTRUCTIONS
Compression Wraps Discharge Instructions   Location: Right leg  Type: 2 layer Compression Wrap    Your doctor has ordered compression therapy for your wound. Compression bandages reduce the swelling, or edema, in your legs and prevent it from returning. The wound care staff will apply your compression wrap. It must be removed and re-applied at least weekly. As the swelling decreases, the boot no longer provides adequate compression and you need a new one. Once applied, you need to know how to take care of your compression wrap.     The boot must stay dry. Do not get it wet in the shower or tub. You may do a partial bath, or you can cover the boot with a large plastic bag, secured at the top, so that no water can get in.    Avoid standing in one place for long periods of time. If you must  one place, shift your weight and change positions often.    If you have CHF, consult your doctor before following the next two recommendations for leg elevation.     When sitting, elevate your legs on pillows, or put blocks under the foot of your bed. Your legs should be higher than your heart.    If your boot becomes painful, or you notice an increase in swelling in your toes, numbness or tingling, or purple color to your toes, remove the wrap and call the Wound Care Center. If it is after hours, call your doctor for instructions or go to the nearest emergency room.

## 2024-02-05 ENCOUNTER — HOSPITAL ENCOUNTER (OUTPATIENT)
Facility: HOSPITAL | Age: 79
Discharge: HOME | End: 2024-02-05
Attending: FAMILY MEDICINE
Payer: MEDICARE

## 2024-02-05 VITALS
RESPIRATION RATE: 16 BRPM | HEART RATE: 111 BPM | DIASTOLIC BLOOD PRESSURE: 64 MMHG | OXYGEN SATURATION: 97 % | TEMPERATURE: 98.1 F | SYSTOLIC BLOOD PRESSURE: 121 MMHG

## 2024-02-05 DIAGNOSIS — L97.812 VARICOSE VEINS OF RIGHT LOWER EXTREMITY WITH ULCER OF OTHER PART OF LOWER LEG WITH FAT LAYER EXPOSED (HCC): ICD-10-CM

## 2024-02-05 DIAGNOSIS — L97.212 CALF ULCER, RIGHT, WITH FAT LAYER EXPOSED (HCC): Primary | ICD-10-CM

## 2024-02-05 DIAGNOSIS — L97.211 CALF ULCER, RIGHT, LIMITED TO BREAKDOWN OF SKIN (HCC): ICD-10-CM

## 2024-02-05 DIAGNOSIS — I83.018 VARICOSE VEINS OF RIGHT LOWER EXTREMITY WITH ULCER OF OTHER PART OF LOWER LEG WITH FAT LAYER EXPOSED (HCC): ICD-10-CM

## 2024-02-05 PROCEDURE — 15271 SKIN SUB GRAFT TRNK/ARM/LEG: CPT

## 2024-02-05 PROCEDURE — 6370000000 HC RX 637 (ALT 250 FOR IP): Performed by: FAMILY MEDICINE

## 2024-02-05 RX ORDER — BACITRACIN ZINC AND POLYMYXIN B SULFATE 500; 1000 [USP'U]/G; [USP'U]/G
OINTMENT TOPICAL ONCE
OUTPATIENT
Start: 2024-02-05 | End: 2024-02-05

## 2024-02-05 RX ORDER — LIDOCAINE 40 MG/G
CREAM TOPICAL ONCE
OUTPATIENT
Start: 2024-02-05 | End: 2024-02-05

## 2024-02-05 RX ORDER — IBUPROFEN 200 MG
TABLET ORAL ONCE
OUTPATIENT
Start: 2024-02-05 | End: 2024-02-05

## 2024-02-05 RX ORDER — BETAMETHASONE DIPROPIONATE 0.05 %
OINTMENT (GRAM) TOPICAL ONCE
OUTPATIENT
Start: 2024-02-05 | End: 2024-02-05

## 2024-02-05 RX ORDER — CLOBETASOL PROPIONATE 0.5 MG/G
OINTMENT TOPICAL ONCE
OUTPATIENT
Start: 2024-02-05 | End: 2024-02-05

## 2024-02-05 RX ORDER — LIDOCAINE HYDROCHLORIDE 40 MG/ML
SOLUTION TOPICAL ONCE
OUTPATIENT
Start: 2024-02-05 | End: 2024-02-05

## 2024-02-05 RX ORDER — GINSENG 100 MG
CAPSULE ORAL ONCE
OUTPATIENT
Start: 2024-02-05 | End: 2024-02-05

## 2024-02-05 RX ORDER — LIDOCAINE HYDROCHLORIDE 20 MG/ML
JELLY TOPICAL ONCE
OUTPATIENT
Start: 2024-02-05 | End: 2024-02-05

## 2024-02-05 RX ORDER — LIDOCAINE HYDROCHLORIDE 20 MG/ML
JELLY TOPICAL ONCE
Status: COMPLETED | OUTPATIENT
Start: 2024-02-05 | End: 2024-02-05

## 2024-02-05 RX ORDER — LIDOCAINE 50 MG/G
OINTMENT TOPICAL ONCE
OUTPATIENT
Start: 2024-02-05 | End: 2024-02-05

## 2024-02-05 RX ORDER — TRIAMCINOLONE ACETONIDE 1 MG/G
OINTMENT TOPICAL ONCE
OUTPATIENT
Start: 2024-02-05 | End: 2024-02-05

## 2024-02-05 RX ORDER — GENTAMICIN SULFATE 1 MG/G
OINTMENT TOPICAL ONCE
OUTPATIENT
Start: 2024-02-05 | End: 2024-02-05

## 2024-02-05 RX ADMIN — LIDOCAINE HYDROCHLORIDE: 20 JELLY TOPICAL at 09:24

## 2024-02-05 ASSESSMENT — PAIN SCALES - GENERAL: PAINLEVEL_OUTOF10: 0

## 2024-02-05 NOTE — FLOWSHEET NOTE
02/05/24 0924   Wound 02/27/23 Leg Right;Lateral   Date First Assessed/Time First Assessed: 02/27/23 1057   Present on Hospital Admission: Yes  Wound Approximate Age at First Assessment (Weeks): 8 weeks  Primary Wound Type: Venous Ulcer  Location: Leg  Wound Location Orientation: Right;Lateral   Wound Image    Wound Etiology Venous   Dressing Status Intact   Wound Cleansed Wound cleanser   Dressing/Treatment Alginate;Dry dressing;Roll gauze  (grafix; 2 layer wrap)   Offloading for Diabetic Foot Ulcers Offloading not required   Dressing Change Due 02/12/24   Wound Length (cm) 1.5 cm   Wound Width (cm) 1.5 cm   Wound Depth (cm) 0.1 cm   Wound Surface Area (cm^2) 2.25 cm^2   Change in Wound Size % (l*w) -40.63   Wound Volume (cm^3) 0.225 cm^3   Wound Healing % -41   Wound Assessment Granulation tissue;Epithelialization   Drainage Amount Small (< 25%)   Drainage Description Serosanguinous   Odor None   Guillermina-wound Assessment Fragile;Intact   Margins Attached edges   Wound Thickness Description not for Pressure Injury Full thickness       Grafix PL Prime Treatment Note    NAME:  Malik Clarke  YOB: 1945  MEDICAL RECORD NUMBER:  [id number]  DATE:  2/5/2024    Goal:  Patient will receive safe and proper application of skin substitute. Patient will comply with caring for dressing, and reporting complications.     The expiration date is checked immediately before use., The package was intact before use, and no damage was noted., Grafix PL is stored at room temperature., Grafix PL Prime was removed from protective sterile packaging by the provider and applied to the prepared ulcer bed., Provider removed the mesh applicators from both sides of graft and discarded  the two mesh pieces.  , Grafix PL Prime was applied to right leg ., Grafix PL Prime was covered with non-adherent ulcer dressing, Applied moisten saline gauze., and Applied dry gauze and/or roll gauze.     Guidelines followed  Grafix PL

## 2024-02-05 NOTE — DISCHARGE INSTRUCTIONS
Compression Wraps Discharge Instructions   Location: Right leg  Type: 2 layer compression    Your doctor has ordered compression therapy for your wound. Compression bandages reduce the swelling, or edema, in your legs and prevent it from returning. The wound care staff will apply your compression wrap. It must be removed and re-applied at least weekly. As the swelling decreases, the boot no longer provides adequate compression and you need a new one. Once applied, you need to know how to take care of your compression wrap.     The boot must stay dry. Do not get it wet in the shower or tub. You may do a partial bath, or you can cover the boot with a large plastic bag, secured at the top, so that no water can get in.    Avoid standing in one place for long periods of time. If you must  one place, shift your weight and change positions often.    If you have CHF, consult your doctor before following the next two recommendations for leg elevation.     When sitting, elevate your legs on pillows, or put blocks under the foot of your bed. Your legs should be higher than your heart.    If your boot becomes painful, or you notice an increase in swelling in your toes, numbness or tingling, or purple color to your toes, remove the wrap and call the Wound Care Center. If it is after hours, call your doctor for instructions or go to the nearest emergency room.

## 2024-02-05 NOTE — PROGRESS NOTES
Guillermina-wound Assessment Fragile;Intact 02/05/24 0924   Margins Attached edges 02/05/24 0924   Wound Thickness Description not for Pressure Injury Full thickness 02/05/24 0924   Number of days: 342            -------    Past Medical History:   Diagnosis Date    A-fib (HCC)     Cardio Dr. Cleaning    Advance directive discussed with patient 04/06/2023    pt states he will bring DOS    Arthritis     Cancer (HCC) 2021    melanoma removed from nose and face, pt denies chemo or readiation    Chronic kidney disease     kidney stones    Coagulation disorder (HCC)     excess iron; erthythrocytosis; hemochromatosis- [facility] yearly exams    Diabetes ([facility])     PcP  manages    Enlarged prostate     managed by Dr. Cole    Exercise tolerance finding     pt states able to climb 2 flights stairs without CP or SOB    Hypertension 2007    cardio Dr. Cleaning    Ill-defined condition     ulcer - shin- Started around Christmas 2022- followed by [facility]    Kidney stones     Dr. Cole    Wears glasses 04/06/2023     Past Surgical History:   Procedure Laterality Date    BLADDER SURGERY  2017    urolift    CHOLECYSTECTOMY  07/2017    COLONOSCOPY  2021 2020, hx polyps    ORTHOPEDIC SURGERY Right 2007    r shoulder replacement    SHOULDER SURGERY Left 2022    replacement    TONSILLECTOMY      TOTAL KNEE ARTHROPLASTY Left 5/18/2023    LEFT TOTAL KNEE ARTHROPLASTY (SPEC POP) performed by Malik Hector DO at [facility] MAIN OR    WISDOM TOOTH EXTRACTION       No family history on file.  Social History     Socioeconomic History    Marital status:    Tobacco Use    Smoking status: Never     Passive exposure: Past    Smokeless tobacco: Never   Vaping Use    Vaping Use: Never used   Substance and Sexual Activity    Alcohol use: Yes     Alcohol/week: 2.0 standard drinks of alcohol     Types: 2 Cans of beer per week    Drug use: No    Sexual activity: Defer        Prior to Admission medications    Medication Sig Start

## 2024-02-12 ENCOUNTER — HOSPITAL ENCOUNTER (OUTPATIENT)
Facility: HOSPITAL | Age: 79
Discharge: HOME | End: 2024-02-12
Attending: FAMILY MEDICINE
Payer: MEDICARE

## 2024-02-12 VITALS
SYSTOLIC BLOOD PRESSURE: 136 MMHG | TEMPERATURE: 97.7 F | RESPIRATION RATE: 16 BRPM | OXYGEN SATURATION: 96 % | DIASTOLIC BLOOD PRESSURE: 87 MMHG | HEART RATE: 112 BPM

## 2024-02-12 DIAGNOSIS — I83.018 VARICOSE VEINS OF RIGHT LOWER EXTREMITY WITH ULCER OF OTHER PART OF LOWER LEG WITH FAT LAYER EXPOSED (HCC): ICD-10-CM

## 2024-02-12 DIAGNOSIS — L97.211 CALF ULCER, RIGHT, LIMITED TO BREAKDOWN OF SKIN (HCC): ICD-10-CM

## 2024-02-12 DIAGNOSIS — L97.212 CALF ULCER, RIGHT, WITH FAT LAYER EXPOSED (HCC): Primary | ICD-10-CM

## 2024-02-12 DIAGNOSIS — L97.812 VARICOSE VEINS OF RIGHT LOWER EXTREMITY WITH ULCER OF OTHER PART OF LOWER LEG WITH FAT LAYER EXPOSED (HCC): ICD-10-CM

## 2024-02-12 PROCEDURE — 99213 OFFICE O/P EST LOW 20 MIN: CPT

## 2024-02-12 RX ORDER — GENTAMICIN SULFATE 1 MG/G
OINTMENT TOPICAL ONCE
OUTPATIENT
Start: 2024-02-12 | End: 2024-02-12

## 2024-02-12 RX ORDER — GINSENG 100 MG
CAPSULE ORAL ONCE
OUTPATIENT
Start: 2024-02-12 | End: 2024-02-12

## 2024-02-12 RX ORDER — TRIAMCINOLONE ACETONIDE 1 MG/G
OINTMENT TOPICAL ONCE
OUTPATIENT
Start: 2024-02-12 | End: 2024-02-12

## 2024-02-12 RX ORDER — LIDOCAINE 40 MG/G
CREAM TOPICAL ONCE
OUTPATIENT
Start: 2024-02-12 | End: 2024-02-12

## 2024-02-12 RX ORDER — BETAMETHASONE DIPROPIONATE 0.05 %
OINTMENT (GRAM) TOPICAL ONCE
OUTPATIENT
Start: 2024-02-12 | End: 2024-02-12

## 2024-02-12 RX ORDER — LIDOCAINE HYDROCHLORIDE 20 MG/ML
JELLY TOPICAL ONCE
OUTPATIENT
Start: 2024-02-12 | End: 2024-02-12

## 2024-02-12 RX ORDER — LIDOCAINE HYDROCHLORIDE 40 MG/ML
SOLUTION TOPICAL ONCE
OUTPATIENT
Start: 2024-02-12 | End: 2024-02-12

## 2024-02-12 RX ORDER — CLOBETASOL PROPIONATE 0.5 MG/G
OINTMENT TOPICAL ONCE
OUTPATIENT
Start: 2024-02-12 | End: 2024-02-12

## 2024-02-12 RX ORDER — BACITRACIN ZINC AND POLYMYXIN B SULFATE 500; 1000 [USP'U]/G; [USP'U]/G
OINTMENT TOPICAL ONCE
OUTPATIENT
Start: 2024-02-12 | End: 2024-02-12

## 2024-02-12 RX ORDER — LIDOCAINE 50 MG/G
OINTMENT TOPICAL ONCE
OUTPATIENT
Start: 2024-02-12 | End: 2024-02-12

## 2024-02-12 RX ORDER — IBUPROFEN 200 MG
TABLET ORAL ONCE
OUTPATIENT
Start: 2024-02-12 | End: 2024-02-12

## 2024-02-12 NOTE — FLOWSHEET NOTE
02/12/24 0923   Wound 02/27/23 Leg Right;Lateral   Date First Assessed/Time First Assessed: 02/27/23 1057   Present on Hospital Admission: Yes  Wound Approximate Age at First Assessment (Weeks): 8 weeks  Primary Wound Type: Venous Ulcer  Location: Leg  Wound Location Orientation: Right;Lateral   Wound Image    Wound Etiology Venous   Dressing Status Intact   Wound Cleansed Wound cleanser   Dressing/Treatment Alginate;Dry dressing;Roll gauze  (2 layer wrap)   Offloading for Diabetic Foot Ulcers Offloading not required   Dressing Change Due 02/19/24   Wound Length (cm) 1.5 cm   Wound Width (cm) 1.5 cm   Wound Depth (cm)   (hypergranulation)   Wound Surface Area (cm^2) 2.25 cm^2   Change in Wound Size % (l*w) -40.63   Wound Assessment Hyper granulation tissue;Granulation tissue   Drainage Amount Small (< 25%)   Drainage Description Serosanguinous   Odor None   Guillermina-wound Assessment Fragile;Intact   Margins Attached edges   Wound Thickness Description not for Pressure Injury Full thickness     Grafix not placed this week due to hypergranulation

## 2024-02-12 NOTE — PROGRESS NOTES
Wound Center  Progress Note / Procedure Note      Chief Complaint:  Malik Clarke is a 78 y.o.  male  with R lower leg anterior wound of few months duration.      Assessment/Plan     78 y.o. male with Dm2    -R lower leg anterior venous stasis ulcer.  Full thickness  smaller  hypergranular    NO Grafix  today      -Dm2  A1c 6.5  Does not check at home  Controlled with metformin    -Leg swelling/varicosities  Has arthritis      Following discussed with patient   Needs :  Serial debridement- prn    Good local wound care  Dressing: algiante  Frequency : once a week        -Edema management  Do not get dressing wet    Edema management:  Elevate leg(s) throughout the day starting in the morning  Compression: 2 layer coflex  Avoid prolonged standing      -Good Diabetic control      Patient/  understood and agrees with plan. Questions answered.      Follow up with me 1 week ; Order Grafix 1.5x2    Subjective:     Since last visit  No new issues.       HPI:     Acquired wounds a few months ago  Local trauma  Not healing, being managed by HH  Adv to come here      History/Chart/Medications reviewed    Wound caused by:   Current wound care:See flowsheet  Offloading wound:   Appetite: good  Wound associated pain: See flowsheet  Diabetic: yes  Smoker: no  ROS: no N/V/D, no T/chills; no local rash, no chest pain or shortness of breath, no headache or dizzyness      Objective:     Physical Exam:   See flowsheet / nursing notes for vitals  Vitals:    02/12/24 0915   BP: 136/87   Pulse: (!) 112   Resp: 16   Temp: 97.7 °F (36.5 °C)   SpO2: 96%         General: NAD. Hygiene good  Psych: cooperative. No anxiety or depression. Normal mood and affect.  Neuro: alert and oriented to person/place/situation. Otherwise nonfocal.  Derm: Normal  turgor for age, dry skin  HEENT: Normocephalic, atraumatic. EOMI. Conjunctiva clear. No scleral icterus.  Neck: Normal range of motion. No masses.  Chest: Respirations nonlabored  Lower

## 2024-02-19 ENCOUNTER — HOSPITAL ENCOUNTER (OUTPATIENT)
Facility: HOSPITAL | Age: 79
Discharge: HOME | End: 2024-02-19
Attending: FAMILY MEDICINE
Payer: MEDICARE

## 2024-02-19 VITALS
TEMPERATURE: 97.7 F | DIASTOLIC BLOOD PRESSURE: 68 MMHG | OXYGEN SATURATION: 95 % | RESPIRATION RATE: 16 BRPM | HEART RATE: 97 BPM | SYSTOLIC BLOOD PRESSURE: 137 MMHG

## 2024-02-19 PROCEDURE — 17250 CHEM CAUT OF GRANLTJ TISSUE: CPT

## 2024-02-19 ASSESSMENT — PAIN SCALES - GENERAL: PAINLEVEL_OUTOF10: 0

## 2024-02-19 NOTE — FLOWSHEET NOTE
02/19/24 0948   Wound 02/27/23 Leg Right;Lateral   Date First Assessed/Time First Assessed: 02/27/23 1057   Present on Hospital Admission: Yes  Wound Approximate Age at First Assessment (Weeks): 8 weeks  Primary Wound Type: Venous Ulcer  Location: Leg  Wound Location Orientation: Right;Lateral   Wound Image    Wound Etiology Venous   Dressing Status Intact   Wound Cleansed Wound cleanser   Dressing/Treatment Alginate;Dry dressing;Roll gauze  (2 layer wrap)   Offloading for Diabetic Foot Ulcers Offloading not required   Dressing Change Due 02/26/24   Wound Length (cm) 1.2 cm   Wound Width (cm) 1 cm   Wound Depth (cm)   (hypergranulation)   Wound Surface Area (cm^2) 1.2 cm^2   Change in Wound Size % (l*w) 25   Wound Assessment Hyper granulation tissue;Pink/red   Drainage Amount Small (< 25%)   Drainage Description Serosanguinous   Odor None   Guillermina-wound Assessment Fragile;Intact   Margins Attached edges   Wound Thickness Description not for Pressure Injury Full thickness       Multilayer Compression Wrap   (Not Unna) Below the Knee    NAME:  Malik Clarke  YOB: 1945  MEDICAL RECORD NUMBER:  [id number]  DATE:  2/19/2024    Multilayer compression wrap: Removed old Multilayer wrap if indicated and wash leg with mild soap/water.  Applied moisturizing agent to dry skin as needed.   Applied primary and secondary dressing as ordered.  Applied multilayered dressing below the knee to right lower leg.  Instructed patient/caregiver not to remove dressing and to keep it clean and dry.   Instructed patient/caregiver on complications to report to provider, such as pain, numbness in toes, heavy drainage, and slippage of dressing.  Instructed patient on purpose of compression dressing and on activity and exercise recommendations.      Electronically signed by MARY HUA RN on 2/19/2024 at 9:52 AM

## 2024-02-19 NOTE — PROGRESS NOTES
extremities: color normal; temperature normal.  Calves are supple, nontender, approximately equally sized in comparison.   Focussed Lower Extremity Exam:      Ulcer Description:   See Flowsheet           Data Review:              Procedure:     Procedure note: Chemical Cauterization  Chemical cauterization to leg wound using Silver Nitrate  Indication: Hypergranulation   Consent in chart   Procedural Pain: 0  Post - procedural pain: 0   Patient tolerated procedure well.        Wound 02/27/23 Leg Right;Lateral (Active)   Wound Image   02/19/24 0948   Wound Etiology Venous 02/19/24 0948   Dressing Status Intact 02/19/24 0948   Wound Cleansed Wound cleanser 02/19/24 0948   Dressing/Treatment Alginate;Dry dressing;Roll gauze 02/19/24 0948   Offloading for Diabetic Foot Ulcers Offloading not required 02/19/24 0948   Dressing Change Due 02/26/24 02/19/24 0948   Wound Length (cm) 1.2 cm 02/19/24 0948   Wound Width (cm) 1 cm 02/19/24 0948   Wound Depth (cm) 0.1 cm 02/05/24 0924   Wound Surface Area (cm^2) 1.2 cm^2 02/19/24 0948   Change in Wound Size % (l*w) 25 02/19/24 0948   Wound Volume (cm^3) 0.225 cm^3 02/05/24 0924   Wound Healing % -41 02/05/24 0924   Post-Procedure Length (cm) 2 cm 01/29/24 0923   Post-Procedure Width (cm) 2 cm 01/29/24 0923   Post-Procedure Depth (cm) 0.2 cm 01/29/24 0923   Post-Procedure Surface Area (cm^2) 4 cm^2 01/29/24 0923   Post-Procedure Volume (cm^3) 0.8 cm^3 01/29/24 0923   Wound Assessment Hyper granulation tissue;Pink/red 02/19/24 0948   Drainage Amount Small (< 25%) 02/19/24 0948   Drainage Description Serosanguinous 02/19/24 0948   Odor None 02/19/24 0948   Guillermina-wound Assessment Fragile;Intact 02/19/24 0948   Margins Attached edges 02/19/24 0948   Wound Thickness Description not for Pressure Injury Full thickness 02/19/24 0948   Number of days: 356            -------    Past Medical History:   Diagnosis Date    A-fib (HCC)     Cardio Dr. Cleaning    Advance directive discussed with

## 2024-02-26 ENCOUNTER — HOSPITAL ENCOUNTER (OUTPATIENT)
Facility: HOSPITAL | Age: 79
Discharge: HOME | End: 2024-02-26
Attending: FAMILY MEDICINE
Payer: MEDICARE

## 2024-02-26 VITALS
DIASTOLIC BLOOD PRESSURE: 73 MMHG | RESPIRATION RATE: 16 BRPM | TEMPERATURE: 97.7 F | HEART RATE: 94 BPM | SYSTOLIC BLOOD PRESSURE: 113 MMHG | OXYGEN SATURATION: 96 %

## 2024-02-26 DIAGNOSIS — I83.018 VARICOSE VEINS OF RIGHT LOWER EXTREMITY WITH ULCER OF OTHER PART OF LOWER LEG WITH FAT LAYER EXPOSED (HCC): ICD-10-CM

## 2024-02-26 DIAGNOSIS — L97.212 CALF ULCER, RIGHT, WITH FAT LAYER EXPOSED (HCC): Primary | ICD-10-CM

## 2024-02-26 DIAGNOSIS — L97.211 CALF ULCER, RIGHT, LIMITED TO BREAKDOWN OF SKIN (HCC): ICD-10-CM

## 2024-02-26 DIAGNOSIS — L97.812 VARICOSE VEINS OF RIGHT LOWER EXTREMITY WITH ULCER OF OTHER PART OF LOWER LEG WITH FAT LAYER EXPOSED (HCC): ICD-10-CM

## 2024-02-26 PROCEDURE — 17250 CHEM CAUT OF GRANLTJ TISSUE: CPT

## 2024-02-26 PROCEDURE — 29581 APPL MULTLAYER CMPRN SYS LEG: CPT

## 2024-02-26 RX ORDER — TRIAMCINOLONE ACETONIDE 1 MG/G
OINTMENT TOPICAL ONCE
OUTPATIENT
Start: 2024-02-26 | End: 2024-02-26

## 2024-02-26 RX ORDER — LIDOCAINE 40 MG/G
CREAM TOPICAL ONCE
OUTPATIENT
Start: 2024-02-26 | End: 2024-02-26

## 2024-02-26 RX ORDER — BETAMETHASONE DIPROPIONATE 0.05 %
OINTMENT (GRAM) TOPICAL ONCE
OUTPATIENT
Start: 2024-02-26 | End: 2024-02-26

## 2024-02-26 RX ORDER — LIDOCAINE HYDROCHLORIDE 20 MG/ML
JELLY TOPICAL ONCE
OUTPATIENT
Start: 2024-02-26 | End: 2024-02-26

## 2024-02-26 RX ORDER — CLOBETASOL PROPIONATE 0.5 MG/G
OINTMENT TOPICAL ONCE
OUTPATIENT
Start: 2024-02-26 | End: 2024-02-26

## 2024-02-26 RX ORDER — GINSENG 100 MG
CAPSULE ORAL ONCE
OUTPATIENT
Start: 2024-02-26 | End: 2024-02-26

## 2024-02-26 RX ORDER — LIDOCAINE 50 MG/G
OINTMENT TOPICAL ONCE
OUTPATIENT
Start: 2024-02-26 | End: 2024-02-26

## 2024-02-26 RX ORDER — IBUPROFEN 200 MG
TABLET ORAL ONCE
OUTPATIENT
Start: 2024-02-26 | End: 2024-02-26

## 2024-02-26 RX ORDER — LIDOCAINE HYDROCHLORIDE 40 MG/ML
SOLUTION TOPICAL ONCE
OUTPATIENT
Start: 2024-02-26 | End: 2024-02-26

## 2024-02-26 RX ORDER — BACITRACIN ZINC AND POLYMYXIN B SULFATE 500; 1000 [USP'U]/G; [USP'U]/G
OINTMENT TOPICAL ONCE
OUTPATIENT
Start: 2024-02-26 | End: 2024-02-26

## 2024-02-26 RX ORDER — GENTAMICIN SULFATE 1 MG/G
OINTMENT TOPICAL ONCE
OUTPATIENT
Start: 2024-02-26 | End: 2024-02-26

## 2024-02-26 ASSESSMENT — PAIN SCALES - GENERAL: PAINLEVEL_OUTOF10: 0

## 2024-02-26 NOTE — FLOWSHEET NOTE
02/26/24 0923   Wound 02/27/23 Leg Right;Lateral   Date First Assessed/Time First Assessed: 02/27/23 1057   Present on Hospital Admission: Yes  Wound Approximate Age at First Assessment (Weeks): 8 weeks  Primary Wound Type: Venous Ulcer  Location: Leg  Wound Location Orientation: Right;Lateral   Wound Image    Wound Etiology Venous   Dressing Status Intact   Wound Cleansed Vashe   Dressing/Treatment Alginate with Ag;Dry dressing;Roll gauze  (2 layer wrap)   Offloading for Diabetic Foot Ulcers Offloading not required   Dressing Change Due 03/04/24   Wound Length (cm) 1.2 cm   Wound Width (cm) 0.9 cm   Wound Depth (cm)   (hypergranulation)   Wound Surface Area (cm^2) 1.08 cm^2   Change in Wound Size % (l*w) 32.5   Wound Assessment Hyper granulation tissue   Drainage Amount Small (< 25%)   Drainage Description Serosanguinous   Odor None   Guillermina-wound Assessment Intact   Margins Attached edges   Wound Thickness Description not for Pressure Injury Full thickness     Multilayer Compression Wrap   (Not Unna) Below the Knee    NAME:  Malik Clarke  YOB: 1945  MEDICAL RECORD NUMBER:  [id number]  DATE:  2/26/2024    Multilayer compression wrap: Removed old Multilayer wrap if indicated and wash leg with mild soap/water.  Applied moisturizing agent to dry skin as needed.   Applied primary and secondary dressing as ordered.  Applied multilayered dressing below the knee to right lower leg.  Instructed patient/caregiver not to remove dressing and to keep it clean and dry.   Instructed patient/caregiver on complications to report to provider, such as pain, numbness in toes, heavy drainage, and slippage of dressing.  Instructed patient on purpose of compression dressing and on activity and exercise recommendations.      Electronically signed by MARY HUA RN on 2/26/2024 at 10:00 AM

## 2024-02-26 NOTE — PROGRESS NOTES
Wound Center  Progress Note / Procedure Note      Chief Complaint:  Malik Clarke is a 78 y.o.  male  with R lower leg anterior wound of few months duration.      Assessment/Plan     78 y.o. male with Dm2    -R lower leg anterior venous stasis ulcer.  Full thickness  stable  Hypergranular  Cauterized as noted below    NO Grafix  today; apply next week      -Dm2  A1c 6.5  Does not check at home  Controlled with metformin    -Leg swelling/varicosities  Has arthritis      Following discussed with patient   Needs :  Serial debridement- prn    Good local wound care  Dressing: alginate  Frequency : once a week        -Edema management  Do not get dressing wet    Edema management:  Elevate leg(s) throughout the day starting in the morning  Compression: 2 layer coflex  Avoid prolonged standing      -Good Diabetic control      Patient/  understood and agrees with plan. Questions answered.  Discussed compliance with compression and elevation.  Is not wearing compression on left- don't want right leg to wound and then left break down in meantime.  Ortho wants wound completely healed before doing TKR on right      Follow up with me 1 week ;     Subjective:     Since last visit  No new issues.       HPI:     Acquired wounds a few months ago  Local trauma  Not healing, being managed by HH  Adv to come here      History/Chart/Medications reviewed    Wound caused by:   Current wound care:See flowsheet  Offloading wound:   Appetite: good  Wound associated pain: See flowsheet  Diabetic: yes  Smoker: no  ROS: no N/V/D, no T/chills; no local rash, no chest pain or shortness of breath, no headache or dizzyness      Objective:     Physical Exam:   See flowsheet / nursing notes for vitals  Vitals:    02/26/24 0900   BP: 113/73   Pulse: 94   Resp: 16   Temp: 97.7 °F (36.5 °C)   SpO2: 96%         General: NAD. Hygiene good  Psych: cooperative. No anxiety or depression. Normal mood and affect.  Neuro: alert and oriented to

## 2024-03-04 ENCOUNTER — HOSPITAL ENCOUNTER (OUTPATIENT)
Facility: HOSPITAL | Age: 79
Discharge: HOME | End: 2024-03-04
Attending: FAMILY MEDICINE
Payer: MEDICARE

## 2024-03-04 PROCEDURE — 29581 APPL MULTLAYER CMPRN SYS LEG: CPT

## 2024-03-04 NOTE — DISCHARGE INSTR - COC
{YES / NO:}  Bladder: {YES / NO:}  Urinary Catheter: {Urinary Catheter:246380557}   Colostomy/Ileostomy/Ileal Conduit: {YES / NO:}       Date of Last BM: ***  No intake or output data in the 24 hours ending 24 1018  No intake/output data recorded.    Safety Concerns:     { ANASTASIYA Safety Concerns:502654552}    Impairments/Disabilities:      { ANASTASIYA Impairments/Disabilities:454956474}    Nutrition Therapy:  Current Nutrition Therapy:   { ANASTASIYA Diet List:207573489}    Routes of Feeding: {[facility] DME Other Feedings:100618402}  Liquids: {Slp liquid thickness:16142}  Daily Fluid Restriction: {[facility] DME Yes amt example:289740910}  Last Modified Barium Swallow with Video (Video Swallowing Test): {Done Not Done Date:720129860}    Treatments at the Time of Hospital Discharge:   Respiratory Treatments: ***  Oxygen Therapy:  {Therapy; copd oxygen:40307}  Ventilator:    { CC Vent List:076401753}    Rehab Therapies: {THERAPEUTIC INTERVENTION:5646029735}  Weight Bearing Status/Restrictions: {[facility] Weight Bearin}  Other Medical Equipment (for information only, NOT a DME order):  {EQUIPMENT:896716358}  Other Treatments: ***    Patient's personal belongings (please select all that are sent with patient):  {[facility] DME Belongings:819954692}    RN SIGNATURE:  {Esignature:491622013}    CASE MANAGEMENT/SOCIAL WORK SECTION    Inpatient Status Date: ***    Readmission Risk Assessment Score:  Readmission Risk              Risk of Unplanned Readmission:  0           Discharging to Facility/ Agency   Name:   Address:  Phone:  Fax:    Dialysis Facility (if applicable)   Name:  Address:  Dialysis Schedule:  Phone:  Fax:    / signature: {Esignature:394882561}    PHYSICIAN SECTION    Prognosis: {Prognosis:9060056189}    Condition at Discharge: { Patient Condition:046106623}    Rehab Potential (if transferring to Rehab): {Prognosis:9706682052}    Recommended Labs or Other Treatments After

## 2024-03-04 NOTE — DISCHARGE INSTRUCTIONS
Discharge Instructions from  Wound Care Clinic at [facility]   [address]   [address]  [place] [address] [phone] Fax [phone]    NAME:  Malik Clarke  YOB: 1945  MEDICAL RECORD NUMBER:  [id number]  DATE:  @ED@    Wound Cleansing:   Do not scrub or use excessive force.  Cleanse wound prior to applying a clean dressing with:  [] Normal Saline [] Keep Wound Dry in Shower    [x] Wound Cleanser   [] Cleanse wound with Mild Soap & Water  [] May Shower at Discharge   [x] Other:VASHE      Topical Treatments:  Do not apply lotions, creams, or ointments to wound bed unless directed.   [] Apply moisturizing lotion to skin surrounding the wound prior to dressing change.  [] Apply antifungal ointment to skin surrounding the wound prior to dressing change.  [] Apply thin film of moisture barrier ointment to skin immediately around wound.  [] Other:       Dressings:           Wound Location Right Lower Leg   [x] Apply Primary Dressing:       [] MediHoney Gel [x] Alginate with Silver [] Alginate   [] Collagen [] Collagen with Silver   [] Santyl with Moisten saline gauze     [] Hydrocolloid   [] MediHoney Alginate [] Foam with Silver   [] Foam   [] Hydrofera Blue    [] Mepilex Border    [] Moisten with Saline [] Hydrogel [] Mepitel     [] Bactroban/Mupirocin [] Polysporin  [] Other:    [] Pack wound loosely with  [] Iodoform   [] Plain Packing  [] Other   [] Cover and Secure with:     [] Gauze [] Tamanna [] Kerlix   [] Ace Wrap [] Cover Roll Tape [] ABD     [] Other:    Avoid contact of tape with skin.  [x] Change dressing: [] Daily    [] Every Other Day [] Three times per week   [] Once a week [] Do Not Change Dressing   [] Other:     Negative Pressure:           Wound Location:   [] Pressure@           mm/Hg  []Continuous []Intermittent   [] Black  [] White Foam [] Other:   []Change dressing: []Three times per week    []Other:     Pressure Relief:  [x] When sitting, shift position or

## 2024-03-04 NOTE — PLAN OF CARE
Multilayer Compression Wrap   (Not Unna) Below the Knee    NAME:  Malik Clarke  YOB: 1945  MEDICAL RECORD NUMBER:  [id number]  DATE:  3/4/2024    Multilayer compression wrap: Removed old Multilayer wrap if indicated and wash leg with mild soap/water.  Applied moisturizing agent to dry skin as needed.   Applied primary and secondary dressing as ordered.  Applied multilayered dressing below the knee to right lower leg.  Instructed patient/caregiver not to remove dressing and to keep it clean and dry.   Instructed patient/caregiver on complications to report to provider, such as pain, numbness in toes, heavy drainage, and slippage of dressing.  Instructed patient on purpose of compression dressing and on activity and exercise recommendations.      Electronically signed by CATALINA LIVINGSTON LPN on 3/4/2024 at 10:19 AM

## 2024-03-04 NOTE — FLOWSHEET NOTE
03/04/24 1006   Right Leg Edema Point of Measurement   Compression Therapy Compression ordered   Left Leg Edema Point of Measurement   Compression Therapy Velcro compression wrap   Velcro Compression Wrap Bandage Compression Pressure Medium   Wound 02/27/23 Leg Right;Lateral   Date First Assessed/Time First Assessed: 02/27/23 1057   Present on Hospital Admission: Yes  Wound Approximate Age at First Assessment (Weeks): 8 weeks  Primary Wound Type: Venous Ulcer  Location: Leg  Wound Location Orientation: Right;Lateral   Wound Image    Wound Etiology Venous   Dressing Status Intact   Wound Cleansed Vashe   Dressing/Treatment Alginate with Ag  (2 layer compression wrap.)   Offloading for Diabetic Foot Ulcers Offloading not required   Dressing Change Due 03/11/24   Wound Length (cm) 1 cm   Wound Width (cm) 0.5 cm   Wound Depth (cm)   (hypergraulating tissue)   Wound Surface Area (cm^2) 0.5 cm^2   Change in Wound Size % (l*w) 68.75   Wound Assessment Hyper granulation tissue   Drainage Amount Small (< 25%)   Drainage Description Serosanguinous   Odor None   Guillermina-wound Assessment Intact   Margins Attached edges   Wound Thickness Description not for Pressure Injury Full thickness   Pain Assessment   Pain Assessment None - Denies Pain   Pain Level 0   Patient's Stated Pain Goal 0 - No pain   OTHER   Require Follow Up Yes

## 2024-03-11 ENCOUNTER — HOSPITAL ENCOUNTER (OUTPATIENT)
Facility: HOSPITAL | Age: 79
Discharge: HOME | End: 2024-03-11
Attending: FAMILY MEDICINE
Payer: MEDICARE

## 2024-03-11 VITALS
RESPIRATION RATE: 16 BRPM | TEMPERATURE: 98.1 F | DIASTOLIC BLOOD PRESSURE: 75 MMHG | SYSTOLIC BLOOD PRESSURE: 127 MMHG | HEART RATE: 104 BPM | OXYGEN SATURATION: 98 %

## 2024-03-11 DIAGNOSIS — L97.812 VARICOSE VEINS OF RIGHT LOWER EXTREMITY WITH ULCER OF OTHER PART OF LOWER LEG WITH FAT LAYER EXPOSED (HCC): ICD-10-CM

## 2024-03-11 DIAGNOSIS — I83.018 VARICOSE VEINS OF RIGHT LOWER EXTREMITY WITH ULCER OF OTHER PART OF LOWER LEG WITH FAT LAYER EXPOSED (HCC): ICD-10-CM

## 2024-03-11 DIAGNOSIS — L97.211 CALF ULCER, RIGHT, LIMITED TO BREAKDOWN OF SKIN (HCC): ICD-10-CM

## 2024-03-11 DIAGNOSIS — L97.212 CALF ULCER, RIGHT, WITH FAT LAYER EXPOSED (HCC): Primary | ICD-10-CM

## 2024-03-11 PROCEDURE — 29581 APPL MULTLAYER CMPRN SYS LEG: CPT

## 2024-03-11 RX ORDER — LIDOCAINE HYDROCHLORIDE 20 MG/ML
JELLY TOPICAL ONCE
Status: DISCONTINUED | OUTPATIENT
Start: 2024-03-11 | End: 2024-03-12 | Stop reason: HOSPADM

## 2024-03-11 RX ORDER — IBUPROFEN 200 MG
TABLET ORAL ONCE
Status: DISCONTINUED | OUTPATIENT
Start: 2024-03-11 | End: 2024-03-12 | Stop reason: HOSPADM

## 2024-03-11 RX ORDER — TRIAMCINOLONE ACETONIDE 1 MG/G
OINTMENT TOPICAL ONCE
Status: DISCONTINUED | OUTPATIENT
Start: 2024-03-11 | End: 2024-03-12 | Stop reason: HOSPADM

## 2024-03-11 RX ORDER — BACITRACIN ZINC AND POLYMYXIN B SULFATE 500; 1000 [USP'U]/G; [USP'U]/G
OINTMENT TOPICAL ONCE
Status: DISCONTINUED | OUTPATIENT
Start: 2024-03-11 | End: 2024-03-12 | Stop reason: HOSPADM

## 2024-03-11 RX ORDER — LIDOCAINE 50 MG/G
OINTMENT TOPICAL ONCE
Status: DISCONTINUED | OUTPATIENT
Start: 2024-03-11 | End: 2024-03-12 | Stop reason: HOSPADM

## 2024-03-11 RX ORDER — GENTAMICIN SULFATE 1 MG/G
OINTMENT TOPICAL ONCE
Status: DISCONTINUED | OUTPATIENT
Start: 2024-03-11 | End: 2024-03-12 | Stop reason: HOSPADM

## 2024-03-11 RX ORDER — CLOBETASOL PROPIONATE 0.5 MG/G
OINTMENT TOPICAL ONCE
Status: DISCONTINUED | OUTPATIENT
Start: 2024-03-11 | End: 2024-03-12 | Stop reason: HOSPADM

## 2024-03-11 RX ORDER — BETAMETHASONE DIPROPIONATE 0.05 %
OINTMENT (GRAM) TOPICAL ONCE
Status: DISCONTINUED | OUTPATIENT
Start: 2024-03-11 | End: 2024-03-12 | Stop reason: HOSPADM

## 2024-03-11 RX ORDER — LIDOCAINE HYDROCHLORIDE 40 MG/ML
SOLUTION TOPICAL ONCE
Status: DISCONTINUED | OUTPATIENT
Start: 2024-03-11 | End: 2024-03-12 | Stop reason: HOSPADM

## 2024-03-11 RX ORDER — LIDOCAINE 40 MG/G
CREAM TOPICAL ONCE
Status: DISCONTINUED | OUTPATIENT
Start: 2024-03-11 | End: 2024-03-12 | Stop reason: HOSPADM

## 2024-03-11 RX ORDER — GINSENG 100 MG
CAPSULE ORAL ONCE
Status: DISCONTINUED | OUTPATIENT
Start: 2024-03-11 | End: 2024-03-12 | Stop reason: HOSPADM

## 2024-03-11 ASSESSMENT — PAIN SCALES - GENERAL: PAINLEVEL_OUTOF10: 0

## 2024-03-11 NOTE — FLOWSHEET NOTE
03/11/24 0919   Wound 02/27/23 Leg Right;Lateral   Date First Assessed/Time First Assessed: 02/27/23 1057   Present on Hospital Admission: Yes  Wound Approximate Age at First Assessment (Weeks): 8 weeks  Primary Wound Type: Venous Ulcer  Location: Leg  Wound Location Orientation: Right;Lateral   Wound Image     Wound Etiology Venous   Dressing Status Intact   Wound Cleansed Wound cleanser   Offloading for Diabetic Foot Ulcers Offloading not required   Dressing Change Due 03/18/24   Wound Length (cm)   (wound clinically closed)   Wound Assessment Epithelialization   Drainage Amount None (dry)   Odor None   Guillermina-wound Assessment Intact   Margins Attached edges     Multilayer Compression Wrap   (Not Unna) Below the Knee    NAME:  Malik Clarke  YOB: 1945  MEDICAL RECORD NUMBER:  [id number]  DATE:  3/11/2024    Multilayer compression wrap: Removed old Multilayer wrap if indicated and wash leg with mild soap/water.  Applied moisturizing agent to dry skin as needed.   Applied primary and secondary dressing as ordered.  Applied multilayered dressing below the knee to right lower leg.  Instructed patient/caregiver not to remove dressing and to keep it clean and dry.   Instructed patient/caregiver on complications to report to provider, such as pain, numbness in toes, heavy drainage, and slippage of dressing.  Instructed patient on purpose of compression dressing and on activity and exercise recommendations.      Electronically signed by MARY HUA RN on 3/11/2024 at 11:05 AM

## 2024-03-18 ENCOUNTER — HOSPITAL ENCOUNTER (OUTPATIENT)
Facility: HOSPITAL | Age: 79
Discharge: HOME | End: 2024-03-18
Attending: FAMILY MEDICINE
Payer: MEDICARE

## 2024-03-18 VITALS
DIASTOLIC BLOOD PRESSURE: 70 MMHG | RESPIRATION RATE: 18 BRPM | OXYGEN SATURATION: 96 % | TEMPERATURE: 98.4 F | HEART RATE: 98 BPM | SYSTOLIC BLOOD PRESSURE: 123 MMHG

## 2024-03-18 PROCEDURE — 99213 OFFICE O/P EST LOW 20 MIN: CPT

## 2024-03-18 RX ORDER — LIDOCAINE 40 MG/G
CREAM TOPICAL ONCE
OUTPATIENT
Start: 2024-03-18 | End: 2024-03-18

## 2024-03-18 RX ORDER — LIDOCAINE HYDROCHLORIDE 40 MG/ML
SOLUTION TOPICAL ONCE
OUTPATIENT
Start: 2024-03-18 | End: 2024-03-18

## 2024-03-18 RX ORDER — LIDOCAINE 50 MG/G
OINTMENT TOPICAL ONCE
OUTPATIENT
Start: 2024-03-18 | End: 2024-03-18

## 2024-03-18 RX ORDER — GENTAMICIN SULFATE 1 MG/G
OINTMENT TOPICAL ONCE
OUTPATIENT
Start: 2024-03-18 | End: 2024-03-18

## 2024-03-18 RX ORDER — LIDOCAINE HYDROCHLORIDE 20 MG/ML
JELLY TOPICAL ONCE
OUTPATIENT
Start: 2024-03-18 | End: 2024-03-18

## 2024-03-18 RX ORDER — GINSENG 100 MG
CAPSULE ORAL ONCE
OUTPATIENT
Start: 2024-03-18 | End: 2024-03-18

## 2024-03-18 RX ORDER — CLOBETASOL PROPIONATE 0.5 MG/G
OINTMENT TOPICAL ONCE
OUTPATIENT
Start: 2024-03-18 | End: 2024-03-18

## 2024-03-18 RX ORDER — TRIAMCINOLONE ACETONIDE 1 MG/G
OINTMENT TOPICAL ONCE
OUTPATIENT
Start: 2024-03-18 | End: 2024-03-18

## 2024-03-18 RX ORDER — BACITRACIN ZINC AND POLYMYXIN B SULFATE 500; 1000 [USP'U]/G; [USP'U]/G
OINTMENT TOPICAL ONCE
OUTPATIENT
Start: 2024-03-18 | End: 2024-03-18

## 2024-03-18 RX ORDER — IBUPROFEN 200 MG
TABLET ORAL ONCE
OUTPATIENT
Start: 2024-03-18 | End: 2024-03-18

## 2024-03-18 RX ORDER — BETAMETHASONE DIPROPIONATE 0.05 %
OINTMENT (GRAM) TOPICAL ONCE
OUTPATIENT
Start: 2024-03-18 | End: 2024-03-18

## 2024-03-18 ASSESSMENT — PAIN SCALES - GENERAL: PAINLEVEL_OUTOF10: 0

## 2024-03-18 NOTE — DISCHARGE INSTRUCTIONS
mm/Hg      []Medium compression 10-20 mm/Hg     []High compression  20-30 mm/Hg   every morning immediately when getting up should be applied to affected leg(s) from mid foot to knee making sure to cover the heel.  Remove every night before going to bed.   [x] Elevate leg(s) above the level of the heart when sitting.    [x] Avoid prolonged standing in one place.     Compression:  Apply: [] Farrow Wraps [x]RightLeg [x]Left Leg   [] Multi-layer compression.  Do not get leg(s) with wrap wet.  If wraps become too tight call the center or completely remove the wrap.      [x] Elevate leg(s) above the level of the heart when sitting.    [x] Avoid prolonged standing in one place.          Dietary:  [x] Diet as tolerated: [] Calorie Diabetic Diet: [] No Added Salt:  [] Increase Protein: [] Other:       Activity:  [x] Activity as tolerated:  [] Patient has no activity restrictions     [] Strict Bedrest: [] Remain off Work:     [] May return to full duty work:                                   [] Return to work with restrictions:             Return Appointment:  [] Wound and dressing supply provider:   [] ECF or Home Healthcare:  [] Wound Assessment: [] Physician or NP scheduled for Wound Assessment:   [x] Return Appointment: With Dr. Jeong  in  1 Week(s)  [] Ordered tests:      Electronically signed MARY HUA RN on 3/18/2024 at 9:43 AM     Wound Care Center Information: Should you experience any significant changes in your wound(s) or have questions about your wound care, please contact the [facility] at MONDAY - FRIDAY 8:00 am - 4:30.  If you need help with your wound outside these hours and cannot wait until we are again available, contact your PCP or go to the hospital emergency room.     PLEASE NOTE: IF YOU ARE UNABLE TO OBTAIN WOUND SUPPLIES, CONTINUE TO USE THE SUPPLIES YOU HAVE AVAILABLE UNTIL YOU ARE ABLE TO REACH US. IT IS MOST IMPORTANT TO KEEP THE WOUND COVERED AT ALL TIMES.

## 2024-03-18 NOTE — FLOWSHEET NOTE
03/18/24 0920   Wound 02/27/23 Leg Right;Lateral   Date First Assessed/Time First Assessed: 02/27/23 1057   Present on Hospital Admission: Yes  Wound Approximate Age at First Assessment (Weeks): 8 weeks  Primary Wound Type: Venous Ulcer  Location: Leg  Wound Location Orientation: Right;Lateral   Wound Image     Wound Etiology Venous   Dressing Status Intact   Wound Cleansed Soap and water   Dressing/Treatment Zinc paste;Dry dressing;Other (comment)  (tubigrip and velcro compression wrap)   Offloading for Diabetic Foot Ulcers Offloading not required   Dressing Change Due 03/25/24   Wound Length (cm)   (wound remains clinically closed)   Wound Assessment Epithelialization   Drainage Amount None (dry)   Odor None   Guillermina-wound Assessment Intact   Margins Attached edges     Farrow wrap applied with tubigrip size F

## 2024-03-18 NOTE — PROGRESS NOTES
Wound Center  Progress Note        Chief Complaint:  Malik Clarke is a 78 y.o.  male  with R lower leg anterior wound of few months duration.      Assessment/Plan     78 y.o. male with Dm2    -R lower leg anterior venous stasis ulcer.  Full thickness  Healed  Whole area still a bit fragile  Prevention/leg/wound care discussed in detail    -Dm2  A1c 6.5  Does not check at home  Controlled with metformin    -Leg swelling/varicosities  Has arthritis      Following discussed with patient   Needs :  Serial debridement- prn    Good local wound care  Dressing: D/C alginate; use xinc with gauze  Frequency : once a week        -Edema management  Do not get dressing wet    Edema management:  Elevate leg(s) throughout the day starting in the morning  Compression: D/C 2 layer coflex; patient's own velcro wraps  Avoid prolonged standing      -Good Diabetic control      Patient/  understood and agrees with plan. Questions answered.  Prevention discussed in detail      Follow up with me 1 week ; wound healed, cleared from our end for TKR    Subjective:     Since last visit  No new issues.       HPI:     Acquired wounds a few months ago  Local trauma  Not healing, being managed by HH  Adv to come here      History/Chart/Medications reviewed    Wound caused by:   Current wound care:See flowsheet  Offloading wound:   Appetite: good  Wound associated pain: See flowsheet  Diabetic: yes  Smoker: no  ROS: no N/V/D, no T/chills; no local rash, no chest pain or shortness of breath, no headache or dizzyness      Objective:     Physical Exam:   See flowsheet / nursing notes for vitals  Vitals:    03/18/24 0900   BP: 123/70   Pulse: 98   Resp: 18   Temp: 98.4 °F (36.9 °C)   SpO2: 96%         General: NAD. Hygiene good  Psych: cooperative. No anxiety or depression. Normal mood and affect.  Neuro: alert and oriented to person/place/situation. Otherwise nonfocal.  Derm: Normal  turgor for age, dry skin  HEENT: Normocephalic, atraumatic.

## 2024-04-01 ENCOUNTER — HOSPITAL ENCOUNTER (OUTPATIENT)
Facility: HOSPITAL | Age: 79
Discharge: HOME | End: 2024-04-01
Attending: FAMILY MEDICINE
Payer: MEDICARE

## 2024-04-01 VITALS
TEMPERATURE: 97.6 F | RESPIRATION RATE: 18 BRPM | SYSTOLIC BLOOD PRESSURE: 122 MMHG | OXYGEN SATURATION: 98 % | DIASTOLIC BLOOD PRESSURE: 85 MMHG

## 2024-04-01 DIAGNOSIS — L97.211 CALF ULCER, RIGHT, LIMITED TO BREAKDOWN OF SKIN (HCC): ICD-10-CM

## 2024-04-01 DIAGNOSIS — L97.812 VARICOSE VEINS OF RIGHT LOWER EXTREMITY WITH ULCER OF OTHER PART OF LOWER LEG WITH FAT LAYER EXPOSED (HCC): ICD-10-CM

## 2024-04-01 DIAGNOSIS — L97.212 CALF ULCER, RIGHT, WITH FAT LAYER EXPOSED (HCC): Primary | ICD-10-CM

## 2024-04-01 DIAGNOSIS — I83.018 VARICOSE VEINS OF RIGHT LOWER EXTREMITY WITH ULCER OF OTHER PART OF LOWER LEG WITH FAT LAYER EXPOSED (HCC): ICD-10-CM

## 2024-04-01 PROCEDURE — 29581 APPL MULTLAYER CMPRN SYS LEG: CPT

## 2024-04-01 PROCEDURE — 6370000000 HC RX 637 (ALT 250 FOR IP): Performed by: FAMILY MEDICINE

## 2024-04-01 RX ORDER — GINSENG 100 MG
CAPSULE ORAL ONCE
OUTPATIENT
Start: 2024-04-01 | End: 2024-04-01

## 2024-04-01 RX ORDER — BETAMETHASONE DIPROPIONATE 0.05 %
OINTMENT (GRAM) TOPICAL ONCE
OUTPATIENT
Start: 2024-04-01 | End: 2024-04-01

## 2024-04-01 RX ORDER — LIDOCAINE 50 MG/G
OINTMENT TOPICAL ONCE
OUTPATIENT
Start: 2024-04-01 | End: 2024-04-01

## 2024-04-01 RX ORDER — LIDOCAINE HYDROCHLORIDE 20 MG/ML
JELLY TOPICAL ONCE
Status: COMPLETED | OUTPATIENT
Start: 2024-04-01 | End: 2024-04-01

## 2024-04-01 RX ORDER — BACITRACIN ZINC AND POLYMYXIN B SULFATE 500; 1000 [USP'U]/G; [USP'U]/G
OINTMENT TOPICAL ONCE
OUTPATIENT
Start: 2024-04-01 | End: 2024-04-01

## 2024-04-01 RX ORDER — CLOBETASOL PROPIONATE 0.5 MG/G
OINTMENT TOPICAL ONCE
OUTPATIENT
Start: 2024-04-01 | End: 2024-04-01

## 2024-04-01 RX ORDER — TRIAMCINOLONE ACETONIDE 1 MG/G
OINTMENT TOPICAL ONCE
OUTPATIENT
Start: 2024-04-01 | End: 2024-04-01

## 2024-04-01 RX ORDER — LIDOCAINE HYDROCHLORIDE 40 MG/ML
SOLUTION TOPICAL ONCE
OUTPATIENT
Start: 2024-04-01 | End: 2024-04-01

## 2024-04-01 RX ORDER — LIDOCAINE HYDROCHLORIDE 20 MG/ML
JELLY TOPICAL ONCE
OUTPATIENT
Start: 2024-04-01 | End: 2024-04-01

## 2024-04-01 RX ORDER — GENTAMICIN SULFATE 1 MG/G
OINTMENT TOPICAL ONCE
OUTPATIENT
Start: 2024-04-01 | End: 2024-04-01

## 2024-04-01 RX ORDER — IBUPROFEN 200 MG
TABLET ORAL ONCE
OUTPATIENT
Start: 2024-04-01 | End: 2024-04-01

## 2024-04-01 RX ORDER — LIDOCAINE 40 MG/G
CREAM TOPICAL ONCE
OUTPATIENT
Start: 2024-04-01 | End: 2024-04-01

## 2024-04-01 RX ADMIN — LIDOCAINE HYDROCHLORIDE: 20 JELLY TOPICAL at 09:58

## 2024-04-01 ASSESSMENT — PAIN SCALES - GENERAL: PAINLEVEL_OUTOF10: 2

## 2024-04-01 NOTE — FLOWSHEET NOTE
04/01/24 0923   Wound 04/01/24 Leg Right;Lateral   Date First Assessed/Time First Assessed: 04/01/24 0923   Present on Original Admission: Yes  Wound Approximate Age at First Assessment (Weeks): 1 weeks  Primary Wound Type: Venous Ulcer  Location: Leg  Wound Location Orientation: Right;Lateral   Wound Image    Wound Etiology Venous   Dressing Status New dressing applied   Wound Cleansed Vashe   Dressing/Treatment Collagen with Ag;Alginate;Roll gauze  (double layer tubigrip, velcro wrap)   Offloading for Diabetic Foot Ulcers Offloading not required   Dressing Change Due 04/08/24   Wound Length (cm) 1.2 cm   Wound Width (cm) 0.7 cm   Wound Depth (cm) 0.1 cm   Wound Surface Area (cm^2) 0.84 cm^2   Wound Volume (cm^3) 0.084 cm^3   Wound Assessment Granulation tissue   Drainage Amount Small (< 25%)   Drainage Description Serosanguinous   Odor None   Guillermina-wound Assessment Dry/flaky   Margins Defined edges   Wound Thickness Description not for Pressure Injury Full thickness   Wound 02/27/23 Leg Right;Lateral   Date First Assessed/Time First Assessed: 02/27/23 1057   Present on Hospital Admission: Yes  Wound Approximate Age at First Assessment (Weeks): 8 weeks  Primary Wound Type: Venous Ulcer  Location: Leg  Wound Location Orientation: Right;Lateral   Wound Image    Wound Etiology Venous   Dressing Status New dressing applied   Wound Cleansed Vashe   Dressing/Treatment Collagen with Ag;Alginate;Roll gauze  (double layer tubigrip and velcro wrap)   Offloading for Diabetic Foot Ulcers Offloading not required   Dressing Change Due 04/08/24   Wound Length (cm) 2 cm   Wound Width (cm) 1.4 cm   Wound Depth (cm) 0.1 cm   Wound Surface Area (cm^2) 2.8 cm^2   Change in Wound Size % (l*w) -75   Wound Volume (cm^3) 0.28 cm^3   Wound Healing % -75   Wound Assessment Granulation tissue   Drainage Amount Scant (moist but unmeasurable)   Drainage Description Serosanguinous   Odor None   Guillermina-wound Assessment Intact   Margins Attached

## 2024-04-01 NOTE — DISCHARGE INSTRUCTIONS
Compression Wraps Discharge Instructions   Location: Right Leg  Type: Right Leg    Your doctor has ordered compression therapy for your wound. Compression bandages reduce the swelling, or edema, in your legs and prevent it from returning. The wound care staff will apply your compression wrap. It must be removed and re-applied at least weekly. As the swelling decreases, the boot no longer provides adequate compression and you need a new one. Once applied, you need to know how to take care of your compression wrap.     The boot must stay dry. Do not get it wet in the shower or tub. You may do a partial bath, or you can cover the boot with a large plastic bag, secured at the top, so that no water can get in.    Avoid standing in one place for long periods of time. If you must  one place, shift your weight and change positions often.    If you have CHF, consult your doctor before following the next two recommendations for leg elevation.     When sitting, elevate your legs on pillows, or put blocks under the foot of your bed. Your legs should be higher than your heart.    If your boot becomes painful, or you notice an increase in swelling in your toes, numbness or tingling, or purple color to your toes, remove the wrap and call the Wound Care Center. If it is after hours, call your doctor for instructions or go to the nearest emergency room.

## 2024-04-01 NOTE — PROGRESS NOTES
Wound Center  Progress Note        Chief Complaint:  Malik Clarke is a 78 y.o.  male  with R lower leg anterior wound of few months duration.      Assessment/Plan     78 y.o. male with Dm2    -R lower leg anterior venous stasis ulcer.  Full thickness, cluster of 2 now  Re-opened,   dressing slid down and bunched (applied here 2 weeks ago)  granular    -Dm2  A1c 6.5  Does not check at home  Controlled with metformin    -Leg swelling/varicosities  Has arthritis      Following discussed with patient   Needs :  Serial debridement- prn    Good local wound care  Dressing:  alginate;   Frequency : once a week  Asked to change at home, since not here next week        -Edema management  Do not get dressing wet    Edema management:  Elevate leg(s) throughout the day starting in the morning  Compression: patient's own velcro wraps  Avoid prolonged standing      -Good Diabetic control      Patient/  understood and agrees with plan. Questions answered.  Prevention discussed in detail      Follow up with me 2 week ; away next week    Subjective:     Since last visit  Sick last week, not here  Did not change dressing at home      HPI:     Acquired wounds a few months ago  Local trauma  Not healing, being managed by HH  Adv to come here      History/Chart/Medications reviewed    Wound caused by:   Current wound care:See flowsheet  Offloading wound:   Appetite: good  Wound associated pain: See flowsheet  Diabetic: yes  Smoker: no  ROS: no N/V/D, no T/chills; no local rash, no chest pain or shortness of breath, no headache or dizzyness      Objective:     Physical Exam:   See flowsheet / nursing notes for vitals  Vitals:    04/01/24 0900   BP: 122/85   Resp: 18   Temp: 97.6 °F (36.4 °C)   SpO2: 98%         General: NAD. Hygiene good  Psych: cooperative. No anxiety or depression. Normal mood and affect.  Neuro: alert and oriented to person/place/situation. Otherwise nonfocal.  Derm: Normal  turgor for age, dry skin  HEENT:

## 2024-04-15 ENCOUNTER — HOSPITAL ENCOUNTER (OUTPATIENT)
Facility: HOSPITAL | Age: 79
Discharge: HOME | End: 2024-04-15
Attending: FAMILY MEDICINE
Payer: MEDICARE

## 2024-04-15 VITALS
DIASTOLIC BLOOD PRESSURE: 75 MMHG | TEMPERATURE: 97.4 F | RESPIRATION RATE: 18 BRPM | SYSTOLIC BLOOD PRESSURE: 140 MMHG | HEART RATE: 115 BPM | OXYGEN SATURATION: 99 %

## 2024-04-15 DIAGNOSIS — L97.212 CALF ULCER, RIGHT, WITH FAT LAYER EXPOSED (HCC): Primary | ICD-10-CM

## 2024-04-15 DIAGNOSIS — I83.018 VARICOSE VEINS OF RIGHT LOWER EXTREMITY WITH ULCER OF OTHER PART OF LOWER LEG WITH FAT LAYER EXPOSED (HCC): ICD-10-CM

## 2024-04-15 DIAGNOSIS — L97.211 CALF ULCER, RIGHT, LIMITED TO BREAKDOWN OF SKIN (HCC): ICD-10-CM

## 2024-04-15 DIAGNOSIS — L97.812 VARICOSE VEINS OF RIGHT LOWER EXTREMITY WITH ULCER OF OTHER PART OF LOWER LEG WITH FAT LAYER EXPOSED (HCC): ICD-10-CM

## 2024-04-15 PROCEDURE — 11042 DBRDMT SUBQ TIS 1ST 20SQCM/<: CPT

## 2024-04-15 PROCEDURE — 6370000000 HC RX 637 (ALT 250 FOR IP): Performed by: FAMILY MEDICINE

## 2024-04-15 RX ORDER — LIDOCAINE HYDROCHLORIDE 20 MG/ML
JELLY TOPICAL ONCE
OUTPATIENT
Start: 2024-04-15 | End: 2024-04-15

## 2024-04-15 RX ORDER — IBUPROFEN 200 MG
TABLET ORAL ONCE
OUTPATIENT
Start: 2024-04-15 | End: 2024-04-15

## 2024-04-15 RX ORDER — TRIAMCINOLONE ACETONIDE 1 MG/G
OINTMENT TOPICAL ONCE
OUTPATIENT
Start: 2024-04-15 | End: 2024-04-15

## 2024-04-15 RX ORDER — GINSENG 100 MG
CAPSULE ORAL ONCE
OUTPATIENT
Start: 2024-04-15 | End: 2024-04-15

## 2024-04-15 RX ORDER — BETAMETHASONE DIPROPIONATE 0.05 %
OINTMENT (GRAM) TOPICAL ONCE
OUTPATIENT
Start: 2024-04-15 | End: 2024-04-15

## 2024-04-15 RX ORDER — LIDOCAINE HYDROCHLORIDE 40 MG/ML
SOLUTION TOPICAL ONCE
OUTPATIENT
Start: 2024-04-15 | End: 2024-04-15

## 2024-04-15 RX ORDER — GENTAMICIN SULFATE 1 MG/G
OINTMENT TOPICAL ONCE
OUTPATIENT
Start: 2024-04-15 | End: 2024-04-15

## 2024-04-15 RX ORDER — LIDOCAINE 50 MG/G
OINTMENT TOPICAL ONCE
OUTPATIENT
Start: 2024-04-15 | End: 2024-04-15

## 2024-04-15 RX ORDER — LIDOCAINE HYDROCHLORIDE 20 MG/ML
JELLY TOPICAL ONCE
Status: COMPLETED | OUTPATIENT
Start: 2024-04-15 | End: 2024-04-15

## 2024-04-15 RX ORDER — CLOBETASOL PROPIONATE 0.5 MG/G
OINTMENT TOPICAL ONCE
OUTPATIENT
Start: 2024-04-15 | End: 2024-04-15

## 2024-04-15 RX ORDER — LIDOCAINE 40 MG/G
CREAM TOPICAL ONCE
OUTPATIENT
Start: 2024-04-15 | End: 2024-04-15

## 2024-04-15 RX ORDER — BACITRACIN ZINC AND POLYMYXIN B SULFATE 500; 1000 [USP'U]/G; [USP'U]/G
OINTMENT TOPICAL ONCE
OUTPATIENT
Start: 2024-04-15 | End: 2024-04-15

## 2024-04-15 RX ADMIN — LIDOCAINE HYDROCHLORIDE: 20 JELLY TOPICAL at 09:23

## 2024-04-15 ASSESSMENT — PAIN SCALES - GENERAL: PAINLEVEL_OUTOF10: 0

## 2024-04-15 NOTE — FLOWSHEET NOTE
04/15/24 0914   Wound 02/27/23 Leg Right;Lateral   Date First Assessed/Time First Assessed: 02/27/23 1057   Present on Hospital Admission: Yes  Wound Approximate Age at First Assessment (Weeks): 8 weeks  Primary Wound Type: Venous Ulcer  Location: Leg  Wound Location Orientation: Right;Lateral   Wound Image     Wound Etiology Venous   Dressing Status New dressing applied   Wound Cleansed Vashe   Dressing/Treatment Collagen with Ag;Alginate;Dry dressing;Roll gauze  (2 layer wrap)   Offloading for Diabetic Foot Ulcers Offloading not required   Dressing Change Due 04/22/24   Wound Length (cm) 2 cm   Wound Width (cm) 1.7 cm   Wound Depth (cm) 0.2 cm   Wound Surface Area (cm^2) 3.4 cm^2   Change in Wound Size % (l*w) -112.5   Wound Volume (cm^3) 0.68 cm^3   Wound Healing % -325   Post-Procedure Length (cm) 2 cm   Post-Procedure Width (cm) 1.7 cm   Post-Procedure Depth (cm) 0.3 cm   Post-Procedure Surface Area (cm^2) 3.4 cm^2   Post-Procedure Volume (cm^3) 1.02 cm^3   Wound Assessment Devitalized tissue   Drainage Amount Small (< 25%)   Drainage Description Serosanguinous   Odor None   Guillermina-wound Assessment Intact   Margins Defined edges   Wound Thickness Description not for Pressure Injury Full thickness     Multilayer Compression Wrap   (Not Unna) Below the Knee    NAME:  Malik Clarke  YOB: 1945  MEDICAL RECORD NUMBER:  [id number]  DATE:  4/15/2024    Multilayer compression wrap: Removed old Multilayer wrap if indicated and wash leg with mild soap/water.  Applied moisturizing agent to dry skin as needed.   Applied primary and secondary dressing as ordered.  Applied multilayered dressing below the knee to right lower leg.  Instructed patient/caregiver not to remove dressing and to keep it clean and dry.   Instructed patient/caregiver on complications to report to provider, such as pain, numbness in toes, heavy drainage, and slippage of dressing.  Instructed patient on purpose of compression dressing and

## 2024-04-15 NOTE — PROGRESS NOTES
0.2 cm 04/15/24 0914   Wound Surface Area (cm^2) 3.4 cm^2 04/15/24 0914   Change in Wound Size % (l*w) -112.5 04/15/24 0914   Wound Volume (cm^3) 0.68 cm^3 04/15/24 0914   Wound Healing % -325 04/15/24 0914   Post-Procedure Length (cm) 2 cm 04/15/24 0914   Post-Procedure Width (cm) 1.7 cm 04/15/24 0914   Post-Procedure Depth (cm) 0.3 cm 04/15/24 0914   Post-Procedure Surface Area (cm^2) 3.4 cm^2 04/15/24 0914   Post-Procedure Volume (cm^3) 1.02 cm^3 04/15/24 0914   Wound Assessment Devitalized tissue 04/15/24 0914   Drainage Amount Small (< 25%) 04/15/24 0914   Drainage Description Serosanguinous 04/15/24 0914   Odor None 04/15/24 0914   Guillermina-wound Assessment Intact 04/15/24 0914   Margins Defined edges 04/15/24 0914   Wound Thickness Description not for Pressure Injury Full thickness 04/15/24 0914   Number of days: 412       Wound 04/01/24 Leg Right;Lateral (Active)   Wound Image   04/01/24 0923   Wound Etiology Venous 04/01/24 0923   Dressing Status New dressing applied 04/01/24 0923   Wound Cleansed Vashe 04/01/24 0923   Dressing/Treatment Collagen with Ag;Alginate;Roll gauze 04/01/24 0923   Offloading for Diabetic Foot Ulcers Offloading not required 04/01/24 0923   Dressing Change Due 04/08/24 04/01/24 0923   Wound Length (cm) 1.2 cm 04/01/24 0923   Wound Width (cm) 0.7 cm 04/01/24 0923   Wound Depth (cm) 0.1 cm 04/01/24 0923   Wound Surface Area (cm^2) 0.84 cm^2 04/01/24 0923   Wound Volume (cm^3) 0.084 cm^3 04/01/24 0923   Wound Assessment Granulation tissue 04/01/24 0923   Drainage Amount Small (< 25%) 04/01/24 0923   Drainage Description Serosanguinous 04/01/24 0923   Odor None 04/01/24 0923   Guillermina-wound Assessment Dry/flaky 04/01/24 0923   Margins Defined edges 04/01/24 0923   Wound Thickness Description not for Pressure Injury Full thickness 04/01/24 0923   Number of days: 14              -------    Past Medical History:   Diagnosis Date    A-fib ([facility])     Cardio Dr. Cleaning    Advance directive discussed

## 2024-04-22 ENCOUNTER — HOSPITAL ENCOUNTER (OUTPATIENT)
Facility: HOSPITAL | Age: 79
Discharge: HOME | End: 2024-04-22
Attending: FAMILY MEDICINE
Payer: MEDICARE

## 2024-04-22 VITALS
SYSTOLIC BLOOD PRESSURE: 125 MMHG | DIASTOLIC BLOOD PRESSURE: 65 MMHG | TEMPERATURE: 98.2 F | HEART RATE: 92 BPM | OXYGEN SATURATION: 95 % | RESPIRATION RATE: 18 BRPM

## 2024-04-22 DIAGNOSIS — L97.212 CALF ULCER, RIGHT, WITH FAT LAYER EXPOSED (HCC): Primary | ICD-10-CM

## 2024-04-22 DIAGNOSIS — L97.812 VARICOSE VEINS OF RIGHT LOWER EXTREMITY WITH ULCER OF OTHER PART OF LOWER LEG WITH FAT LAYER EXPOSED (HCC): ICD-10-CM

## 2024-04-22 DIAGNOSIS — L97.211 CALF ULCER, RIGHT, LIMITED TO BREAKDOWN OF SKIN (HCC): ICD-10-CM

## 2024-04-22 DIAGNOSIS — I83.018 VARICOSE VEINS OF RIGHT LOWER EXTREMITY WITH ULCER OF OTHER PART OF LOWER LEG WITH FAT LAYER EXPOSED (HCC): ICD-10-CM

## 2024-04-22 PROCEDURE — 11042 DBRDMT SUBQ TIS 1ST 20SQCM/<: CPT

## 2024-04-22 PROCEDURE — 6370000000 HC RX 637 (ALT 250 FOR IP): Performed by: FAMILY MEDICINE

## 2024-04-22 RX ORDER — LIDOCAINE HYDROCHLORIDE 20 MG/ML
JELLY TOPICAL ONCE
Status: COMPLETED | OUTPATIENT
Start: 2024-04-22 | End: 2024-04-22

## 2024-04-22 RX ORDER — GENTAMICIN SULFATE 1 MG/G
OINTMENT TOPICAL ONCE
OUTPATIENT
Start: 2024-04-22 | End: 2024-04-22

## 2024-04-22 RX ORDER — TRIAMCINOLONE ACETONIDE 1 MG/G
OINTMENT TOPICAL ONCE
OUTPATIENT
Start: 2024-04-22 | End: 2024-04-22

## 2024-04-22 RX ORDER — BETAMETHASONE DIPROPIONATE 0.05 %
OINTMENT (GRAM) TOPICAL ONCE
OUTPATIENT
Start: 2024-04-22 | End: 2024-04-22

## 2024-04-22 RX ORDER — GINSENG 100 MG
CAPSULE ORAL ONCE
OUTPATIENT
Start: 2024-04-22 | End: 2024-04-22

## 2024-04-22 RX ORDER — LIDOCAINE HYDROCHLORIDE 40 MG/ML
SOLUTION TOPICAL ONCE
OUTPATIENT
Start: 2024-04-22 | End: 2024-04-22

## 2024-04-22 RX ORDER — BACITRACIN ZINC AND POLYMYXIN B SULFATE 500; 1000 [USP'U]/G; [USP'U]/G
OINTMENT TOPICAL ONCE
OUTPATIENT
Start: 2024-04-22 | End: 2024-04-22

## 2024-04-22 RX ORDER — IBUPROFEN 200 MG
TABLET ORAL ONCE
OUTPATIENT
Start: 2024-04-22 | End: 2024-04-22

## 2024-04-22 RX ORDER — LIDOCAINE HYDROCHLORIDE 20 MG/ML
JELLY TOPICAL ONCE
OUTPATIENT
Start: 2024-04-22 | End: 2024-04-22

## 2024-04-22 RX ORDER — LIDOCAINE 50 MG/G
OINTMENT TOPICAL ONCE
OUTPATIENT
Start: 2024-04-22 | End: 2024-04-22

## 2024-04-22 RX ORDER — LIDOCAINE 40 MG/G
CREAM TOPICAL ONCE
OUTPATIENT
Start: 2024-04-22 | End: 2024-04-22

## 2024-04-22 RX ORDER — CLOBETASOL PROPIONATE 0.5 MG/G
OINTMENT TOPICAL ONCE
OUTPATIENT
Start: 2024-04-22 | End: 2024-04-22

## 2024-04-22 RX ADMIN — LIDOCAINE HYDROCHLORIDE: 20 JELLY TOPICAL at 09:35

## 2024-04-22 ASSESSMENT — PAIN SCALES - GENERAL: PAINLEVEL_OUTOF10: 0

## 2024-04-22 NOTE — DISCHARGE INSTRUCTIONS
Compression Wraps Discharge Instructions   Location: Bilateral Legs  Type: 2 layer wrap    Your doctor has ordered compression therapy for your wound. Compression bandages reduce the swelling, or edema, in your legs and prevent it from returning. The wound care staff will apply your compression wrap. It must be removed and re-applied at least weekly. As the swelling decreases, the boot no longer provides adequate compression and you need a new one. Once applied, you need to know how to take care of your compression wrap.     The boot must stay dry. Do not get it wet in the shower or tub. You may do a partial bath, or you can cover the boot with a large plastic bag, secured at the top, so that no water can get in.    Avoid standing in one place for long periods of time. If you must  one place, shift your weight and change positions often.    If you have CHF, consult your doctor before following the next two recommendations for leg elevation.     When sitting, elevate your legs on pillows, or put blocks under the foot of your bed. Your legs should be higher than your heart.    If your boot becomes painful, or you notice an increase in swelling in your toes, numbness or tingling, or purple color to your toes, remove the wrap and call the Wound Care Center. If it is after hours, call your doctor for instructions or go to the nearest emergency room.

## 2024-04-22 NOTE — FLOWSHEET NOTE
04/22/24 0919   Wound 04/22/24 Leg Left;Lateral;Distal ruptured blisters   Date First Assessed/Time First Assessed: 04/22/24 0928   Present on Original Admission: Yes  Wound Approximate Age at First Assessment (Weeks): 1 weeks  Primary Wound Type: Venous Ulcer  Location: Leg  Wound Location Orientation: Left;Lateral;Distal  ...   Wound Image     Wound Etiology Venous   Dressing Status New dressing applied   Wound Cleansed Vashe   Dressing/Treatment Collagen;Alginate with Ag;Dry dressing;Roll gauze  (2 layer wrap)   Offloading for Diabetic Foot Ulcers Offloading not required   Dressing Change Due 04/29/24   Wound Length (cm) 1.7 cm   Wound Width (cm) 1.5 cm   Wound Depth (cm) 0.1 cm   Wound Surface Area (cm^2) 2.55 cm^2   Wound Volume (cm^3) 0.255 cm^3   Post-Procedure Length (cm) 1.7 cm   Post-Procedure Width (cm) 1.5 cm   Post-Procedure Depth (cm) 0.2 cm   Post-Procedure Surface Area (cm^2) 2.55 cm^2   Post-Procedure Volume (cm^3) 0.51 cm^3   Wound Assessment Devitalized tissue   Drainage Amount Small (< 25%)   Drainage Description Serosanguinous   Odor None   Guillermina-wound Assessment Edematous   Margins Defined edges   Wound Thickness Description not for Pressure Injury Full thickness   Wound 02/27/23 Leg Right;Lateral   Date First Assessed/Time First Assessed: 02/27/23 1057   Present on Original Admission: Yes  Wound Approximate Age at First Assessment (Weeks): 8 weeks  Primary Wound Type: Venous Ulcer  Location: Leg  Wound Location Orientation: Right;Lateral  Wound ...   Wound Image     Wound Etiology Venous   Dressing Status Intact   Wound Cleansed Vashe   Dressing/Treatment Collagen with Ag;Alginate with Ag;Dry dressing;Roll gauze  (2 layer wrap)   Offloading for Diabetic Foot Ulcers Offloading not required   Dressing Change Due 04/29/24   Wound Length (cm) 2.2 cm   Wound Width (cm) 1.8 cm   Wound Depth (cm) 0.2 cm   Wound Surface Area (cm^2) 3.96 cm^2   Change in Wound Size % (l*w) -147.5   Wound Volume

## 2024-04-22 NOTE — PROGRESS NOTES
Wound Center  Progress Note / Procedure Note       Chief Complaint:  Malik Clarke is a 78 y.o.  male  with R lower leg anterior wound of few months duration.      Assessment/Plan     78 y.o. male with Dm2    -R lower leg anterior venous stasis ulcer.  Full thickness, cluster of 2 now  Bigger  Friable tissue/granular/ slough  Necessitates debridement  for wound healing and to prevent/heal infection  Ulcer needs debridement- see note below      NEW   Left lateral leg proximal ulcer  Full thickness  Slough/granular  Necessitates debridement  for wound healing and to prevent/heal infection  Ulcer needs debridement- see note below      NEW   Left lateral leg distal ulcer  Full thickness  Slough/granular  Necessitates debridement  for wound healing and to prevent/heal infection  Ulcer needs debridement- see note below    -Dm2  A1c 6.5  Does not check at home  Controlled with metformin    -Leg swelling/varicosities  Has arthritis      Following discussed with patient   Needs :  Serial debridement- prn    Good local wound care  Dressing:  collagen, alginate to both legs  Frequency : once a week  coflex        -Edema management  Do not get dressing wet    Edema management:  Elevate leg(s) throughout the day starting in the morning  Compression: coflex  Avoid prolonged standing      -Good Diabetic control      Patient/  understood and agrees with plan. Questions answered.  Long discussion re compression and elevation      Follow up with me 1 week     Subjective:     Since last visit  Has been on and off with compression on left leg- now has open wounds on this leg  More active than usual as wife is having her own medical problems  Also has some trips coming up    HPI:     Acquired wounds a few months ago  Local trauma  Not healing, being managed by HH  Adv to come here      History/Chart/Medications reviewed    Wound caused by:   Current wound care:See flowsheet  Offloading wound:   Appetite: good  Wound associated

## 2024-04-29 ENCOUNTER — HOSPITAL ENCOUNTER (OUTPATIENT)
Facility: HOSPITAL | Age: 79
Discharge: HOME | End: 2024-04-29
Attending: FAMILY MEDICINE
Payer: MEDICARE

## 2024-04-29 VITALS
DIASTOLIC BLOOD PRESSURE: 91 MMHG | SYSTOLIC BLOOD PRESSURE: 133 MMHG | OXYGEN SATURATION: 95 % | HEART RATE: 105 BPM | TEMPERATURE: 98.6 F | RESPIRATION RATE: 16 BRPM

## 2024-04-29 DIAGNOSIS — L97.211 CALF ULCER, RIGHT, LIMITED TO BREAKDOWN OF SKIN (HCC): ICD-10-CM

## 2024-04-29 DIAGNOSIS — I83.018 VARICOSE VEINS OF RIGHT LOWER EXTREMITY WITH ULCER OF OTHER PART OF LOWER LEG WITH FAT LAYER EXPOSED (HCC): ICD-10-CM

## 2024-04-29 DIAGNOSIS — L97.812 VARICOSE VEINS OF RIGHT LOWER EXTREMITY WITH ULCER OF OTHER PART OF LOWER LEG WITH FAT LAYER EXPOSED (HCC): ICD-10-CM

## 2024-04-29 DIAGNOSIS — L97.212 CALF ULCER, RIGHT, WITH FAT LAYER EXPOSED (HCC): Primary | ICD-10-CM

## 2024-04-29 PROCEDURE — 6370000000 HC RX 637 (ALT 250 FOR IP): Performed by: FAMILY MEDICINE

## 2024-04-29 PROCEDURE — 11042 DBRDMT SUBQ TIS 1ST 20SQCM/<: CPT

## 2024-04-29 RX ORDER — IBUPROFEN 200 MG
TABLET ORAL ONCE
OUTPATIENT
Start: 2024-04-29 | End: 2024-04-29

## 2024-04-29 RX ORDER — TRIAMCINOLONE ACETONIDE 1 MG/G
OINTMENT TOPICAL ONCE
OUTPATIENT
Start: 2024-04-29 | End: 2024-04-29

## 2024-04-29 RX ORDER — GINSENG 100 MG
CAPSULE ORAL ONCE
OUTPATIENT
Start: 2024-04-29 | End: 2024-04-29

## 2024-04-29 RX ORDER — GENTAMICIN SULFATE 1 MG/G
OINTMENT TOPICAL ONCE
OUTPATIENT
Start: 2024-04-29 | End: 2024-04-29

## 2024-04-29 RX ORDER — LIDOCAINE HYDROCHLORIDE 20 MG/ML
JELLY TOPICAL ONCE
Status: COMPLETED | OUTPATIENT
Start: 2024-04-29 | End: 2024-04-29

## 2024-04-29 RX ORDER — BETAMETHASONE DIPROPIONATE 0.05 %
OINTMENT (GRAM) TOPICAL ONCE
OUTPATIENT
Start: 2024-04-29 | End: 2024-04-29

## 2024-04-29 RX ORDER — LIDOCAINE 40 MG/G
CREAM TOPICAL ONCE
OUTPATIENT
Start: 2024-04-29 | End: 2024-04-29

## 2024-04-29 RX ORDER — LIDOCAINE HYDROCHLORIDE 20 MG/ML
JELLY TOPICAL ONCE
OUTPATIENT
Start: 2024-04-29 | End: 2024-04-29

## 2024-04-29 RX ORDER — LIDOCAINE HYDROCHLORIDE 40 MG/ML
SOLUTION TOPICAL ONCE
OUTPATIENT
Start: 2024-04-29 | End: 2024-04-29

## 2024-04-29 RX ORDER — LIDOCAINE 50 MG/G
OINTMENT TOPICAL ONCE
OUTPATIENT
Start: 2024-04-29 | End: 2024-04-29

## 2024-04-29 RX ORDER — BACITRACIN ZINC AND POLYMYXIN B SULFATE 500; 1000 [USP'U]/G; [USP'U]/G
OINTMENT TOPICAL ONCE
OUTPATIENT
Start: 2024-04-29 | End: 2024-04-29

## 2024-04-29 RX ORDER — CLOBETASOL PROPIONATE 0.5 MG/G
OINTMENT TOPICAL ONCE
OUTPATIENT
Start: 2024-04-29 | End: 2024-04-29

## 2024-04-29 RX ADMIN — LIDOCAINE HYDROCHLORIDE: 20 JELLY TOPICAL at 09:43

## 2024-04-29 ASSESSMENT — PAIN SCALES - GENERAL: PAINLEVEL_OUTOF10: 0

## 2024-04-29 NOTE — PROGRESS NOTES
Wound Center  Progress Note / Procedure Note       Chief Complaint:  Malik Clarke is a 78 y.o.  male  with R lower leg anterior wound of few months duration.      Assessment/Plan     78 y.o. male with Dm2    -R lower leg anterior venous stasis ulcer.  Full thickness,   Longer  Friable tissue/granular/ slough  Necessitates debridement  for wound healing and to prevent/heal infection  Ulcer needs debridement- see note below      NEW   Left lateral leg proximal ulcer  Full thickness  smaller        Left lateral leg distal ulcer  Full thickness  smaller    -Dm2  A1c 6.5  Does not check at home  Controlled with metformin    -Leg swelling/varicosities  Has arthritis      Following discussed with patient   Needs :  Serial debridement- prn    Good local wound care  Dressing:  collagen, alginate to both legs  Frequency : once a week  Coflex on right  Tubi and Own velcro wraps on left        -Edema management  Do not get dressing wet    Edema management:  Elevate leg(s) throughout the day starting in the morning  Compression: coflex  Avoid prolonged standing      -Good Diabetic control      Patient/  understood and agrees with plan. Questions answered.  Long discussion re compression and elevation      Follow up with me 1 week     Subjective:     Since last visit  4/29 didn't like having compression on left leg, wants to go back to using his velcro wrap      4/22 Has been on and off with compression on left leg- now has open wounds on this leg  More active than usual as wife is having her own medical problems  Also has some trips coming up    HPI:     Acquired wounds a few months ago  Local trauma  Not healing, being managed by HH  Adv to come here      History/Chart/Medications reviewed    Wound caused by:   Current wound care:See flowsheet  Offloading wound:   Appetite: good  Wound associated pain: See flowsheet  Diabetic: yes  Smoker: no  ROS: no N/V/D, no T/chills; no local rash, no chest pain or shortness of 
Possible Home

## 2024-04-29 NOTE — FLOWSHEET NOTE
04/29/24 0932   Wound 02/27/23 Leg Right;Lateral   Date First Assessed/Time First Assessed: 02/27/23 1057   Present on Original Admission: Yes  Wound Approximate Age at First Assessment (Weeks): 8 weeks  Primary Wound Type: Venous Ulcer  Location: Leg  Wound Location Orientation: Right;Lateral  Wound ...   Wound Image    Wound Etiology Venous   Dressing Status Intact   Wound Cleansed Vashe   Dressing/Treatment Collagen with Ag;Alginate;Gauze dressing/dressing sponge;Roll gauze  (2 layer wrap)   Offloading for Diabetic Foot Ulcers Offloading not required   Dressing Change Due 05/06/24   Wound Length (cm) 2.8 cm   Wound Width (cm) 1.8 cm   Wound Depth (cm) 0.1 cm   Wound Surface Area (cm^2) 5.04 cm^2   Change in Wound Size % (l*w) -215   Wound Volume (cm^3) 0.504 cm^3   Wound Healing % -215   Post-Procedure Length (cm) 2.8 cm   Post-Procedure Width (cm) 1.8 cm   Post-Procedure Depth (cm) 0.2 cm   Post-Procedure Surface Area (cm^2) 5.04 cm^2   Post-Procedure Volume (cm^3) 1.008 cm^3   Wound Assessment Devitalized tissue;Granulation tissue   Drainage Amount Small (< 25%)   Drainage Description Serosanguinous   Odor None   Guillermina-wound Assessment Dry/flaky   Margins Attached edges   Wound Thickness Description not for Pressure Injury Full thickness   Wound 04/22/24 Leg Left;Lateral;Distal ruptured blisters   Date First Assessed/Time First Assessed: 04/22/24 0928   Present on Original Admission: Yes  Wound Approximate Age at First Assessment (Weeks): 1 weeks  Primary Wound Type: Venous Ulcer  Location: Leg  Wound Location Orientation: Left;Lateral;Distal  ...   Wound Image    Wound Etiology Venous   Dressing Status Intact   Wound Cleansed Vashe   Dressing/Treatment Alginate with Ag;Gauze dressing/dressing sponge;Silicone border;Tubular bandage;Other (comment)  (Farrow wrap)   Offloading for Diabetic Foot Ulcers Offloading not required   Dressing Change Due 05/06/24   Wound Length (cm)   (clinically closed)   Wound

## 2024-05-06 ENCOUNTER — HOSPITAL ENCOUNTER (OUTPATIENT)
Facility: HOSPITAL | Age: 79
Discharge: HOME | End: 2024-05-06
Attending: FAMILY MEDICINE
Payer: MEDICARE

## 2024-05-06 VITALS
DIASTOLIC BLOOD PRESSURE: 73 MMHG | TEMPERATURE: 97.6 F | HEART RATE: 106 BPM | OXYGEN SATURATION: 98 % | RESPIRATION RATE: 18 BRPM | SYSTOLIC BLOOD PRESSURE: 137 MMHG

## 2024-05-06 DIAGNOSIS — I83.018 VARICOSE VEINS OF RIGHT LOWER EXTREMITY WITH ULCER OF OTHER PART OF LOWER LEG WITH FAT LAYER EXPOSED (HCC): ICD-10-CM

## 2024-05-06 DIAGNOSIS — L97.812 VARICOSE VEINS OF RIGHT LOWER EXTREMITY WITH ULCER OF OTHER PART OF LOWER LEG WITH FAT LAYER EXPOSED (HCC): ICD-10-CM

## 2024-05-06 DIAGNOSIS — L97.212 CALF ULCER, RIGHT, WITH FAT LAYER EXPOSED (HCC): Primary | ICD-10-CM

## 2024-05-06 DIAGNOSIS — L97.211 CALF ULCER, RIGHT, LIMITED TO BREAKDOWN OF SKIN (HCC): ICD-10-CM

## 2024-05-06 PROCEDURE — 11042 DBRDMT SUBQ TIS 1ST 20SQCM/<: CPT

## 2024-05-06 PROCEDURE — 6370000000 HC RX 637 (ALT 250 FOR IP): Performed by: FAMILY MEDICINE

## 2024-05-06 RX ORDER — BETAMETHASONE DIPROPIONATE 0.05 %
OINTMENT (GRAM) TOPICAL ONCE
OUTPATIENT
Start: 2024-05-06 | End: 2024-05-06

## 2024-05-06 RX ORDER — LIDOCAINE HYDROCHLORIDE 20 MG/ML
JELLY TOPICAL ONCE
Status: COMPLETED | OUTPATIENT
Start: 2024-05-06 | End: 2024-05-06

## 2024-05-06 RX ORDER — LIDOCAINE 50 MG/G
OINTMENT TOPICAL ONCE
OUTPATIENT
Start: 2024-05-06 | End: 2024-05-06

## 2024-05-06 RX ORDER — IBUPROFEN 200 MG
TABLET ORAL ONCE
OUTPATIENT
Start: 2024-05-06 | End: 2024-05-06

## 2024-05-06 RX ORDER — TRIAMCINOLONE ACETONIDE 1 MG/G
OINTMENT TOPICAL ONCE
OUTPATIENT
Start: 2024-05-06 | End: 2024-05-06

## 2024-05-06 RX ORDER — CLOBETASOL PROPIONATE 0.5 MG/G
OINTMENT TOPICAL ONCE
OUTPATIENT
Start: 2024-05-06 | End: 2024-05-06

## 2024-05-06 RX ORDER — LIDOCAINE HYDROCHLORIDE 40 MG/ML
SOLUTION TOPICAL ONCE
OUTPATIENT
Start: 2024-05-06 | End: 2024-05-06

## 2024-05-06 RX ORDER — LIDOCAINE HYDROCHLORIDE 20 MG/ML
JELLY TOPICAL ONCE
OUTPATIENT
Start: 2024-05-06 | End: 2024-05-06

## 2024-05-06 RX ORDER — LIDOCAINE 40 MG/G
CREAM TOPICAL ONCE
OUTPATIENT
Start: 2024-05-06 | End: 2024-05-06

## 2024-05-06 RX ORDER — GENTAMICIN SULFATE 1 MG/G
OINTMENT TOPICAL ONCE
OUTPATIENT
Start: 2024-05-06 | End: 2024-05-06

## 2024-05-06 RX ORDER — GINSENG 100 MG
CAPSULE ORAL ONCE
OUTPATIENT
Start: 2024-05-06 | End: 2024-05-06

## 2024-05-06 RX ORDER — BACITRACIN ZINC AND POLYMYXIN B SULFATE 500; 1000 [USP'U]/G; [USP'U]/G
OINTMENT TOPICAL ONCE
OUTPATIENT
Start: 2024-05-06 | End: 2024-05-06

## 2024-05-06 RX ADMIN — LIDOCAINE HYDROCHLORIDE: 20 JELLY TOPICAL at 09:28

## 2024-05-06 ASSESSMENT — PAIN SCALES - GENERAL: PAINLEVEL_OUTOF10: 0

## 2024-05-06 NOTE — PROGRESS NOTES
Dressing/Treatment Collagen with Ag;Alginate;Dry dressing 05/06/24 0915   Offloading for Diabetic Foot Ulcers Offloading not required 05/06/24 0915   Dressing Change Due 05/13/24 05/06/24 0915   Wound Length (cm) 2.5 cm 05/06/24 0915   Wound Width (cm) 1.8 cm 05/06/24 0915   Wound Depth (cm) 0.1 cm 05/06/24 0915   Wound Surface Area (cm^2) 4.5 cm^2 05/06/24 0915   Change in Wound Size % (l*w) -181.25 05/06/24 0915   Wound Volume (cm^3) 0.45 cm^3 05/06/24 0915   Wound Healing % -181 05/06/24 0915   Post-Procedure Length (cm) 2.5 cm 05/06/24 0915   Post-Procedure Width (cm) 1.8 cm 05/06/24 0915   Post-Procedure Depth (cm) 0.2 cm 05/06/24 0915   Post-Procedure Surface Area (cm^2) 4.5 cm^2 05/06/24 0915   Post-Procedure Volume (cm^3) 0.9 cm^3 05/06/24 0915   Wound Assessment Granulation tissue;Devitalized tissue 05/06/24 0915   Drainage Amount Small (< 25%) 05/06/24 0915   Drainage Description Serosanguinous 05/06/24 0915   Odor None 05/06/24 0915   Guillermina-wound Assessment Intact;Dry/flaky 05/06/24 0915   Margins Epibole (rolled edges) 05/06/24 0915   Wound Thickness Description not for Pressure Injury Full thickness 05/06/24 0915   Number of days: 433              -------    Past Medical History:   Diagnosis Date    A-fib ([facility])     Cardio Dr. Cleaning    Advance directive discussed with patient 04/06/2023    pt states he will bring DOS    Arthritis     Cancer (HCC) 2021    melanoma removed from nose and face, pt denies chemo or readiation    Chronic kidney disease     kidney stones    Coagulation disorder (HCC)     excess iron; erthythrocytosis; hemochromatosis- [facility] yearly exams    Diabetes ([facility])     PcP  manages    Enlarged prostate     managed by Dr. Cole    Exercise tolerance finding     pt states able to climb 2 flights stairs without CP or SOB    Hypertension 2007    cardio Dr. Cleaning    Ill-defined condition     ulcer - shin- Started around Christmas 2022- followed by [facility]

## 2024-05-06 NOTE — FLOWSHEET NOTE
05/06/24 0915   Wound 02/27/23 Leg Right;Lateral   Date First Assessed/Time First Assessed: 02/27/23 1057   Present on Original Admission: Yes  Wound Approximate Age at First Assessment (Weeks): 8 weeks  Primary Wound Type: Venous Ulcer  Location: Leg  Wound Location Orientation: Right;Lateral  Wound ...   Wound Image    Wound Etiology Venous   Dressing Status Intact   Wound Cleansed Vashe   Dressing/Treatment Collagen with Ag;Alginate;Dry dressing  (2 layer wrap)   Offloading for Diabetic Foot Ulcers Offloading not required   Dressing Change Due 05/13/24   Wound Length (cm) 2.5 cm   Wound Width (cm) 1.8 cm   Wound Depth (cm) 0.1 cm   Wound Surface Area (cm^2) 4.5 cm^2   Change in Wound Size % (l*w) -181.25   Wound Volume (cm^3) 0.45 cm^3   Wound Healing % -181   Post-Procedure Length (cm) 2.5 cm   Post-Procedure Width (cm) 1.8 cm   Post-Procedure Depth (cm) 0.2 cm   Post-Procedure Surface Area (cm^2) 4.5 cm^2   Post-Procedure Volume (cm^3) 0.9 cm^3   Wound Assessment Granulation tissue;Devitalized tissue   Drainage Amount Small (< 25%)   Drainage Description Serosanguinous   Odor None   Guillermina-wound Assessment Intact;Dry/flaky   Margins Epibole (rolled edges)   Wound Thickness Description not for Pressure Injury Full thickness     Multilayer Compression Wrap   (Not Unna) Below the Knee    NAME:  Malik Clarke  YOB: 1945  MEDICAL RECORD NUMBER:  [id number]  DATE:  5/6/2024    Multilayer compression wrap: Removed old Multilayer wrap if indicated and wash leg with mild soap/water.  Applied moisturizing agent to dry skin as needed.   Applied primary and secondary dressing as ordered.  Applied multilayered dressing below the knee to right lower leg.  Instructed patient/caregiver not to remove dressing and to keep it clean and dry.   Instructed patient/caregiver on complications to report to provider, such as pain, numbness in toes, heavy drainage, and slippage of dressing.  Instructed patient on purpose of

## 2024-05-13 ENCOUNTER — HOSPITAL ENCOUNTER (OUTPATIENT)
Facility: HOSPITAL | Age: 79
Discharge: HOME | End: 2024-05-13
Attending: FAMILY MEDICINE
Payer: MEDICARE

## 2024-05-13 VITALS
DIASTOLIC BLOOD PRESSURE: 74 MMHG | HEART RATE: 104 BPM | RESPIRATION RATE: 18 BRPM | TEMPERATURE: 98 F | OXYGEN SATURATION: 97 % | SYSTOLIC BLOOD PRESSURE: 137 MMHG

## 2024-05-13 PROCEDURE — 29581 APPL MULTLAYER CMPRN SYS LEG: CPT

## 2024-05-13 ASSESSMENT — PAIN SCALES - GENERAL: PAINLEVEL_OUTOF10: 0

## 2024-05-13 NOTE — FLOWSHEET NOTE
05/13/24 1100   Wound 02/27/23 Leg Right;Lateral   Date First Assessed/Time First Assessed: 02/27/23 1057   Present on Original Admission: Yes  Wound Approximate Age at First Assessment (Weeks): 8 weeks  Primary Wound Type: Venous Ulcer  Location: Leg  Wound Location Orientation: Right;Lateral  Wound ...   Wound Image    Wound Etiology Venous   Dressing Status Intact   Wound Cleansed Wound cleanser   Dressing/Treatment Collagen;Alginate with Ag;Dry dressing;Roll gauze  (2 layer wrap)   Offloading for Diabetic Foot Ulcers Offloading not required   Dressing Change Due 05/20/24   Wound Length (cm) 1.5 cm   Wound Width (cm) 1.2 cm   Wound Depth (cm) 0.1 cm   Wound Surface Area (cm^2) 1.8 cm^2   Change in Wound Size % (l*w) -12.5   Wound Volume (cm^3) 0.18 cm^3   Wound Healing % -13   Wound Assessment Granulation tissue   Drainage Amount Small (< 25%)   Drainage Description Serosanguinous   Odor None   Guillermina-wound Assessment Intact   Margins Defined edges   Wound Thickness Description not for Pressure Injury Full thickness     Multilayer Compression Wrap   (Not Unna) Below the Knee    NAME:  Malik Clarke  YOB: 1945  MEDICAL RECORD NUMBER:  [id number]  DATE:  5/13/2024    Multilayer compression wrap: Removed old Multilayer wrap if indicated and wash leg with mild soap/water.  Applied moisturizing agent to dry skin as needed.   Applied primary and secondary dressing as ordered.  Applied multilayered dressing below the knee to right lower leg.  Instructed patient/caregiver not to remove dressing and to keep it clean and dry.   Instructed patient/caregiver on complications to report to provider, such as pain, numbness in toes, heavy drainage, and slippage of dressing.  Instructed patient on purpose of compression dressing and on activity and exercise recommendations.      Electronically signed by MARY HUA RN on 5/13/2024 at 1:34 PM

## 2024-05-20 ENCOUNTER — HOSPITAL ENCOUNTER (OUTPATIENT)
Facility: HOSPITAL | Age: 79
Discharge: HOME | End: 2024-05-20
Attending: FAMILY MEDICINE
Payer: MEDICARE

## 2024-05-20 VITALS
OXYGEN SATURATION: 97 % | TEMPERATURE: 97.6 F | RESPIRATION RATE: 16 BRPM | DIASTOLIC BLOOD PRESSURE: 70 MMHG | SYSTOLIC BLOOD PRESSURE: 145 MMHG | HEART RATE: 97 BPM

## 2024-05-20 DIAGNOSIS — L97.212 CALF ULCER, RIGHT, WITH FAT LAYER EXPOSED (HCC): Primary | ICD-10-CM

## 2024-05-20 DIAGNOSIS — L97.812 VARICOSE VEINS OF RIGHT LOWER EXTREMITY WITH ULCER OF OTHER PART OF LOWER LEG WITH FAT LAYER EXPOSED (HCC): ICD-10-CM

## 2024-05-20 DIAGNOSIS — L97.211 CALF ULCER, RIGHT, LIMITED TO BREAKDOWN OF SKIN (HCC): ICD-10-CM

## 2024-05-20 DIAGNOSIS — I83.018 VARICOSE VEINS OF RIGHT LOWER EXTREMITY WITH ULCER OF OTHER PART OF LOWER LEG WITH FAT LAYER EXPOSED (HCC): ICD-10-CM

## 2024-05-20 PROCEDURE — 6370000000 HC RX 637 (ALT 250 FOR IP): Performed by: FAMILY MEDICINE

## 2024-05-20 PROCEDURE — 29581 APPL MULTLAYER CMPRN SYS LEG: CPT

## 2024-05-20 RX ORDER — GENTAMICIN SULFATE 1 MG/G
OINTMENT TOPICAL ONCE
OUTPATIENT
Start: 2024-05-20 | End: 2024-05-20

## 2024-05-20 RX ORDER — LIDOCAINE HYDROCHLORIDE 20 MG/ML
JELLY TOPICAL ONCE
OUTPATIENT
Start: 2024-05-20 | End: 2024-05-20

## 2024-05-20 RX ORDER — IBUPROFEN 200 MG
TABLET ORAL ONCE
OUTPATIENT
Start: 2024-05-20 | End: 2024-05-20

## 2024-05-20 RX ORDER — LIDOCAINE 40 MG/G
CREAM TOPICAL ONCE
OUTPATIENT
Start: 2024-05-20 | End: 2024-05-20

## 2024-05-20 RX ORDER — LIDOCAINE HYDROCHLORIDE 40 MG/ML
SOLUTION TOPICAL ONCE
OUTPATIENT
Start: 2024-05-20 | End: 2024-05-20

## 2024-05-20 RX ORDER — CLOBETASOL PROPIONATE 0.5 MG/G
OINTMENT TOPICAL ONCE
OUTPATIENT
Start: 2024-05-20 | End: 2024-05-20

## 2024-05-20 RX ORDER — LIDOCAINE 50 MG/G
OINTMENT TOPICAL ONCE
OUTPATIENT
Start: 2024-05-20 | End: 2024-05-20

## 2024-05-20 RX ORDER — TRIAMCINOLONE ACETONIDE 1 MG/G
OINTMENT TOPICAL ONCE
OUTPATIENT
Start: 2024-05-20 | End: 2024-05-20

## 2024-05-20 RX ORDER — BACITRACIN ZINC AND POLYMYXIN B SULFATE 500; 1000 [USP'U]/G; [USP'U]/G
OINTMENT TOPICAL ONCE
OUTPATIENT
Start: 2024-05-20 | End: 2024-05-20

## 2024-05-20 RX ORDER — LIDOCAINE HYDROCHLORIDE 20 MG/ML
JELLY TOPICAL ONCE
Status: COMPLETED | OUTPATIENT
Start: 2024-05-20 | End: 2024-05-20

## 2024-05-20 RX ORDER — GINSENG 100 MG
CAPSULE ORAL ONCE
OUTPATIENT
Start: 2024-05-20 | End: 2024-05-20

## 2024-05-20 RX ORDER — BETAMETHASONE DIPROPIONATE 0.05 %
OINTMENT (GRAM) TOPICAL ONCE
OUTPATIENT
Start: 2024-05-20 | End: 2024-05-20

## 2024-05-20 RX ADMIN — LIDOCAINE HYDROCHLORIDE: 20 JELLY TOPICAL at 12:37

## 2024-05-20 ASSESSMENT — PAIN SCALES - GENERAL: PAINLEVEL_OUTOF10: 0

## 2024-05-20 NOTE — FLOWSHEET NOTE
05/20/24 1154   Wound 02/27/23 Leg Right;Lateral   Date First Assessed/Time First Assessed: 02/27/23 1057   Present on Original Admission: Yes  Wound Approximate Age at First Assessment (Weeks): 8 weeks  Primary Wound Type: Venous Ulcer  Location: Leg  Wound Location Orientation: Right;Lateral  Wound ...   Wound Image     Wound Etiology Venous   Dressing Status Intact   Wound Cleansed Vashe   Dressing/Treatment Collagen;Alginate with Ag;Dry dressing;Roll gauze  (2 layer wraps)   Offloading for Diabetic Foot Ulcers Offloading not required   Dressing Change Due 05/30/24   Wound Length (cm) 0.5 cm   Wound Width (cm) 0.5 cm   Wound Depth (cm) 0.1 cm   Wound Surface Area (cm^2) 0.25 cm^2   Change in Wound Size % (l*w) 84.38   Wound Volume (cm^3) 0.025 cm^3   Wound Healing % 84   Post-Procedure Length (cm) 0.5 cm   Post-Procedure Width (cm) 0.5 cm   Post-Procedure Depth (cm) 0.2 cm   Post-Procedure Surface Area (cm^2) 0.25 cm^2   Post-Procedure Volume (cm^3) 0.05 cm^3   Wound Assessment Granulation tissue   Drainage Amount Small (< 25%)   Drainage Description Serosanguinous   Odor None   Guillermina-wound Assessment Intact   Margins Defined edges   Wound Thickness Description not for Pressure Injury Full thickness     Multilayer Compression Wrap   (Not Unna) Below the Knee    NAME:  Malik Clarke  YOB: 1945  MEDICAL RECORD NUMBER:  [id number]  DATE:  5/20/2024    Multilayer compression wrap: Removed old Multilayer wrap if indicated and wash leg with mild soap/water.  Applied moisturizing agent to dry skin as needed.   Applied primary and secondary dressing as ordered.  Applied multilayered dressing below the knee to right lower leg.  Instructed patient/caregiver not to remove dressing and to keep it clean and dry.   Instructed patient/caregiver on complications to report to provider, such as pain, numbness in toes, heavy drainage, and slippage of dressing.  Instructed patient on purpose of compression dressing

## 2024-05-30 ENCOUNTER — HOSPITAL ENCOUNTER (OUTPATIENT)
Facility: HOSPITAL | Age: 79
Discharge: HOME | End: 2024-05-30
Attending: FAMILY MEDICINE
Payer: MEDICARE

## 2024-05-30 VITALS
HEART RATE: 90 BPM | RESPIRATION RATE: 16 BRPM | SYSTOLIC BLOOD PRESSURE: 137 MMHG | OXYGEN SATURATION: 98 % | DIASTOLIC BLOOD PRESSURE: 84 MMHG | TEMPERATURE: 97.7 F

## 2024-05-30 DIAGNOSIS — I83.018 VARICOSE VEINS OF RIGHT LOWER EXTREMITY WITH ULCER OF OTHER PART OF LOWER LEG WITH FAT LAYER EXPOSED (HCC): ICD-10-CM

## 2024-05-30 DIAGNOSIS — L97.212 CALF ULCER, RIGHT, WITH FAT LAYER EXPOSED (HCC): Primary | ICD-10-CM

## 2024-05-30 DIAGNOSIS — L97.812 VARICOSE VEINS OF RIGHT LOWER EXTREMITY WITH ULCER OF OTHER PART OF LOWER LEG WITH FAT LAYER EXPOSED (HCC): ICD-10-CM

## 2024-05-30 DIAGNOSIS — L97.211 CALF ULCER, RIGHT, LIMITED TO BREAKDOWN OF SKIN (HCC): ICD-10-CM

## 2024-05-30 PROCEDURE — 29581 APPL MULTLAYER CMPRN SYS LEG: CPT

## 2024-05-30 RX ORDER — LIDOCAINE HYDROCHLORIDE 40 MG/ML
SOLUTION TOPICAL ONCE
OUTPATIENT
Start: 2024-05-30 | End: 2024-05-30

## 2024-05-30 RX ORDER — BACITRACIN ZINC AND POLYMYXIN B SULFATE 500; 1000 [USP'U]/G; [USP'U]/G
OINTMENT TOPICAL ONCE
OUTPATIENT
Start: 2024-05-30 | End: 2024-05-30

## 2024-05-30 RX ORDER — CLOBETASOL PROPIONATE 0.5 MG/G
OINTMENT TOPICAL ONCE
OUTPATIENT
Start: 2024-05-30 | End: 2024-05-30

## 2024-05-30 RX ORDER — LIDOCAINE HYDROCHLORIDE 20 MG/ML
JELLY TOPICAL ONCE
OUTPATIENT
Start: 2024-05-30 | End: 2024-05-30

## 2024-05-30 RX ORDER — LIDOCAINE 50 MG/G
OINTMENT TOPICAL ONCE
OUTPATIENT
Start: 2024-05-30 | End: 2024-05-30

## 2024-05-30 RX ORDER — BETAMETHASONE DIPROPIONATE 0.05 %
OINTMENT (GRAM) TOPICAL ONCE
OUTPATIENT
Start: 2024-05-30 | End: 2024-05-30

## 2024-05-30 RX ORDER — GENTAMICIN SULFATE 1 MG/G
OINTMENT TOPICAL ONCE
OUTPATIENT
Start: 2024-05-30 | End: 2024-05-30

## 2024-05-30 RX ORDER — GINSENG 100 MG
CAPSULE ORAL ONCE
OUTPATIENT
Start: 2024-05-30 | End: 2024-05-30

## 2024-05-30 RX ORDER — IBUPROFEN 200 MG
TABLET ORAL ONCE
OUTPATIENT
Start: 2024-05-30 | End: 2024-05-30

## 2024-05-30 RX ORDER — LIDOCAINE 40 MG/G
CREAM TOPICAL ONCE
OUTPATIENT
Start: 2024-05-30 | End: 2024-05-30

## 2024-05-30 RX ORDER — TRIAMCINOLONE ACETONIDE 1 MG/G
OINTMENT TOPICAL ONCE
OUTPATIENT
Start: 2024-05-30 | End: 2024-05-30

## 2024-05-30 ASSESSMENT — PAIN SCALES - GENERAL: PAINLEVEL_OUTOF10: 0

## 2024-05-31 NOTE — DISCHARGE INSTRUCTIONS
Compression Wraps Discharge Instructions   Location: Left leg  Type: 2 layer compression wrap    Your doctor has ordered compression therapy for your wound. Compression bandages reduce the swelling, or edema, in your legs and prevent it from returning. The wound care staff will apply your compression wrap. It must be removed and re-applied at least weekly. As the swelling decreases, the boot no longer provides adequate compression and you need a new one. Once applied, you need to know how to take care of your compression wrap.     The boot must stay dry. Do not get it wet in the shower or tub. You may do a partial bath, or you can cover the boot with a large plastic bag, secured at the top, so that no water can get in.    Avoid standing in one place for long periods of time. If you must  one place, shift your weight and change positions often.    If you have CHF, consult your doctor before following the next two recommendations for leg elevation.     When sitting, elevate your legs on pillows, or put blocks under the foot of your bed. Your legs should be higher than your heart.    If your boot becomes painful, or you notice an increase in swelling in your toes, numbness or tingling, or purple color to your toes, remove the wrap and call the Wound Care Center. If it is after hours, call your doctor for instructions or go to the nearest emergency room.

## 2024-05-31 NOTE — FLOWSHEET NOTE
05/30/24 1256   Wound 02/27/23 Leg Right;Lateral   Date First Assessed/Time First Assessed: 02/27/23 1057   Present on Original Admission: Yes  Wound Approximate Age at First Assessment (Weeks): 8 weeks  Primary Wound Type: Venous Ulcer  Location: Leg  Wound Location Orientation: Right;Lateral  Wound ...   Wound Image     Wound Etiology Venous   Dressing Status Intact   Wound Cleansed Vashe   Dressing/Treatment Alginate with Ag  (2 layer wrap)   Offloading for Diabetic Foot Ulcers Offloading not required   Dressing Change Due 06/03/24   Wound Length (cm)   (Clinically closed)   Wound Assessment Epithelialization   Drainage Amount None (dry)   Odor None   Guillermina-wound Assessment Intact     Multilayer Compression Wrap   (Not Unna) Below the Knee    NAME:  Malik Clarke  YOB: 1945  MEDICAL RECORD NUMBER:  [id number]  DATE:  5/30/2024    Multilayer compression wrap: Removed old Multilayer wrap if indicated and wash leg with mild soap/water.  Applied moisturizing agent to dry skin as needed.   Applied primary and secondary dressing as ordered.  Applied multilayered dressing below the knee to left lower leg.  Instructed patient/caregiver not to remove dressing and to keep it clean and dry.   Instructed patient/caregiver on complications to report to provider, such as pain, numbness in toes, heavy drainage, and slippage of dressing.  Instructed patient on purpose of compression dressing and on activity and exercise recommendations.      Electronically signed by MARY HUA RN on 5/30/2024 at 10:00 AM

## 2024-06-03 ENCOUNTER — HOSPITAL ENCOUNTER (OUTPATIENT)
Facility: HOSPITAL | Age: 79
Discharge: HOME | End: 2024-06-03
Attending: FAMILY MEDICINE
Payer: MEDICARE

## 2024-06-03 VITALS
DIASTOLIC BLOOD PRESSURE: 78 MMHG | TEMPERATURE: 98.4 F | RESPIRATION RATE: 18 BRPM | OXYGEN SATURATION: 98 % | SYSTOLIC BLOOD PRESSURE: 148 MMHG | HEART RATE: 102 BPM

## 2024-06-03 PROCEDURE — 99212 OFFICE O/P EST SF 10 MIN: CPT

## 2024-06-03 ASSESSMENT — PAIN SCALES - GENERAL: PAINLEVEL_OUTOF10: 0

## 2024-06-03 NOTE — FLOWSHEET NOTE
06/03/24 1542   Wound 02/27/23 Leg Right;Lateral   Date First Assessed/Time First Assessed: 02/27/23 1057   Present on Original Admission: Yes  Wound Approximate Age at First Assessment (Weeks): 8 weeks  Primary Wound Type: Venous Ulcer  Location: Leg  Wound Location Orientation: Right;Lateral  Wound ...   Wound Image    Wound Etiology Venous   Dressing Status Intact   Wound Cleansed Wound cleanser   Dressing/Treatment   (Velcro compression wrap)   Offloading for Diabetic Foot Ulcers Offloading not required   Dressing Change Due   (pt discharged)   Wound Length (cm)   (wound closed)   Wound Assessment Epithelialization   Drainage Amount None (dry)   Odor None   Guillermina-wound Assessment Intact     Pt discharged from clinic

## 2024-06-03 NOTE — DISCHARGE INSTRUCTIONS
Wound Care Discharge Instructions  Wound Care Clinic at [facility]                                     [address]             [address]               [place] [address]                                         Telephone: [phone]      FAX [phone]    NAME:  Malik Clarke  YOB: 1945  MEDICAL RECORD NUMBER:  [id number]  DATE: 6/3/2024    Congratulations!  You have completed your treatment.     Return to your Primary Care Physician for all your health issues.   Resume your ordinary activities as tolerated.   Take your medications as prescribed by your primary care physician.   Check your skin daily for cracks, bruises, sores, or dryness. Use a moisturizer as needed.   Clean and dry your skin, using mild soap and warm water (not hot).   Maintain a nutritious diet.  Avoid pressure on your wound site. Keep your legs elevated above the level of the heart whenever possible.  Continue to use wraps/stockings/compression as prescribed.  Replace compression stockings every four to six months as needed to ensure proper fit.   Wear well-fitting shoes and leg garments.    THANK YOU FOR ALLOWING US TO SERVE YOU.  PLEASE CALL IF YOU DEVELOP ANOTHER WOUND. ([phone])     Electronically signed by MARY HUA RN on 6/3/2024 at 3:55 PM

## 2024-06-03 NOTE — PROGRESS NOTES
Wound Center  Progress Note       Chief Complaint:  Malik Clarke is a 78 y.o.  male  with R lower leg anterior wound of few months duration.      Assessment/Plan     78 y.o. male with Dm2    -R lower leg anterior venous stasis ulcer.  Full thickness,   Healed    Prevention discussed in detail  aquaphor few times daily  Leave open, no bandaid      Cont to Elevate legs throughout day and wear compression  Take diuretics daily        Follow up prn    Subjective:     Since last visit  6/3  No new issues    4/29 didn't like having compression on left leg, wants to go back to using his velcro wrap      4/22 Has been on and off with compression on left leg- now has open wounds on this leg  More active than usual as wife is having her own medical problems  Also has some trips coming up    HPI:     Acquired wounds a few months ago  Local trauma  Not healing, being managed by HH  Adv to come here      History/Chart/Medications reviewed    Wound caused by:   Current wound care:See flowsheet  Offloading wound:   Appetite: good  Wound associated pain: See flowsheet  Diabetic: yes  Smoker: no  ROS: no N/V/D, no T/chills; no local rash, no chest pain or shortness of breath, no headache or dizzyness      Objective:     Physical Exam:   See flowsheet / nursing notes for vitals  There were no vitals filed for this visit.          General: NAD. Hygiene good  Psych: cooperative. No anxiety or depression. Normal mood and affect.  Neuro: alert and oriented to person/place/situation. Otherwise nonfocal.  Derm: Normal  turgor for age, dry skin  HEENT: Normocephalic, atraumatic. EOMI. Conjunctiva clear. No scleral icterus.  Neck: Normal range of motion. No masses.  Chest: Respirations nonlabored  Lower extremities: color normal; temperature normal.  Calves are supple, nontender, approximately equally sized in comparison.   Focussed Lower Extremity Exam:      Ulcer Description:   See Flowsheet           Data Review:

## 2024-09-23 ENCOUNTER — HOSPITAL ENCOUNTER (OUTPATIENT)
Facility: HOSPITAL | Age: 79
Discharge: HOME | End: 2024-09-23
Attending: FAMILY MEDICINE | Admitting: FAMILY MEDICINE
Payer: MEDICARE

## 2024-09-23 VITALS
OXYGEN SATURATION: 99 % | RESPIRATION RATE: 16 BRPM | DIASTOLIC BLOOD PRESSURE: 64 MMHG | SYSTOLIC BLOOD PRESSURE: 147 MMHG | HEART RATE: 112 BPM | TEMPERATURE: 98.4 F

## 2024-09-23 DIAGNOSIS — L97.212 CALF ULCER, RIGHT, WITH FAT LAYER EXPOSED (HCC): ICD-10-CM

## 2024-09-23 DIAGNOSIS — L97.211 CALF ULCER, RIGHT, LIMITED TO BREAKDOWN OF SKIN (HCC): Primary | ICD-10-CM

## 2024-09-23 DIAGNOSIS — I83.018 VARICOSE VEINS OF RIGHT LOWER EXTREMITY WITH ULCER OF OTHER PART OF LOWER LEG WITH FAT LAYER EXPOSED (HCC): ICD-10-CM

## 2024-09-23 DIAGNOSIS — L97.812 VARICOSE VEINS OF RIGHT LOWER EXTREMITY WITH ULCER OF OTHER PART OF LOWER LEG WITH FAT LAYER EXPOSED (HCC): ICD-10-CM

## 2024-09-23 PROCEDURE — 6370000000 HC RX 637 (ALT 250 FOR IP): Performed by: FAMILY MEDICINE

## 2024-09-23 PROCEDURE — 87070 CULTURE OTHR SPECIMN AEROBIC: CPT

## 2024-09-23 PROCEDURE — 11042 DBRDMT SUBQ TIS 1ST 20SQCM/<: CPT

## 2024-09-23 PROCEDURE — 87075 CULTR BACTERIA EXCEPT BLOOD: CPT

## 2024-09-23 PROCEDURE — 87077 CULTURE AEROBIC IDENTIFY: CPT

## 2024-09-23 PROCEDURE — 87186 SC STD MICRODIL/AGAR DIL: CPT

## 2024-09-23 PROCEDURE — 11043 DBRDMT MUSC&/FSCA 1ST 20/<: CPT

## 2024-09-23 PROCEDURE — 87205 SMEAR GRAM STAIN: CPT

## 2024-09-23 RX ORDER — LIDOCAINE 50 MG/G
OINTMENT TOPICAL ONCE
Status: CANCELLED | OUTPATIENT
Start: 2024-09-23 | End: 2024-09-23

## 2024-09-23 RX ORDER — BETAMETHASONE DIPROPIONATE 0.5 MG/G
CREAM TOPICAL ONCE
Status: CANCELLED | OUTPATIENT
Start: 2024-09-23 | End: 2024-09-23

## 2024-09-23 RX ORDER — LIDOCAINE HYDROCHLORIDE 40 MG/ML
SOLUTION TOPICAL ONCE
Status: CANCELLED | OUTPATIENT
Start: 2024-09-23 | End: 2024-09-23

## 2024-09-23 RX ORDER — LIDOCAINE HYDROCHLORIDE 40 MG/ML
SOLUTION TOPICAL ONCE
OUTPATIENT
Start: 2024-09-23 | End: 2024-09-23

## 2024-09-23 RX ORDER — LIDOCAINE 50 MG/G
OINTMENT TOPICAL ONCE
OUTPATIENT
Start: 2024-09-23 | End: 2024-09-23

## 2024-09-23 RX ORDER — LIDOCAINE 40 MG/G
CREAM TOPICAL ONCE
OUTPATIENT
Start: 2024-09-23 | End: 2024-09-23

## 2024-09-23 RX ORDER — LIDOCAINE HYDROCHLORIDE 20 MG/ML
JELLY TOPICAL ONCE
OUTPATIENT
Start: 2024-09-23 | End: 2024-09-23

## 2024-09-23 RX ORDER — MUPIROCIN 20 MG/G
OINTMENT TOPICAL ONCE
Status: CANCELLED | OUTPATIENT
Start: 2024-09-23 | End: 2024-09-23

## 2024-09-23 RX ORDER — LIDOCAINE HYDROCHLORIDE 20 MG/ML
JELLY TOPICAL ONCE
Status: COMPLETED | OUTPATIENT
Start: 2024-09-23 | End: 2024-09-23

## 2024-09-23 RX ORDER — GINSENG 100 MG
CAPSULE ORAL ONCE
OUTPATIENT
Start: 2024-09-23 | End: 2024-09-23

## 2024-09-23 RX ORDER — BETAMETHASONE DIPROPIONATE 0.5 MG/G
CREAM TOPICAL ONCE
OUTPATIENT
Start: 2024-09-23 | End: 2024-09-23

## 2024-09-23 RX ORDER — BACITRACIN ZINC AND POLYMYXIN B SULFATE 500; 1000 [USP'U]/G; [USP'U]/G
OINTMENT TOPICAL ONCE
Status: CANCELLED | OUTPATIENT
Start: 2024-09-23 | End: 2024-09-23

## 2024-09-23 RX ORDER — GINSENG 100 MG
CAPSULE ORAL ONCE
Status: CANCELLED | OUTPATIENT
Start: 2024-09-23 | End: 2024-09-23

## 2024-09-23 RX ORDER — BACITRACIN ZINC AND POLYMYXIN B SULFATE 500; 1000 [USP'U]/G; [USP'U]/G
OINTMENT TOPICAL ONCE
OUTPATIENT
Start: 2024-09-23 | End: 2024-09-23

## 2024-09-23 RX ORDER — NEOMYCIN/BACITRACIN/POLYMYXINB 3.5-400-5K
OINTMENT (GRAM) TOPICAL ONCE
OUTPATIENT
Start: 2024-09-23 | End: 2024-09-23

## 2024-09-23 RX ORDER — LIDOCAINE HYDROCHLORIDE 20 MG/ML
JELLY TOPICAL ONCE
Status: CANCELLED | OUTPATIENT
Start: 2024-09-23 | End: 2024-09-23

## 2024-09-23 RX ORDER — SILVER SULFADIAZINE 10 MG/G
CREAM TOPICAL ONCE
Status: CANCELLED | OUTPATIENT
Start: 2024-09-23 | End: 2024-09-23

## 2024-09-23 RX ORDER — TRIAMCINOLONE ACETONIDE 1 MG/G
OINTMENT TOPICAL ONCE
Status: CANCELLED | OUTPATIENT
Start: 2024-09-23 | End: 2024-09-23

## 2024-09-23 RX ORDER — LIDOCAINE 40 MG/G
CREAM TOPICAL ONCE
Status: CANCELLED | OUTPATIENT
Start: 2024-09-23 | End: 2024-09-23

## 2024-09-23 RX ORDER — SILVER SULFADIAZINE 10 MG/G
CREAM TOPICAL ONCE
OUTPATIENT
Start: 2024-09-23 | End: 2024-09-23

## 2024-09-23 RX ORDER — CLOBETASOL PROPIONATE 0.5 MG/G
OINTMENT TOPICAL ONCE
OUTPATIENT
Start: 2024-09-23 | End: 2024-09-23

## 2024-09-23 RX ORDER — SODIUM CHLOR/HYPOCHLOROUS ACID 0.033 %
SOLUTION, IRRIGATION IRRIGATION ONCE
Status: CANCELLED | OUTPATIENT
Start: 2024-09-23 | End: 2024-09-23

## 2024-09-23 RX ORDER — NEOMYCIN/BACITRACIN/POLYMYXINB 3.5-400-5K
OINTMENT (GRAM) TOPICAL ONCE
Status: CANCELLED | OUTPATIENT
Start: 2024-09-23 | End: 2024-09-23

## 2024-09-23 RX ORDER — TRIAMCINOLONE ACETONIDE 1 MG/G
OINTMENT TOPICAL ONCE
OUTPATIENT
Start: 2024-09-23 | End: 2024-09-23

## 2024-09-23 RX ORDER — CLOBETASOL PROPIONATE 0.5 MG/G
OINTMENT TOPICAL ONCE
Status: CANCELLED | OUTPATIENT
Start: 2024-09-23 | End: 2024-09-23

## 2024-09-23 RX ORDER — MUPIROCIN 20 MG/G
OINTMENT TOPICAL ONCE
OUTPATIENT
Start: 2024-09-23 | End: 2024-09-23

## 2024-09-23 RX ORDER — GENTAMICIN SULFATE 1 MG/G
OINTMENT TOPICAL ONCE
Status: CANCELLED | OUTPATIENT
Start: 2024-09-23 | End: 2024-09-23

## 2024-09-23 RX ORDER — GENTAMICIN SULFATE 1 MG/G
OINTMENT TOPICAL ONCE
OUTPATIENT
Start: 2024-09-23 | End: 2024-09-23

## 2024-09-23 RX ORDER — SODIUM CHLOR/HYPOCHLOROUS ACID 0.033 %
SOLUTION, IRRIGATION IRRIGATION ONCE
OUTPATIENT
Start: 2024-09-23 | End: 2024-09-23

## 2024-09-23 RX ADMIN — LIDOCAINE HYDROCHLORIDE: 20 JELLY TOPICAL at 11:26

## 2024-09-23 ASSESSMENT — PAIN SCALES - GENERAL: PAINLEVEL_OUTOF10: 0

## 2024-09-25 LAB
BACTERIA SPEC CULT: NORMAL
SERVICE CMNT-IMP: NORMAL

## 2024-09-26 LAB
BACTERIA SPEC CULT: ABNORMAL
BACTERIA SPEC CULT: ABNORMAL
GRAM STN SPEC: ABNORMAL
GRAM STN SPEC: ABNORMAL
SERVICE CMNT-IMP: ABNORMAL

## 2024-09-27 NOTE — PROGRESS NOTES
Wound Center  Progress Note / Procedure Note      Chief Complaint:  Bart Hoffman is a 65 y.o.  male  with R lower leg anterior wound of few months duration. Assessment/Plan     65 y.o. male with Dm2    -R lower leg anterior venous stasis ulcer. Full thickness  Slough/granular  Improving; more granular  Necessitates debridement  for wound healing and to prevent/heal infection  See below    -R lower leg lateral venous stasis ulcer. Full thickness  Improving, smaller  Slough/granular  Necessitates debridement  for wound healing and to prevent/heal infection  See below    -R lower leg inferior venous stasis ulcer. healed    -Dm2  A1c 6.5  Does not check at home  Controlled with metformin    -Leg swelling/varicosities  Has arthritis  Unable to put on compression   Has a hard time with socks too    Following discussed with patient   Needs :  Serial debridement- prn    Good local wound care  Dressing: collagen, H Blue  Frequency : two times a week     Continue Home Health    -Edema management  Do not get dressing wet    Edema management:  Elevate leg(s) throughout the day starting in the morning  Compression: 2 layer unnas  Avoid prolonged standing      -Good Diabetic control      Patient/  understood and agrees with plan. Questions answered.       Follow up with me in 1 week    Subjective:     Since last visit  No new issues  PCP referred him to vascular, had ABIs last Friday, venous testing scheduled  Knee procedure postponed to 5/18 due to open wounds    HPI:     Acquired wounds a few months ago  Local trauma  Not healing, being managed by [facility]  Adv to come here      History/Chart/Medications reviewed    Wound caused by:   Current wound care:See flowsheet  Offloading wound:   Appetite: good  Wound associated pain: See flowsheet  Diabetic: yes  Smoker: no  ROS: no N/V/D, no T/chills; no local rash, no chest pain or shortness of breath, no headache or dizzyness      Objective:     Physical Exam:   See [Eats meals with family] : eats meals with family [Has family members/adults to turn to for help] : has family members/adults to turn to for help [Is permitted and is able to make independent decisions] : Is permitted and is able to make independent decisions [Grade: ____] : Grade: [unfilled] [Normal Performance] : normal performance [Normal Behavior/Attention] : normal behavior/attention [Normal Homework] : normal homework [Eats regular meals including adequate fruits and vegetables] : eats regular meals including adequate fruits and vegetables [Calcium source] : calcium source [Has concerns about body or appearance] : has concerns about body or appearance [Has friends] : has friends [Screen time (except homework) less than 2 hours a day] : screen time (except homework) less than 2 hours a day [Uses safety belts/safety equipment] : uses safety belts/safety equipment  [Has ways to cope with stress] : has ways to cope with stress [Displays self-confidence] : displays self-confidence [With Teen] : teen [At least 1 hour of physical activity a day] : does not do at least 1 hour of physical activity a day [Uses tobacco] : does not use tobacco [Uses drugs] : does not use drugs  [Drinks alcohol] : does not drink alcohol [Has/had oral sex] : has not had oral sex [Has had sexual intercourse] : has not had sexual intercourse [Has problems with sleep] : does not have problems with sleep [Gets depressed, anxious, or irritable/has mood swings] : does not get depressed, anxious, or irritable/has mood swings [Has thought about hurting self or considered suicide] : has not thought about hurting self or considered suicide [de-identified] : Born in [place], in  for 3 years. Lives with Mom and 3 sisters in a shelter. Has lived in shelter for one month. Was living with friends but they moved. Mom does not work.  [de-identified] : Passing all her classes [de-identified] : Has gained weight recently.

## 2024-09-30 ENCOUNTER — HOSPITAL ENCOUNTER (OUTPATIENT)
Facility: HOSPITAL | Age: 79
Discharge: HOME | End: 2024-09-30
Attending: FAMILY MEDICINE
Payer: MEDICARE

## 2024-09-30 VITALS
OXYGEN SATURATION: 98 % | HEART RATE: 90 BPM | TEMPERATURE: 97.7 F | SYSTOLIC BLOOD PRESSURE: 133 MMHG | RESPIRATION RATE: 18 BRPM | DIASTOLIC BLOOD PRESSURE: 74 MMHG

## 2024-09-30 DIAGNOSIS — L97.212 CALF ULCER, RIGHT, WITH FAT LAYER EXPOSED (HCC): ICD-10-CM

## 2024-09-30 DIAGNOSIS — L97.211 CALF ULCER, RIGHT, LIMITED TO BREAKDOWN OF SKIN (HCC): Primary | ICD-10-CM

## 2024-09-30 DIAGNOSIS — L97.812 VARICOSE VEINS OF RIGHT LOWER EXTREMITY WITH ULCER OF OTHER PART OF LOWER LEG WITH FAT LAYER EXPOSED (HCC): ICD-10-CM

## 2024-09-30 DIAGNOSIS — I83.018 VARICOSE VEINS OF RIGHT LOWER EXTREMITY WITH ULCER OF OTHER PART OF LOWER LEG WITH FAT LAYER EXPOSED (HCC): ICD-10-CM

## 2024-09-30 PROCEDURE — 11042 DBRDMT SUBQ TIS 1ST 20SQCM/<: CPT

## 2024-09-30 PROCEDURE — 6370000000 HC RX 637 (ALT 250 FOR IP): Performed by: FAMILY MEDICINE

## 2024-09-30 RX ORDER — LIDOCAINE HYDROCHLORIDE 40 MG/ML
SOLUTION TOPICAL ONCE
OUTPATIENT
Start: 2024-09-30 | End: 2024-09-30

## 2024-09-30 RX ORDER — TRIAMCINOLONE ACETONIDE 1 MG/G
OINTMENT TOPICAL ONCE
OUTPATIENT
Start: 2024-09-30 | End: 2024-09-30

## 2024-09-30 RX ORDER — LIDOCAINE HYDROCHLORIDE 20 MG/ML
JELLY TOPICAL ONCE
OUTPATIENT
Start: 2024-09-30 | End: 2024-09-30

## 2024-09-30 RX ORDER — CLOBETASOL PROPIONATE 0.5 MG/G
OINTMENT TOPICAL ONCE
OUTPATIENT
Start: 2024-09-30 | End: 2024-09-30

## 2024-09-30 RX ORDER — MUPIROCIN 20 MG/G
OINTMENT TOPICAL ONCE
OUTPATIENT
Start: 2024-09-30 | End: 2024-09-30

## 2024-09-30 RX ORDER — GENTAMICIN SULFATE 1 MG/G
OINTMENT TOPICAL ONCE
OUTPATIENT
Start: 2024-09-30 | End: 2024-09-30

## 2024-09-30 RX ORDER — DOXYCYCLINE HYCLATE 100 MG
100 TABLET ORAL 2 TIMES DAILY
Qty: 28 TABLET | Refills: 0 | Status: SHIPPED | OUTPATIENT
Start: 2024-09-30 | End: 2024-10-14

## 2024-09-30 RX ORDER — LIDOCAINE 50 MG/G
OINTMENT TOPICAL ONCE
OUTPATIENT
Start: 2024-09-30 | End: 2024-09-30

## 2024-09-30 RX ORDER — BETAMETHASONE DIPROPIONATE 0.5 MG/G
CREAM TOPICAL ONCE
OUTPATIENT
Start: 2024-09-30 | End: 2024-09-30

## 2024-09-30 RX ORDER — LIDOCAINE HYDROCHLORIDE 20 MG/ML
JELLY TOPICAL ONCE
Status: COMPLETED | OUTPATIENT
Start: 2024-09-30 | End: 2024-09-30

## 2024-09-30 RX ORDER — SILVER SULFADIAZINE 10 MG/G
CREAM TOPICAL ONCE
OUTPATIENT
Start: 2024-09-30 | End: 2024-09-30

## 2024-09-30 RX ORDER — BACITRACIN ZINC AND POLYMYXIN B SULFATE 500; 1000 [USP'U]/G; [USP'U]/G
OINTMENT TOPICAL ONCE
OUTPATIENT
Start: 2024-09-30 | End: 2024-09-30

## 2024-09-30 RX ORDER — GINSENG 100 MG
CAPSULE ORAL ONCE
OUTPATIENT
Start: 2024-09-30 | End: 2024-09-30

## 2024-09-30 RX ORDER — LIDOCAINE 40 MG/G
CREAM TOPICAL ONCE
OUTPATIENT
Start: 2024-09-30 | End: 2024-09-30

## 2024-09-30 RX ORDER — NEOMYCIN/BACITRACIN/POLYMYXINB 3.5-400-5K
OINTMENT (GRAM) TOPICAL ONCE
OUTPATIENT
Start: 2024-09-30 | End: 2024-09-30

## 2024-09-30 RX ORDER — SODIUM CHLOR/HYPOCHLOROUS ACID 0.033 %
SOLUTION, IRRIGATION IRRIGATION ONCE
OUTPATIENT
Start: 2024-09-30 | End: 2024-09-30

## 2024-09-30 RX ADMIN — LIDOCAINE HYDROCHLORIDE: 20 JELLY TOPICAL at 10:14

## 2024-09-30 ASSESSMENT — PAIN SCALES - GENERAL: PAINLEVEL_OUTOF10: 0

## 2024-09-30 NOTE — PROGRESS NOTES
Wound Center  Progress Note / Procedure Note       Chief Complaint:  Malik Clarke is a 79 y.o.  male  with R lower leg anterior wound of few months duration.      Assessment/Plan     79 y.o. male with Dm2    -R lower leg anterior venous stasis ulcer.  Full thickness,   Re-opened  Sl smaller  Mostly slough  Necessitates debridement  for wound healing and to prevent/heal infection  Ulcer needs debridement- see note below  Culture done post debridement    -R lower leg anterior proximal venous stasis ulcer.  Full thickness,   Sl smaller  Mostly slough  Necessitates debridement  for wound healing and to prevent/heal infection  Ulcer needs debridement- see note below    -R lower leg anterior lateral venous stasis ulcer.  Full thickness,   stable  Mostly slough  Necessitates debridement  for wound healing and to prevent/heal infection  Ulcer needs debridement- see note below      Culture reviewed, d/w patient  Doxycycline sent to pharmacy    -Dm2  A1c 6.8  (9/2024)  Does not check at home  Controlled with metformin    -Leg swelling/varicosities  Has arthritis      Following discussed with patient   Needs :  Serial debridement- prn    Good local wound care  Dressing:  collagen, alginate to all wounds  Frequency : once a week  Tubi x 2  Patient to change on own next week          -Edema management  Do not get dressing wet    Edema management:  Elevate leg(s) throughout the day starting in the morning  Compression: tubi x2  Avoid prolonged standing      -Good Diabetic control      Patient/  understood and agrees with plan. Questions answered.        Follow up with me 2 week     Subjective:     Since last visit  9/30  No issues, tolerated wrap  Going out of town next week    9/23/24  Has not been seen since  May  Apparently wound re-opened within few weeks of being discharged from here, sounds like he wore the velcro wraps without the sock that comes with it.  Thought it was looking better so did not come here, until

## 2024-09-30 NOTE — FLOWSHEET NOTE
Width (cm) 0.6 cm   Post-Procedure Depth (cm) 0.2 cm   Post-Procedure Surface Area (cm^2) 0.72 cm^2   Post-Procedure Volume (cm^3) 0.144 cm^3   Wound Assessment Devitalized tissue   Drainage Amount Small (< 25%)   Drainage Description Serosanguinous   Odor None   Guillermina-wound Assessment Blanchable erythema;Edematous   Margins Defined edges   Wound Thickness Description not for Pressure Injury Full thickness   Wound 09/23/24 Leg Right;Lateral   Date First Assessed: 09/23/24   Location: Leg  Wound Location Orientation: Right;Lateral   Wound Image    Wound Etiology Venous   Dressing Status New dressing applied   Wound Cleansed Wound cleanser   Dressing/Treatment Collagen with Ag;Dry dressing;Roll gauze;Tubular bandage;Alginate   Offloading for Diabetic Foot Ulcers Offloading not required   Dressing Change Due 10/14/24   Wound Length (cm) 2 cm   Wound Width (cm) 1.4 cm   Wound Depth (cm) 0.1 cm   Wound Surface Area (cm^2) 2.8 cm^2   Change in Wound Size % (l*w) -24.44   Wound Volume (cm^3) 0.28 cm^3   Wound Healing % -24   Post-Procedure Length (cm) 2 cm   Post-Procedure Width (cm) 1.4 cm   Post-Procedure Depth (cm) 0.2 cm   Post-Procedure Surface Area (cm^2) 2.8 cm^2   Post-Procedure Volume (cm^3) 0.56 cm^3   Wound Assessment Devitalized tissue   Drainage Amount Moderate (25-50%)   Drainage Description Serosanguinous   Odor None   Guillermina-wound Assessment Edematous;Blanchable erythema   Margins Defined edges   Wound Thickness Description not for Pressure Injury Full thickness   Multilayer Compression Wrap   (Not Unna) Below the Knee    NAME:  Malik Clarke  YOB: 1945  MEDICAL RECORD NUMBER:  [id number]  DATE:  9/30/2024    Multilayer compression wrap: Removed old Multilayer wrap if indicated and wash leg with mild soap/water.  Applied moisturizing agent to dry skin as needed.   Applied primary and secondary dressing as ordered.  Applied multilayered dressing below the knee to right lower leg.  Instructed

## 2024-10-14 ENCOUNTER — HOSPITAL ENCOUNTER (OUTPATIENT)
Facility: HOSPITAL | Age: 79
Discharge: HOME | End: 2024-10-14
Payer: MEDICARE

## 2024-10-14 VITALS
DIASTOLIC BLOOD PRESSURE: 82 MMHG | OXYGEN SATURATION: 100 % | SYSTOLIC BLOOD PRESSURE: 136 MMHG | RESPIRATION RATE: 18 BRPM | TEMPERATURE: 97.5 F | HEART RATE: 90 BPM

## 2024-10-14 DIAGNOSIS — L97.212 CALF ULCER, RIGHT, WITH FAT LAYER EXPOSED (HCC): Primary | ICD-10-CM

## 2024-10-14 DIAGNOSIS — L97.812 VARICOSE VEINS OF RIGHT LOWER EXTREMITY WITH ULCER OF OTHER PART OF LOWER LEG WITH FAT LAYER EXPOSED (HCC): ICD-10-CM

## 2024-10-14 DIAGNOSIS — I83.018 VARICOSE VEINS OF RIGHT LOWER EXTREMITY WITH ULCER OF OTHER PART OF LOWER LEG WITH FAT LAYER EXPOSED (HCC): ICD-10-CM

## 2024-10-14 DIAGNOSIS — L97.211 CALF ULCER, RIGHT, LIMITED TO BREAKDOWN OF SKIN (HCC): ICD-10-CM

## 2024-10-14 PROCEDURE — 11042 DBRDMT SUBQ TIS 1ST 20SQCM/<: CPT

## 2024-10-14 PROCEDURE — 6370000000 HC RX 637 (ALT 250 FOR IP): Performed by: FAMILY MEDICINE

## 2024-10-14 RX ORDER — BETAMETHASONE DIPROPIONATE 0.5 MG/G
CREAM TOPICAL ONCE
OUTPATIENT
Start: 2024-10-14 | End: 2024-10-14

## 2024-10-14 RX ORDER — TRIAMCINOLONE ACETONIDE 1 MG/G
OINTMENT TOPICAL ONCE
OUTPATIENT
Start: 2024-10-14 | End: 2024-10-14

## 2024-10-14 RX ORDER — LIDOCAINE 50 MG/G
OINTMENT TOPICAL ONCE
OUTPATIENT
Start: 2024-10-14 | End: 2024-10-14

## 2024-10-14 RX ORDER — GINSENG 100 MG
CAPSULE ORAL ONCE
OUTPATIENT
Start: 2024-10-14 | End: 2024-10-14

## 2024-10-14 RX ORDER — LIDOCAINE HYDROCHLORIDE 20 MG/ML
JELLY TOPICAL ONCE
OUTPATIENT
Start: 2024-10-14 | End: 2024-10-14

## 2024-10-14 RX ORDER — BACITRACIN ZINC AND POLYMYXIN B SULFATE 500; 1000 [USP'U]/G; [USP'U]/G
OINTMENT TOPICAL ONCE
OUTPATIENT
Start: 2024-10-14 | End: 2024-10-14

## 2024-10-14 RX ORDER — LIDOCAINE 40 MG/G
CREAM TOPICAL ONCE
OUTPATIENT
Start: 2024-10-14 | End: 2024-10-14

## 2024-10-14 RX ORDER — LIDOCAINE HYDROCHLORIDE 40 MG/ML
SOLUTION TOPICAL ONCE
OUTPATIENT
Start: 2024-10-14 | End: 2024-10-14

## 2024-10-14 RX ORDER — GENTAMICIN SULFATE 1 MG/G
OINTMENT TOPICAL ONCE
OUTPATIENT
Start: 2024-10-14 | End: 2024-10-14

## 2024-10-14 RX ORDER — LIDOCAINE HYDROCHLORIDE 20 MG/ML
JELLY TOPICAL ONCE
Status: COMPLETED | OUTPATIENT
Start: 2024-10-14 | End: 2024-10-14

## 2024-10-14 RX ORDER — NEOMYCIN/BACITRACIN/POLYMYXINB 3.5-400-5K
OINTMENT (GRAM) TOPICAL ONCE
OUTPATIENT
Start: 2024-10-14 | End: 2024-10-14

## 2024-10-14 RX ORDER — MUPIROCIN 20 MG/G
OINTMENT TOPICAL ONCE
OUTPATIENT
Start: 2024-10-14 | End: 2024-10-14

## 2024-10-14 RX ORDER — SODIUM CHLOR/HYPOCHLOROUS ACID 0.033 %
SOLUTION, IRRIGATION IRRIGATION ONCE
OUTPATIENT
Start: 2024-10-14 | End: 2024-10-14

## 2024-10-14 RX ORDER — SILVER SULFADIAZINE 10 MG/G
CREAM TOPICAL ONCE
OUTPATIENT
Start: 2024-10-14 | End: 2024-10-14

## 2024-10-14 RX ORDER — CLOBETASOL PROPIONATE 0.5 MG/G
OINTMENT TOPICAL ONCE
OUTPATIENT
Start: 2024-10-14 | End: 2024-10-14

## 2024-10-14 RX ADMIN — LIDOCAINE HYDROCHLORIDE: 20 JELLY TOPICAL at 09:35

## 2024-10-14 ASSESSMENT — PAIN SCALES - GENERAL: PAINLEVEL_OUTOF10: 0

## 2024-10-14 NOTE — FLOWSHEET NOTE
10/14/24 0924   Wound 09/23/24 Pretibial Proximal;Right   Date First Assessed/Time First Assessed: 09/23/24 0944   Primary Wound Type: Venous Ulcer  Location: Pretibial  Wound Location Orientation: Proximal;Right   Wound Image     Wound Etiology Venous   Dressing Status New dressing applied   Wound Cleansed Wound cleanser   Dressing/Treatment Dry dressing;Roll gauze;Collagen with Ag;Alginate  (2 layer wrap)   Offloading for Diabetic Foot Ulcers Offloading not required   Dressing Change Due 10/14/24   Wound Length (cm) 1.2 cm   Wound Width (cm) 0.4 cm   Wound Depth (cm) 0.1 cm   Wound Surface Area (cm^2) 0.48 cm^2   Change in Wound Size % (l*w) 56.36   Wound Volume (cm^3) 0.048 cm^3   Wound Healing % 56   Post-Procedure Length (cm) 1.2 cm   Post-Procedure Width (cm) 0.4 cm   Post-Procedure Depth (cm) 0.3 cm   Post-Procedure Surface Area (cm^2) 0.48 cm^2   Post-Procedure Volume (cm^3) 0.144 cm^3   Wound Assessment Devitalized tissue   Drainage Amount Small (< 25%)   Drainage Description Serosanguinous   Odor None   Guillermina-wound Assessment Blanchable erythema;Edematous   Margins Defined edges   Wound Thickness Description not for Pressure Injury Full thickness   Wound 09/23/24 Pretibial Distal;Right   Date First Assessed/Time First Assessed: 09/23/24 0943   Present on Original Admission: Yes  Primary Wound Type: Venous Ulcer  Location: Pretibial  Wound Location Orientation: Distal;Right   Wound Image     Wound Etiology Venous   Dressing Status New dressing applied   Wound Cleansed Wound cleanser   Dressing/Treatment Collagen with Ag;Dry dressing;Alginate  (2 layer wrap)   Offloading for Diabetic Foot Ulcers Offloading not required   Dressing Change Due 10/14/24   Wound Length (cm) 2.4 cm   Wound Width (cm) 1.7 cm   Wound Depth (cm) 0.1 cm   Wound Surface Area (cm^2) 4.08 cm^2   Change in Wound Size % (l*w) 18.4   Wound Volume (cm^3) 0.408 cm^3   Wound Healing % 18   Post-Procedure Length (cm) 2.4 cm   Post-Procedure

## 2024-10-14 NOTE — PROGRESS NOTES
SURGERY Right 2007    r shoulder replacement    SHOULDER SURGERY Left 2022    replacement    TONSILLECTOMY      TOTAL KNEE ARTHROPLASTY Left 5/18/2023    LEFT TOTAL KNEE ARTHROPLASTY (SPEC POP) performed by Malik Hector DO at [facility] MAIN OR    WISDOM TOOTH EXTRACTION       No family history on file.  Social History     Socioeconomic History    Marital status:    Tobacco Use    Smoking status: Never     Passive exposure: Past    Smokeless tobacco: Never   Vaping Use    Vaping status: Never Used   Substance and Sexual Activity    Alcohol use: Yes     Alcohol/week: 2.0 standard drinks of alcohol     Types: 2 Cans of beer per week    Drug use: No    Sexual activity: Defer        Prior to Admission medications    Medication Sig Start Date End Date Taking? Authorizing Provider   doxycycline hyclate (VIBRA-TABS) 100 MG tablet Take 1 tablet by mouth 2 times daily for 14 days 9/30/24 10/14/24  Hellen Jeong MD   meloxicam (MOBIC) 15 MG tablet Take 1 tablet by mouth daily 5/18/23   Janine Martell, PA-C   furosemide (LASIX) 20 MG tablet Take 1 tablet by mouth as needed Indications: edema 7/1/22   Martin Boothe MD   apixaban (ELIQUIS) 5 MG TABS tablet 1 tablet 2 times daily Indications: a-fib 3/15/22   Martin Boothe MD   allopurinol (ZYLOPRIM) 300 MG tablet 1 tablet daily Indications: gout 3/1/23   ProviderMartin MD   Calcium Carb-Cholecalciferol 600-10 MG-MCG CAPS Take by mouth 2 times daily    Automatic Reconciliation, Ar   ibandronate (BONIVA) 150 MG tablet Take 1 tablet by mouth every 30 days Indications: Osteoporosis    Automatic Reconciliation, Ar   metFORMIN (GLUCOPHAGE) 1000 MG tablet Take 0.5 tablets by mouth 2 times daily (with meals) Indications: DM    Automatic Reconciliation, Ar   mometasone (NASONEX) 50 MCG/ACT nasal spray 2 sprays by Nasal route daily Indications: seasonal allergies    Automatic Reconciliation, Ar   montelukast (SINGULAIR) 10 MG tablet Take 1 tablet by mouth

## 2024-10-21 ENCOUNTER — HOSPITAL ENCOUNTER (OUTPATIENT)
Facility: HOSPITAL | Age: 79
Discharge: HOME | End: 2024-10-21
Payer: MEDICARE

## 2024-10-21 VITALS
DIASTOLIC BLOOD PRESSURE: 75 MMHG | RESPIRATION RATE: 18 BRPM | SYSTOLIC BLOOD PRESSURE: 118 MMHG | TEMPERATURE: 97.4 F | HEART RATE: 97 BPM | OXYGEN SATURATION: 97 %

## 2024-10-21 DIAGNOSIS — I83.018 VARICOSE VEINS OF RIGHT LOWER EXTREMITY WITH ULCER OF OTHER PART OF LOWER LEG WITH FAT LAYER EXPOSED (HCC): ICD-10-CM

## 2024-10-21 DIAGNOSIS — L97.212 CALF ULCER, RIGHT, WITH FAT LAYER EXPOSED (HCC): Primary | ICD-10-CM

## 2024-10-21 DIAGNOSIS — L97.812 VARICOSE VEINS OF RIGHT LOWER EXTREMITY WITH ULCER OF OTHER PART OF LOWER LEG WITH FAT LAYER EXPOSED (HCC): ICD-10-CM

## 2024-10-21 DIAGNOSIS — L97.211 CALF ULCER, RIGHT, LIMITED TO BREAKDOWN OF SKIN (HCC): ICD-10-CM

## 2024-10-21 PROCEDURE — 29581 APPL MULTLAYER CMPRN SYS LEG: CPT

## 2024-10-21 ASSESSMENT — PAIN SCALES - GENERAL: PAINLEVEL_OUTOF10: 0

## 2024-10-28 ENCOUNTER — HOSPITAL ENCOUNTER (OUTPATIENT)
Facility: HOSPITAL | Age: 79
Discharge: HOME | End: 2024-10-28
Payer: MEDICARE

## 2024-10-28 VITALS
RESPIRATION RATE: 18 BRPM | OXYGEN SATURATION: 100 % | DIASTOLIC BLOOD PRESSURE: 74 MMHG | SYSTOLIC BLOOD PRESSURE: 143 MMHG | HEART RATE: 66 BPM | TEMPERATURE: 97.8 F

## 2024-10-28 DIAGNOSIS — L97.212 CALF ULCER, RIGHT, WITH FAT LAYER EXPOSED (HCC): ICD-10-CM

## 2024-10-28 DIAGNOSIS — L97.211 CALF ULCER, RIGHT, LIMITED TO BREAKDOWN OF SKIN (HCC): Primary | ICD-10-CM

## 2024-10-28 DIAGNOSIS — I83.018 VARICOSE VEINS OF RIGHT LOWER EXTREMITY WITH ULCER OF OTHER PART OF LOWER LEG WITH FAT LAYER EXPOSED (HCC): ICD-10-CM

## 2024-10-28 DIAGNOSIS — L97.812 VARICOSE VEINS OF RIGHT LOWER EXTREMITY WITH ULCER OF OTHER PART OF LOWER LEG WITH FAT LAYER EXPOSED (HCC): ICD-10-CM

## 2024-10-28 PROCEDURE — 11042 DBRDMT SUBQ TIS 1ST 20SQCM/<: CPT

## 2024-10-28 PROCEDURE — 6370000000 HC RX 637 (ALT 250 FOR IP): Performed by: FAMILY MEDICINE

## 2024-10-28 RX ORDER — LIDOCAINE HYDROCHLORIDE 20 MG/ML
JELLY TOPICAL ONCE
OUTPATIENT
Start: 2024-10-28 | End: 2024-10-28

## 2024-10-28 RX ORDER — TRIAMCINOLONE ACETONIDE 1 MG/G
OINTMENT TOPICAL ONCE
OUTPATIENT
Start: 2024-10-28 | End: 2024-10-28

## 2024-10-28 RX ORDER — LIDOCAINE 40 MG/G
CREAM TOPICAL ONCE
OUTPATIENT
Start: 2024-10-28 | End: 2024-10-28

## 2024-10-28 RX ORDER — BETAMETHASONE DIPROPIONATE 0.5 MG/G
CREAM TOPICAL ONCE
OUTPATIENT
Start: 2024-10-28 | End: 2024-10-28

## 2024-10-28 RX ORDER — CLOBETASOL PROPIONATE 0.5 MG/G
OINTMENT TOPICAL ONCE
OUTPATIENT
Start: 2024-10-28 | End: 2024-10-28

## 2024-10-28 RX ORDER — MUPIROCIN 20 MG/G
OINTMENT TOPICAL ONCE
OUTPATIENT
Start: 2024-10-28 | End: 2024-10-28

## 2024-10-28 RX ORDER — GENTAMICIN SULFATE 1 MG/G
OINTMENT TOPICAL ONCE
OUTPATIENT
Start: 2024-10-28 | End: 2024-10-28

## 2024-10-28 RX ORDER — SODIUM CHLOR/HYPOCHLOROUS ACID 0.033 %
SOLUTION, IRRIGATION IRRIGATION ONCE
OUTPATIENT
Start: 2024-10-28 | End: 2024-10-28

## 2024-10-28 RX ORDER — BACITRACIN ZINC AND POLYMYXIN B SULFATE 500; 1000 [USP'U]/G; [USP'U]/G
OINTMENT TOPICAL ONCE
OUTPATIENT
Start: 2024-10-28 | End: 2024-10-28

## 2024-10-28 RX ORDER — SILVER SULFADIAZINE 10 MG/G
CREAM TOPICAL ONCE
OUTPATIENT
Start: 2024-10-28 | End: 2024-10-28

## 2024-10-28 RX ORDER — GINSENG 100 MG
CAPSULE ORAL ONCE
OUTPATIENT
Start: 2024-10-28 | End: 2024-10-28

## 2024-10-28 RX ORDER — LIDOCAINE HYDROCHLORIDE 20 MG/ML
JELLY TOPICAL ONCE
Status: COMPLETED | OUTPATIENT
Start: 2024-10-28 | End: 2024-10-28

## 2024-10-28 RX ORDER — LIDOCAINE 50 MG/G
OINTMENT TOPICAL ONCE
OUTPATIENT
Start: 2024-10-28 | End: 2024-10-28

## 2024-10-28 RX ORDER — NEOMYCIN/BACITRACIN/POLYMYXINB 3.5-400-5K
OINTMENT (GRAM) TOPICAL ONCE
OUTPATIENT
Start: 2024-10-28 | End: 2024-10-28

## 2024-10-28 RX ORDER — LIDOCAINE HYDROCHLORIDE 40 MG/ML
SOLUTION TOPICAL ONCE
OUTPATIENT
Start: 2024-10-28 | End: 2024-10-28

## 2024-10-28 RX ADMIN — LIDOCAINE HYDROCHLORIDE: 20 JELLY TOPICAL at 09:23

## 2024-10-28 NOTE — PROGRESS NOTES
Wound Center  Progress Note / Procedure Note       Chief Complaint:  Malik Clarke is a 79 y.o.  male  with R lower leg anterior wound of few months duration.      Assessment/Plan     79 y.o. male with Dm2    -R lower leg anterior venous stasis ulcer.  Full thickness,   Re-opened  smaller   Slough/granular  Necessitates debridement  for wound healing and to prevent/heal infection  Ulcer needs debridement- see note below      -R lower leg anterior proximal venous stasis ulcer.  Full thickness,   healed    -R lower leg anterior lateral venous stasis ulcer.  Full thickness,   smaller   Slough/granular  Necessitates debridement  for wound healing and to prevent/heal infection  Ulcer needs debridement- see note below      -Dm2  A1c 6.8  (9/2024)  Does not check at home  Controlled with metformin    -Leg swelling/varicosities  Has arthritis      Following discussed with patient   Needs :  Serial debridement- prn    Good local wound care  Dressing:  Endoform to all wounds  Frequency : once a week  coflex        -Edema management  Do not get dressing wet    Edema management:  Elevate leg(s) throughout the day starting in the morning  Compression: coflex  Avoid prolonged standing      -Good Diabetic control      Patient/  understood and agrees with plan. Questions answered.        Follow up with me 1 week ;    Subjective:     Since last visit  10/28 no new issues      9/30  No issues, tolerated wrap  Going out of town next week    9/23/24  Has not been seen since  May  Apparently wound re-opened within few weeks of being discharged from here, sounds like he wore the velcro wraps without the sock that comes with it.  Thought it was looking better so did not come here, until wife called to make appointment after getting tired of seeing blood on sheets, etc.  Has neem traveling lot  Unclear what dressing he used if any  Also not been compliant with compression    5/20/24  No new issues    4/29 didn't like having

## 2024-10-28 NOTE — FLOWSHEET NOTE
10/28/24 0919   Wound 09/23/24 Leg Right;Lateral   Date First Assessed: 09/23/24   Location: Leg  Wound Location Orientation: Right;Lateral   Wound Image    Wound Etiology Venous   Dressing Status Intact   Wound Cleansed Wound cleanser   Dressing/Treatment Collagen;Alginate with Ag;Dry dressing;Roll gauze  (2 layer wrap)   Offloading for Diabetic Foot Ulcers Offloading not required   Dressing Change Due 10/28/24   Wound Length (cm) 0.9 cm   Wound Width (cm) 0.5 cm   Wound Depth (cm) 0.1 cm   Wound Surface Area (cm^2) 0.45 cm^2   Change in Wound Size % (l*w) 80   Wound Volume (cm^3) 0.045 cm^3   Wound Healing % 80   Post-Procedure Length (cm) 0.9 cm   Post-Procedure Width (cm) 0.5 cm   Post-Procedure Depth (cm) 0.2 cm   Post-Procedure Surface Area (cm^2) 0.45 cm^2   Post-Procedure Volume (cm^3) 0.09 cm^3   Wound Assessment Devitalized tissue   Drainage Amount Small (< 25%)   Drainage Description Serosanguinous   Odor None   Guillermina-wound Assessment Intact   Margins Defined edges   Wound Thickness Description not for Pressure Injury Full thickness   Wound 09/23/24 Pretibial Distal;Right   Date First Assessed/Time First Assessed: 09/23/24 0943   Present on Original Admission: Yes  Primary Wound Type: Venous Ulcer  Location: Pretibial  Wound Location Orientation: Distal;Right   Wound Image     Wound Etiology Venous   Dressing Status New dressing applied   Wound Cleansed Wound cleanser   Dressing/Treatment Collagen;Alginate with Ag;Dry dressing;Roll gauze  (2 layer wrap)   Offloading for Diabetic Foot Ulcers Offloading not required   Dressing Change Due 10/28/24   Wound Length (cm) 2 cm   Wound Width (cm) 1.2 cm   Wound Depth (cm) 0.1 cm   Wound Surface Area (cm^2) 2.4 cm^2   Change in Wound Size % (l*w) 52   Wound Volume (cm^3) 0.24 cm^3   Wound Healing % 52   Post-Procedure Length (cm) 2 cm   Post-Procedure Width (cm) 1.2 cm   Post-Procedure Depth (cm) 0.2 cm   Post-Procedure Surface Area (cm^2) 2.4 cm^2

## 2024-11-04 ENCOUNTER — HOSPITAL ENCOUNTER (OUTPATIENT)
Facility: HOSPITAL | Age: 79
Discharge: HOME | End: 2024-11-04
Payer: MEDICARE

## 2024-11-04 VITALS
OXYGEN SATURATION: 99 % | HEART RATE: 100 BPM | DIASTOLIC BLOOD PRESSURE: 69 MMHG | TEMPERATURE: 99.4 F | SYSTOLIC BLOOD PRESSURE: 134 MMHG | RESPIRATION RATE: 16 BRPM

## 2024-11-04 DIAGNOSIS — L97.212 CALF ULCER, RIGHT, WITH FAT LAYER EXPOSED (HCC): ICD-10-CM

## 2024-11-04 DIAGNOSIS — L97.812 VARICOSE VEINS OF RIGHT LOWER EXTREMITY WITH ULCER OF OTHER PART OF LOWER LEG WITH FAT LAYER EXPOSED (HCC): ICD-10-CM

## 2024-11-04 DIAGNOSIS — I83.018 VARICOSE VEINS OF RIGHT LOWER EXTREMITY WITH ULCER OF OTHER PART OF LOWER LEG WITH FAT LAYER EXPOSED (HCC): ICD-10-CM

## 2024-11-04 DIAGNOSIS — L97.211 CALF ULCER, RIGHT, LIMITED TO BREAKDOWN OF SKIN (HCC): Primary | ICD-10-CM

## 2024-11-04 PROCEDURE — 6370000000 HC RX 637 (ALT 250 FOR IP): Performed by: FAMILY MEDICINE

## 2024-11-04 PROCEDURE — 11042 DBRDMT SUBQ TIS 1ST 20SQCM/<: CPT

## 2024-11-04 RX ORDER — SODIUM CHLOR/HYPOCHLOROUS ACID 0.033 %
SOLUTION, IRRIGATION IRRIGATION ONCE
OUTPATIENT
Start: 2024-11-04 | End: 2024-11-04

## 2024-11-04 RX ORDER — TRIAMCINOLONE ACETONIDE 1 MG/G
OINTMENT TOPICAL ONCE
OUTPATIENT
Start: 2024-11-04 | End: 2024-11-04

## 2024-11-04 RX ORDER — LIDOCAINE 40 MG/G
CREAM TOPICAL ONCE
OUTPATIENT
Start: 2024-11-04 | End: 2024-11-04

## 2024-11-04 RX ORDER — GINSENG 100 MG
CAPSULE ORAL ONCE
OUTPATIENT
Start: 2024-11-04 | End: 2024-11-04

## 2024-11-04 RX ORDER — NEOMYCIN/BACITRACIN/POLYMYXINB 3.5-400-5K
OINTMENT (GRAM) TOPICAL ONCE
OUTPATIENT
Start: 2024-11-04 | End: 2024-11-04

## 2024-11-04 RX ORDER — BETAMETHASONE DIPROPIONATE 0.5 MG/G
CREAM TOPICAL ONCE
OUTPATIENT
Start: 2024-11-04 | End: 2024-11-04

## 2024-11-04 RX ORDER — LIDOCAINE HYDROCHLORIDE 40 MG/ML
SOLUTION TOPICAL ONCE
OUTPATIENT
Start: 2024-11-04 | End: 2024-11-04

## 2024-11-04 RX ORDER — BACITRACIN ZINC AND POLYMYXIN B SULFATE 500; 1000 [USP'U]/G; [USP'U]/G
OINTMENT TOPICAL ONCE
OUTPATIENT
Start: 2024-11-04 | End: 2024-11-04

## 2024-11-04 RX ORDER — MUPIROCIN 20 MG/G
OINTMENT TOPICAL ONCE
OUTPATIENT
Start: 2024-11-04 | End: 2024-11-04

## 2024-11-04 RX ORDER — CLOBETASOL PROPIONATE 0.5 MG/G
OINTMENT TOPICAL ONCE
OUTPATIENT
Start: 2024-11-04 | End: 2024-11-04

## 2024-11-04 RX ORDER — LIDOCAINE 50 MG/G
OINTMENT TOPICAL ONCE
OUTPATIENT
Start: 2024-11-04 | End: 2024-11-04

## 2024-11-04 RX ORDER — LIDOCAINE HYDROCHLORIDE 20 MG/ML
JELLY TOPICAL ONCE
OUTPATIENT
Start: 2024-11-04 | End: 2024-11-04

## 2024-11-04 RX ORDER — SILVER SULFADIAZINE 10 MG/G
CREAM TOPICAL ONCE
OUTPATIENT
Start: 2024-11-04 | End: 2024-11-04

## 2024-11-04 RX ORDER — GENTAMICIN SULFATE 1 MG/G
OINTMENT TOPICAL ONCE
OUTPATIENT
Start: 2024-11-04 | End: 2024-11-04

## 2024-11-04 RX ORDER — LIDOCAINE HYDROCHLORIDE 20 MG/ML
JELLY TOPICAL ONCE
Status: COMPLETED | OUTPATIENT
Start: 2024-11-04 | End: 2024-11-04

## 2024-11-04 RX ADMIN — LIDOCAINE HYDROCHLORIDE: 20 JELLY TOPICAL at 12:02

## 2024-11-04 ASSESSMENT — PAIN SCALES - GENERAL: PAINLEVEL_OUTOF10: 0

## 2024-11-04 NOTE — FLOWSHEET NOTE
11/04/24 1545   Wound 09/23/24 Pretibial Distal;Right   Date First Assessed/Time First Assessed: 09/23/24 0943   Present on Original Admission: Yes  Primary Wound Type: Venous Ulcer  Location: Pretibial  Wound Location Orientation: Distal;Right   Wound Image     Wound Etiology Venous   Dressing Status New dressing applied   Wound Cleansed Wound cleanser   Dressing/Treatment Collagen;Alginate with Ag;Dry dressing;Roll gauze  (2 layer wrap)   Offloading for Diabetic Foot Ulcers Offloading not required   Dressing Change Due 11/11/24   Wound Length (cm) 1.5 cm   Wound Width (cm) 1 cm   Wound Depth (cm) 0.1 cm   Wound Surface Area (cm^2) 1.5 cm^2   Change in Wound Size % (l*w) 70   Wound Volume (cm^3) 0.15 cm^3   Wound Healing % 70   Post-Procedure Length (cm) 1.5 cm   Post-Procedure Width (cm) 1 cm   Post-Procedure Depth (cm) 0.2 cm   Post-Procedure Surface Area (cm^2) 1.5 cm^2   Post-Procedure Volume (cm^3) 0.3 cm^3   Wound Assessment Devitalized tissue   Drainage Amount Moderate (25-50%)   Drainage Description Serosanguinous   Odor None   Guillermina-wound Assessment Blanchable erythema   Margins Defined edges   Wound Thickness Description not for Pressure Injury Full thickness   Wound 09/23/24 Leg Right;Lateral   Date First Assessed: 09/23/24   Location: Leg  Wound Location Orientation: Right;Lateral   Wound Image     Wound Etiology Venous   Dressing Status Intact   Wound Cleansed Wound cleanser   Dressing/Treatment Collagen;Alginate with Ag;Dry dressing;Roll gauze  (2 layer wrap)   Offloading for Diabetic Foot Ulcers Offloading not required   Dressing Change Due 11/11/24   Wound Length (cm) 0.8 cm   Wound Width (cm) 0.5 cm   Wound Depth (cm) 0.1 cm   Wound Surface Area (cm^2) 0.4 cm^2   Change in Wound Size % (l*w) 82.22   Wound Volume (cm^3) 0.04 cm^3   Wound Healing % 82   Post-Procedure Length (cm) 0.8 cm   Post-Procedure Width (cm) 0.5 cm   Post-Procedure Depth (cm) 0.2 cm   Post-Procedure Surface Area (cm^2) 0.4

## 2024-11-04 NOTE — PROGRESS NOTES
Wound Center  Progress Note / Procedure Note       Chief Complaint:  Malik Clarke is a 79 y.o.  male  with R lower leg anterior wound of few months duration.      Assessment/Plan     79 y.o. male with Dm2    -R lower leg anterior venous stasis ulcer.  Full thickness,   Re-opened  smaller   Slough/granular  Necessitates debridement  for wound healing and to prevent/heal infection  Ulcer needs debridement- see note below    -R lower leg anterior lateral venous stasis ulcer.  Full thickness,   smaller   Slough/granular  Necessitates debridement  for wound healing and to prevent/heal infection  Ulcer needs debridement- see note below      -Dm2  A1c 6.8  (9/2024)  Does not check at home  Controlled with metformin    -Leg swelling/varicosities  Has arthritis      Following discussed with patient   Needs :  Serial debridement- prn    Good local wound care  Dressing:  Endoform to all wounds  Frequency : once a week  coflex        -Edema management  Do not get dressing wet    Edema management:  Elevate leg(s) throughout the day starting in the morning  Compression: coflex  Avoid prolonged standing      -Good Diabetic control      Patient/  understood and agrees with plan. Questions answered.        Follow up with me 1 week ;    Subjective:     Since last visit  11/4 no new issues      9/30  No issues, tolerated wrap  Going out of town next week    9/23/24  Has not been seen since  May  Apparently wound re-opened within few weeks of being discharged from here, sounds like he wore the velcro wraps without the sock that comes with it.  Thought it was looking better so did not come here, until wife called to make appointment after getting tired of seeing blood on sheets, etc.  Has neem traveling lot  Unclear what dressing he used if any  Also not been compliant with compression    5/20/24  No new issues    4/29 didn't like having compression on left leg, wants to go back to using his velcro wrap      4/22 Has been on

## 2024-11-11 ENCOUNTER — HOSPITAL ENCOUNTER (OUTPATIENT)
Facility: HOSPITAL | Age: 79
Discharge: HOME | End: 2024-11-11
Payer: MEDICARE

## 2024-11-11 VITALS
SYSTOLIC BLOOD PRESSURE: 126 MMHG | OXYGEN SATURATION: 97 % | RESPIRATION RATE: 16 BRPM | DIASTOLIC BLOOD PRESSURE: 66 MMHG | HEART RATE: 80 BPM | TEMPERATURE: 98 F

## 2024-11-11 DIAGNOSIS — I83.018 VARICOSE VEINS OF RIGHT LOWER EXTREMITY WITH ULCER OF OTHER PART OF LOWER LEG WITH FAT LAYER EXPOSED (HCC): ICD-10-CM

## 2024-11-11 DIAGNOSIS — L97.812 VARICOSE VEINS OF RIGHT LOWER EXTREMITY WITH ULCER OF OTHER PART OF LOWER LEG WITH FAT LAYER EXPOSED (HCC): ICD-10-CM

## 2024-11-11 DIAGNOSIS — L97.212 CALF ULCER, RIGHT, WITH FAT LAYER EXPOSED (HCC): ICD-10-CM

## 2024-11-11 DIAGNOSIS — L97.211 CALF ULCER, RIGHT, LIMITED TO BREAKDOWN OF SKIN (HCC): Primary | ICD-10-CM

## 2024-11-11 PROCEDURE — 6370000000 HC RX 637 (ALT 250 FOR IP): Performed by: FAMILY MEDICINE

## 2024-11-11 PROCEDURE — 29581 APPL MULTLAYER CMPRN SYS LEG: CPT

## 2024-11-11 RX ORDER — LIDOCAINE HYDROCHLORIDE 40 MG/ML
SOLUTION TOPICAL ONCE
OUTPATIENT
Start: 2024-11-11 | End: 2024-11-11

## 2024-11-11 RX ORDER — LIDOCAINE HYDROCHLORIDE 20 MG/ML
JELLY TOPICAL ONCE
OUTPATIENT
Start: 2024-11-11 | End: 2024-11-11

## 2024-11-11 RX ORDER — GENTAMICIN SULFATE 1 MG/G
OINTMENT TOPICAL ONCE
OUTPATIENT
Start: 2024-11-11 | End: 2024-11-11

## 2024-11-11 RX ORDER — LIDOCAINE 50 MG/G
OINTMENT TOPICAL ONCE
OUTPATIENT
Start: 2024-11-11 | End: 2024-11-11

## 2024-11-11 RX ORDER — TRIAMCINOLONE ACETONIDE 1 MG/G
OINTMENT TOPICAL ONCE
OUTPATIENT
Start: 2024-11-11 | End: 2024-11-11

## 2024-11-11 RX ORDER — LIDOCAINE HYDROCHLORIDE 20 MG/ML
JELLY TOPICAL ONCE
Status: COMPLETED | OUTPATIENT
Start: 2024-11-11 | End: 2024-11-11

## 2024-11-11 RX ORDER — LIDOCAINE 40 MG/G
CREAM TOPICAL ONCE
OUTPATIENT
Start: 2024-11-11 | End: 2024-11-11

## 2024-11-11 RX ORDER — CLOBETASOL PROPIONATE 0.5 MG/G
OINTMENT TOPICAL ONCE
OUTPATIENT
Start: 2024-11-11 | End: 2024-11-11

## 2024-11-11 RX ORDER — BACITRACIN ZINC AND POLYMYXIN B SULFATE 500; 1000 [USP'U]/G; [USP'U]/G
OINTMENT TOPICAL ONCE
OUTPATIENT
Start: 2024-11-11 | End: 2024-11-11

## 2024-11-11 RX ORDER — SODIUM CHLOR/HYPOCHLOROUS ACID 0.033 %
SOLUTION, IRRIGATION IRRIGATION ONCE
OUTPATIENT
Start: 2024-11-11 | End: 2024-11-11

## 2024-11-11 RX ORDER — NEOMYCIN/BACITRACIN/POLYMYXINB 3.5-400-5K
OINTMENT (GRAM) TOPICAL ONCE
OUTPATIENT
Start: 2024-11-11 | End: 2024-11-11

## 2024-11-11 RX ORDER — SILVER SULFADIAZINE 10 MG/G
CREAM TOPICAL ONCE
OUTPATIENT
Start: 2024-11-11 | End: 2024-11-11

## 2024-11-11 RX ORDER — GINSENG 100 MG
CAPSULE ORAL ONCE
OUTPATIENT
Start: 2024-11-11 | End: 2024-11-11

## 2024-11-11 RX ORDER — BETAMETHASONE DIPROPIONATE 0.5 MG/G
CREAM TOPICAL ONCE
OUTPATIENT
Start: 2024-11-11 | End: 2024-11-11

## 2024-11-11 RX ORDER — MUPIROCIN 20 MG/G
OINTMENT TOPICAL ONCE
OUTPATIENT
Start: 2024-11-11 | End: 2024-11-11

## 2024-11-11 RX ADMIN — LIDOCAINE HYDROCHLORIDE: 20 JELLY TOPICAL at 13:43

## 2024-11-11 ASSESSMENT — PAIN SCALES - GENERAL: PAINLEVEL_OUTOF10: 0

## 2024-11-11 NOTE — PROGRESS NOTES
directive discussed with patient 04/06/2023    pt states he will bring DOS    Arthritis     Cancer (HCC) 2021    melanoma removed from nose and face, pt denies chemo or readiation    Chronic kidney disease     kidney stones    Coagulation disorder (HCC)     excess iron; erthythrocytosis; hemochromatosis- [facility] yearly exams    Diabetes (HCC)     PcP  manages    Enlarged prostate     managed by Dr. Cole    Exercise tolerance finding     pt states able to climb 2 flights stairs without CP or SOB    Hypertension 2007    cardio Dr. Cleaning    Ill-defined condition     ulcer - shin- Started around [place] 2022- followed by [facility]    Kidney stones     Dr. Cole    Wears glasses 04/06/2023     Past Surgical History:   Procedure Laterality Date    BLADDER SURGERY  2017    urolift    CHOLECYSTECTOMY  07/2017    COLONOSCOPY  2021 2020, hx polyps    ORTHOPEDIC SURGERY Right 2007    r shoulder replacement    SHOULDER SURGERY Left 2022    replacement    TONSILLECTOMY      TOTAL KNEE ARTHROPLASTY Left 5/18/2023    LEFT TOTAL KNEE ARTHROPLASTY (SPEC POP) performed by Malik Hector DO at [facility] MAIN OR    WISDOM TOOTH EXTRACTION       No family history on file.  Social History     Socioeconomic History    Marital status:    Tobacco Use    Smoking status: Never     Passive exposure: Past    Smokeless tobacco: Never   Vaping Use    Vaping status: Never Used   Substance and Sexual Activity    Alcohol use: Yes     Alcohol/week: 2.0 standard drinks of alcohol     Types: 2 Cans of beer per week    Drug use: No    Sexual activity: Defer        Prior to Admission medications    Medication Sig Start Date End Date Taking? Authorizing Provider   meloxicam (MOBIC) 15 MG tablet Take 1 tablet by mouth daily 5/18/23   Janine Martell PA-C   furosemide (LASIX) 20 MG tablet Take 1 tablet by mouth as needed Indications: edema 7/1/22   Provider, MD Martin   apixaban (ELIQUIS) 5 MG TABS tablet 1 tablet

## 2024-11-11 NOTE — FLOWSHEET NOTE
11/11/24 1103   Wound 09/23/24 Leg Right;Lateral   Date First Assessed: 09/23/24   Location: Leg  Wound Location Orientation: Right;Lateral   Wound Image    Wound Etiology Venous   Dressing Status Intact   Wound Cleansed Wound cleanser   Dressing/Treatment Zinc paste;Gauze dressing/dressing sponge  (2 layer wrap)   Offloading for Diabetic Foot Ulcers Offloading not required   Dressing Change Due 11/18/24   Wound Length (cm) 0.2 cm   Wound Width (cm) 0.2 cm   Wound Depth (cm) 0.1 cm   Wound Surface Area (cm^2) 0.04 cm^2   Change in Wound Size % (l*w) 98.22   Wound Volume (cm^3) 0.004 cm^3   Wound Healing % 98   Wound Assessment Devitalized tissue   Drainage Amount Small (< 25%)   Drainage Description Serosanguinous   Odor None   Guillermina-wound Assessment Intact   Margins Defined edges   Wound Thickness Description not for Pressure Injury Full thickness   Wound 09/23/24 Pretibial Distal;Right   Date First Assessed/Time First Assessed: 09/23/24 0943   Present on Original Admission: Yes  Primary Wound Type: Venous Ulcer  Location: Pretibial  Wound Location Orientation: Distal;Right   Wound Image    Wound Etiology Venous   Dressing Status New dressing applied   Wound Cleansed Wound cleanser   Dressing/Treatment Zinc paste;Gauze dressing/dressing sponge  (2 layer wrap)   Offloading for Diabetic Foot Ulcers Offloading not required   Dressing Change Due 11/18/24   Wound Length (cm) 0.3 cm   Wound Width (cm) 0.3 cm   Wound Depth (cm) 0.1 cm   Wound Surface Area (cm^2) 0.09 cm^2   Change in Wound Size % (l*w) 98.2   Wound Volume (cm^3) 0.009 cm^3   Wound Healing % 98   Wound Assessment Devitalized tissue   Drainage Amount Small (< 25%)   Drainage Description Serosanguinous   Odor None   Guillermina-wound Assessment Blanchable erythema   Margins Defined edges   Wound Thickness Description not for Pressure Injury Full thickness       Multilayer Compression Wrap   (Not Unna) Below the Knee    NAME:  Malik Clarke  DATE OF BIRTH:

## 2024-11-15 NOTE — DISCHARGE INSTRUCTIONS
Compression Wraps Discharge Instructions   Location: Right Leg  Type: 2 layer compression wap    Your doctor has ordered compression therapy for your wound. Compression bandages reduce the swelling, or edema, in your legs and prevent it from returning. The wound care staff will apply your compression wrap. It must be removed and re-applied at least weekly. As the swelling decreases, the boot no longer provides adequate compression and you need a new one. Once applied, you need to know how to take care of your compression wrap.     The boot must stay dry. Do not get it wet in the shower or tub. You may do a partial bath, or you can cover the boot with a large plastic bag, secured at the top, so that no water can get in.    Avoid standing in one place for long periods of time. If you must  one place, shift your weight and change positions often.    If you have CHF, consult your doctor before following the next two recommendations for leg elevation.     When sitting, elevate your legs on pillows, or put blocks under the foot of your bed. Your legs should be higher than your heart.    If your boot becomes painful, or you notice an increase in swelling in your toes, numbness or tingling, or purple color to your toes, remove the wrap and call the Wound Care Center. If it is after hours, call your doctor for instructions or go to the nearest emergency room.

## 2024-11-18 ENCOUNTER — HOSPITAL ENCOUNTER (OUTPATIENT)
Facility: HOSPITAL | Age: 79
Discharge: HOME | End: 2024-11-18
Payer: MEDICARE

## 2024-11-18 VITALS
TEMPERATURE: 97.7 F | HEART RATE: 84 BPM | RESPIRATION RATE: 16 BRPM | SYSTOLIC BLOOD PRESSURE: 118 MMHG | DIASTOLIC BLOOD PRESSURE: 68 MMHG | OXYGEN SATURATION: 97 %

## 2024-11-18 DIAGNOSIS — L97.212 CALF ULCER, RIGHT, WITH FAT LAYER EXPOSED (HCC): Primary | ICD-10-CM

## 2024-11-18 DIAGNOSIS — I83.018 VARICOSE VEINS OF RIGHT LOWER EXTREMITY WITH ULCER OF OTHER PART OF LOWER LEG WITH FAT LAYER EXPOSED (HCC): ICD-10-CM

## 2024-11-18 DIAGNOSIS — L97.211 CALF ULCER, RIGHT, LIMITED TO BREAKDOWN OF SKIN (HCC): ICD-10-CM

## 2024-11-18 DIAGNOSIS — L97.812 VARICOSE VEINS OF RIGHT LOWER EXTREMITY WITH ULCER OF OTHER PART OF LOWER LEG WITH FAT LAYER EXPOSED (HCC): ICD-10-CM

## 2024-11-18 PROCEDURE — 99213 OFFICE O/P EST LOW 20 MIN: CPT

## 2024-11-18 PROCEDURE — 29581 APPL MULTLAYER CMPRN SYS LEG: CPT

## 2024-11-18 NOTE — PROGRESS NOTES
Attached edges 11/18/24 1036   Wound Thickness Description not for Pressure Injury Full thickness 11/18/24 1036   Number of days: 56        -------    Past Medical History:   Diagnosis Date    A-fib (HCC)     Cardio Dr. Cleaning    Advance directive discussed with patient 04/06/2023    pt states he will bring DOS    Arthritis     Cancer (HCC) 2021    melanoma removed from nose and face, pt denies chemo or readiation    Chronic kidney disease     kidney stones    Coagulation disorder (HCC)     excess iron; erthythrocytosis; hemochromatosis- [facility] yearly exams    Diabetes ([facility])     PcP  manages    Enlarged prostate     managed by Dr. Cole    Exercise tolerance finding     pt states able to climb 2 flights stairs without CP or SOB    Hypertension 2007    cardio Dr. Cleaning    Ill-defined condition     ulcer - shin- Started around Pablo 2022- followed by [facility]    Kidney stones     Dr. Cole    Wears glasses 04/06/2023     Past Surgical History:   Procedure Laterality Date    BLADDER SURGERY  2017    urolift    CHOLECYSTECTOMY  07/2017    COLONOSCOPY  2021 2020, hx polyps    ORTHOPEDIC SURGERY Right 2007    r shoulder replacement    SHOULDER SURGERY Left 2022    replacement    TONSILLECTOMY      TOTAL KNEE ARTHROPLASTY Left 5/18/2023    LEFT TOTAL KNEE ARTHROPLASTY (SPEC POP) performed by Malik Hector DO at [facility] MAIN OR    WISDOM TOOTH EXTRACTION       No family history on file.  Social History     Socioeconomic History    Marital status:    Tobacco Use    Smoking status: Never     Passive exposure: Past    Smokeless tobacco: Never   Vaping Use    Vaping status: Never Used   Substance and Sexual Activity    Alcohol use: Yes     Alcohol/week: 2.0 standard drinks of alcohol     Types: 2 Cans of beer per week    Drug use: No    Sexual activity: Defer        Prior to Admission medications    Medication Sig Start Date End Date Taking? Authorizing Provider   meloxicam (MOBIC) 15

## 2024-11-18 NOTE — FLOWSHEET NOTE
11/18/24 1036   [REMOVED] Wound 09/23/24 Leg Right;Lateral   Final Assessment Date: 11/18/24  Date First Assessed: 09/23/24   Location: Leg  Wound Location Orientation: Right;Lateral  Wound Outcome: Healed   Dressing/Treatment   (2 layer wrap)   Wound 09/23/24 Pretibial Distal;Right   Date First Assessed/Time First Assessed: 09/23/24 0943   Present on Original Admission: Yes  Primary Wound Type: Venous Ulcer  Location: Pretibial  Wound Location Orientation: Distal;Right   Wound Image    Wound Etiology Venous   Dressing Status New dressing applied   Wound Cleansed Wound cleanser   Dressing/Treatment Roll gauze;Other (comment)  (2 layer zince wrap)   Offloading for Diabetic Foot Ulcers Offloading not required   Dressing Change Due 11/25/24   Wound Length (cm) 0.5 cm  (wound clinically closed)   Wound Width (cm) 0.5 cm   Wound Depth (cm) 0.1 cm   Wound Surface Area (cm^2) 0.25 cm^2   Change in Wound Size % (l*w) 95   Wound Volume (cm^3) 0.025 cm^3   Wound Healing % 95   Wound Assessment Dry   Drainage Amount None (dry)   Odor None   Guillermina-wound Assessment Fragile   Margins Attached edges   Wound Thickness Description not for Pressure Injury Full thickness   Stevan Scale   Sensory Perceptions 4   Moisture 4   Activity 4   Mobility 4   Nutrition 4   Friction and Shear 3   Stevan Scale Score 23     Multilayer Compression Wrap   (Not Unna) Below the Knee    NAME:  Malik Clarke  YOB: 1945  MEDICAL RECORD NUMBER:  [id number]  DATE:  11/18/2024    Multilayer compression wrap: Removed old Multilayer wrap if indicated and wash leg with mild soap/water.  Applied moisturizing agent to dry skin as needed.   Applied multilayered dressing below the knee to right lower leg.  Instructed patient/caregiver not to remove dressing and to keep it clean and dry.   Instructed patient/caregiver on complications to report to provider, such as pain, numbness in toes, heavy drainage, and slippage of dressing.  Instructed patient

## 2024-11-18 NOTE — DISCHARGE INSTRUCTIONS
Discharge Instructions from  Wound Care Clinic at [facility]   [address]   [address]  [place] [address]  [phone] Fax [phone]    NAME:  Malik Clarke  YOB: 1945  MEDICAL RECORD NUMBER:  [id number]  DATE:  11/18/24    Wound Cleansing:   Do not scrub or use excessive force.  Cleanse wound prior to applying a clean dressing with:     [x] Wound Cleanser     Dressings:           Wound Location right lower leg      [x] Change dressing:    [x] Once a week in clinic      Compression:  Apply: [x] Multilayer Compression Wrap Applied in Clinic [x]RightLeg []Left Leg   [x] Multi-layer compression.  Do not get leg(s) with wrap wet.  If wraps become too tight call the center or completely remove the wrap.      [x] Elevate leg(s) above the level of the heart when sitting.    [x] Avoid prolonged standing in one place.    Return Appointment:  [x] Return Appointment: With MD  in  1 Week(s)       Electronically signed Rafaela Barton RN on 11/18/2024 at 2:24 PM     Wound Care Center Information: Should you experience any significant changes in your wound(s) or have questions about your wound care, please contact the [facility] at MONDAY - FRIDAY 8:00 am - 4:30.  If you need help with your wound outside these hours and cannot wait until we are again available, contact your PCP or go to the hospital emergency room.     PLEASE NOTE: IF YOU ARE UNABLE TO OBTAIN WOUND SUPPLIES, CONTINUE TO USE THE SUPPLIES YOU HAVE AVAILABLE UNTIL YOU ARE ABLE TO REACH US. IT IS MOST IMPORTANT TO KEEP THE WOUND COVERED AT ALL TIMES.

## 2024-11-20 NOTE — PLAN OF CARE
Compression Garments    Supply Company for Compression Stockings:     Other [employer]: [phone]      Ordering Center:     [facility]  [address] [facility] [address] [phone]  WOUND CARE Dept: [phone]   FAX NUMBER [phone]    Patient Information:      Linda Rojas  [facility] [id number] [phone]   : 1945  AGE: 79 y.o.     GENDER: male   TODAYS DATE:  2024    Insurance:      PRIMARY INSURANCE:  Plan: MEDICARE PART A AND B  Coverage: MEDICARE  Effective Date: 2009  Group Number: [unfilled]  Subscriber Number: [id number] - (Medicare)    Payer/Plan Subscr  Sex Relation Sub. Ins. ID Effective Group Num   1. MEDICARE - ME* LINDA JUAREZ CATHY 1945 Male Self [id number]                                    [address]    2. 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)       Other    Calf ulcer, right, with fat layer exposed (HCC) - Primary    Calf ulcer, right, limited to breakdown of skin (HCC)       Wound 24 Pretibial Distal;Right (Active)   Wound Image   24 1036   Wound Etiology Venous 24 1036   Dressing Status New dressing applied 24 1036   Wound Cleansed Wound cleanser 24 1036   Dressing/Treatment Roll gauze;Other (comment) 24 1036   Offloading for Diabetic Foot Ulcers Offloading not required 24 1036   Dressing Change Due 24 1036   Wound Length (cm) 0.5 cm 24 1036   Wound Width (cm) 0.5 cm 24 1036   Wound Depth (cm) 0.1 cm 24 1036   Wound Surface Area (cm^2) 0.25 cm^2 24 1036   Change in Wound Size % (l*w) 95 24 1036   Wound Volume (cm^3) 0.025 cm^3 24 1036   Wound Healing % 95 24 1036   Post-Procedure Length

## 2024-11-25 ENCOUNTER — HOSPITAL ENCOUNTER (OUTPATIENT)
Facility: HOSPITAL | Age: 79
Discharge: HOME | End: 2024-11-25
Payer: MEDICARE

## 2024-11-25 VITALS
OXYGEN SATURATION: 98 % | SYSTOLIC BLOOD PRESSURE: 127 MMHG | HEART RATE: 102 BPM | DIASTOLIC BLOOD PRESSURE: 80 MMHG | RESPIRATION RATE: 18 BRPM | TEMPERATURE: 98.5 F

## 2024-11-25 DIAGNOSIS — L97.212 CALF ULCER, RIGHT, WITH FAT LAYER EXPOSED (HCC): ICD-10-CM

## 2024-11-25 DIAGNOSIS — L97.812 VARICOSE VEINS OF RIGHT LOWER EXTREMITY WITH ULCER OF OTHER PART OF LOWER LEG WITH FAT LAYER EXPOSED (HCC): ICD-10-CM

## 2024-11-25 DIAGNOSIS — I83.018 VARICOSE VEINS OF RIGHT LOWER EXTREMITY WITH ULCER OF OTHER PART OF LOWER LEG WITH FAT LAYER EXPOSED (HCC): ICD-10-CM

## 2024-11-25 DIAGNOSIS — L97.211 CALF ULCER, RIGHT, LIMITED TO BREAKDOWN OF SKIN (HCC): Primary | ICD-10-CM

## 2024-11-25 PROCEDURE — 99212 OFFICE O/P EST SF 10 MIN: CPT

## 2024-11-25 RX ORDER — BACITRACIN ZINC AND POLYMYXIN B SULFATE 500; 1000 [USP'U]/G; [USP'U]/G
OINTMENT TOPICAL ONCE
Status: CANCELLED | OUTPATIENT
Start: 2024-11-25 | End: 2024-11-25

## 2024-11-25 RX ORDER — GINSENG 100 MG
CAPSULE ORAL ONCE
Status: CANCELLED | OUTPATIENT
Start: 2024-11-25 | End: 2024-11-25

## 2024-11-25 RX ORDER — CLOBETASOL PROPIONATE 0.5 MG/G
OINTMENT TOPICAL ONCE
Status: CANCELLED | OUTPATIENT
Start: 2024-11-25 | End: 2024-11-25

## 2024-11-25 RX ORDER — LIDOCAINE 50 MG/G
OINTMENT TOPICAL ONCE
Status: CANCELLED | OUTPATIENT
Start: 2024-11-25 | End: 2024-11-25

## 2024-11-25 RX ORDER — LIDOCAINE 40 MG/G
CREAM TOPICAL ONCE
Status: CANCELLED | OUTPATIENT
Start: 2024-11-25 | End: 2024-11-25

## 2024-11-25 RX ORDER — SILVER SULFADIAZINE 10 MG/G
CREAM TOPICAL ONCE
Status: CANCELLED | OUTPATIENT
Start: 2024-11-25 | End: 2024-11-25

## 2024-11-25 RX ORDER — GENTAMICIN SULFATE 1 MG/G
OINTMENT TOPICAL ONCE
Status: CANCELLED | OUTPATIENT
Start: 2024-11-25 | End: 2024-11-25

## 2024-11-25 RX ORDER — BETAMETHASONE DIPROPIONATE 0.5 MG/G
CREAM TOPICAL ONCE
Status: CANCELLED | OUTPATIENT
Start: 2024-11-25 | End: 2024-11-25

## 2024-11-25 RX ORDER — LIDOCAINE HYDROCHLORIDE 40 MG/ML
SOLUTION TOPICAL ONCE
Status: CANCELLED | OUTPATIENT
Start: 2024-11-25 | End: 2024-11-25

## 2024-11-25 RX ORDER — LIDOCAINE HYDROCHLORIDE 20 MG/ML
JELLY TOPICAL ONCE
Status: CANCELLED | OUTPATIENT
Start: 2024-11-25 | End: 2024-11-25

## 2024-11-25 RX ORDER — TRIAMCINOLONE ACETONIDE 1 MG/G
OINTMENT TOPICAL ONCE
Status: CANCELLED | OUTPATIENT
Start: 2024-11-25 | End: 2024-11-25

## 2024-11-25 RX ORDER — NEOMYCIN/BACITRACIN/POLYMYXINB 3.5-400-5K
OINTMENT (GRAM) TOPICAL ONCE
Status: CANCELLED | OUTPATIENT
Start: 2024-11-25 | End: 2024-11-25

## 2024-11-25 RX ORDER — MUPIROCIN 20 MG/G
OINTMENT TOPICAL ONCE
Status: CANCELLED | OUTPATIENT
Start: 2024-11-25 | End: 2024-11-25

## 2024-11-25 RX ORDER — SODIUM CHLOR/HYPOCHLOROUS ACID 0.033 %
SOLUTION, IRRIGATION IRRIGATION ONCE
Status: CANCELLED | OUTPATIENT
Start: 2024-11-25 | End: 2024-11-25

## 2024-11-25 ASSESSMENT — PAIN SCALES - GENERAL: PAINLEVEL_OUTOF10: 0

## 2024-11-25 NOTE — PROGRESS NOTES
Automatic Reconciliation, Ar   tadalafil (CIALIS) 5 MG tablet Take 1 tablet by mouth daily Indications: BPH    Automatic Reconciliation, Ar   testosterone (ANDROGEL; TESTIM) 50 MG/5GM (1%) GEL 1% gel Apply 5 g topically four times a week.    Automatic Reconciliation, Ar      No Known Allergies       Signed By: Hellen Jeong MD      11/25/24

## 2024-11-25 NOTE — FLOWSHEET NOTE
11/25/24 0932   Wound 09/23/24 Pretibial Distal;Right   Date First Assessed/Time First Assessed: 09/23/24 0943   Present on Original Admission: Yes  Primary Wound Type: Venous Ulcer  Location: Pretibial  Wound Location Orientation: Distal;Right   Wound Image    Wound Etiology Venous   Dressing Status New dressing applied   Wound Cleansed Wound cleanser   Dressing/Treatment Tubular bandage  (Sigvaris Wrap)   Offloading for Diabetic Foot Ulcers Offloading not required   Dressing Change Due   (pt discharged)   Wound Length (cm)   (wound clinically closed)   Wound Assessment Dry   Drainage Amount None (dry)   Odor None   Guillermina-wound Assessment Fragile   Margins Attached edges   Wound Thickness Description not for Pressure Injury Full thickness     Pt discharged from clinic.  Sigvaris wrap applied to right leg

## 2024-11-25 NOTE — DISCHARGE INSTRUCTIONS
Wound Care Discharge Instructions  Wound Care Clinic at [facility]                                     [address]             [address]               [place] [address]                                         Telephone: [phone]      FAX [phone]    NAME:  Malik Clarke  YOB: 1945  MEDICAL RECORD NUMBER:  [id number]  DATE: 11/25/2024    Congratulations!  You have completed your treatment.     Return to your Primary Care Physician for all your health issues.   Resume your ordinary activities as tolerated.   Take your medications as prescribed by your primary care physician.   Check your skin daily for cracks, bruises, sores, or dryness. Use a moisturizer as needed.   Clean and dry your skin, using mild soap and warm water (not hot).   Maintain a nutritious diet.  Avoid pressure on your wound site. Keep your legs elevated above the level of the heart whenever possible.  Continue to use wraps/stockings/compression as prescribed.  Replace compression stockings every four to six months as needed to ensure proper fit.   Wear well-fitting shoes and leg garments.    THANK YOU FOR ALLOWING US TO SERVE YOU.  PLEASE CALL IF YOU DEVELOP ANOTHER WOUND. [phone])     Electronically signed by MARY HUA RN on 11/25/2024 at 2:56 PM

## 2024-12-16 ENCOUNTER — HOSPITAL ENCOUNTER (OUTPATIENT)
Facility: HOSPITAL | Age: 79
Discharge: HOME | End: 2024-12-16
Payer: MEDICARE

## 2024-12-16 PROCEDURE — 11042 DBRDMT SUBQ TIS 1ST 20SQCM/<: CPT

## 2024-12-16 NOTE — FLOWSHEET NOTE
12/16/24 1208   Wound 12/16/24 Pretibial Distal;Right   Date First Assessed/Time First Assessed: 12/16/24 0943   Present on Original Admission: Yes  Primary Wound Type: Venous Ulcer  Location: Pretibial  Wound Location Orientation: Distal;Right  Wound Outcome: Healed   Wound Image    Wound Etiology Venous   Dressing Status New dressing applied   Wound Cleansed Wound cleanser   Dressing/Treatment Collagen;Alginate;Dry dressing;Roll gauze  (2 layer wrap)   Offloading for Diabetic Foot Ulcers Offloading not required   Dressing Change Due 12/23/24   Wound Length (cm) 2.5 cm   Wound Width (cm) 3 cm   Wound Depth (cm) 0.1 cm   Wound Surface Area (cm^2) 7.5 cm^2   Change in Wound Size % (l*w) -50   Wound Volume (cm^3) 0.75 cm^3   Wound Healing % -50   Post-Procedure Length (cm) 2.5 cm   Post-Procedure Width (cm) 3 cm   Post-Procedure Depth (cm) 0.2 cm   Post-Procedure Surface Area (cm^2) 7.5 cm^2   Post-Procedure Volume (cm^3) 1.5 cm^3   Wound Assessment Dry;Devitalized tissue   Drainage Amount Moderate (25-50%)   Drainage Description Serosanguinous   Odor None   Guillermina-wound Assessment Dry/flaky   Margins Defined edges   Wound Thickness Description not for Pressure Injury Full thickness   Wound 12/16/24 Leg Right   Date First Assessed/Time First Assessed: 12/16/24 1207   Primary Wound Type: Venous Ulcer  Location: Leg  Wound Location Orientation: Right   Wound Image    Wound Etiology Venous   Dressing Status New dressing applied   Wound Cleansed Wound cleanser   Dressing/Treatment Collagen;Alginate with Ag;Dry dressing;Roll gauze  (2 layer wrap)   Offloading for Diabetic Foot Ulcers Offloading not required   Dressing Change Due 12/23/24   Wound Length (cm) 2.2 cm   Wound Width (cm) 1.7 cm   Wound Depth (cm) 0.1 cm   Wound Surface Area (cm^2) 3.74 cm^2   Wound Volume (cm^3) 0.374 cm^3   Post-Procedure Length (cm) 2.2 cm   Post-Procedure Width (cm) 1.7 cm   Post-Procedure Depth (cm) 0.2 cm   Post-Procedure Surface Area

## 2024-12-23 ENCOUNTER — HOSPITAL ENCOUNTER (OUTPATIENT)
Facility: HOSPITAL | Age: 79
Discharge: HOME | End: 2024-12-23
Attending: FAMILY MEDICINE | Admitting: FAMILY MEDICINE
Payer: MEDICARE

## 2024-12-23 VITALS
TEMPERATURE: 97.3 F | RESPIRATION RATE: 16 BRPM | DIASTOLIC BLOOD PRESSURE: 77 MMHG | OXYGEN SATURATION: 100 % | SYSTOLIC BLOOD PRESSURE: 136 MMHG | HEART RATE: 100 BPM

## 2024-12-23 DIAGNOSIS — L97.812 VARICOSE VEINS OF RIGHT LOWER EXTREMITY WITH ULCER OF OTHER PART OF LOWER LEG WITH FAT LAYER EXPOSED (HCC): ICD-10-CM

## 2024-12-23 DIAGNOSIS — L97.212 CALF ULCER, RIGHT, WITH FAT LAYER EXPOSED (HCC): Primary | ICD-10-CM

## 2024-12-23 DIAGNOSIS — L97.211 CALF ULCER, RIGHT, LIMITED TO BREAKDOWN OF SKIN (HCC): ICD-10-CM

## 2024-12-23 DIAGNOSIS — I83.018 VARICOSE VEINS OF RIGHT LOWER EXTREMITY WITH ULCER OF OTHER PART OF LOWER LEG WITH FAT LAYER EXPOSED (HCC): ICD-10-CM

## 2024-12-23 PROCEDURE — 6370000000 HC RX 637 (ALT 250 FOR IP): Performed by: FAMILY MEDICINE

## 2024-12-23 PROCEDURE — 11042 DBRDMT SUBQ TIS 1ST 20SQCM/<: CPT

## 2024-12-23 RX ORDER — TRIAMCINOLONE ACETONIDE 1 MG/G
OINTMENT TOPICAL ONCE
Status: CANCELLED | OUTPATIENT
Start: 2024-12-23 | End: 2024-12-23

## 2024-12-23 RX ORDER — GENTAMICIN SULFATE 1 MG/G
OINTMENT TOPICAL ONCE
OUTPATIENT
Start: 2024-12-23 | End: 2024-12-23

## 2024-12-23 RX ORDER — NEOMYCIN/BACITRACIN/POLYMYXINB 3.5-400-5K
OINTMENT (GRAM) TOPICAL ONCE
Status: CANCELLED | OUTPATIENT
Start: 2024-12-23 | End: 2024-12-23

## 2024-12-23 RX ORDER — LIDOCAINE HYDROCHLORIDE 40 MG/ML
SOLUTION TOPICAL ONCE
Status: CANCELLED | OUTPATIENT
Start: 2024-12-23 | End: 2024-12-23

## 2024-12-23 RX ORDER — LIDOCAINE 40 MG/G
CREAM TOPICAL ONCE
OUTPATIENT
Start: 2024-12-23 | End: 2024-12-23

## 2024-12-23 RX ORDER — LIDOCAINE 50 MG/G
OINTMENT TOPICAL ONCE
OUTPATIENT
Start: 2024-12-23 | End: 2024-12-23

## 2024-12-23 RX ORDER — SODIUM CHLOR/HYPOCHLOROUS ACID 0.033 %
SOLUTION, IRRIGATION IRRIGATION ONCE
OUTPATIENT
Start: 2024-12-23 | End: 2024-12-23

## 2024-12-23 RX ORDER — LIDOCAINE 40 MG/G
CREAM TOPICAL ONCE
Status: CANCELLED | OUTPATIENT
Start: 2024-12-23 | End: 2024-12-23

## 2024-12-23 RX ORDER — NEOMYCIN/BACITRACIN/POLYMYXINB 3.5-400-5K
OINTMENT (GRAM) TOPICAL ONCE
OUTPATIENT
Start: 2024-12-23 | End: 2024-12-23

## 2024-12-23 RX ORDER — LIDOCAINE HYDROCHLORIDE 40 MG/ML
SOLUTION TOPICAL ONCE
OUTPATIENT
Start: 2024-12-23 | End: 2024-12-23

## 2024-12-23 RX ORDER — MUPIROCIN 20 MG/G
OINTMENT TOPICAL ONCE
OUTPATIENT
Start: 2024-12-23 | End: 2024-12-23

## 2024-12-23 RX ORDER — BETAMETHASONE DIPROPIONATE 0.5 MG/G
CREAM TOPICAL ONCE
Status: CANCELLED | OUTPATIENT
Start: 2024-12-23 | End: 2024-12-23

## 2024-12-23 RX ORDER — SILVER SULFADIAZINE 10 MG/G
CREAM TOPICAL ONCE
OUTPATIENT
Start: 2024-12-23 | End: 2024-12-23

## 2024-12-23 RX ORDER — GINSENG 100 MG
CAPSULE ORAL ONCE
Status: CANCELLED | OUTPATIENT
Start: 2024-12-23 | End: 2024-12-23

## 2024-12-23 RX ORDER — LIDOCAINE HYDROCHLORIDE 20 MG/ML
JELLY TOPICAL ONCE
Status: CANCELLED | OUTPATIENT
Start: 2024-12-23 | End: 2024-12-23

## 2024-12-23 RX ORDER — SILVER SULFADIAZINE 10 MG/G
CREAM TOPICAL ONCE
Status: CANCELLED | OUTPATIENT
Start: 2024-12-23 | End: 2024-12-23

## 2024-12-23 RX ORDER — SODIUM CHLOR/HYPOCHLOROUS ACID 0.033 %
SOLUTION, IRRIGATION IRRIGATION ONCE
Status: CANCELLED | OUTPATIENT
Start: 2024-12-23 | End: 2024-12-23

## 2024-12-23 RX ORDER — BACITRACIN ZINC AND POLYMYXIN B SULFATE 500; 1000 [USP'U]/G; [USP'U]/G
OINTMENT TOPICAL ONCE
OUTPATIENT
Start: 2024-12-23 | End: 2024-12-23

## 2024-12-23 RX ORDER — TRIAMCINOLONE ACETONIDE 1 MG/G
OINTMENT TOPICAL ONCE
OUTPATIENT
Start: 2024-12-23 | End: 2024-12-23

## 2024-12-23 RX ORDER — GINSENG 100 MG
CAPSULE ORAL ONCE
OUTPATIENT
Start: 2024-12-23 | End: 2024-12-23

## 2024-12-23 RX ORDER — CLOBETASOL PROPIONATE 0.5 MG/G
OINTMENT TOPICAL ONCE
Status: CANCELLED | OUTPATIENT
Start: 2024-12-23 | End: 2024-12-23

## 2024-12-23 RX ORDER — BACITRACIN ZINC AND POLYMYXIN B SULFATE 500; 1000 [USP'U]/G; [USP'U]/G
OINTMENT TOPICAL ONCE
Status: CANCELLED | OUTPATIENT
Start: 2024-12-23 | End: 2024-12-23

## 2024-12-23 RX ORDER — LIDOCAINE HYDROCHLORIDE 20 MG/ML
JELLY TOPICAL ONCE
Status: COMPLETED | OUTPATIENT
Start: 2024-12-23 | End: 2024-12-23

## 2024-12-23 RX ORDER — CLOBETASOL PROPIONATE 0.5 MG/G
OINTMENT TOPICAL ONCE
OUTPATIENT
Start: 2024-12-23 | End: 2024-12-23

## 2024-12-23 RX ORDER — LIDOCAINE 50 MG/G
OINTMENT TOPICAL ONCE
Status: CANCELLED | OUTPATIENT
Start: 2024-12-23 | End: 2024-12-23

## 2024-12-23 RX ORDER — GENTAMICIN SULFATE 1 MG/G
OINTMENT TOPICAL ONCE
Status: CANCELLED | OUTPATIENT
Start: 2024-12-23 | End: 2024-12-23

## 2024-12-23 RX ORDER — LIDOCAINE HYDROCHLORIDE 20 MG/ML
JELLY TOPICAL ONCE
OUTPATIENT
Start: 2024-12-23 | End: 2024-12-23

## 2024-12-23 RX ORDER — BETAMETHASONE DIPROPIONATE 0.5 MG/G
CREAM TOPICAL ONCE
OUTPATIENT
Start: 2024-12-23 | End: 2024-12-23

## 2024-12-23 RX ORDER — MUPIROCIN 20 MG/G
OINTMENT TOPICAL ONCE
Status: CANCELLED | OUTPATIENT
Start: 2024-12-23 | End: 2024-12-23

## 2024-12-23 RX ADMIN — LIDOCAINE HYDROCHLORIDE: 20 JELLY TOPICAL at 13:37

## 2024-12-23 ASSESSMENT — PAIN SCALES - GENERAL: PAINLEVEL_OUTOF10: 0

## 2024-12-23 NOTE — PROGRESS NOTES
Wound Center  Progress Note / Procedure Note       Chief Complaint:  Malik Clarke is a 79 y.o.  male  with R lower leg anterior wound of few months duration.      Assessment/Plan     79 y.o. male with Dm2    -R lower leg anterior venous stasis ulcer.  Full thickness,   smaller  Full thickness  Slough/granular  Necessitates debridement  for wound healing and to prevent/heal infection  Ulcer needs debridement- see note below      -NEW R lower leg anterior lateral venous stasis ulcer.  Full thickness,   smaller  Slough/granular  Necessitates debridement  for wound healing and to prevent/heal infection  Ulcer needs debridement- see note below          -Dm2  A1c 6.8  (9/2024)  Does not check at home  Controlled with metformin    -Leg swelling/varicosities  Has arthritis      Following discussed with patient   Needs :  Serial debridement- prn    Good local wound care  Dressing:  collagen, alginate  Frequency : once a week  coflex        -Edema management  Do not get dressing wet    Edema management:  Elevate leg(s) throughout the day starting in the morning  Compression: coflex  Avoid prolonged standing      -Good Diabetic control      Patient/  understood and agrees with plan. Questions answered.        Follow up with me 2 week ; NV 1 week    Subjective:     Since last visit  12/23 no issues    12/16 wounds opened up about 10 days after being discharged from here.  Has been compliant with compression.  Wounds were open when went to see ortho  Will be doing injections into knee for now      9/30  No issues, tolerated wrap  Going out of town next week    9/23/24  Has not been seen since  May  Apparently wound re-opened within few weeks of being discharged from here, sounds like he wore the velcro wraps without the sock that comes with it.  Thought it was looking better so did not come here, until wife called to make appointment after getting tired of seeing blood on sheets, etc.  Has neem traveling lot  Unclear

## 2024-12-30 ENCOUNTER — HOSPITAL ENCOUNTER (OUTPATIENT)
Facility: HOSPITAL | Age: 79
Discharge: HOME | End: 2024-12-30
Payer: MEDICARE

## 2024-12-30 VITALS
SYSTOLIC BLOOD PRESSURE: 124 MMHG | RESPIRATION RATE: 18 BRPM | TEMPERATURE: 97.3 F | DIASTOLIC BLOOD PRESSURE: 78 MMHG | HEART RATE: 67 BPM | OXYGEN SATURATION: 99 %

## 2024-12-30 DIAGNOSIS — L97.812 VARICOSE VEINS OF RIGHT LOWER EXTREMITY WITH ULCER OF OTHER PART OF LOWER LEG WITH FAT LAYER EXPOSED (HCC): ICD-10-CM

## 2024-12-30 DIAGNOSIS — L97.211 CALF ULCER, RIGHT, LIMITED TO BREAKDOWN OF SKIN (HCC): Primary | ICD-10-CM

## 2024-12-30 DIAGNOSIS — I83.018 VARICOSE VEINS OF RIGHT LOWER EXTREMITY WITH ULCER OF OTHER PART OF LOWER LEG WITH FAT LAYER EXPOSED (HCC): ICD-10-CM

## 2024-12-30 DIAGNOSIS — L97.212 CALF ULCER, RIGHT, WITH FAT LAYER EXPOSED (HCC): ICD-10-CM

## 2024-12-30 PROCEDURE — 29581 APPL MULTLAYER CMPRN SYS LEG: CPT

## 2024-12-30 RX ORDER — CLOBETASOL PROPIONATE 0.5 MG/G
OINTMENT TOPICAL ONCE
OUTPATIENT
Start: 2024-12-30 | End: 2024-12-30

## 2024-12-30 RX ORDER — TRIAMCINOLONE ACETONIDE 1 MG/G
OINTMENT TOPICAL ONCE
OUTPATIENT
Start: 2024-12-30 | End: 2024-12-30

## 2024-12-30 RX ORDER — NEOMYCIN/BACITRACIN/POLYMYXINB 3.5-400-5K
OINTMENT (GRAM) TOPICAL ONCE
OUTPATIENT
Start: 2024-12-30 | End: 2024-12-30

## 2024-12-30 RX ORDER — SILVER SULFADIAZINE 10 MG/G
CREAM TOPICAL ONCE
OUTPATIENT
Start: 2024-12-30 | End: 2024-12-30

## 2024-12-30 RX ORDER — GINSENG 100 MG
CAPSULE ORAL ONCE
OUTPATIENT
Start: 2024-12-30 | End: 2024-12-30

## 2024-12-30 RX ORDER — LIDOCAINE 50 MG/G
OINTMENT TOPICAL ONCE
OUTPATIENT
Start: 2024-12-30 | End: 2024-12-30

## 2024-12-30 RX ORDER — GENTAMICIN SULFATE 1 MG/G
OINTMENT TOPICAL ONCE
OUTPATIENT
Start: 2024-12-30 | End: 2024-12-30

## 2024-12-30 RX ORDER — SODIUM CHLOR/HYPOCHLOROUS ACID 0.033 %
SOLUTION, IRRIGATION IRRIGATION ONCE
OUTPATIENT
Start: 2024-12-30 | End: 2024-12-30

## 2024-12-30 RX ORDER — LIDOCAINE HYDROCHLORIDE 20 MG/ML
JELLY TOPICAL ONCE
OUTPATIENT
Start: 2024-12-30 | End: 2024-12-30

## 2024-12-30 RX ORDER — LIDOCAINE HYDROCHLORIDE 40 MG/ML
SOLUTION TOPICAL ONCE
OUTPATIENT
Start: 2024-12-30 | End: 2024-12-30

## 2024-12-30 RX ORDER — BETAMETHASONE DIPROPIONATE 0.5 MG/G
CREAM TOPICAL ONCE
OUTPATIENT
Start: 2024-12-30 | End: 2024-12-30

## 2024-12-30 RX ORDER — MUPIROCIN 20 MG/G
OINTMENT TOPICAL ONCE
OUTPATIENT
Start: 2024-12-30 | End: 2024-12-30

## 2024-12-30 RX ORDER — LIDOCAINE 40 MG/G
CREAM TOPICAL ONCE
OUTPATIENT
Start: 2024-12-30 | End: 2024-12-30

## 2024-12-30 RX ORDER — BACITRACIN ZINC AND POLYMYXIN B SULFATE 500; 1000 [USP'U]/G; [USP'U]/G
OINTMENT TOPICAL ONCE
OUTPATIENT
Start: 2024-12-30 | End: 2024-12-30

## 2024-12-30 ASSESSMENT — PAIN SCALES - GENERAL: PAINLEVEL_OUTOF10: 0

## 2024-12-30 NOTE — FLOWSHEET NOTE
12/30/24 1255   Wound 12/16/24 Leg Right   Date First Assessed/Time First Assessed: 12/16/24 1207   Primary Wound Type: Venous Ulcer  Location: Leg  Wound Location Orientation: Right   Wound Image    Wound Etiology Venous   Dressing Status New dressing applied   Wound Cleansed Wound cleanser   Dressing/Treatment Collagen;Alginate with Ag;Dry dressing;Roll gauze  (2 layer wrap)   Offloading for Diabetic Foot Ulcers Offloading not required   Dressing Change Due 01/02/25   Wound Length (cm) 2 cm   Wound Width (cm) 1.8 cm   Wound Depth (cm) 0.1 cm   Wound Surface Area (cm^2) 3.6 cm^2   Change in Wound Size % (l*w) 3.74   Wound Volume (cm^3) 0.36 cm^3   Wound Healing % 4   Drainage Amount Moderate (25-50%)   Drainage Description Serosanguinous   Odor None   Guillermina-wound Assessment Dry/flaky   Margins Defined edges   Wound Thickness Description not for Pressure Injury Full thickness   Wound 12/16/24 Pretibial Distal;Right   Date First Assessed/Time First Assessed: 12/16/24 0943   Present on Original Admission: Yes  Primary Wound Type: Venous Ulcer  Location: Pretibial  Wound Location Orientation: Distal;Right  Wound Outcome: Healed   Wound Image    Wound Etiology Venous   Dressing Status New dressing applied   Wound Cleansed Wound cleanser   Dressing/Treatment Collagen;Alginate;Dry dressing;Roll gauze  (2 layer wrap)   Offloading for Diabetic Foot Ulcers Offloading not required   Dressing Change Due 01/02/25   Wound Length (cm) 1.5 cm   Wound Width (cm) 1.7 cm   Wound Depth (cm) 0.1 cm   Wound Surface Area (cm^2) 2.55 cm^2   Change in Wound Size % (l*w) 49   Wound Volume (cm^3) 0.255 cm^3   Wound Healing % 49   Wound Assessment Dry;Devitalized tissue   Drainage Amount Moderate (25-50%)   Drainage Description Serosanguinous   Odor None   Guillermina-wound Assessment Dry/flaky   Margins Defined edges   Wound Thickness Description not for Pressure Injury Full thickness     Multilayer Compression Wrap   (Not Unna) Below the

## 2025-01-09 ENCOUNTER — HOSPITAL ENCOUNTER (OUTPATIENT)
Facility: HOSPITAL | Age: 80
Discharge: HOME | End: 2025-01-09
Payer: MEDICARE

## 2025-01-09 VITALS
SYSTOLIC BLOOD PRESSURE: 134 MMHG | OXYGEN SATURATION: 98 % | HEART RATE: 96 BPM | TEMPERATURE: 97.5 F | RESPIRATION RATE: 18 BRPM | DIASTOLIC BLOOD PRESSURE: 91 MMHG

## 2025-01-09 DIAGNOSIS — L97.211 CALF ULCER, RIGHT, LIMITED TO BREAKDOWN OF SKIN (HCC): ICD-10-CM

## 2025-01-09 DIAGNOSIS — I83.018 VARICOSE VEINS OF RIGHT LOWER EXTREMITY WITH ULCER OF OTHER PART OF LOWER LEG WITH FAT LAYER EXPOSED (HCC): ICD-10-CM

## 2025-01-09 DIAGNOSIS — L97.212 CALF ULCER, RIGHT, WITH FAT LAYER EXPOSED (HCC): Primary | ICD-10-CM

## 2025-01-09 DIAGNOSIS — L97.812 VARICOSE VEINS OF RIGHT LOWER EXTREMITY WITH ULCER OF OTHER PART OF LOWER LEG WITH FAT LAYER EXPOSED (HCC): ICD-10-CM

## 2025-01-09 PROCEDURE — 29581 APPL MULTLAYER CMPRN SYS LEG: CPT

## 2025-01-09 RX ORDER — LIDOCAINE 50 MG/G
OINTMENT TOPICAL ONCE
OUTPATIENT
Start: 2025-01-09 | End: 2025-01-09

## 2025-01-09 RX ORDER — LIDOCAINE HYDROCHLORIDE 40 MG/ML
SOLUTION TOPICAL ONCE
OUTPATIENT
Start: 2025-01-09 | End: 2025-01-09

## 2025-01-09 RX ORDER — NEOMYCIN/BACITRACIN/POLYMYXINB 3.5-400-5K
OINTMENT (GRAM) TOPICAL ONCE
OUTPATIENT
Start: 2025-01-09 | End: 2025-01-09

## 2025-01-09 RX ORDER — GINSENG 100 MG
CAPSULE ORAL ONCE
OUTPATIENT
Start: 2025-01-09 | End: 2025-01-09

## 2025-01-09 RX ORDER — GENTAMICIN SULFATE 1 MG/G
OINTMENT TOPICAL ONCE
OUTPATIENT
Start: 2025-01-09 | End: 2025-01-09

## 2025-01-09 RX ORDER — CLOBETASOL PROPIONATE 0.5 MG/G
OINTMENT TOPICAL ONCE
OUTPATIENT
Start: 2025-01-09 | End: 2025-01-09

## 2025-01-09 RX ORDER — LIDOCAINE HYDROCHLORIDE 20 MG/ML
JELLY TOPICAL ONCE
OUTPATIENT
Start: 2025-01-09 | End: 2025-01-09

## 2025-01-09 RX ORDER — LIDOCAINE 40 MG/G
CREAM TOPICAL ONCE
OUTPATIENT
Start: 2025-01-09 | End: 2025-01-09

## 2025-01-09 RX ORDER — TRIAMCINOLONE ACETONIDE 1 MG/G
OINTMENT TOPICAL ONCE
OUTPATIENT
Start: 2025-01-09 | End: 2025-01-09

## 2025-01-09 RX ORDER — BETAMETHASONE DIPROPIONATE 0.5 MG/G
CREAM TOPICAL ONCE
OUTPATIENT
Start: 2025-01-09 | End: 2025-01-09

## 2025-01-09 RX ORDER — SILVER SULFADIAZINE 10 MG/G
CREAM TOPICAL ONCE
OUTPATIENT
Start: 2025-01-09 | End: 2025-01-09

## 2025-01-09 RX ORDER — SODIUM CHLOR/HYPOCHLOROUS ACID 0.033 %
SOLUTION, IRRIGATION IRRIGATION ONCE
OUTPATIENT
Start: 2025-01-09 | End: 2025-01-09

## 2025-01-09 RX ORDER — MUPIROCIN 20 MG/G
OINTMENT TOPICAL ONCE
OUTPATIENT
Start: 2025-01-09 | End: 2025-01-09

## 2025-01-09 RX ORDER — BACITRACIN ZINC AND POLYMYXIN B SULFATE 500; 1000 [USP'U]/G; [USP'U]/G
OINTMENT TOPICAL ONCE
OUTPATIENT
Start: 2025-01-09 | End: 2025-01-09

## 2025-01-09 ASSESSMENT — PAIN SCALES - GENERAL: PAINLEVEL_OUTOF10: 0

## 2025-01-09 NOTE — FLOWSHEET NOTE
01/09/25 1553   Wound 12/16/24 Leg Right   Date First Assessed/Time First Assessed: 12/16/24 1207   Primary Wound Type: Venous Ulcer  Location: Leg  Wound Location Orientation: Right   Wound Image    Wound Etiology Venous   Dressing Status New dressing applied   Wound Cleansed Wound cleanser   Dressing/Treatment Collagen;Alginate with Ag;Dry dressing;Roll gauze  (2 layer wrap)   Offloading for Diabetic Foot Ulcers Offloading not required   Dressing Change Due 01/13/25   Wound Length (cm) 1.9 cm   Wound Width (cm) 1.6 cm   Wound Depth (cm) 0.1 cm   Wound Surface Area (cm^2) 3.04 cm^2   Change in Wound Size % (l*w) 18.72   Wound Volume (cm^3) 0.304 cm^3   Wound Healing % 19   Drainage Amount Moderate (25-50%)   Drainage Description Serosanguinous   Odor None   Guillermina-wound Assessment Dry/flaky   Margins Defined edges   Wound Thickness Description not for Pressure Injury Full thickness   Wound 12/16/24 Pretibial Distal;Right   Date First Assessed/Time First Assessed: 12/16/24 0943   Present on Original Admission: Yes  Primary Wound Type: Venous Ulcer  Location: Pretibial  Wound Location Orientation: Distal;Right  Wound Outcome: Healed   Wound Image    Wound Etiology Venous   Dressing Status New dressing applied   Wound Cleansed Wound cleanser   Dressing/Treatment Collagen;Alginate;Dry dressing;Roll gauze  (2 layer wrap)   Offloading for Diabetic Foot Ulcers Offloading not required   Dressing Change Due 01/13/25   Wound Length (cm) 0.5 cm   Wound Width (cm) 0.6 cm   Wound Depth (cm) 0.1 cm   Wound Surface Area (cm^2) 0.3 cm^2   Change in Wound Size % (l*w) 94   Wound Volume (cm^3) 0.03 cm^3   Wound Healing % 94   Wound Assessment Dry;Devitalized tissue   Drainage Amount Moderate (25-50%)   Drainage Description Serosanguinous   Odor None   Guillermina-wound Assessment Dry/flaky   Margins Defined edges   Wound Thickness Description not for Pressure Injury Full thickness     Multilayer Compression Wrap   (Not Unna) Below the

## 2025-01-13 ENCOUNTER — HOSPITAL ENCOUNTER (OUTPATIENT)
Facility: HOSPITAL | Age: 80
Discharge: HOME | End: 2025-01-13
Payer: MEDICARE

## 2025-01-13 VITALS
TEMPERATURE: 97.3 F | OXYGEN SATURATION: 99 % | SYSTOLIC BLOOD PRESSURE: 134 MMHG | HEART RATE: 91 BPM | DIASTOLIC BLOOD PRESSURE: 91 MMHG

## 2025-01-13 DIAGNOSIS — L97.212 CALF ULCER, RIGHT, WITH FAT LAYER EXPOSED (HCC): ICD-10-CM

## 2025-01-13 DIAGNOSIS — I83.018 VARICOSE VEINS OF RIGHT LOWER EXTREMITY WITH ULCER OF OTHER PART OF LOWER LEG WITH FAT LAYER EXPOSED (HCC): ICD-10-CM

## 2025-01-13 DIAGNOSIS — L97.211 CALF ULCER, RIGHT, LIMITED TO BREAKDOWN OF SKIN (HCC): Primary | ICD-10-CM

## 2025-01-13 DIAGNOSIS — L97.812 VARICOSE VEINS OF RIGHT LOWER EXTREMITY WITH ULCER OF OTHER PART OF LOWER LEG WITH FAT LAYER EXPOSED (HCC): ICD-10-CM

## 2025-01-13 PROCEDURE — 6370000000 HC RX 637 (ALT 250 FOR IP): Performed by: FAMILY MEDICINE

## 2025-01-13 PROCEDURE — 11042 DBRDMT SUBQ TIS 1ST 20SQCM/<: CPT

## 2025-01-13 RX ORDER — BETAMETHASONE DIPROPIONATE 0.5 MG/G
CREAM TOPICAL ONCE
OUTPATIENT
Start: 2025-01-13 | End: 2025-01-13

## 2025-01-13 RX ORDER — LIDOCAINE HYDROCHLORIDE 20 MG/ML
JELLY TOPICAL ONCE
OUTPATIENT
Start: 2025-01-13 | End: 2025-01-13

## 2025-01-13 RX ORDER — LIDOCAINE HYDROCHLORIDE 20 MG/ML
JELLY TOPICAL ONCE
Status: COMPLETED | OUTPATIENT
Start: 2025-01-13 | End: 2025-01-13

## 2025-01-13 RX ORDER — SILVER SULFADIAZINE 10 MG/G
CREAM TOPICAL ONCE
OUTPATIENT
Start: 2025-01-13 | End: 2025-01-13

## 2025-01-13 RX ORDER — NEOMYCIN/BACITRACIN/POLYMYXINB 3.5-400-5K
OINTMENT (GRAM) TOPICAL ONCE
OUTPATIENT
Start: 2025-01-13 | End: 2025-01-13

## 2025-01-13 RX ORDER — BACITRACIN ZINC AND POLYMYXIN B SULFATE 500; 1000 [USP'U]/G; [USP'U]/G
OINTMENT TOPICAL ONCE
OUTPATIENT
Start: 2025-01-13 | End: 2025-01-13

## 2025-01-13 RX ORDER — LIDOCAINE 40 MG/G
CREAM TOPICAL ONCE
OUTPATIENT
Start: 2025-01-13 | End: 2025-01-13

## 2025-01-13 RX ORDER — GENTAMICIN SULFATE 1 MG/G
OINTMENT TOPICAL ONCE
OUTPATIENT
Start: 2025-01-13 | End: 2025-01-13

## 2025-01-13 RX ORDER — MUPIROCIN 20 MG/G
OINTMENT TOPICAL ONCE
OUTPATIENT
Start: 2025-01-13 | End: 2025-01-13

## 2025-01-13 RX ORDER — CLOBETASOL PROPIONATE 0.5 MG/G
OINTMENT TOPICAL ONCE
OUTPATIENT
Start: 2025-01-13 | End: 2025-01-13

## 2025-01-13 RX ORDER — LIDOCAINE 50 MG/G
OINTMENT TOPICAL ONCE
OUTPATIENT
Start: 2025-01-13 | End: 2025-01-13

## 2025-01-13 RX ORDER — TRIAMCINOLONE ACETONIDE 1 MG/G
OINTMENT TOPICAL ONCE
OUTPATIENT
Start: 2025-01-13 | End: 2025-01-13

## 2025-01-13 RX ORDER — SODIUM CHLOR/HYPOCHLOROUS ACID 0.033 %
SOLUTION, IRRIGATION IRRIGATION ONCE
OUTPATIENT
Start: 2025-01-13 | End: 2025-01-13

## 2025-01-13 RX ORDER — LIDOCAINE HYDROCHLORIDE 40 MG/ML
SOLUTION TOPICAL ONCE
OUTPATIENT
Start: 2025-01-13 | End: 2025-01-13

## 2025-01-13 RX ORDER — GINSENG 100 MG
CAPSULE ORAL ONCE
OUTPATIENT
Start: 2025-01-13 | End: 2025-01-13

## 2025-01-13 RX ADMIN — LIDOCAINE HYDROCHLORIDE: 20 JELLY TOPICAL at 09:47

## 2025-01-13 NOTE — DISCHARGE INSTRUCTIONS
Discharge Instructions from  Wound Care Clinic at [facility]   [address]   [address]  [place] [address] [phone] Fax [phone]    NAME:  Malik Clarke  YOB: 1945  MEDICAL RECORD NUMBER:  [id number]  DATE:  1/13/25    Wound Cleansing:   Do not scrub or use excessive force.  Cleanse wound prior to applying a clean dressing with:  [] Normal Saline [] Keep Wound Dry in Shower    [x] Wound Cleanser   [] Cleanse wound with Mild Soap & Water  [] May Shower at Discharge   [] Other:      Topical Treatments:  Do not apply lotions, creams, or ointments to wound bed unless directed.   [x] Apply moisturizing lotion to skin surrounding the wound prior to dressing change.  [] Apply antifungal ointment to skin surrounding the wound prior to dressing change.  [] Apply thin film of moisture barrier ointment to skin immediately around wound.  [] Other:       Dressings:           Wound Location right leg   [] Apply Primary Dressing:       [] MediHoney Gel [x] Alginate with Silver [] Alginate   [x] Collagen [] Collagen with Silver   [] Santyl with Moisten saline gauze     [] Hydrocolloid   [] MediHoney Alginate [] Foam with Silver   [] Foam   [] Hydrofera Blue    [] Mepilex Border    [] Moisten with Saline [] Hydrogel [] Mepitel     [] Bactroban/Mupirocin [] Polysporin  [] Other:    [] Pack wound loosely with  [] Iodoform   [] Plain Packing  [] Other   [] Cover and Secure with:     [] Gauze [] Tamanna [] Kerlix   [] Ace Wrap [] Cover Roll Tape [] ABD     [] Other:    Avoid contact of tape with skin.  [] Change dressing: [] Daily    [] Every Other Day [] Three times per week   [] Once a week [] Do Not Change Dressing   [] Other:     Negative Pressure:           Wound Location:   [] Pressure@           mm/Hg  []Continuous []Intermittent   [] Black  [] White Foam [] Other:   []Change dressing: []Three times per week    []Other:     Pressure Relief:  [] When sitting, shift position or do seat

## 2025-01-13 NOTE — PROGRESS NOTES
Wound Center  Progress Note / Procedure Note       Chief Complaint:  Malik Clarke is a 79 y.o.  male  with R lower leg anterior wound of few months duration.      Assessment/Plan     79 y.o. male with Dm2    -R lower leg anterior venous stasis ulcer.  Full thickness,   smaller  Full thickness  Slough/granular  Necessitates debridement  for wound healing and to prevent/heal infection  Ulcer needs debridement- see note below      -NEW R lower leg anterior lateral venous stasis ulcer.  Full thickness,   smaller  Slough/granular  Necessitates debridement  for wound healing and to prevent/heal infection  Ulcer needs debridement- see note below          -Dm2  A1c 6.8  (9/2024)  Does not check at home  Controlled with metformin    -Leg swelling/varicosities  Has arthritis      Following discussed with patient   Needs :  Serial debridement- prn    Good local wound care  Dressing:  collagen, alginate  Frequency : once a week  coflex        -Edema management  Do not get dressing wet    Edema management:  Elevate leg(s) throughout the day starting in the morning  Compression: coflex  Avoid prolonged standing      -Good Diabetic control      Patient/  understood and agrees with plan. Questions answered.        Follow up with me 2 week ; NV 1 week    Subjective:     Since last visit  1/13 no issues    12/16 wounds opened up about 10 days after being discharged from here.  Has been compliant with compression.  Wounds were open when went to see ortho  Will be doing injections into knee for now      9/30  No issues, tolerated wrap  Going out of town next week    9/23/24  Has not been seen since  May  Apparently wound re-opened within few weeks of being discharged from here, sounds like he wore the velcro wraps without the sock that comes with it.  Thought it was looking better so did not come here, until wife called to make appointment after getting tired of seeing blood on sheets, etc.  Has neem traveling lot  Unclear

## 2025-01-20 ENCOUNTER — HOSPITAL ENCOUNTER (OUTPATIENT)
Facility: HOSPITAL | Age: 80
Discharge: HOME | End: 2025-01-20
Payer: MEDICARE

## 2025-01-20 VITALS
HEART RATE: 100 BPM | TEMPERATURE: 97.7 F | RESPIRATION RATE: 16 BRPM | SYSTOLIC BLOOD PRESSURE: 133 MMHG | DIASTOLIC BLOOD PRESSURE: 84 MMHG | OXYGEN SATURATION: 97 %

## 2025-01-20 DIAGNOSIS — L97.812 VARICOSE VEINS OF RIGHT LOWER EXTREMITY WITH ULCER OF OTHER PART OF LOWER LEG WITH FAT LAYER EXPOSED (HCC): ICD-10-CM

## 2025-01-20 DIAGNOSIS — I83.018 VARICOSE VEINS OF RIGHT LOWER EXTREMITY WITH ULCER OF OTHER PART OF LOWER LEG WITH FAT LAYER EXPOSED (HCC): ICD-10-CM

## 2025-01-20 DIAGNOSIS — L97.212 CALF ULCER, RIGHT, WITH FAT LAYER EXPOSED (HCC): Primary | ICD-10-CM

## 2025-01-20 DIAGNOSIS — L97.211 CALF ULCER, RIGHT, LIMITED TO BREAKDOWN OF SKIN (HCC): ICD-10-CM

## 2025-01-20 PROCEDURE — 29581 APPL MULTLAYER CMPRN SYS LEG: CPT

## 2025-01-20 RX ORDER — LIDOCAINE HYDROCHLORIDE 20 MG/ML
JELLY TOPICAL ONCE
OUTPATIENT
Start: 2025-01-20 | End: 2025-01-20

## 2025-01-20 RX ORDER — GENTAMICIN SULFATE 1 MG/G
OINTMENT TOPICAL ONCE
OUTPATIENT
Start: 2025-01-20 | End: 2025-01-20

## 2025-01-20 RX ORDER — LIDOCAINE 50 MG/G
OINTMENT TOPICAL ONCE
OUTPATIENT
Start: 2025-01-20 | End: 2025-01-20

## 2025-01-20 RX ORDER — MUPIROCIN 20 MG/G
OINTMENT TOPICAL ONCE
OUTPATIENT
Start: 2025-01-20 | End: 2025-01-20

## 2025-01-20 RX ORDER — BETAMETHASONE DIPROPIONATE 0.5 MG/G
CREAM TOPICAL ONCE
OUTPATIENT
Start: 2025-01-20 | End: 2025-01-20

## 2025-01-20 RX ORDER — LIDOCAINE 40 MG/G
CREAM TOPICAL ONCE
OUTPATIENT
Start: 2025-01-20 | End: 2025-01-20

## 2025-01-20 RX ORDER — NEOMYCIN/BACITRACIN/POLYMYXINB 3.5-400-5K
OINTMENT (GRAM) TOPICAL ONCE
OUTPATIENT
Start: 2025-01-20 | End: 2025-01-20

## 2025-01-20 RX ORDER — CLOBETASOL PROPIONATE 0.5 MG/G
OINTMENT TOPICAL ONCE
OUTPATIENT
Start: 2025-01-20 | End: 2025-01-20

## 2025-01-20 RX ORDER — SODIUM CHLOR/HYPOCHLOROUS ACID 0.033 %
SOLUTION, IRRIGATION IRRIGATION ONCE
OUTPATIENT
Start: 2025-01-20 | End: 2025-01-20

## 2025-01-20 RX ORDER — SILVER SULFADIAZINE 10 MG/G
CREAM TOPICAL ONCE
OUTPATIENT
Start: 2025-01-20 | End: 2025-01-20

## 2025-01-20 RX ORDER — TRIAMCINOLONE ACETONIDE 1 MG/G
OINTMENT TOPICAL ONCE
OUTPATIENT
Start: 2025-01-20 | End: 2025-01-20

## 2025-01-20 RX ORDER — BACITRACIN ZINC AND POLYMYXIN B SULFATE 500; 1000 [USP'U]/G; [USP'U]/G
OINTMENT TOPICAL ONCE
OUTPATIENT
Start: 2025-01-20 | End: 2025-01-20

## 2025-01-20 RX ORDER — LIDOCAINE HYDROCHLORIDE 40 MG/ML
SOLUTION TOPICAL ONCE
OUTPATIENT
Start: 2025-01-20 | End: 2025-01-20

## 2025-01-20 RX ORDER — GINSENG 100 MG
CAPSULE ORAL ONCE
OUTPATIENT
Start: 2025-01-20 | End: 2025-01-20

## 2025-01-20 NOTE — FLOWSHEET NOTE
01/20/25 1031   Wound 12/16/24 Leg Right   Date First Assessed/Time First Assessed: 12/16/24 1207   Primary Wound Type: Venous Ulcer  Location: Leg  Wound Location Orientation: Right   Wound Image    Wound Etiology Venous   Dressing Status New dressing applied   Wound Cleansed Wound cleanser;Vashe   Dressing/Treatment Collagen;Alginate with Ag;Roll gauze  (2 layer wrap)   Offloading for Diabetic Foot Ulcers Offloading not required   Dressing Change Due 01/27/25   Wound Length (cm) 2.5 cm   Wound Width (cm) 2 cm   Wound Depth (cm) 0.1 cm   Wound Surface Area (cm^2) 5 cm^2   Change in Wound Size % (l*w) -33.69   Wound Volume (cm^3) 0.5 cm^3   Wound Healing % -34   Wound Assessment Hyper granulation tissue   Drainage Amount Moderate (25-50%)   Drainage Description Serosanguinous   Odor None   Guillermina-wound Assessment Dry/flaky;Intact   Margins Attached edges;Defined edges   Wound Thickness Description not for Pressure Injury Full thickness   Wound 12/16/24 Pretibial Distal;Right   Date First Assessed/Time First Assessed: 12/16/24 0943   Present on Original Admission: Yes  Primary Wound Type: Venous Ulcer  Location: Pretibial  Wound Location Orientation: Distal;Right  Wound Outcome: Healed   Wound Image    Wound Etiology Venous   Dressing Status New dressing applied   Wound Cleansed Wound cleanser   Dressing/Treatment Alginate with Ag;Roll gauze  (2 layer wrap)   Offloading for Diabetic Foot Ulcers Offloading not required   Dressing Change Due 01/27/25   Wound Length (cm) 1 cm   Wound Width (cm) 1 cm   Wound Depth (cm) 0.1 cm   Wound Surface Area (cm^2) 1 cm^2   Change in Wound Size % (l*w) 80   Wound Volume (cm^3) 0.1 cm^3   Wound Healing % 80   Wound Assessment Other (Comment)  (heavy scab)   Drainage Amount Small (< 25%)   Drainage Description Serosanguinous   Odor None   Guillermina-wound Assessment Dry/flaky   Margins Attached edges   Wound Thickness Description not for Pressure Injury Full thickness   Stevan Scale

## 2025-01-27 ENCOUNTER — HOSPITAL ENCOUNTER (OUTPATIENT)
Facility: HOSPITAL | Age: 80
Discharge: HOME | End: 2025-01-27
Payer: MEDICARE

## 2025-01-27 VITALS
RESPIRATION RATE: 16 BRPM | TEMPERATURE: 97.5 F | SYSTOLIC BLOOD PRESSURE: 138 MMHG | HEART RATE: 73 BPM | DIASTOLIC BLOOD PRESSURE: 74 MMHG | OXYGEN SATURATION: 98 %

## 2025-01-27 DIAGNOSIS — L97.212 CALF ULCER, RIGHT, WITH FAT LAYER EXPOSED (HCC): ICD-10-CM

## 2025-01-27 DIAGNOSIS — L97.211 CALF ULCER, RIGHT, LIMITED TO BREAKDOWN OF SKIN (HCC): Primary | ICD-10-CM

## 2025-01-27 DIAGNOSIS — I83.018 VARICOSE VEINS OF RIGHT LOWER EXTREMITY WITH ULCER OF OTHER PART OF LOWER LEG WITH FAT LAYER EXPOSED (HCC): ICD-10-CM

## 2025-01-27 DIAGNOSIS — L97.812 VARICOSE VEINS OF RIGHT LOWER EXTREMITY WITH ULCER OF OTHER PART OF LOWER LEG WITH FAT LAYER EXPOSED (HCC): ICD-10-CM

## 2025-01-27 PROCEDURE — 6370000000 HC RX 637 (ALT 250 FOR IP): Performed by: FAMILY MEDICINE

## 2025-01-27 PROCEDURE — 11042 DBRDMT SUBQ TIS 1ST 20SQCM/<: CPT

## 2025-01-27 RX ORDER — LIDOCAINE HYDROCHLORIDE 40 MG/ML
SOLUTION TOPICAL ONCE
OUTPATIENT
Start: 2025-01-27 | End: 2025-01-27

## 2025-01-27 RX ORDER — BETAMETHASONE DIPROPIONATE 0.5 MG/G
CREAM TOPICAL ONCE
OUTPATIENT
Start: 2025-01-27 | End: 2025-01-27

## 2025-01-27 RX ORDER — LIDOCAINE HYDROCHLORIDE 20 MG/ML
JELLY TOPICAL ONCE
OUTPATIENT
Start: 2025-01-27 | End: 2025-01-27

## 2025-01-27 RX ORDER — NEOMYCIN/BACITRACIN/POLYMYXINB 3.5-400-5K
OINTMENT (GRAM) TOPICAL ONCE
OUTPATIENT
Start: 2025-01-27 | End: 2025-01-27

## 2025-01-27 RX ORDER — TRIAMCINOLONE ACETONIDE 1 MG/G
OINTMENT TOPICAL ONCE
OUTPATIENT
Start: 2025-01-27 | End: 2025-01-27

## 2025-01-27 RX ORDER — LIDOCAINE HYDROCHLORIDE 20 MG/ML
JELLY TOPICAL ONCE
Status: COMPLETED | OUTPATIENT
Start: 2025-01-27 | End: 2025-01-27

## 2025-01-27 RX ORDER — GINSENG 100 MG
CAPSULE ORAL ONCE
OUTPATIENT
Start: 2025-01-27 | End: 2025-01-27

## 2025-01-27 RX ORDER — SODIUM CHLOR/HYPOCHLOROUS ACID 0.033 %
SOLUTION, IRRIGATION IRRIGATION ONCE
OUTPATIENT
Start: 2025-01-27 | End: 2025-01-27

## 2025-01-27 RX ORDER — CLOBETASOL PROPIONATE 0.5 MG/G
OINTMENT TOPICAL ONCE
OUTPATIENT
Start: 2025-01-27 | End: 2025-01-27

## 2025-01-27 RX ORDER — LIDOCAINE 40 MG/G
CREAM TOPICAL ONCE
OUTPATIENT
Start: 2025-01-27 | End: 2025-01-27

## 2025-01-27 RX ORDER — BACITRACIN ZINC AND POLYMYXIN B SULFATE 500; 1000 [USP'U]/G; [USP'U]/G
OINTMENT TOPICAL ONCE
OUTPATIENT
Start: 2025-01-27 | End: 2025-01-27

## 2025-01-27 RX ORDER — LIDOCAINE 50 MG/G
OINTMENT TOPICAL ONCE
OUTPATIENT
Start: 2025-01-27 | End: 2025-01-27

## 2025-01-27 RX ORDER — SILVER SULFADIAZINE 10 MG/G
CREAM TOPICAL ONCE
OUTPATIENT
Start: 2025-01-27 | End: 2025-01-27

## 2025-01-27 RX ORDER — MUPIROCIN 20 MG/G
OINTMENT TOPICAL ONCE
OUTPATIENT
Start: 2025-01-27 | End: 2025-01-27

## 2025-01-27 RX ORDER — GENTAMICIN SULFATE 1 MG/G
OINTMENT TOPICAL ONCE
OUTPATIENT
Start: 2025-01-27 | End: 2025-01-27

## 2025-01-27 RX ADMIN — LIDOCAINE HYDROCHLORIDE: 20 JELLY TOPICAL at 09:58

## 2025-01-27 NOTE — FLOWSHEET NOTE
unmeasurable)   Drainage Description Serosanguinous   Odor None   Guillermina-wound Assessment Dry/flaky   Margins Attached edges   Wound Thickness Description not for Pressure Injury Full thickness   Stevan Scale   Sensory Perceptions 4   Moisture 4   Activity 4   Mobility 4   Nutrition 3   Friction and Shear 3   Stevan Scale Score 22   Wound Follow Up   Require Follow Up Yes     Multilayer Compression Wrap   (Not Unna) Below the Knee    NAME:  Malik Clarke  YOB: 1945  MEDICAL RECORD NUMBER:  [id number]  DATE:  1/27/2025    Multilayer compression wrap: Removed old Multilayer wrap if indicated and wash leg with mild soap/water.  Applied moisturizing agent to dry skin as needed.   Applied primary and secondary dressing as ordered.  Applied multilayered dressing below the knee to right lower leg.  Instructed patient/caregiver not to remove dressing and to keep it clean and dry.   Instructed patient/caregiver on complications to report to provider, such as pain, numbness in toes, heavy drainage, and slippage of dressing.  Instructed patient on purpose of compression dressing and on activity and exercise recommendations.      Electronically signed by Rafaela Barton RN on 1/27/2025 at 1:20 PM

## 2025-01-27 NOTE — PROGRESS NOTES
Wound Center  Progress Note / Procedure Note       Chief Complaint:  Malik Clarke is a 79 y.o.  male  with R lower leg anterior wound of few months duration.      Assessment/Plan     79 y.o. male with Dm2    -R lower leg anterior venous stasis ulcer.  Full thickness,   Smaller, hypergranular  Full thickness  Slough/granular  Necessitates debridement  for wound healing and to prevent/heal infection  Ulcer needs debridement- see note below  Cauterized as noted below    -NEW R lower leg anterior lateral venous stasis ulcer.  Full thickness,   healed          -Dm2  A1c 6.8  (9/2024)  Does not check at home  Controlled with metformin    -Leg swelling/varicosities  Has arthritis      Following discussed with patient   Needs :  Serial debridement- prn    Good local wound care  Dressing: HOLD  collagen, Continue alginate  Frequency : once a week  coflex        -Edema management  Do not get dressing wet    Edema management:  Elevate leg(s) throughout the day starting in the morning  Compression: coflex  Avoid prolonged standing      -Good Diabetic control      Patient/  understood and agrees with plan. Questions answered.        Follow up with me 2 week ; NV 1 week    Subjective:     Since last visit  1/27 no issues    12/16 wounds opened up about 10 days after being discharged from here.  Has been compliant with compression.  Wounds were open when went to see ortho  Will be doing injections into knee for now      9/30  No issues, tolerated wrap  Going out of town next week    9/23/24  Has not been seen since  May  Apparently wound re-opened within few weeks of being discharged from here, sounds like he wore the velcro wraps without the sock that comes with it.  Thought it was looking better so did not come here, until wife called to make appointment after getting tired of seeing blood on sheets, etc.  Has neem traveling lot  Unclear what dressing he used if any  Also not been compliant with

## 2025-01-27 NOTE — DISCHARGE INSTRUCTIONS
Discharge Instructions from  Wound Care Clinic at [facility]   [address]   [address]  [place] [address] [phone] Fax [phone]    NAME:  Malik Clarke  YOB: 1945  MEDICAL RECORD NUMBER:  [id number]  DATE:  1/27/25  Wound Cleansing:   Do not scrub or use excessive force.  Cleanse wound prior to applying a clean dressing with:  [] Normal Saline [] Keep Wound Dry in Shower    [x] Wound Cleanser   [] Cleanse wound with Mild Soap & Water  [] May Shower at Discharge   [] Other:      Topical Treatments:  Do not apply lotions, creams, or ointments to wound bed unless directed.   [] Apply moisturizing lotion to skin surrounding the wound prior to dressing change.  [] Apply antifungal ointment to skin surrounding the wound prior to dressing change.  [] Apply thin film of moisture barrier ointment to skin immediately around wound.  [x] Other:  Zinc paste     Dressings:           Wound Location right lower leg   [] Apply Primary Dressing:       [] MediHoney Gel [x] Alginate with Silver [] Alginate   [] Collagen [] Collagen with Silver   [] Santyl with Moisten saline gauze     [] Hydrocolloid   [] MediHoney Alginate [] Foam with Silver   [] Foam   [] Hydrofera Blue    [] Mepilex Border    [] Moisten with Saline [] Hydrogel [] Mepitel     [] Bactroban/Mupirocin [] Polysporin  [] Other:    [] Pack wound loosely with  [] Iodoform   [] Plain Packing  [] Other   [] Cover and Secure with:     [] Gauze [] Tamanna [] Kerlix   [] Ace Wrap [] Cover Roll Tape [] ABD     [] Other:    Avoid contact of tape with skin.  [] Change dressing: [] Daily    [] Every Other Day [] Three times per week   [] Once a week [x] Do Not Change Dressing   [] Other:     Negative Pressure:           Wound Location:   [] Pressure@           mm/Hg  []Continuous []Intermittent   [] Black  [] White Foam [] Other:   []Change dressing: []Three times per week    []Other:     Pressure Relief:  [] When sitting, shift

## 2025-02-01 ENCOUNTER — HOSPITAL ENCOUNTER (EMERGENCY)
Facility: HOSPITAL | Age: 80
Discharge: HOME | End: 2025-02-01
Payer: MEDICARE

## 2025-02-01 VITALS
WEIGHT: 195 LBS | HEIGHT: 69 IN | HEART RATE: 77 BPM | OXYGEN SATURATION: 97 % | SYSTOLIC BLOOD PRESSURE: 123 MMHG | TEMPERATURE: 98.2 F | RESPIRATION RATE: 15 BRPM | DIASTOLIC BLOOD PRESSURE: 86 MMHG | BODY MASS INDEX: 28.88 KG/M2

## 2025-02-01 DIAGNOSIS — T14.8XXA BLEEDING FROM WOUND: Primary | ICD-10-CM

## 2025-02-01 DIAGNOSIS — Z79.01 ANTICOAGULATED: ICD-10-CM

## 2025-02-01 PROCEDURE — 99282 EMERGENCY DEPT VISIT SF MDM: CPT

## 2025-02-01 ASSESSMENT — PAIN - FUNCTIONAL ASSESSMENT: PAIN_FUNCTIONAL_ASSESSMENT: NONE - DENIES PAIN

## 2025-02-01 NOTE — DISCHARGE INSTRUCTIONS
Continue all your home medications  Keep your wound clean, covered and bandaged  Elevate your leg when not using  Follow-up with the wound clinic as planned in 2 days  Return if you have bleeding through your bandage

## 2025-02-01 NOTE — ED TRIAGE NOTES
Patient reports that he has a wound on right leg that he has been going to wound care clinic for this leg for about two years. Reports this morning he stood up and there is a pool of blood on floor.reports he wrapped it and applied compression.

## 2025-02-01 NOTE — ED PROVIDER NOTES
EMERGENCY DEPARTMENT HISTORY & PHYSICAL EXAM    2/1/25, 9:39 AM EST    Clinical Impression:  1. Bleeding from wound    2. Anticoagulated        Assessment/Differential Diagnosis:     Ddx chronic wound, varicose veins, no trauma, bleeding, anticoagulated, blood loss all considered    ED Course:   Initial assessment performed. The patients presenting problems have been discussed, and they are in agreement with the care plan formulated and outlined with them.  I have encouraged them to ask questions as they arise throughout their visit.    Patient brings up to the ED with concern of bleeding from a leg wound.  Patient states he has chronic wound to his right lower leg, followed at the wound clinic weekly.  He was there just a few days ago and had treatment to his wound.  It was then wrapped with gauze and an Ace wrap.  Patient states he was having no difficulty or concern.  Patient woke this morning and noticed when he was walking to the bathroom that there was blood on the floor, and noticed that there was blood soaked through his Ace wrap.  Patient was able to put a pressure dressing on and came to the ED.  He has not noticed any additional bleeding.  There was no blood pooled in his bed.  Patient states he feels well with no shortness of breath, chest pain or feeling lightheaded.  Patient is on anticoagulation for A-fib.  Patient is diabetic.  There is been no fever or chills.  Patient states he feels his normal.  Patient does have an appointment at the wound clinic in 2 days.    Exam with older gentleman ambulating without obvious distress.  Vitals with no acute concern.  Examination of his right lower extremity with bandage from his toes to below his knee.  There does appear to be blood soaked along the lower portion to his heel.  There is no dripping of blood.  Bandage was removed which revealed a circular lesion to his mid anterior/lateral lower leg.  Coagulated blood with  pain  GI:  neg for abdominal pain.  :  No urinary symptoms. No Flank pain.  MSK: neg for back pain. Neg for extremity pain.   Integumentary: no rashes, positive for bleeding right leg  Neurological: neg for headaches  All other systems reviewed negative with exception of positives in ROS and HPI.    Past Medical History:  Past Medical History:   Diagnosis Date    A-fib (HCC)     Cardio Dr. Cleaning    Advance directive discussed with patient 04/06/2023    pt states he will bring DOS    Arthritis     Cancer (HCC) 2021    melanoma removed from nose and face, pt denies chemo or readiation    Chronic kidney disease     kidney stones    Coagulation disorder (HCC)     excess iron; erthythrocytosis; hemochromatosis- [facility] yearly exams    Diabetes (HCC)     PcP  manages    Enlarged prostate     managed by Dr. Cole    Exercise tolerance finding     pt states able to climb 2 flights stairs without CP or SOB    Hypertension 2007    cardio Dr. Cleaning    Ill-defined condition     ulcer - shin- Started around [place] 2022- followed by [facility]    Kidney stones     Dr. Cole    Wears glasses 04/06/2023       Past Surgical History:  Past Surgical History:   Procedure Laterality Date    BLADDER SURGERY  2017    urolift    CHOLECYSTECTOMY  07/2017    COLONOSCOPY  2021 2020, hx polyps    ORTHOPEDIC SURGERY Right 2007    r shoulder replacement    SHOULDER SURGERY Left 2022    replacement    TONSILLECTOMY      TOTAL KNEE ARTHROPLASTY Left 5/18/2023    LEFT TOTAL KNEE ARTHROPLASTY (SPEC POP) performed by Malik Hector DO at [facility]    WISDOM TOOTH EXTRACTION         Family History:  History reviewed. No pertinent family history.    Social History:  Social History     Tobacco Use    Smoking status: Never     Passive exposure: Past    Smokeless tobacco: Never   Vaping Use    Vaping status: Never Used   Substance Use Topics    Alcohol use: Yes     Alcohol/week: 2.0 standard drinks of alcohol

## 2025-02-03 ENCOUNTER — HOSPITAL ENCOUNTER (OUTPATIENT)
Facility: HOSPITAL | Age: 80
Discharge: HOME | End: 2025-02-03
Payer: MEDICARE

## 2025-02-03 VITALS
SYSTOLIC BLOOD PRESSURE: 133 MMHG | HEART RATE: 75 BPM | TEMPERATURE: 97.3 F | RESPIRATION RATE: 16 BRPM | OXYGEN SATURATION: 99 % | DIASTOLIC BLOOD PRESSURE: 87 MMHG

## 2025-02-03 DIAGNOSIS — L97.812 VARICOSE VEINS OF RIGHT LOWER EXTREMITY WITH ULCER OF OTHER PART OF LOWER LEG WITH FAT LAYER EXPOSED (HCC): ICD-10-CM

## 2025-02-03 DIAGNOSIS — L97.211 CALF ULCER, RIGHT, LIMITED TO BREAKDOWN OF SKIN (HCC): Primary | ICD-10-CM

## 2025-02-03 DIAGNOSIS — L97.212 CALF ULCER, RIGHT, WITH FAT LAYER EXPOSED (HCC): ICD-10-CM

## 2025-02-03 DIAGNOSIS — I83.018 VARICOSE VEINS OF RIGHT LOWER EXTREMITY WITH ULCER OF OTHER PART OF LOWER LEG WITH FAT LAYER EXPOSED (HCC): ICD-10-CM

## 2025-02-03 PROCEDURE — 29581 APPL MULTLAYER CMPRN SYS LEG: CPT

## 2025-02-03 RX ORDER — LIDOCAINE HYDROCHLORIDE 40 MG/ML
SOLUTION TOPICAL ONCE
OUTPATIENT
Start: 2025-02-03 | End: 2025-02-03

## 2025-02-03 RX ORDER — SILVER SULFADIAZINE 10 MG/G
CREAM TOPICAL ONCE
OUTPATIENT
Start: 2025-02-03 | End: 2025-02-03

## 2025-02-03 RX ORDER — BACITRACIN ZINC AND POLYMYXIN B SULFATE 500; 1000 [USP'U]/G; [USP'U]/G
OINTMENT TOPICAL ONCE
OUTPATIENT
Start: 2025-02-03 | End: 2025-02-03

## 2025-02-03 RX ORDER — NEOMYCIN/BACITRACIN/POLYMYXINB 3.5-400-5K
OINTMENT (GRAM) TOPICAL ONCE
OUTPATIENT
Start: 2025-02-03 | End: 2025-02-03

## 2025-02-03 RX ORDER — LIDOCAINE 40 MG/G
CREAM TOPICAL ONCE
OUTPATIENT
Start: 2025-02-03 | End: 2025-02-03

## 2025-02-03 RX ORDER — BETAMETHASONE DIPROPIONATE 0.5 MG/G
CREAM TOPICAL ONCE
OUTPATIENT
Start: 2025-02-03 | End: 2025-02-03

## 2025-02-03 RX ORDER — CLOBETASOL PROPIONATE 0.5 MG/G
OINTMENT TOPICAL ONCE
OUTPATIENT
Start: 2025-02-03 | End: 2025-02-03

## 2025-02-03 RX ORDER — GENTAMICIN SULFATE 1 MG/G
OINTMENT TOPICAL ONCE
OUTPATIENT
Start: 2025-02-03 | End: 2025-02-03

## 2025-02-03 RX ORDER — LIDOCAINE HYDROCHLORIDE 20 MG/ML
JELLY TOPICAL ONCE
OUTPATIENT
Start: 2025-02-03 | End: 2025-02-03

## 2025-02-03 RX ORDER — MUPIROCIN 20 MG/G
OINTMENT TOPICAL ONCE
OUTPATIENT
Start: 2025-02-03 | End: 2025-02-03

## 2025-02-03 RX ORDER — TRIAMCINOLONE ACETONIDE 1 MG/G
OINTMENT TOPICAL ONCE
OUTPATIENT
Start: 2025-02-03 | End: 2025-02-03

## 2025-02-03 RX ORDER — GINSENG 100 MG
CAPSULE ORAL ONCE
OUTPATIENT
Start: 2025-02-03 | End: 2025-02-03

## 2025-02-03 RX ORDER — LIDOCAINE 50 MG/G
OINTMENT TOPICAL ONCE
OUTPATIENT
Start: 2025-02-03 | End: 2025-02-03

## 2025-02-03 RX ORDER — SODIUM CHLOR/HYPOCHLOROUS ACID 0.033 %
SOLUTION, IRRIGATION IRRIGATION ONCE
OUTPATIENT
Start: 2025-02-03 | End: 2025-02-03

## 2025-02-03 NOTE — FLOWSHEET NOTE
02/03/25 1021   Wound 12/16/24 Leg Right   Date First Assessed/Time First Assessed: 12/16/24 1207   Primary Wound Type: Venous Ulcer  Location: Leg  Wound Location Orientation: Right   Wound Image    Wound Etiology Venous   Dressing Status New dressing applied   Wound Cleansed Wound cleanser   Dressing/Treatment Alginate with Ag;Dry dressing;Roll gauze  (2 layer wrap)   Offloading for Diabetic Foot Ulcers Offloading not required   Dressing Change Due 02/10/25   Wound Length (cm) 2.2 cm   Wound Width (cm) 2 cm   Wound Depth (cm) 0.1 cm   Wound Surface Area (cm^2) 4.4 cm^2   Change in Wound Size % (l*w) -17.65   Wound Volume (cm^3) 0.44 cm^3   Wound Healing % -18   Drainage Amount Moderate (25-50%)   Drainage Description Other (Comment)  (Bloody)   Odor None   Guillermina-wound Assessment Dry/flaky   Margins Defined edges   Wound Thickness Description not for Pressure Injury Full thickness     Multilayer Compression Wrap   (Not Unna) Below the Knee    NAME:  Malik Clarke  YOB: 1945  MEDICAL RECORD NUMBER:  [id number]  DATE:  2/3/2025    Multilayer compression wrap: Removed old Multilayer wrap if indicated and wash leg with mild soap/water.  Applied moisturizing agent to dry skin as needed.   Applied primary and secondary dressing as ordered.  Applied multilayered dressing below the knee to right lower leg.  Instructed patient/caregiver not to remove dressing and to keep it clean and dry.   Instructed patient/caregiver on complications to report to provider, such as pain, numbness in toes, heavy drainage, and slippage of dressing.  Instructed patient on purpose of compression dressing and on activity and exercise recommendations.      Electronically signed by MARY HUA RN on 2/3/2025 at 10:58 AM

## 2025-02-10 ENCOUNTER — HOSPITAL ENCOUNTER (OUTPATIENT)
Facility: HOSPITAL | Age: 80
Discharge: HOME | End: 2025-02-10
Payer: MEDICARE

## 2025-02-10 VITALS
RESPIRATION RATE: 18 BRPM | SYSTOLIC BLOOD PRESSURE: 116 MMHG | OXYGEN SATURATION: 100 % | TEMPERATURE: 97.7 F | HEART RATE: 76 BPM | DIASTOLIC BLOOD PRESSURE: 71 MMHG

## 2025-02-10 DIAGNOSIS — L97.812 VARICOSE VEINS OF RIGHT LOWER EXTREMITY WITH ULCER OF OTHER PART OF LOWER LEG WITH FAT LAYER EXPOSED (HCC): ICD-10-CM

## 2025-02-10 DIAGNOSIS — I83.018 VARICOSE VEINS OF RIGHT LOWER EXTREMITY WITH ULCER OF OTHER PART OF LOWER LEG WITH FAT LAYER EXPOSED (HCC): ICD-10-CM

## 2025-02-10 DIAGNOSIS — L97.211 CALF ULCER, RIGHT, LIMITED TO BREAKDOWN OF SKIN (HCC): Primary | ICD-10-CM

## 2025-02-10 DIAGNOSIS — L97.212 CALF ULCER, RIGHT, WITH FAT LAYER EXPOSED (HCC): ICD-10-CM

## 2025-02-10 PROCEDURE — 6370000000 HC RX 637 (ALT 250 FOR IP): Performed by: FAMILY MEDICINE

## 2025-02-10 PROCEDURE — 11042 DBRDMT SUBQ TIS 1ST 20SQCM/<: CPT

## 2025-02-10 RX ORDER — LIDOCAINE HYDROCHLORIDE 20 MG/ML
JELLY TOPICAL ONCE
OUTPATIENT
Start: 2025-02-10 | End: 2025-02-10

## 2025-02-10 RX ORDER — MUPIROCIN 20 MG/G
OINTMENT TOPICAL ONCE
OUTPATIENT
Start: 2025-02-10 | End: 2025-02-10

## 2025-02-10 RX ORDER — LIDOCAINE 40 MG/G
CREAM TOPICAL ONCE
OUTPATIENT
Start: 2025-02-10 | End: 2025-02-10

## 2025-02-10 RX ORDER — TRIAMCINOLONE ACETONIDE 1 MG/G
OINTMENT TOPICAL ONCE
OUTPATIENT
Start: 2025-02-10 | End: 2025-02-10

## 2025-02-10 RX ORDER — BETAMETHASONE DIPROPIONATE 0.5 MG/G
CREAM TOPICAL ONCE
OUTPATIENT
Start: 2025-02-10 | End: 2025-02-10

## 2025-02-10 RX ORDER — LIDOCAINE 50 MG/G
OINTMENT TOPICAL ONCE
OUTPATIENT
Start: 2025-02-10 | End: 2025-02-10

## 2025-02-10 RX ORDER — NEOMYCIN/BACITRACIN/POLYMYXINB 3.5-400-5K
OINTMENT (GRAM) TOPICAL ONCE
OUTPATIENT
Start: 2025-02-10 | End: 2025-02-10

## 2025-02-10 RX ORDER — SILVER SULFADIAZINE 10 MG/G
CREAM TOPICAL ONCE
OUTPATIENT
Start: 2025-02-10 | End: 2025-02-10

## 2025-02-10 RX ORDER — CLOBETASOL PROPIONATE 0.5 MG/G
OINTMENT TOPICAL ONCE
OUTPATIENT
Start: 2025-02-10 | End: 2025-02-10

## 2025-02-10 RX ORDER — BACITRACIN ZINC AND POLYMYXIN B SULFATE 500; 1000 [USP'U]/G; [USP'U]/G
OINTMENT TOPICAL ONCE
OUTPATIENT
Start: 2025-02-10 | End: 2025-02-10

## 2025-02-10 RX ORDER — GENTAMICIN SULFATE 1 MG/G
OINTMENT TOPICAL ONCE
OUTPATIENT
Start: 2025-02-10 | End: 2025-02-10

## 2025-02-10 RX ORDER — LIDOCAINE HYDROCHLORIDE 40 MG/ML
SOLUTION TOPICAL ONCE
OUTPATIENT
Start: 2025-02-10 | End: 2025-02-10

## 2025-02-10 RX ORDER — GINSENG 100 MG
CAPSULE ORAL ONCE
OUTPATIENT
Start: 2025-02-10 | End: 2025-02-10

## 2025-02-10 RX ORDER — SODIUM CHLOR/HYPOCHLOROUS ACID 0.033 %
SOLUTION, IRRIGATION IRRIGATION ONCE
OUTPATIENT
Start: 2025-02-10 | End: 2025-02-10

## 2025-02-10 RX ORDER — LIDOCAINE HYDROCHLORIDE 20 MG/ML
JELLY TOPICAL ONCE
Status: COMPLETED | OUTPATIENT
Start: 2025-02-10 | End: 2025-02-10

## 2025-02-10 RX ADMIN — LIDOCAINE HYDROCHLORIDE: 20 JELLY TOPICAL at 13:28

## 2025-02-10 NOTE — FLOWSHEET NOTE
02/10/25 1031   Wound 12/16/24 Leg Right   Date First Assessed/Time First Assessed: 12/16/24 1207   Primary Wound Type: Venous Ulcer  Location: Leg  Wound Location Orientation: Right   Wound Image    Wound Etiology Venous   Dressing Status New dressing applied   Wound Cleansed Wound cleanser   Dressing/Treatment Alginate with Ag;Dry dressing;Roll gauze  (2 layer wrap)   Offloading for Diabetic Foot Ulcers Offloading not required   Dressing Change Due 02/10/25   Wound Length (cm) 2.1 cm   Wound Width (cm) 1.9 cm   Wound Depth (cm) 0.1 cm   Wound Surface Area (cm^2) 3.99 cm^2   Change in Wound Size % (l*w) -6.68   Wound Volume (cm^3) 0.399 cm^3   Wound Healing % -7   Post-Procedure Length (cm) 2.1 cm   Post-Procedure Width (cm) 1.9 cm   Post-Procedure Depth (cm) 0.2 cm   Post-Procedure Surface Area (cm^2) 3.99 cm^2   Post-Procedure Volume (cm^3) 0.798 cm^3   Drainage Amount Moderate (25-50%)   Drainage Description Other (Comment)  (Bloody)   Odor None   Guillermina-wound Assessment Dry/flaky   Margins Defined edges   Wound Thickness Description not for Pressure Injury Full thickness       Multilayer Compression Wrap   (Not Unna) Below the Knee    NAME:  Malik Clarke  YOB: 1945  MEDICAL RECORD NUMBER:  [id number]  DATE:  2/10/2025    Multilayer compression wrap: Removed old Multilayer wrap if indicated and wash leg with mild soap/water.  Applied moisturizing agent to dry skin as needed.   Applied primary and secondary dressing as ordered.  Applied multilayered dressing below the knee to right lower leg.  Instructed patient/caregiver not to remove dressing and to keep it clean and dry.   Instructed patient/caregiver on complications to report to provider, such as pain, numbness in toes, heavy drainage, and slippage of dressing.  Instructed patient on purpose of compression dressing and on activity and exercise recommendations.      Electronically signed by MARY HUA RN on 2/10/2025 at 1:33 PM

## 2025-02-10 NOTE — DISCHARGE INSTRUCTIONS
Multilayer Compression Wrap   (Not Unna) Below the Knee    NAME:  Malik Clarke  YOB: 1945  MEDICAL RECORD NUMBER:  [id number]  DATE:  2/10/2025    Multilayer compression wrap: Removed old Multilayer wrap if indicated and wash leg with mild soap/water.  Applied moisturizing agent to dry skin as needed.   Applied primary and secondary dressing as ordered.  Applied multilayered dressing below the knee to right lower leg.  Instructed patient/caregiver not to remove dressing and to keep it clean and dry.   Instructed patient/caregiver on complications to report to provider, such as pain, numbness in toes, heavy drainage, and slippage of dressing.  Instructed patient on purpose of compression dressing and on activity and exercise recommendations.      Electronically signed by MARY HUA RN on 2/10/2025 at 1:35 PM

## 2025-02-10 NOTE — PROGRESS NOTES
Wound Center  Progress Note / Procedure Note       Chief Complaint:  Malik Clarke is a 79 y.o.  male  with R lower leg anterior wound of few months duration.      Assessment/Plan     79 y.o. male with Dm2    -R lower leg anterior venous stasis ulcer.  Full thickness,   stable  Full thickness  Slough/mostly granular  Necessitates debridement  for wound healing and to prevent/heal infection  Ulcer needs debridement- see note below  (Was very hypergranular last visit- likely caused the bleeding)            -Dm2  A1c 6.8  (9/2024)  Does not check at home  Controlled with metformin    -Leg swelling/varicosities  Has arthritis      Following discussed with patient   Needs :  Serial debridement- prn    Good local wound care  Dressing:  collagen,  alginate  Frequency : once a week  coflex        -Edema management  Do not get dressing wet    Edema management:  Elevate leg(s) throughout the day starting in the morning  Compression: coflex  Avoid prolonged standing      -Good Diabetic control      Patient/  understood and agrees with plan. Questions answered.        Follow up with me 2 week ; NV 1 week    Subjective:     Since last visit  2/10 the weekend after Monday appt, had to go to ER due to ongoing bleeding from wound- bleeding had stopped by the time he got there  But did bleed through the week, noticed was leaving trails/pools of blood.    12/16 wounds opened up about 10 days after being discharged from here.  Has been compliant with compression.  Wounds were open when went to see ortho  Will be doing injections into knee for now      9/30  No issues, tolerated wrap  Going out of town next week    9/23/24  Has not been seen since  May  Apparently wound re-opened within few weeks of being discharged from here, sounds like he wore the velcro wraps without the sock that comes with it.  Thought it was looking better so did not come here, until wife called to make appointment after getting tired of seeing blood on

## 2025-02-17 ENCOUNTER — HOSPITAL ENCOUNTER (OUTPATIENT)
Facility: HOSPITAL | Age: 80
Discharge: HOME | End: 2025-02-17
Attending: FAMILY MEDICINE | Admitting: FAMILY MEDICINE
Payer: MEDICARE

## 2025-02-17 VITALS
TEMPERATURE: 97.5 F | HEART RATE: 84 BPM | DIASTOLIC BLOOD PRESSURE: 68 MMHG | SYSTOLIC BLOOD PRESSURE: 115 MMHG | OXYGEN SATURATION: 98 % | RESPIRATION RATE: 16 BRPM

## 2025-02-17 DIAGNOSIS — L97.212 CALF ULCER, RIGHT, WITH FAT LAYER EXPOSED (HCC): ICD-10-CM

## 2025-02-17 DIAGNOSIS — L97.812 VARICOSE VEINS OF RIGHT LOWER EXTREMITY WITH ULCER OF OTHER PART OF LOWER LEG WITH FAT LAYER EXPOSED (HCC): ICD-10-CM

## 2025-02-17 DIAGNOSIS — L97.211 CALF ULCER, RIGHT, LIMITED TO BREAKDOWN OF SKIN (HCC): Primary | ICD-10-CM

## 2025-02-17 DIAGNOSIS — I83.018 VARICOSE VEINS OF RIGHT LOWER EXTREMITY WITH ULCER OF OTHER PART OF LOWER LEG WITH FAT LAYER EXPOSED (HCC): ICD-10-CM

## 2025-02-17 PROCEDURE — 29581 APPL MULTLAYER CMPRN SYS LEG: CPT

## 2025-02-17 RX ORDER — BETAMETHASONE DIPROPIONATE 0.5 MG/G
CREAM TOPICAL ONCE
OUTPATIENT
Start: 2025-02-17 | End: 2025-02-17

## 2025-02-17 RX ORDER — LIDOCAINE HYDROCHLORIDE 40 MG/ML
SOLUTION TOPICAL ONCE
OUTPATIENT
Start: 2025-02-17 | End: 2025-02-17

## 2025-02-17 RX ORDER — TRIAMCINOLONE ACETONIDE 1 MG/G
OINTMENT TOPICAL ONCE
OUTPATIENT
Start: 2025-02-17 | End: 2025-02-17

## 2025-02-17 RX ORDER — LIDOCAINE 50 MG/G
OINTMENT TOPICAL ONCE
OUTPATIENT
Start: 2025-02-17 | End: 2025-02-17

## 2025-02-17 RX ORDER — NEOMYCIN/BACITRACIN/POLYMYXINB 3.5-400-5K
OINTMENT (GRAM) TOPICAL ONCE
OUTPATIENT
Start: 2025-02-17 | End: 2025-02-17

## 2025-02-17 RX ORDER — SODIUM CHLOR/HYPOCHLOROUS ACID 0.033 %
SOLUTION, IRRIGATION IRRIGATION ONCE
OUTPATIENT
Start: 2025-02-17 | End: 2025-02-17

## 2025-02-17 RX ORDER — GINSENG 100 MG
CAPSULE ORAL ONCE
OUTPATIENT
Start: 2025-02-17 | End: 2025-02-17

## 2025-02-17 RX ORDER — SILVER SULFADIAZINE 10 MG/G
CREAM TOPICAL ONCE
OUTPATIENT
Start: 2025-02-17 | End: 2025-02-17

## 2025-02-17 RX ORDER — LIDOCAINE HYDROCHLORIDE 20 MG/ML
JELLY TOPICAL ONCE
OUTPATIENT
Start: 2025-02-17 | End: 2025-02-17

## 2025-02-17 RX ORDER — LIDOCAINE 40 MG/G
CREAM TOPICAL ONCE
OUTPATIENT
Start: 2025-02-17 | End: 2025-02-17

## 2025-02-17 RX ORDER — GENTAMICIN SULFATE 1 MG/G
OINTMENT TOPICAL ONCE
OUTPATIENT
Start: 2025-02-17 | End: 2025-02-17

## 2025-02-17 RX ORDER — CLOBETASOL PROPIONATE 0.5 MG/G
OINTMENT TOPICAL ONCE
OUTPATIENT
Start: 2025-02-17 | End: 2025-02-17

## 2025-02-17 RX ORDER — MUPIROCIN 20 MG/G
OINTMENT TOPICAL ONCE
OUTPATIENT
Start: 2025-02-17 | End: 2025-02-17

## 2025-02-17 RX ORDER — BACITRACIN ZINC AND POLYMYXIN B SULFATE 500; 1000 [USP'U]/G; [USP'U]/G
OINTMENT TOPICAL ONCE
OUTPATIENT
Start: 2025-02-17 | End: 2025-02-17

## 2025-02-17 ASSESSMENT — PAIN SCALES - GENERAL: PAINLEVEL_OUTOF10: 0

## 2025-02-17 NOTE — FLOWSHEET NOTE
02/17/25 1140   Wound 12/16/24 Leg Right   Date First Assessed/Time First Assessed: 12/16/24 1207   Primary Wound Type: Venous Ulcer  Location: Leg  Wound Location Orientation: Right   Wound Image     Wound Etiology Venous   Dressing Status New dressing applied   Wound Cleansed Wound cleanser   Dressing/Treatment Alginate with Ag;Dry dressing;Roll gauze  (2 layer wrap)   Offloading for Diabetic Foot Ulcers Offloading not required   Dressing Change Due 02/24/25   Wound Length (cm) 1.7 cm   Wound Width (cm) 1.5 cm   Wound Depth (cm) 0.1 cm   Wound Surface Area (cm^2) 2.55 cm^2   Change in Wound Size % (l*w) 31.82   Wound Volume (cm^3) 0.255 cm^3   Wound Healing % 32   Wound Assessment Hyper granulation tissue   Drainage Amount Moderate (25-50%)   Drainage Description Other (Comment);Serosanguinous   Odor None   Guillermina-wound Assessment Dry/flaky   Margins Defined edges   Wound Thickness Description not for Pressure Injury Full thickness       Multilayer Compression Wrap   (Not Unna) Below the Knee    NAME:  Malik Clarke  YOB: 1945  MEDICAL RECORD NUMBER:  [id number]  DATE:  2/17/2025    Multilayer compression wrap: Removed old Multilayer wrap if indicated and wash leg with mild soap/water.  Applied moisturizing agent to dry skin as needed.   Applied primary and secondary dressing as ordered.  Applied multilayered dressing below the knee to right lower leg.  Instructed patient/caregiver not to remove dressing and to keep it clean and dry.   Instructed patient/caregiver on complications to report to provider, such as pain, numbness in toes, heavy drainage, and slippage of dressing.  Instructed patient on purpose of compression dressing and on activity and exercise recommendations.      Electronically signed by MARY HUA RN on 2/17/2025 at 11:43 AM

## 2025-02-24 ENCOUNTER — HOSPITAL ENCOUNTER (OUTPATIENT)
Facility: HOSPITAL | Age: 80
Discharge: HOME | End: 2025-02-24
Payer: MEDICARE

## 2025-02-24 VITALS
SYSTOLIC BLOOD PRESSURE: 132 MMHG | TEMPERATURE: 97.3 F | OXYGEN SATURATION: 98 % | HEART RATE: 79 BPM | RESPIRATION RATE: 20 BRPM | DIASTOLIC BLOOD PRESSURE: 81 MMHG

## 2025-02-24 DIAGNOSIS — L97.812 VARICOSE VEINS OF RIGHT LOWER EXTREMITY WITH ULCER OF OTHER PART OF LOWER LEG WITH FAT LAYER EXPOSED (HCC): ICD-10-CM

## 2025-02-24 DIAGNOSIS — L97.212 CALF ULCER, RIGHT, WITH FAT LAYER EXPOSED (HCC): ICD-10-CM

## 2025-02-24 DIAGNOSIS — L97.211 CALF ULCER, RIGHT, LIMITED TO BREAKDOWN OF SKIN (HCC): Primary | ICD-10-CM

## 2025-02-24 DIAGNOSIS — I83.018 VARICOSE VEINS OF RIGHT LOWER EXTREMITY WITH ULCER OF OTHER PART OF LOWER LEG WITH FAT LAYER EXPOSED (HCC): ICD-10-CM

## 2025-02-24 PROCEDURE — 6370000000 HC RX 637 (ALT 250 FOR IP): Performed by: FAMILY MEDICINE

## 2025-02-24 PROCEDURE — 11042 DBRDMT SUBQ TIS 1ST 20SQCM/<: CPT

## 2025-02-24 RX ORDER — LIDOCAINE HYDROCHLORIDE 40 MG/ML
SOLUTION TOPICAL ONCE
OUTPATIENT
Start: 2025-02-24 | End: 2025-02-24

## 2025-02-24 RX ORDER — BETAMETHASONE DIPROPIONATE 0.5 MG/G
CREAM TOPICAL ONCE
OUTPATIENT
Start: 2025-02-24 | End: 2025-02-24

## 2025-02-24 RX ORDER — LIDOCAINE 40 MG/G
CREAM TOPICAL ONCE
OUTPATIENT
Start: 2025-02-24 | End: 2025-02-24

## 2025-02-24 RX ORDER — TRIAMCINOLONE ACETONIDE 1 MG/G
OINTMENT TOPICAL ONCE
OUTPATIENT
Start: 2025-02-24 | End: 2025-02-24

## 2025-02-24 RX ORDER — BACITRACIN ZINC AND POLYMYXIN B SULFATE 500; 1000 [USP'U]/G; [USP'U]/G
OINTMENT TOPICAL ONCE
OUTPATIENT
Start: 2025-02-24 | End: 2025-02-24

## 2025-02-24 RX ORDER — GENTAMICIN SULFATE 1 MG/G
OINTMENT TOPICAL ONCE
OUTPATIENT
Start: 2025-02-24 | End: 2025-02-24

## 2025-02-24 RX ORDER — LIDOCAINE HYDROCHLORIDE 20 MG/ML
JELLY TOPICAL ONCE
Status: COMPLETED | OUTPATIENT
Start: 2025-02-24 | End: 2025-02-24

## 2025-02-24 RX ORDER — NEOMYCIN/BACITRACIN/POLYMYXINB 3.5-400-5K
OINTMENT (GRAM) TOPICAL ONCE
OUTPATIENT
Start: 2025-02-24 | End: 2025-02-24

## 2025-02-24 RX ORDER — GINSENG 100 MG
CAPSULE ORAL ONCE
OUTPATIENT
Start: 2025-02-24 | End: 2025-02-24

## 2025-02-24 RX ORDER — MUPIROCIN 20 MG/G
OINTMENT TOPICAL ONCE
OUTPATIENT
Start: 2025-02-24 | End: 2025-02-24

## 2025-02-24 RX ORDER — SILVER SULFADIAZINE 10 MG/G
CREAM TOPICAL ONCE
OUTPATIENT
Start: 2025-02-24 | End: 2025-02-24

## 2025-02-24 RX ORDER — LIDOCAINE 50 MG/G
OINTMENT TOPICAL ONCE
OUTPATIENT
Start: 2025-02-24 | End: 2025-02-24

## 2025-02-24 RX ORDER — SODIUM CHLOR/HYPOCHLOROUS ACID 0.033 %
SOLUTION, IRRIGATION IRRIGATION ONCE
OUTPATIENT
Start: 2025-02-24 | End: 2025-02-24

## 2025-02-24 RX ORDER — LIDOCAINE HYDROCHLORIDE 20 MG/ML
JELLY TOPICAL ONCE
OUTPATIENT
Start: 2025-02-24 | End: 2025-02-24

## 2025-02-24 RX ORDER — CLOBETASOL PROPIONATE 0.5 MG/G
OINTMENT TOPICAL ONCE
OUTPATIENT
Start: 2025-02-24 | End: 2025-02-24

## 2025-02-24 RX ADMIN — LIDOCAINE HYDROCHLORIDE: 20 JELLY TOPICAL at 09:49

## 2025-02-24 NOTE — PROGRESS NOTES
Wound Center  Progress Note / Procedure Note       Chief Complaint:  Malik Clarke is a 79 y.o.  male  with R lower leg anterior wound of few months duration.      Assessment/Plan     79 y.o. male with Dm2    -R lower leg anterior venous stasis ulcer.  Full thickness,   smaller  Full thickness  Slough/mostly granular/dried up scabs all around  Necessitates debridement  for wound healing and to prevent/heal infection  Ulcer needs debridement- see note below      -Dm2  A1c 6.8  (9/2024)  Does not check at home  Controlled with metformin    -Leg swelling/varicosities  Has arthritis      Following discussed with patient   Needs :  Serial debridement- prn    Good local wound care  Dressing:   alginate  Frequency : once a week  coflex        -Edema management  Do not get dressing wet    Edema management:  Elevate leg(s) throughout the day starting in the morning  Compression: coflex  Avoid prolonged standing      -Good Diabetic control      Patient/  understood and agrees with plan. Questions answered.        Follow up with me 1 week      Subjective:     Since last visit  2/24 has URI sx, getting better    2/10 the weekend after Monday appt, had to go to ER due to ongoing bleeding from wound- bleeding had stopped by the time he got there  But did bleed through the week, noticed was leaving trails/pools of blood.    12/16 wounds opened up about 10 days after being discharged from here.  Has been compliant with compression.  Wounds were open when went to see ortho  Will be doing injections into knee for now      9/30  No issues, tolerated wrap  Going out of town next week    9/23/24  Has not been seen since  May  Apparently wound re-opened within few weeks of being discharged from here, sounds like he wore the velcro wraps without the sock that comes with it.  Thought it was looking better so did not come here, until wife called to make appointment after getting tired of seeing blood on sheets, etc.  Erica zarco

## 2025-02-24 NOTE — DISCHARGE INSTRUCTIONS
Discharge Instructions from  Wound Care Clinic at [facility]   [address]   [address]  [place] [address] [phone] Fax [phone]    NAME:  Malik Clarke  YOB: 1945  MEDICAL RECORD NUMBER:  [id number]  DATE:  2/24/25    Wound Cleansing:   Do not scrub or use excessive force.  Cleanse wound prior to applying a clean dressing with:  [] Normal Saline [] Keep Wound Dry in Shower    [x] Wound Cleanser   [] Cleanse wound with Mild Soap & Water  [] May Shower at Discharge   [] Other:      Topical Treatments:  Do not apply lotions, creams, or ointments to wound bed unless directed.   [] Apply moisturizing lotion to skin surrounding the wound prior to dressing change.  [] Apply antifungal ointment to skin surrounding the wound prior to dressing change.  [] Apply thin film of moisture barrier ointment to skin immediately around wound.  [] Other:       Dressings:           Wound Location right lower leg   [] Apply Primary Dressing:       [] MediHoney Gel [x] Alginate with Silver [] Alginate   [] Collagen [] Collagen with Silver   [] Santyl with Moisten saline gauze     [] Hydrocolloid   [] MediHoney Alginate [] Foam with Silver   [] Foam   [] Hydrofera Blue    [] Mepilex Border    [] Moisten with Saline [] Hydrogel [] Mepitel     [] Bactroban/Mupirocin [] Polysporin  [] Other:    [] Pack wound loosely with  [] Iodoform   [] Plain Packing  [] Other   [] Cover and Secure with:     [] Gauze [] Tamanna [] Kerlix   [] Ace Wrap [] Cover Roll Tape [] ABD     [x] Other: 2 layer zinc wrap   Avoid contact of tape with skin.  [] Change dressing: [] Daily    [] Every Other Day [] Three times per week   [] Once a week [x] Do Not Change Dressing   [] Other:     Negative Pressure:           Wound Location:   [] Pressure@           mm/Hg  []Continuous []Intermittent   [] Black  [] White Foam [] Other:   []Change dressing: []Three times per week    []Other:     Pressure Relief:  [] When sitting,

## 2025-03-03 ENCOUNTER — HOSPITAL ENCOUNTER (OUTPATIENT)
Facility: HOSPITAL | Age: 80
Discharge: HOME | End: 2025-03-03
Payer: MEDICARE

## 2025-03-03 VITALS
RESPIRATION RATE: 18 BRPM | HEART RATE: 83 BPM | DIASTOLIC BLOOD PRESSURE: 53 MMHG | OXYGEN SATURATION: 98 % | TEMPERATURE: 97.5 F | SYSTOLIC BLOOD PRESSURE: 118 MMHG

## 2025-03-03 DIAGNOSIS — L97.211 CALF ULCER, RIGHT, LIMITED TO BREAKDOWN OF SKIN (HCC): Primary | ICD-10-CM

## 2025-03-03 DIAGNOSIS — L97.812 VARICOSE VEINS OF RIGHT LOWER EXTREMITY WITH ULCER OF OTHER PART OF LOWER LEG WITH FAT LAYER EXPOSED (HCC): ICD-10-CM

## 2025-03-03 DIAGNOSIS — L97.212 CALF ULCER, RIGHT, WITH FAT LAYER EXPOSED (HCC): ICD-10-CM

## 2025-03-03 DIAGNOSIS — I83.018 VARICOSE VEINS OF RIGHT LOWER EXTREMITY WITH ULCER OF OTHER PART OF LOWER LEG WITH FAT LAYER EXPOSED (HCC): ICD-10-CM

## 2025-03-03 PROCEDURE — 29581 APPL MULTLAYER CMPRN SYS LEG: CPT

## 2025-03-03 PROCEDURE — 6370000000 HC RX 637 (ALT 250 FOR IP): Performed by: FAMILY MEDICINE

## 2025-03-03 RX ORDER — SILVER SULFADIAZINE 10 MG/G
CREAM TOPICAL ONCE
OUTPATIENT
Start: 2025-03-03 | End: 2025-03-03

## 2025-03-03 RX ORDER — LIDOCAINE 40 MG/G
CREAM TOPICAL ONCE
OUTPATIENT
Start: 2025-03-03 | End: 2025-03-03

## 2025-03-03 RX ORDER — NEOMYCIN/BACITRACIN/POLYMYXINB 3.5-400-5K
OINTMENT (GRAM) TOPICAL ONCE
OUTPATIENT
Start: 2025-03-03 | End: 2025-03-03

## 2025-03-03 RX ORDER — LIDOCAINE 50 MG/G
OINTMENT TOPICAL ONCE
OUTPATIENT
Start: 2025-03-03 | End: 2025-03-03

## 2025-03-03 RX ORDER — LIDOCAINE HYDROCHLORIDE 20 MG/ML
JELLY TOPICAL ONCE
OUTPATIENT
Start: 2025-03-03 | End: 2025-03-03

## 2025-03-03 RX ORDER — LIDOCAINE HYDROCHLORIDE 20 MG/ML
JELLY TOPICAL ONCE
Status: DISCONTINUED | OUTPATIENT
Start: 2025-03-03 | End: 2025-03-03 | Stop reason: CLARIF

## 2025-03-03 RX ORDER — GENTAMICIN SULFATE 1 MG/G
OINTMENT TOPICAL ONCE
OUTPATIENT
Start: 2025-03-03 | End: 2025-03-03

## 2025-03-03 RX ORDER — BETAMETHASONE DIPROPIONATE 0.5 MG/G
CREAM TOPICAL ONCE
OUTPATIENT
Start: 2025-03-03 | End: 2025-03-03

## 2025-03-03 RX ORDER — BACITRACIN ZINC AND POLYMYXIN B SULFATE 500; 1000 [USP'U]/G; [USP'U]/G
OINTMENT TOPICAL ONCE
OUTPATIENT
Start: 2025-03-03 | End: 2025-03-03

## 2025-03-03 RX ORDER — TRIAMCINOLONE ACETONIDE 1 MG/G
OINTMENT TOPICAL ONCE
OUTPATIENT
Start: 2025-03-03 | End: 2025-03-03

## 2025-03-03 RX ORDER — LIDOCAINE HYDROCHLORIDE 40 MG/ML
SOLUTION TOPICAL ONCE
OUTPATIENT
Start: 2025-03-03 | End: 2025-03-03

## 2025-03-03 RX ORDER — LIDOCAINE HYDROCHLORIDE 20 MG/ML
JELLY TOPICAL ONCE
Status: COMPLETED | OUTPATIENT
Start: 2025-03-03 | End: 2025-03-03

## 2025-03-03 RX ORDER — SODIUM CHLOR/HYPOCHLOROUS ACID 0.033 %
SOLUTION, IRRIGATION IRRIGATION ONCE
OUTPATIENT
Start: 2025-03-03 | End: 2025-03-03

## 2025-03-03 RX ORDER — MUPIROCIN 20 MG/G
OINTMENT TOPICAL ONCE
OUTPATIENT
Start: 2025-03-03 | End: 2025-03-03

## 2025-03-03 RX ORDER — GINSENG 100 MG
CAPSULE ORAL ONCE
OUTPATIENT
Start: 2025-03-03 | End: 2025-03-03

## 2025-03-03 RX ORDER — CLOBETASOL PROPIONATE 0.5 MG/G
OINTMENT TOPICAL ONCE
OUTPATIENT
Start: 2025-03-03 | End: 2025-03-03

## 2025-03-03 RX ADMIN — LIDOCAINE HYDROCHLORIDE: 20 JELLY TOPICAL at 09:23

## 2025-03-03 ASSESSMENT — PAIN SCALES - GENERAL: PAINLEVEL_OUTOF10: 0

## 2025-03-03 NOTE — PROGRESS NOTES
Width (cm) 0.7 cm 03/03/25 0917   Wound Depth (cm) 0.1 cm 03/03/25 0917   Wound Surface Area (cm^2) 0.77 cm^2 03/03/25 0917   Change in Wound Size % (l*w) 79.41 03/03/25 0917   Wound Volume (cm^3) 0.077 cm^3 03/03/25 0917   Wound Healing % 79 03/03/25 0917   Post-Procedure Length (cm) 1.2 cm 02/24/25 0938   Post-Procedure Width (cm) 0.6 cm 02/24/25 0938   Post-Procedure Depth (cm) 0.2 cm 02/24/25 0938   Post-Procedure Surface Area (cm^2) 0.72 cm^2 02/24/25 0938   Post-Procedure Volume (cm^3) 0.144 cm^3 02/24/25 0938   Wound Assessment Hyper granulation tissue 03/03/25 0917   Drainage Amount Small (< 25%) 03/03/25 0917   Drainage Description Serosanguinous 03/03/25 0917   Odor None 03/03/25 0917   Guillermina-wound Assessment Dry/flaky 03/03/25 0917   Margins Defined edges 03/03/25 0917   Wound Thickness Description not for Pressure Injury Full thickness 03/03/25 0917   Number of days: 76        -------    Past Medical History:   Diagnosis Date    A-fib (HCC)     Cardio Dr. Cleaning    Advance directive discussed with patient 04/06/2023    pt states he will bring DOS    Arthritis     Cancer (HCC) 2021    melanoma removed from nose and face, pt denies chemo or readiation    Chronic kidney disease     kidney stones    Coagulation disorder     excess iron; erthythrocytosis; hemochromatosis- [facility] yearly exams    Diabetes ([facility])     PcP  manages    Enlarged prostate     managed by Dr. Cole    Exercise tolerance finding     pt states able to climb 2 flights stairs without CP or SOB    Hypertension 2007    cardio Dr. Cleaning    Ill-defined condition     ulcer - shin- Started around Christmas 2022- followed by [facility]    Kidney stones     Dr. Cole    Wears glasses 04/06/2023     Past Surgical History:   Procedure Laterality Date    BLADDER SURGERY  2017    urolift    CHOLECYSTECTOMY  07/2017    COLONOSCOPY  2021    2020, hx polyps    ORTHOPEDIC SURGERY Right 2007    r shoulder replacement    SHOULDER

## 2025-03-03 NOTE — FLOWSHEET NOTE
03/03/25 0917   Wound 12/16/24 Leg Right   Date First Assessed/Time First Assessed: 12/16/24 1207   Primary Wound Type: Venous Ulcer  Location: Leg  Wound Location Orientation: Right   Wound Image    Wound Etiology Venous   Dressing Status Intact   Wound Cleansed Wound cleanser   Dressing/Treatment Alginate with Ag  (2 layer wrap)   Offloading for Diabetic Foot Ulcers Offloading not required   Dressing Change Due 03/10/25   Wound Length (cm) 1.1 cm   Wound Width (cm) 0.7 cm   Wound Depth (cm) 0.1 cm   Wound Surface Area (cm^2) 0.77 cm^2   Change in Wound Size % (l*w) 79.41   Wound Volume (cm^3) 0.077 cm^3   Wound Healing % 79   Wound Assessment Hyper granulation tissue   Drainage Amount Small (< 25%)   Drainage Description Serosanguinous   Odor None   Guillermina-wound Assessment Dry/flaky   Margins Defined edges   Wound Thickness Description not for Pressure Injury Full thickness     Multilayer Compression Wrap   (Not Unna) Below the Knee    NAME:  Malik Clarke  YOB: 1945  MEDICAL RECORD NUMBER:  [id number]  DATE:  3/3/2025    Multilayer compression wrap: Removed old Multilayer wrap if indicated and wash leg with mild soap/water.  Applied moisturizing agent to dry skin as needed.   Applied primary and secondary dressing as ordered.  Applied multilayered dressing below the knee to right lower leg.  Instructed patient/caregiver not to remove dressing and to keep it clean and dry.   Instructed patient/caregiver on complications to report to provider, such as pain, numbness in toes, heavy drainage, and slippage of dressing.  Instructed patient on purpose of compression dressing and on activity and exercise recommendations.      Electronically signed by MARY HUA RN on 3/3/2025 at 11:25 AM

## 2025-03-10 ENCOUNTER — HOSPITAL ENCOUNTER (OUTPATIENT)
Facility: HOSPITAL | Age: 80
Discharge: HOME | End: 2025-03-10
Payer: MEDICARE

## 2025-03-10 VITALS
HEART RATE: 63 BPM | TEMPERATURE: 97.7 F | OXYGEN SATURATION: 100 % | RESPIRATION RATE: 18 BRPM | DIASTOLIC BLOOD PRESSURE: 64 MMHG | SYSTOLIC BLOOD PRESSURE: 121 MMHG

## 2025-03-10 DIAGNOSIS — L97.211 CALF ULCER, RIGHT, LIMITED TO BREAKDOWN OF SKIN (HCC): Primary | ICD-10-CM

## 2025-03-10 DIAGNOSIS — L97.812 VARICOSE VEINS OF RIGHT LOWER EXTREMITY WITH ULCER OF OTHER PART OF LOWER LEG WITH FAT LAYER EXPOSED (HCC): ICD-10-CM

## 2025-03-10 DIAGNOSIS — I83.018 VARICOSE VEINS OF RIGHT LOWER EXTREMITY WITH ULCER OF OTHER PART OF LOWER LEG WITH FAT LAYER EXPOSED (HCC): ICD-10-CM

## 2025-03-10 DIAGNOSIS — L97.212 CALF ULCER, RIGHT, WITH FAT LAYER EXPOSED (HCC): ICD-10-CM

## 2025-03-10 PROCEDURE — 29581 APPL MULTLAYER CMPRN SYS LEG: CPT

## 2025-03-10 RX ORDER — LIDOCAINE HYDROCHLORIDE 20 MG/ML
JELLY TOPICAL ONCE
OUTPATIENT
Start: 2025-03-10 | End: 2025-03-10

## 2025-03-10 RX ORDER — GENTAMICIN SULFATE 1 MG/G
OINTMENT TOPICAL ONCE
OUTPATIENT
Start: 2025-03-10 | End: 2025-03-10

## 2025-03-10 RX ORDER — LIDOCAINE 50 MG/G
OINTMENT TOPICAL ONCE
OUTPATIENT
Start: 2025-03-10 | End: 2025-03-10

## 2025-03-10 RX ORDER — MUPIROCIN 20 MG/G
OINTMENT TOPICAL ONCE
OUTPATIENT
Start: 2025-03-10 | End: 2025-03-10

## 2025-03-10 RX ORDER — CLOBETASOL PROPIONATE 0.5 MG/G
OINTMENT TOPICAL ONCE
OUTPATIENT
Start: 2025-03-10 | End: 2025-03-10

## 2025-03-10 RX ORDER — TRIAMCINOLONE ACETONIDE 1 MG/G
OINTMENT TOPICAL ONCE
OUTPATIENT
Start: 2025-03-10 | End: 2025-03-10

## 2025-03-10 RX ORDER — BACITRACIN ZINC AND POLYMYXIN B SULFATE 500; 1000 [USP'U]/G; [USP'U]/G
OINTMENT TOPICAL ONCE
OUTPATIENT
Start: 2025-03-10 | End: 2025-03-10

## 2025-03-10 RX ORDER — GINSENG 100 MG
CAPSULE ORAL ONCE
OUTPATIENT
Start: 2025-03-10 | End: 2025-03-10

## 2025-03-10 RX ORDER — SODIUM CHLOR/HYPOCHLOROUS ACID 0.033 %
SOLUTION, IRRIGATION IRRIGATION ONCE
OUTPATIENT
Start: 2025-03-10 | End: 2025-03-10

## 2025-03-10 RX ORDER — LIDOCAINE 40 MG/G
CREAM TOPICAL ONCE
OUTPATIENT
Start: 2025-03-10 | End: 2025-03-10

## 2025-03-10 RX ORDER — SILVER SULFADIAZINE 10 MG/G
CREAM TOPICAL ONCE
OUTPATIENT
Start: 2025-03-10 | End: 2025-03-10

## 2025-03-10 RX ORDER — LIDOCAINE HYDROCHLORIDE 40 MG/ML
SOLUTION TOPICAL ONCE
OUTPATIENT
Start: 2025-03-10 | End: 2025-03-10

## 2025-03-10 RX ORDER — NEOMYCIN/BACITRACIN/POLYMYXINB 3.5-400-5K
OINTMENT (GRAM) TOPICAL ONCE
OUTPATIENT
Start: 2025-03-10 | End: 2025-03-10

## 2025-03-10 RX ORDER — BETAMETHASONE DIPROPIONATE 0.5 MG/G
CREAM TOPICAL ONCE
OUTPATIENT
Start: 2025-03-10 | End: 2025-03-10

## 2025-03-10 NOTE — FLOWSHEET NOTE
03/10/25 0943   Wound 12/16/24 Leg Right   Date First Assessed/Time First Assessed: 12/16/24 1207   Primary Wound Type: Venous Ulcer  Location: Leg  Wound Location Orientation: Right   Wound Image    Wound Etiology Venous   Dressing Status Intact   Wound Cleansed Wound cleanser   Dressing/Treatment Alginate with Ag  (2 layer wrap)   Offloading for Diabetic Foot Ulcers Offloading not required   Dressing Change Due 03/17/25   Wound Length (cm) 0.2 cm   Wound Width (cm) 0.2 cm   Wound Depth (cm) 0.1 cm   Wound Surface Area (cm^2) 0.04 cm^2   Change in Wound Size % (l*w) 98.93   Wound Volume (cm^3) 0.004 cm^3   Wound Healing % 99   Wound Assessment Epithelialization;Pink/red   Drainage Amount Scant (moist but unmeasurable)   Drainage Description Serosanguinous   Odor None   Guillermina-wound Assessment Dry/flaky   Margins Defined edges   Wound Thickness Description not for Pressure Injury Full thickness     Multilayer Compression Wrap   (Not Unna) Below the Knee    NAME:  Malik Clarke  YOB: 1945  MEDICAL RECORD NUMBER:  [id number]  DATE:  3/10/2025    Multilayer compression wrap: Removed old Multilayer wrap if indicated and wash leg with mild soap/water.  Applied moisturizing agent to dry skin as needed.   Applied primary and secondary dressing as ordered.  Applied multilayered dressing below the knee to right lower leg.  Instructed patient/caregiver not to remove dressing and to keep it clean and dry.   Instructed patient/caregiver on complications to report to provider, such as pain, numbness in toes, heavy drainage, and slippage of dressing.  Instructed patient on purpose of compression dressing and on activity and exercise recommendations.      Electronically signed by MARY HUA RN on 3/10/2025 at 5:05 PM

## 2025-03-10 NOTE — PROGRESS NOTES
Wound Center  Progress Note / Procedure Note       Chief Complaint:  Malik Clarke is a 79 y.o.  male  with R lower leg anterior wound of few months duration.      Assessment/Plan     79 y.o. male with Dm2    -R lower leg anterior venous stasis ulcer.  Full thickness,   Smaller, hypergranulation improved  Full thickness      -Dm2  A1c 6.8  (9/2024)  Does not check at home  Controlled with metformin    -Leg swelling/varicosities  Has arthritis      Following discussed with patient   Needs :  Serial debridement- prn    Good local wound carD/C collagen  Frequency : once a week  coflex        -Edema management  Do not get dressing wet    Edema management:  Elevate leg(s) throughout the day starting in the morning  Compression: coflex  Avoid prolonged standing      -Good Diabetic control      Patient/  understood and agrees with plan. Questions answered.        Follow up with me 1 week      Subjective:     Since last visit  3/10 no new issues    2/24 has JOSE solisx, getting better    2/10 the weekend after Monday appt, had to go to ER due to ongoing bleeding from wound- bleeding had stopped by the time he got there  But did bleed through the week, noticed was leaving trails/pools of blood.    12/16 wounds opened up about 10 days after being discharged from here.  Has been compliant with compression.  Wounds were open when went to see ortho  Will be doing injections into knee for now      9/30  No issues, tolerated wrap  Going out of town next week    9/23/24  Has not been seen since  May  Apparently wound re-opened within few weeks of being discharged from here, sounds like he wore the velcro wraps without the sock that comes with it.  Thought it was looking better so did not come here, until wife called to make appointment after getting tired of seeing blood on sheets, etc.  Has neem traveling lot  Unclear what dressing he used if any  Also not been compliant with compression    5/20/24  No new issues    4/29 didn't

## 2025-03-17 ENCOUNTER — HOSPITAL ENCOUNTER (OUTPATIENT)
Facility: HOSPITAL | Age: 80
Discharge: HOME | End: 2025-03-17
Payer: MEDICARE

## 2025-03-17 VITALS
HEART RATE: 81 BPM | DIASTOLIC BLOOD PRESSURE: 72 MMHG | OXYGEN SATURATION: 98 % | RESPIRATION RATE: 18 BRPM | SYSTOLIC BLOOD PRESSURE: 139 MMHG | TEMPERATURE: 97.5 F

## 2025-03-17 DIAGNOSIS — I83.018 VARICOSE VEINS OF RIGHT LOWER EXTREMITY WITH ULCER OF OTHER PART OF LOWER LEG WITH FAT LAYER EXPOSED: ICD-10-CM

## 2025-03-17 DIAGNOSIS — L97.211 CALF ULCER, RIGHT, LIMITED TO BREAKDOWN OF SKIN (HCC): Primary | ICD-10-CM

## 2025-03-17 DIAGNOSIS — L97.812 VARICOSE VEINS OF RIGHT LOWER EXTREMITY WITH ULCER OF OTHER PART OF LOWER LEG WITH FAT LAYER EXPOSED: ICD-10-CM

## 2025-03-17 DIAGNOSIS — L97.212 CALF ULCER, RIGHT, WITH FAT LAYER EXPOSED (HCC): ICD-10-CM

## 2025-03-17 PROCEDURE — 11042 DBRDMT SUBQ TIS 1ST 20SQCM/<: CPT

## 2025-03-17 PROCEDURE — 6370000000 HC RX 637 (ALT 250 FOR IP): Performed by: FAMILY MEDICINE

## 2025-03-17 RX ORDER — LIDOCAINE 50 MG/G
OINTMENT TOPICAL ONCE
OUTPATIENT
Start: 2025-03-17 | End: 2025-03-17

## 2025-03-17 RX ORDER — BACITRACIN ZINC AND POLYMYXIN B SULFATE 500; 1000 [USP'U]/G; [USP'U]/G
OINTMENT TOPICAL ONCE
OUTPATIENT
Start: 2025-03-17 | End: 2025-03-17

## 2025-03-17 RX ORDER — MUPIROCIN 20 MG/G
OINTMENT TOPICAL ONCE
OUTPATIENT
Start: 2025-03-17 | End: 2025-03-17

## 2025-03-17 RX ORDER — SODIUM CHLOR/HYPOCHLOROUS ACID 0.033 %
SOLUTION, IRRIGATION IRRIGATION ONCE
OUTPATIENT
Start: 2025-03-17 | End: 2025-03-17

## 2025-03-17 RX ORDER — GINSENG 100 MG
CAPSULE ORAL ONCE
OUTPATIENT
Start: 2025-03-17 | End: 2025-03-17

## 2025-03-17 RX ORDER — LIDOCAINE HYDROCHLORIDE 20 MG/ML
JELLY TOPICAL ONCE
Status: COMPLETED | OUTPATIENT
Start: 2025-03-17 | End: 2025-03-17

## 2025-03-17 RX ORDER — GENTAMICIN SULFATE 1 MG/G
OINTMENT TOPICAL ONCE
OUTPATIENT
Start: 2025-03-17 | End: 2025-03-17

## 2025-03-17 RX ORDER — CLOBETASOL PROPIONATE 0.5 MG/G
OINTMENT TOPICAL ONCE
OUTPATIENT
Start: 2025-03-17 | End: 2025-03-17

## 2025-03-17 RX ORDER — TRIAMCINOLONE ACETONIDE 1 MG/G
OINTMENT TOPICAL ONCE
OUTPATIENT
Start: 2025-03-17 | End: 2025-03-17

## 2025-03-17 RX ORDER — LIDOCAINE HYDROCHLORIDE 20 MG/ML
JELLY TOPICAL ONCE
OUTPATIENT
Start: 2025-03-17 | End: 2025-03-17

## 2025-03-17 RX ORDER — LIDOCAINE HYDROCHLORIDE 40 MG/ML
SOLUTION TOPICAL ONCE
OUTPATIENT
Start: 2025-03-17 | End: 2025-03-17

## 2025-03-17 RX ORDER — NEOMYCIN/BACITRACIN/POLYMYXINB 3.5-400-5K
OINTMENT (GRAM) TOPICAL ONCE
OUTPATIENT
Start: 2025-03-17 | End: 2025-03-17

## 2025-03-17 RX ORDER — SILVER SULFADIAZINE 10 MG/G
CREAM TOPICAL ONCE
OUTPATIENT
Start: 2025-03-17 | End: 2025-03-17

## 2025-03-17 RX ORDER — BETAMETHASONE DIPROPIONATE 0.5 MG/G
CREAM TOPICAL ONCE
OUTPATIENT
Start: 2025-03-17 | End: 2025-03-17

## 2025-03-17 RX ORDER — LIDOCAINE 40 MG/G
CREAM TOPICAL ONCE
OUTPATIENT
Start: 2025-03-17 | End: 2025-03-17

## 2025-03-17 RX ADMIN — LIDOCAINE HYDROCHLORIDE: 20 JELLY TOPICAL at 09:15

## 2025-03-17 NOTE — FLOWSHEET NOTE
03/17/25 0904   Wound 12/16/24 Leg Right   Date First Assessed/Time First Assessed: 12/16/24 1207   Primary Wound Type: Venous Ulcer  Location: Leg  Wound Location Orientation: Right   Wound Image    Wound Etiology Venous   Dressing Status Intact   Wound Cleansed Wound cleanser   Dressing/Treatment Zinc paste;Dry dressing;Other (comment)  (2 layer wrap)   Offloading for Diabetic Foot Ulcers Offloading not required   Dressing Change Due 03/24/25   Wound Length (cm) 0.1 cm   Wound Width (cm) 0.1 cm   Wound Depth (cm) 0 cm   Wound Surface Area (cm^2) 0.01 cm^2   Change in Wound Size % (l*w) 99.73   Wound Volume (cm^3) 0 cm^3   Wound Healing % 100   Wound Assessment Eschar dry   Drainage Amount Scant (moist but unmeasurable)   Drainage Description Serosanguinous   Odor None   Guillermina-wound Assessment Dry/flaky   Margins Attached edges   Wound Thickness Description not for Pressure Injury Full thickness   Wound Follow Up   Require Follow Up Yes     Multilayer Compression Wrap   (Not Unna) Below the Knee    NAME:  Malik Clarke  YOB: 1945  MEDICAL RECORD NUMBER:  [id number]  DATE:  3/17/2025    Multilayer compression wrap: Removed old Multilayer wrap if indicated and wash leg with mild soap/water.  Applied moisturizing agent to dry skin as needed.   Applied primary and secondary dressing as ordered.  Applied multilayered dressing below the knee to right lower leg.  Instructed patient/caregiver not to remove dressing and to keep it clean and dry.   Instructed patient/caregiver on complications to report to provider, such as pain, numbness in toes, heavy drainage, and slippage of dressing.  Instructed patient on purpose of compression dressing and on activity and exercise recommendations.      Electronically signed by Rafaela Barton RN on 3/17/2025 at 9:37 AM

## 2025-03-17 NOTE — DISCHARGE INSTRUCTIONS
Activity as tolerated:  [] Patient has no activity restrictions     [] Strict Bedrest: [] Remain off Work:     [] May return to full duty work:                                   [] Return to work with restrictions:             Return Appointment:  [] Wound and dressing supply provider:   [] ECF or Home Healthcare:  [] Wound Assessment: [] Physician or NP scheduled for Wound Assessment:   [x] Return Appointment: Nurse visit  in  1 Week(s)  [] Ordered tests:      Electronically signed Rafaela Barton RN on 3/17/2025 at 9:35 AM     Wound Care Center Information: Should you experience any significant changes in your wound(s) or have questions about your wound care, please contact the [facility] at MONDAY - FRIDAY 8:00 am - 4:30.  If you need help with your wound outside these hours and cannot wait until we are again available, contact your PCP or go to the hospital emergency room.     PLEASE NOTE: IF YOU ARE UNABLE TO OBTAIN WOUND SUPPLIES, CONTINUE TO USE THE SUPPLIES YOU HAVE AVAILABLE UNTIL YOU ARE ABLE TO REACH US. IT IS MOST IMPORTANT TO KEEP THE WOUND COVERED AT ALL TIMES.

## 2025-03-17 NOTE — PROGRESS NOTES
required 03/17/25 0904   Dressing Change Due 03/24/25 03/17/25 0904   Wound Length (cm) 0.1 cm 03/17/25 0904   Wound Width (cm) 0.1 cm 03/17/25 0904   Wound Depth (cm) 0 cm 03/17/25 0904   Wound Surface Area (cm^2) 0.01 cm^2 03/17/25 0904   Change in Wound Size % (l*w) 99.73 03/17/25 0904   Wound Volume (cm^3) 0 cm^3 03/17/25 0904   Wound Healing % 100 03/17/25 0904   Post-Procedure Length (cm) 1.2 cm 02/24/25 0938   Post-Procedure Width (cm) 0.6 cm 02/24/25 0938   Post-Procedure Depth (cm) 0.2 cm 02/24/25 0938   Post-Procedure Surface Area (cm^2) 0.72 cm^2 02/24/25 0938   Post-Procedure Volume (cm^3) 0.144 cm^3 02/24/25 0938   Wound Assessment Eschar dry 03/17/25 0904   Drainage Amount Scant (moist but unmeasurable) 03/17/25 0904   Drainage Description Serosanguinous 03/17/25 0904   Odor None 03/17/25 0904   Guillermina-wound Assessment Dry/flaky 03/17/25 0904   Margins Attached edges 03/17/25 0904   Wound Thickness Description not for Pressure Injury Full thickness 03/17/25 0904   Number of days: 90        -------    Past Medical History:   Diagnosis Date    A-fib ([facility])     Cardio Dr. Cleaning    Advance directive discussed with patient 04/06/2023    pt states he will bring DOS    Arthritis     Cancer (HCC) 2021    melanoma removed from nose and face, pt denies chemo or readiation    Chronic kidney disease     kidney stones    Coagulation disorder     excess iron; erthythrocytosis; hemochromatosis- [facility] yearly exams    Diabetes ([facility])     PcP  manages    Enlarged prostate     managed by Dr. Cole    Exercise tolerance finding     pt states able to climb 2 flights stairs without CP or SOB    Hypertension 2007    cardio Dr. Cleaning    Ill-defined condition     ulcer - shin- Started around Christmas 2022- followed by [facility]    Kidney stones     Dr. Cole    Wears glasses 04/06/2023     Past Surgical History:   Procedure Laterality Date    BLADDER SURGERY  2017    urolift    CHOLECYSTECTOMY  07/2017

## 2025-03-24 ENCOUNTER — HOSPITAL ENCOUNTER (OUTPATIENT)
Facility: HOSPITAL | Age: 80
Discharge: HOME | End: 2025-03-24
Payer: MEDICARE

## 2025-03-24 VITALS
OXYGEN SATURATION: 97 % | HEART RATE: 85 BPM | DIASTOLIC BLOOD PRESSURE: 60 MMHG | RESPIRATION RATE: 18 BRPM | SYSTOLIC BLOOD PRESSURE: 120 MMHG | TEMPERATURE: 97.5 F

## 2025-03-24 PROCEDURE — 29581 APPL MULTLAYER CMPRN SYS LEG: CPT

## 2025-03-24 NOTE — FLOWSHEET NOTE
03/24/25 0900   Wound 12/16/24 Leg Right   Date First Assessed/Time First Assessed: 12/16/24 1207   Primary Wound Type: Venous Ulcer  Location: Leg  Wound Location Orientation: Right   Wound Image    Wound Etiology Venous   Dressing Status Intact   Wound Cleansed Wound cleanser   Dressing/Treatment Zinc paste;Dry dressing;Roll gauze  (2 layer wrap)   Offloading for Diabetic Foot Ulcers Offloading not required   Dressing Change Due 04/07/25   Wound Length (cm) 0.2 cm   Wound Width (cm) 0.2 cm   Wound Depth (cm) 0.1 cm   Wound Surface Area (cm^2) 0.04 cm^2   Change in Wound Size % (l*w) 98.93   Wound Volume (cm^3) 0.004 cm^3   Wound Healing % 99   Wound Assessment Epithelialization;Pink/red   Drainage Amount Scant (moist but unmeasurable)   Drainage Description Serosanguinous   Odor None   Guillermina-wound Assessment Dry/flaky   Margins Defined edges   Wound Thickness Description not for Pressure Injury Full thickness     Multilayer Compression Wrap   (Not Unna) Below the Knee    NAME:  Malik Clarke  YOB: 1945  MEDICAL RECORD NUMBER:  [id number]  DATE:  3/24/2025    Multilayer compression wrap: Removed old Multilayer wrap if indicated and wash leg with mild soap/water.  Applied moisturizing agent to dry skin as needed.   Applied primary and secondary dressing as ordered.  Applied multilayered dressing below the knee to right lower leg.  Instructed patient/caregiver not to remove dressing and to keep it clean and dry.   Instructed patient/caregiver on complications to report to provider, such as pain, numbness in toes, heavy drainage, and slippage of dressing.  Instructed patient on purpose of compression dressing and on activity and exercise recommendations.      Electronically signed by MARY HUA RN on 3/24/2025 at 11:13 AM

## 2025-04-07 ENCOUNTER — HOSPITAL ENCOUNTER (OUTPATIENT)
Facility: HOSPITAL | Age: 80
Discharge: HOME | End: 2025-04-07
Payer: MEDICARE

## 2025-04-07 VITALS
TEMPERATURE: 97.5 F | HEART RATE: 87 BPM | DIASTOLIC BLOOD PRESSURE: 65 MMHG | RESPIRATION RATE: 18 BRPM | OXYGEN SATURATION: 99 % | SYSTOLIC BLOOD PRESSURE: 152 MMHG

## 2025-04-07 DIAGNOSIS — L97.212 CALF ULCER, RIGHT, WITH FAT LAYER EXPOSED (HCC): ICD-10-CM

## 2025-04-07 DIAGNOSIS — L97.211 CALF ULCER, RIGHT, LIMITED TO BREAKDOWN OF SKIN (HCC): Primary | ICD-10-CM

## 2025-04-07 DIAGNOSIS — I83.018 VARICOSE VEINS OF RIGHT LOWER EXTREMITY WITH ULCER OF OTHER PART OF LOWER LEG WITH FAT LAYER EXPOSED: ICD-10-CM

## 2025-04-07 DIAGNOSIS — L97.812 VARICOSE VEINS OF RIGHT LOWER EXTREMITY WITH ULCER OF OTHER PART OF LOWER LEG WITH FAT LAYER EXPOSED: ICD-10-CM

## 2025-04-07 PROCEDURE — 29581 APPL MULTLAYER CMPRN SYS LEG: CPT

## 2025-04-07 RX ORDER — LIDOCAINE HYDROCHLORIDE 20 MG/ML
JELLY TOPICAL ONCE
OUTPATIENT
Start: 2025-04-07 | End: 2025-04-07

## 2025-04-07 RX ORDER — GINSENG 100 MG
CAPSULE ORAL ONCE
OUTPATIENT
Start: 2025-04-07 | End: 2025-04-07

## 2025-04-07 RX ORDER — NEOMYCIN/BACITRACIN/POLYMYXINB 3.5-400-5K
OINTMENT (GRAM) TOPICAL ONCE
OUTPATIENT
Start: 2025-04-07 | End: 2025-04-07

## 2025-04-07 RX ORDER — TRIAMCINOLONE ACETONIDE 1 MG/G
OINTMENT TOPICAL ONCE
OUTPATIENT
Start: 2025-04-07 | End: 2025-04-07

## 2025-04-07 RX ORDER — LIDOCAINE 50 MG/G
OINTMENT TOPICAL ONCE
OUTPATIENT
Start: 2025-04-07 | End: 2025-04-07

## 2025-04-07 RX ORDER — LIDOCAINE 40 MG/G
CREAM TOPICAL ONCE
OUTPATIENT
Start: 2025-04-07 | End: 2025-04-07

## 2025-04-07 RX ORDER — MUPIROCIN 20 MG/G
OINTMENT TOPICAL ONCE
OUTPATIENT
Start: 2025-04-07 | End: 2025-04-07

## 2025-04-07 RX ORDER — BETAMETHASONE DIPROPIONATE 0.5 MG/G
CREAM TOPICAL ONCE
OUTPATIENT
Start: 2025-04-07 | End: 2025-04-07

## 2025-04-07 RX ORDER — BACITRACIN ZINC AND POLYMYXIN B SULFATE 500; 1000 [USP'U]/G; [USP'U]/G
OINTMENT TOPICAL ONCE
OUTPATIENT
Start: 2025-04-07 | End: 2025-04-07

## 2025-04-07 RX ORDER — CLOBETASOL PROPIONATE 0.5 MG/G
OINTMENT TOPICAL ONCE
OUTPATIENT
Start: 2025-04-07 | End: 2025-04-07

## 2025-04-07 RX ORDER — LIDOCAINE HYDROCHLORIDE 40 MG/ML
SOLUTION TOPICAL ONCE
OUTPATIENT
Start: 2025-04-07 | End: 2025-04-07

## 2025-04-07 RX ORDER — SODIUM CHLOR/HYPOCHLOROUS ACID 0.033 %
SOLUTION, IRRIGATION IRRIGATION ONCE
OUTPATIENT
Start: 2025-04-07 | End: 2025-04-07

## 2025-04-07 RX ORDER — SILVER SULFADIAZINE 10 MG/G
CREAM TOPICAL ONCE
OUTPATIENT
Start: 2025-04-07 | End: 2025-04-07

## 2025-04-07 RX ORDER — GENTAMICIN SULFATE 1 MG/G
OINTMENT TOPICAL ONCE
OUTPATIENT
Start: 2025-04-07 | End: 2025-04-07

## 2025-04-07 NOTE — PROGRESS NOTES
required 03/24/25 0900   Dressing Change Due 04/07/25 03/24/25 0900   Wound Length (cm) 0.2 cm 03/24/25 0900   Wound Width (cm) 0.2 cm 03/24/25 0900   Wound Depth (cm) 0.1 cm 03/24/25 0900   Wound Surface Area (cm^2) 0.04 cm^2 03/24/25 0900   Change in Wound Size % (l*w) 98.93 03/24/25 0900   Wound Volume (cm^3) 0.004 cm^3 03/24/25 0900   Wound Healing % 99 03/24/25 0900   Wound Assessment Epithelialization;Pink/red 03/24/25 0900   Drainage Amount Scant (moist but unmeasurable) 03/24/25 0900   Drainage Description Serosanguinous 03/24/25 0900   Odor None 03/24/25 0900   Guillermina-wound Assessment Dry/flaky 03/24/25 0900   Margins Defined edges 03/24/25 0900   Wound Thickness Description not for Pressure Injury Full thickness 03/24/25 0900   Number of days: 112        -------    Past Medical History:   Diagnosis Date    A-fib ([facility])     Cardio Dr. Cleaning    Advance directive discussed with patient 04/06/2023    pt states he will bring DOS    Arthritis     Cancer (HCC) 2021    melanoma removed from nose and face, pt denies chemo or readiation    Chronic kidney disease     kidney stones    Coagulation disorder     excess iron; erthythrocytosis; hemochromatosis- [facility] yearly exams    Diabetes ([facility])     PcP  manages    Enlarged prostate     managed by Dr. Cole    Exercise tolerance finding     pt states able to climb 2 flights stairs without CP or SOB    Hypertension 2007    cardio Dr. Cleaning    Ill-defined condition     ulcer - shin- Started around Christmas 2022- followed by [facility]    Kidney stones     Dr. Cole    Wears glasses 04/06/2023     Past Surgical History:   Procedure Laterality Date    BLADDER SURGERY  2017    urolift    CHOLECYSTECTOMY  07/2017    COLONOSCOPY  2021 2020, hx polyps    ORTHOPEDIC SURGERY Right 2007    r shoulder replacement    SHOULDER SURGERY Left 2022    replacement    TONSILLECTOMY      TOTAL KNEE ARTHROPLASTY Left 5/18/2023    LEFT TOTAL KNEE ARTHROPLASTY (SPEC

## 2025-04-07 NOTE — FLOWSHEET NOTE
04/07/25 1327   Wound 12/16/24 Leg Right   Date First Assessed/Time First Assessed: 12/16/24 1207   Primary Wound Type: Venous Ulcer  Location: Leg  Wound Location Orientation: Right   Wound Image    Wound Etiology Venous   Dressing Status Intact   Wound Cleansed Wound cleanser   Dressing/Treatment Alginate;Dry dressing;Zinc paste;Roll gauze  (2 layer wrap)   Offloading for Diabetic Foot Ulcers Offloading not required   Dressing Change Due 04/14/25   Wound Length (cm) 2 cm   Wound Width (cm) 1.8 cm   Wound Depth (cm) 0.1 cm   Wound Surface Area (cm^2) 3.6 cm^2   Change in Wound Size % (l*w) 3.74   Wound Volume (cm^3) 0.36 cm^3   Wound Healing % 4   Wound Assessment Pink/red   Drainage Amount Small (< 25%)   Drainage Description Serosanguinous   Odor None   Guillermina-wound Assessment Dry/flaky   Margins Defined edges   Wound Thickness Description not for Pressure Injury Full thickness

## 2025-04-14 ENCOUNTER — HOSPITAL ENCOUNTER (OUTPATIENT)
Facility: HOSPITAL | Age: 80
Discharge: HOME | End: 2025-04-14
Payer: MEDICARE

## 2025-04-14 VITALS
RESPIRATION RATE: 18 BRPM | HEART RATE: 87 BPM | DIASTOLIC BLOOD PRESSURE: 73 MMHG | SYSTOLIC BLOOD PRESSURE: 132 MMHG | TEMPERATURE: 97.5 F | OXYGEN SATURATION: 98 %

## 2025-04-14 DIAGNOSIS — L97.211 CALF ULCER, RIGHT, LIMITED TO BREAKDOWN OF SKIN (HCC): Primary | ICD-10-CM

## 2025-04-14 DIAGNOSIS — I83.018 VARICOSE VEINS OF RIGHT LOWER EXTREMITY WITH ULCER OF OTHER PART OF LOWER LEG WITH FAT LAYER EXPOSED: ICD-10-CM

## 2025-04-14 DIAGNOSIS — L97.812 VARICOSE VEINS OF RIGHT LOWER EXTREMITY WITH ULCER OF OTHER PART OF LOWER LEG WITH FAT LAYER EXPOSED: ICD-10-CM

## 2025-04-14 DIAGNOSIS — L97.212 CALF ULCER, RIGHT, WITH FAT LAYER EXPOSED (HCC): ICD-10-CM

## 2025-04-14 PROCEDURE — 11042 DBRDMT SUBQ TIS 1ST 20SQCM/<: CPT

## 2025-04-14 PROCEDURE — 6370000000 HC RX 637 (ALT 250 FOR IP): Performed by: FAMILY MEDICINE

## 2025-04-14 RX ORDER — LIDOCAINE 50 MG/G
OINTMENT TOPICAL ONCE
OUTPATIENT
Start: 2025-04-14 | End: 2025-04-14

## 2025-04-14 RX ORDER — MUPIROCIN 20 MG/G
OINTMENT TOPICAL ONCE
OUTPATIENT
Start: 2025-04-14 | End: 2025-04-14

## 2025-04-14 RX ORDER — SILVER SULFADIAZINE 10 MG/G
CREAM TOPICAL ONCE
OUTPATIENT
Start: 2025-04-14 | End: 2025-04-14

## 2025-04-14 RX ORDER — LIDOCAINE HYDROCHLORIDE 40 MG/ML
SOLUTION TOPICAL ONCE
OUTPATIENT
Start: 2025-04-14 | End: 2025-04-14

## 2025-04-14 RX ORDER — LIDOCAINE HYDROCHLORIDE 20 MG/ML
JELLY TOPICAL ONCE
Status: COMPLETED | OUTPATIENT
Start: 2025-04-14 | End: 2025-04-14

## 2025-04-14 RX ORDER — LIDOCAINE HYDROCHLORIDE 20 MG/ML
JELLY TOPICAL ONCE
OUTPATIENT
Start: 2025-04-14 | End: 2025-04-14

## 2025-04-14 RX ORDER — BACITRACIN ZINC AND POLYMYXIN B SULFATE 500; 1000 [USP'U]/G; [USP'U]/G
OINTMENT TOPICAL ONCE
OUTPATIENT
Start: 2025-04-14 | End: 2025-04-14

## 2025-04-14 RX ORDER — BETAMETHASONE DIPROPIONATE 0.5 MG/G
CREAM TOPICAL ONCE
OUTPATIENT
Start: 2025-04-14 | End: 2025-04-14

## 2025-04-14 RX ORDER — TRIAMCINOLONE ACETONIDE 1 MG/G
OINTMENT TOPICAL ONCE
OUTPATIENT
Start: 2025-04-14 | End: 2025-04-14

## 2025-04-14 RX ORDER — SODIUM CHLOR/HYPOCHLOROUS ACID 0.033 %
SOLUTION, IRRIGATION IRRIGATION ONCE
OUTPATIENT
Start: 2025-04-14 | End: 2025-04-14

## 2025-04-14 RX ORDER — GINSENG 100 MG
CAPSULE ORAL ONCE
OUTPATIENT
Start: 2025-04-14 | End: 2025-04-14

## 2025-04-14 RX ORDER — LIDOCAINE 40 MG/G
CREAM TOPICAL ONCE
OUTPATIENT
Start: 2025-04-14 | End: 2025-04-14

## 2025-04-14 RX ORDER — CLOBETASOL PROPIONATE 0.5 MG/G
OINTMENT TOPICAL ONCE
OUTPATIENT
Start: 2025-04-14 | End: 2025-04-14

## 2025-04-14 RX ORDER — GENTAMICIN SULFATE 1 MG/G
OINTMENT TOPICAL ONCE
OUTPATIENT
Start: 2025-04-14 | End: 2025-04-14

## 2025-04-14 RX ORDER — NEOMYCIN/BACITRACIN/POLYMYXINB 3.5-400-5K
OINTMENT (GRAM) TOPICAL ONCE
OUTPATIENT
Start: 2025-04-14 | End: 2025-04-14

## 2025-04-14 RX ADMIN — LIDOCAINE HYDROCHLORIDE: 20 JELLY TOPICAL at 09:08

## 2025-04-14 NOTE — PROGRESS NOTES
Wound Center  Progress Note / Procedure Note       Chief Complaint:  Malik Clarke is a 79 y.o.  male  with R lower leg anterior wound of few months duration.      Assessment/Plan     79 y.o. male with Dm2    -R lower leg anterior venous stasis ulcer.  Full thickness,   Re-opened  Mostly granular, hypergranular , scant slough  Necessitates debridement  for wound healing and to prevent/heal infection  Ulcer needs debridement- see note below          -Dm2  A1c 6.8  (9/2024)  Does not check at home  Controlled with metformin    -Leg swelling/varicosities  Has arthritis      Following discussed with patient   Needs :  Serial debridement- prn    Good local wound car  See discharge notes  Frequency : once a week        -Edema management  Do not get dressing wet    Edema management:  Elevate leg(s) throughout the day starting in the morning  Compression: coflex today  Avoid prolonged standing      -Good Diabetic control      Patient/  understood and agrees with plan. Questions answered.        Follow up with 1  week       Subjective:     Since last visit  4/14 no new issues    4/7 went to beach for a week, did not elevate as much    3/17 no issues    3/10 no new issues    2/24 has URI sx, getting better    2/10 the weekend after Monday appt, had to go to ER due to ongoing bleeding from wound- bleeding had stopped by the time he got there  But did bleed through the week, noticed was leaving trails/pools of blood.    12/16 wounds opened up about 10 days after being discharged from here.  Has been compliant with compression.  Wounds were open when went to see ortho  Will be doing injections into knee for now      9/30  No issues, tolerated wrap  Going out of town next week    9/23/24  Has not been seen since  May  Apparently wound re-opened within few weeks of being discharged from here, sounds like he wore the velcro wraps without the sock that comes with it.  Thought it was looking better so did not come here, until

## 2025-04-14 NOTE — FLOWSHEET NOTE
04/14/25 0853   Wound 12/16/24 Leg Right   Date First Assessed/Time First Assessed: 12/16/24 1207   Primary Wound Type: Venous Ulcer  Location: Leg  Wound Location Orientation: Right   Wound Image     Wound Etiology Venous   Dressing Status Intact   Wound Cleansed Wound cleanser   Dressing/Treatment Alginate;Dry dressing;Zinc paste;Roll gauze  (2 layer wrap)   Offloading for Diabetic Foot Ulcers Offloading not required   Dressing Change Due 04/21/25   Wound Length (cm) 1.5 cm   Wound Width (cm) 1.4 cm   Wound Depth (cm) 0.1 cm   Wound Surface Area (cm^2) 2.1 cm^2   Change in Wound Size % (l*w) 43.85   Wound Volume (cm^3) 0.21 cm^3   Wound Healing % 44   Post-Procedure Length (cm) 1.5 cm   Post-Procedure Width (cm) 1.4 cm   Post-Procedure Depth (cm) 0.2 cm   Post-Procedure Surface Area (cm^2) 2.1 cm^2   Post-Procedure Volume (cm^3) 0.42 cm^3   Wound Assessment Pink/red   Drainage Amount Small (< 25%)   Drainage Description Serosanguinous   Odor None   Guillermina-wound Assessment Dry/flaky;Fragile   Margins Defined edges   Wound Thickness Description not for Pressure Injury Full thickness       Multilayer Compression Wrap   (Not Unna) Below the Knee    NAME:  Malik Clarke  YOB: 1945  MEDICAL RECORD NUMBER:  [id number]  DATE:  4/14/2025    Multilayer compression wrap: Removed old Multilayer wrap if indicated and wash leg with mild soap/water.  Applied primary and secondary dressing as ordered.  Applied multilayered dressing below the knee to right lower leg.  Instructed patient/caregiver not to remove dressing and to keep it clean and dry.   Instructed patient/caregiver on complications to report to provider, such as pain, numbness in toes, heavy drainage, and slippage of dressing.  Instructed patient on purpose of compression dressing and on activity and exercise recommendations.      Electronically signed by MARY HUA RN on 4/14/2025 at 9:22 AM

## 2025-04-21 ENCOUNTER — HOSPITAL ENCOUNTER (OUTPATIENT)
Facility: HOSPITAL | Age: 80
Discharge: HOME | End: 2025-04-21
Payer: MEDICARE

## 2025-04-21 VITALS
RESPIRATION RATE: 18 BRPM | HEART RATE: 79 BPM | OXYGEN SATURATION: 98 % | SYSTOLIC BLOOD PRESSURE: 122 MMHG | DIASTOLIC BLOOD PRESSURE: 72 MMHG | TEMPERATURE: 98.1 F

## 2025-04-21 DIAGNOSIS — L97.212 CALF ULCER, RIGHT, WITH FAT LAYER EXPOSED (HCC): ICD-10-CM

## 2025-04-21 DIAGNOSIS — L97.211 CALF ULCER, RIGHT, LIMITED TO BREAKDOWN OF SKIN (HCC): Primary | ICD-10-CM

## 2025-04-21 DIAGNOSIS — L97.812 VARICOSE VEINS OF RIGHT LOWER EXTREMITY WITH ULCER OF OTHER PART OF LOWER LEG WITH FAT LAYER EXPOSED (HCC): ICD-10-CM

## 2025-04-21 DIAGNOSIS — I83.018 VARICOSE VEINS OF RIGHT LOWER EXTREMITY WITH ULCER OF OTHER PART OF LOWER LEG WITH FAT LAYER EXPOSED (HCC): ICD-10-CM

## 2025-04-21 PROCEDURE — 11042 DBRDMT SUBQ TIS 1ST 20SQCM/<: CPT

## 2025-04-21 PROCEDURE — 6370000000 HC RX 637 (ALT 250 FOR IP): Performed by: FAMILY MEDICINE

## 2025-04-21 RX ORDER — SODIUM CHLOR/HYPOCHLOROUS ACID 0.033 %
SOLUTION, IRRIGATION IRRIGATION ONCE
OUTPATIENT
Start: 2025-04-21 | End: 2025-04-21

## 2025-04-21 RX ORDER — CLOBETASOL PROPIONATE 0.5 MG/G
OINTMENT TOPICAL ONCE
OUTPATIENT
Start: 2025-04-21 | End: 2025-04-21

## 2025-04-21 RX ORDER — BETAMETHASONE DIPROPIONATE 0.5 MG/G
CREAM TOPICAL ONCE
OUTPATIENT
Start: 2025-04-21 | End: 2025-04-21

## 2025-04-21 RX ORDER — LIDOCAINE 50 MG/G
OINTMENT TOPICAL ONCE
OUTPATIENT
Start: 2025-04-21 | End: 2025-04-21

## 2025-04-21 RX ORDER — GENTAMICIN SULFATE 1 MG/G
OINTMENT TOPICAL ONCE
OUTPATIENT
Start: 2025-04-21 | End: 2025-04-21

## 2025-04-21 RX ORDER — MUPIROCIN 20 MG/G
OINTMENT TOPICAL ONCE
OUTPATIENT
Start: 2025-04-21 | End: 2025-04-21

## 2025-04-21 RX ORDER — LIDOCAINE HYDROCHLORIDE 40 MG/ML
SOLUTION TOPICAL ONCE
OUTPATIENT
Start: 2025-04-21 | End: 2025-04-21

## 2025-04-21 RX ORDER — LIDOCAINE HYDROCHLORIDE 20 MG/ML
JELLY TOPICAL ONCE
Status: COMPLETED | OUTPATIENT
Start: 2025-04-21 | End: 2025-04-21

## 2025-04-21 RX ORDER — LIDOCAINE HYDROCHLORIDE 20 MG/ML
JELLY TOPICAL ONCE
OUTPATIENT
Start: 2025-04-21 | End: 2025-04-21

## 2025-04-21 RX ORDER — TRIAMCINOLONE ACETONIDE 1 MG/G
OINTMENT TOPICAL ONCE
OUTPATIENT
Start: 2025-04-21 | End: 2025-04-21

## 2025-04-21 RX ORDER — BACITRACIN ZINC AND POLYMYXIN B SULFATE 500; 1000 [USP'U]/G; [USP'U]/G
OINTMENT TOPICAL ONCE
OUTPATIENT
Start: 2025-04-21 | End: 2025-04-21

## 2025-04-21 RX ORDER — LIDOCAINE 40 MG/G
CREAM TOPICAL ONCE
OUTPATIENT
Start: 2025-04-21 | End: 2025-04-21

## 2025-04-21 RX ORDER — SILVER SULFADIAZINE 10 MG/G
CREAM TOPICAL ONCE
OUTPATIENT
Start: 2025-04-21 | End: 2025-04-21

## 2025-04-21 RX ORDER — NEOMYCIN/BACITRACIN/POLYMYXINB 3.5-400-5K
OINTMENT (GRAM) TOPICAL ONCE
OUTPATIENT
Start: 2025-04-21 | End: 2025-04-21

## 2025-04-21 RX ORDER — GINSENG 100 MG
CAPSULE ORAL ONCE
OUTPATIENT
Start: 2025-04-21 | End: 2025-04-21

## 2025-04-21 RX ADMIN — LIDOCAINE HYDROCHLORIDE: 20 JELLY TOPICAL at 09:55

## 2025-04-21 ASSESSMENT — PAIN SCALES - GENERAL: PAINLEVEL_OUTOF10: 0

## 2025-04-21 NOTE — DISCHARGE INSTRUCTIONS
Compression Wraps Discharge Instructions   Location: Left Leg  Type: 2 layer wrap    Your doctor has ordered compression therapy for your wound. Compression bandages reduce the swelling, or edema, in your legs and prevent it from returning. The wound care staff will apply your compression wrap. It must be removed and re-applied at least weekly. As the swelling decreases, the boot no longer provides adequate compression and you need a new one. Once applied, you need to know how to take care of your compression wrap.     The boot must stay dry. Do not get it wet in the shower or tub. You may do a partial bath, or you can cover the boot with a large plastic bag, secured at the top, so that no water can get in.    Avoid standing in one place for long periods of time. If you must  one place, shift your weight and change positions often.    If you have CHF, consult your doctor before following the next two recommendations for leg elevation.     When sitting, elevate your legs on pillows, or put blocks under the foot of your bed. Your legs should be higher than your heart.    If your boot becomes painful, or you notice an increase in swelling in your toes, numbness or tingling, or purple color to your toes, remove the wrap and call the Wound Care Center. If it is after hours, call your doctor for instructions or go to the nearest emergency room.   What Is The Reason For Today's Visit?: Full Body Skin Examination with No Concerns What Is The Reason For Today's Visit? (Being Monitored For X): concerning skin lesions on an annual basis

## 2025-04-28 ENCOUNTER — HOSPITAL ENCOUNTER (OUTPATIENT)
Facility: HOSPITAL | Age: 80
Discharge: HOME | End: 2025-04-28
Payer: MEDICARE

## 2025-04-28 VITALS
DIASTOLIC BLOOD PRESSURE: 73 MMHG | HEART RATE: 84 BPM | RESPIRATION RATE: 18 BRPM | TEMPERATURE: 97.7 F | SYSTOLIC BLOOD PRESSURE: 124 MMHG

## 2025-04-28 DIAGNOSIS — I83.018 VARICOSE VEINS OF RIGHT LOWER EXTREMITY WITH ULCER OF OTHER PART OF LOWER LEG WITH FAT LAYER EXPOSED (HCC): ICD-10-CM

## 2025-04-28 DIAGNOSIS — L97.212 CALF ULCER, RIGHT, WITH FAT LAYER EXPOSED (HCC): Primary | ICD-10-CM

## 2025-04-28 DIAGNOSIS — L97.211 CALF ULCER, RIGHT, LIMITED TO BREAKDOWN OF SKIN (HCC): ICD-10-CM

## 2025-04-28 DIAGNOSIS — L97.812 VARICOSE VEINS OF RIGHT LOWER EXTREMITY WITH ULCER OF OTHER PART OF LOWER LEG WITH FAT LAYER EXPOSED (HCC): ICD-10-CM

## 2025-04-28 PROCEDURE — 11042 DBRDMT SUBQ TIS 1ST 20SQCM/<: CPT

## 2025-04-28 NOTE — FLOWSHEET NOTE
04/28/25 0937   Wound 12/16/24 Leg Right   Date First Assessed/Time First Assessed: 12/16/24 1207   Primary Wound Type: Venous Ulcer  Location: Leg  Wound Location Orientation: Right   Wound Image     Wound Etiology Venous   Dressing Status Intact   Wound Cleansed Wound cleanser   Dressing/Treatment Non adherent;Alginate with Ag  (2 layer wrap)   Offloading for Diabetic Foot Ulcers Offloading not required   Dressing Change Due 05/12/25   Wound Length (cm) 1 cm  (dressing adhered to wound bed)   Wound Width (cm) 1 cm   Wound Depth (cm) 0.1 cm   Wound Surface Area (cm^2) 1 cm^2   Change in Wound Size % (l*w) 73.26   Wound Volume (cm^3) 0.1 cm^3   Wound Healing % 73   Post-Procedure Length (cm) 1.1 cm   Post-Procedure Width (cm) 1.1 cm   Post-Procedure Depth (cm) 0.2 cm   Post-Procedure Surface Area (cm^2) 1.21 cm^2   Post-Procedure Volume (cm^3) 0.242 cm^3   Wound Assessment Devitalized tissue   Drainage Amount Small (< 25%)   Drainage Description Serosanguinous   Odor None   Guillermina-wound Assessment Dry/flaky;Fragile   Margins Attached edges   Wound Thickness Description not for Pressure Injury Full thickness   Stevan Scale   Sensory Perceptions 4   Moisture 4   Activity 4   Mobility 3   Nutrition 4   Friction and Shear 3   Stevan Scale Score 22   Wound Follow Up   Require Follow Up Yes     Multilayer Compression Wrap   (Not Unna) Below the Knee    NAME:  Malik Clarke  YOB: 1945  MEDICAL RECORD NUMBER:  [id number]  DATE:  4/28/2025    Multilayer compression wrap: Removed old Multilayer wrap if indicated and wash leg with mild soap/water.  Applied moisturizing agent to dry skin as needed.   Applied primary and secondary dressing as ordered.  Applied multilayered dressing below the knee to right lower leg.  Instructed patient/caregiver not to remove dressing and to keep it clean and dry.   Instructed patient/caregiver on complications to report to provider, such as pain, numbness in toes, heavy drainage,

## 2025-04-28 NOTE — DISCHARGE INSTRUCTIONS
Discharge Instructions from  [place] [address] [phone] Fax [phone]    Do not remove dressing     Return Appointment:  [] Wound and dressing supply provider:   [] ECF or Home Healthcare:  [] Wound Assessment: [] Physician or NP scheduled for Wound Assessment:   [x] Return Appointment: With MD  in  1 Week(s)  [] Ordered tests:       Wound Care Center Information: Should you experience any significant changes in your wound(s) or have questions about your wound care, please contact the [facility] at MONDAY - FRIDAY 8:00 am - 4:30.  If you need help with your wound outside these hours and cannot wait until we are again available, contact your PCP or go to the hospital emergency room.     PLEASE NOTE: IF YOU ARE UNABLE TO OBTAIN WOUND SUPPLIES, CONTINUE TO USE THE SUPPLIES YOU HAVE AVAILABLE UNTIL YOU ARE ABLE TO REACH US. IT IS MOST IMPORTANT TO KEEP THE WOUND COVERED AT ALL TIMES.

## 2025-04-28 NOTE — PROGRESS NOTES
Wound Center  Progress Note / Procedure Note       Chief Complaint:  Malik Clarke is a 79 y.o.  male  with R lower leg anterior wound of few months duration.      Assessment/Plan     79 y.o. male with Dm2    -R lower leg anterior venous stasis ulcer.  Full thickness,   Re-opened  smaller  Scabbed, soft  Necessitates debridement  for wound healing and to prevent/heal infection  Ulcer needs debridement- see note below     Instructions for next 2 weeks given    -Dm2  A1c 6.8  (9/2024)  Does not check at home  Controlled with metformin    -Leg swelling/varicosities  Has arthritis      Following discussed with patient   Needs :  Serial debridement- prn    Good local wound car  See discharge notes          -Edema management  Do not get dressing wet    Edema management:  Elevate leg(s) throughout the day starting in the morning  Compression: coflex today  Avoid prolonged standing      -Good Diabetic control      Patient/  understood and agrees with plan. Questions answered.        Follow up with 2 week       Subjective:     Since last visit  4/28 no new issues; away next week    4/7 went to beach for a week, did not elevate as much    3/17 no issues    3/10 no new issues    2/24 has URI sx, getting better    2/10 the weekend after Monday appt, had to go to ER due to ongoing bleeding from wound- bleeding had stopped by the time he got there  But did bleed through the week, noticed was leaving trails/pools of blood.    12/16 wounds opened up about 10 days after being discharged from here.  Has been compliant with compression.  Wounds were open when went to see ortho  Will be doing injections into knee for now      9/30  No issues, tolerated wrap  Going out of town next week    9/23/24  Has not been seen since  May  Apparently wound re-opened within few weeks of being discharged from here, sounds like he wore the velcro wraps without the sock that comes with it.  Thought it was looking better so did not come here,

## 2025-05-12 ENCOUNTER — HOSPITAL ENCOUNTER (OUTPATIENT)
Facility: HOSPITAL | Age: 80
Discharge: HOME | End: 2025-05-12
Payer: MEDICARE

## 2025-05-12 VITALS
RESPIRATION RATE: 18 BRPM | SYSTOLIC BLOOD PRESSURE: 129 MMHG | TEMPERATURE: 97.7 F | DIASTOLIC BLOOD PRESSURE: 60 MMHG | OXYGEN SATURATION: 97 % | HEART RATE: 78 BPM

## 2025-05-12 DIAGNOSIS — I83.018 VARICOSE VEINS OF RIGHT LOWER EXTREMITY WITH ULCER OF OTHER PART OF LOWER LEG WITH FAT LAYER EXPOSED (HCC): ICD-10-CM

## 2025-05-12 DIAGNOSIS — L97.212 CALF ULCER, RIGHT, WITH FAT LAYER EXPOSED (HCC): ICD-10-CM

## 2025-05-12 DIAGNOSIS — L97.812 VARICOSE VEINS OF RIGHT LOWER EXTREMITY WITH ULCER OF OTHER PART OF LOWER LEG WITH FAT LAYER EXPOSED (HCC): ICD-10-CM

## 2025-05-12 DIAGNOSIS — L97.211 CALF ULCER, RIGHT, LIMITED TO BREAKDOWN OF SKIN (HCC): Primary | ICD-10-CM

## 2025-05-12 PROCEDURE — 11042 DBRDMT SUBQ TIS 1ST 20SQCM/<: CPT

## 2025-05-12 PROCEDURE — 6370000000 HC RX 637 (ALT 250 FOR IP): Performed by: FAMILY MEDICINE

## 2025-05-12 RX ORDER — LIDOCAINE HYDROCHLORIDE 20 MG/ML
JELLY TOPICAL ONCE
OUTPATIENT
Start: 2025-05-12 | End: 2025-05-12

## 2025-05-12 RX ORDER — SODIUM CHLOR/HYPOCHLOROUS ACID 0.033 %
SOLUTION, IRRIGATION IRRIGATION ONCE
OUTPATIENT
Start: 2025-05-12 | End: 2025-05-12

## 2025-05-12 RX ORDER — LIDOCAINE 40 MG/G
CREAM TOPICAL ONCE
OUTPATIENT
Start: 2025-05-12 | End: 2025-05-12

## 2025-05-12 RX ORDER — TRIAMCINOLONE ACETONIDE 1 MG/G
OINTMENT TOPICAL ONCE
OUTPATIENT
Start: 2025-05-12 | End: 2025-05-12

## 2025-05-12 RX ORDER — NEOMYCIN/BACITRACIN/POLYMYXINB 3.5-400-5K
OINTMENT (GRAM) TOPICAL ONCE
OUTPATIENT
Start: 2025-05-12 | End: 2025-05-12

## 2025-05-12 RX ORDER — MUPIROCIN 20 MG/G
OINTMENT TOPICAL ONCE
OUTPATIENT
Start: 2025-05-12 | End: 2025-05-12

## 2025-05-12 RX ORDER — GENTAMICIN SULFATE 1 MG/G
OINTMENT TOPICAL ONCE
OUTPATIENT
Start: 2025-05-12 | End: 2025-05-12

## 2025-05-12 RX ORDER — LIDOCAINE 50 MG/G
OINTMENT TOPICAL ONCE
OUTPATIENT
Start: 2025-05-12 | End: 2025-05-12

## 2025-05-12 RX ORDER — BACITRACIN ZINC AND POLYMYXIN B SULFATE 500; 1000 [USP'U]/G; [USP'U]/G
OINTMENT TOPICAL ONCE
OUTPATIENT
Start: 2025-05-12 | End: 2025-05-12

## 2025-05-12 RX ORDER — BETAMETHASONE DIPROPIONATE 0.5 MG/G
CREAM TOPICAL ONCE
OUTPATIENT
Start: 2025-05-12 | End: 2025-05-12

## 2025-05-12 RX ORDER — LIDOCAINE HYDROCHLORIDE 20 MG/ML
JELLY TOPICAL ONCE
Status: COMPLETED | OUTPATIENT
Start: 2025-05-12 | End: 2025-05-12

## 2025-05-12 RX ORDER — LIDOCAINE HYDROCHLORIDE 40 MG/ML
SOLUTION TOPICAL ONCE
OUTPATIENT
Start: 2025-05-12 | End: 2025-05-12

## 2025-05-12 RX ORDER — GINSENG 100 MG
CAPSULE ORAL ONCE
OUTPATIENT
Start: 2025-05-12 | End: 2025-05-12

## 2025-05-12 RX ORDER — SILVER SULFADIAZINE 10 MG/G
CREAM TOPICAL ONCE
OUTPATIENT
Start: 2025-05-12 | End: 2025-05-12

## 2025-05-12 RX ORDER — CLOBETASOL PROPIONATE 0.5 MG/G
OINTMENT TOPICAL ONCE
OUTPATIENT
Start: 2025-05-12 | End: 2025-05-12

## 2025-05-12 RX ADMIN — LIDOCAINE HYDROCHLORIDE: 20 JELLY TOPICAL at 15:53

## 2025-05-12 ASSESSMENT — PAIN SCALES - GENERAL: PAINLEVEL_OUTOF10: 0

## 2025-05-12 NOTE — FLOWSHEET NOTE
05/12/25 0907   Wound 12/16/24 Leg Right   Date First Assessed/Time First Assessed: 12/16/24 1207   Primary Wound Type: Venous Ulcer  Location: Leg  Wound Location Orientation: Right   Wound Image     Wound Etiology Venous   Dressing Status Intact   Wound Cleansed Wound cleanser   Dressing/Treatment Collagen;Alginate;Dry dressing;Roll gauze   Offloading for Diabetic Foot Ulcers Offloading not required   Dressing Change Due 05/19/25   Wound Length (cm) 2 cm  (small open areas around wound)   Wound Width (cm) 2 cm   Wound Depth (cm) 0.1 cm   Wound Surface Area (cm^2) 4 cm^2   Change in Wound Size % (l*w) -6.95   Wound Volume (cm^3) 0.4 cm^3   Wound Healing % -7   Post-Procedure Length (cm) 2 cm   Post-Procedure Width (cm) 2 cm   Post-Procedure Depth (cm) 0.2 cm   Post-Procedure Surface Area (cm^2) 4 cm^2   Post-Procedure Volume (cm^3) 0.8 cm^3   Wound Assessment Devitalized tissue;Pink/red   Drainage Amount Moderate (25-50%)   Drainage Description Serosanguinous   Odor None   Guillermina-wound Assessment Maceration   Margins Defined edges;Other (Comment)   Wound Thickness Description not for Pressure Injury Full thickness       Multilayer Compression Wrap   (Not Unna) Below the Knee    NAME:  Malik Clarke  YOB: 1945  MEDICAL RECORD NUMBER:  [id number]  DATE:  5/12/2025    Multilayer compression wrap: Applied moisturizing agent to dry skin as needed.   Applied primary and secondary dressing as ordered.  Applied multilayered dressing below the knee to right lower leg.  Instructed patient/caregiver not to remove dressing and to keep it clean and dry.   Instructed patient/caregiver on complications to report to provider, such as pain, numbness in toes, heavy drainage, and slippage of dressing.  Instructed patient on purpose of compression dressing and on activity and exercise recommendations.      Electronically signed by MARY HUA RN on 5/12/2025 at 2:57 PM

## 2025-05-12 NOTE — PROGRESS NOTES
Wound Center  Progress Note / Procedure Note       Chief Complaint:  Malik Clarke is a 79 y.o.  male  with R lower leg anterior wound of few months duration.      Assessment/Plan     79 y.o. male with Dm2    -R lower leg anterior venous stasis ulcer.  Full thickness,   Re-opened  Slightly bigger, few scattered areas of epithelial redness on leg  Mix of slough and granulation  Necessitates debridement  for wound healing and to prevent/heal infection  Ulcer needs debridement- see note below    Considering how the last 2 weeks transpired, wound looks better than expected    -Dm2  A1c 6.8  (9/2024)  Does not check at home  Controlled with metformin    -Leg swelling/varicosities  Has arthritis      Following discussed with patient   Needs :  Serial debridement- prn    Good local wound car  See discharge notes          -Edema management  Do not get dressing wet    Edema management:  Elevate leg(s) throughout the day starting in the morning  Compression: coflex today  Avoid prolonged standing      -Good Diabetic control      Patient/  understood and agrees with plan. Questions answered.        Follow up with 2 week       Subjective:     Since last visit  5/12 left our wrap on 2 weeks while away, did lots of walking, did not elevate, did not take diuretics.    4/28 no new issues; away next week    4/7 went to beach for a week, did not elevate as much    3/17 no issues    3/10 no new issues    2/24 has URI sx, getting better    2/10 the weekend after Monday appt, had to go to ER due to ongoing bleeding from wound- bleeding had stopped by the time he got there  But did bleed through the week, noticed was leaving trails/pools of blood.    12/16 wounds opened up about 10 days after being discharged from here.  Has been compliant with compression.  Wounds were open when went to see ortho  Will be doing injections into knee for now      9/30  No issues, tolerated wrap  Going out of town next week    9/23/24  Has not been

## 2025-05-19 ENCOUNTER — HOSPITAL ENCOUNTER (OUTPATIENT)
Facility: HOSPITAL | Age: 80
Discharge: HOME | End: 2025-05-19
Payer: MEDICARE

## 2025-05-19 VITALS
HEART RATE: 80 BPM | TEMPERATURE: 97.3 F | RESPIRATION RATE: 18 BRPM | SYSTOLIC BLOOD PRESSURE: 127 MMHG | DIASTOLIC BLOOD PRESSURE: 83 MMHG | OXYGEN SATURATION: 98 %

## 2025-05-19 DIAGNOSIS — L97.812 VARICOSE VEINS OF RIGHT LOWER EXTREMITY WITH ULCER OF OTHER PART OF LOWER LEG WITH FAT LAYER EXPOSED (HCC): ICD-10-CM

## 2025-05-19 DIAGNOSIS — I83.018 VARICOSE VEINS OF RIGHT LOWER EXTREMITY WITH ULCER OF OTHER PART OF LOWER LEG WITH FAT LAYER EXPOSED (HCC): ICD-10-CM

## 2025-05-19 DIAGNOSIS — L97.211 CALF ULCER, RIGHT, LIMITED TO BREAKDOWN OF SKIN (HCC): Primary | ICD-10-CM

## 2025-05-19 DIAGNOSIS — L97.212 CALF ULCER, RIGHT, WITH FAT LAYER EXPOSED (HCC): ICD-10-CM

## 2025-05-19 PROCEDURE — 11042 DBRDMT SUBQ TIS 1ST 20SQCM/<: CPT

## 2025-05-19 PROCEDURE — 6370000000 HC RX 637 (ALT 250 FOR IP): Performed by: FAMILY MEDICINE

## 2025-05-19 RX ORDER — NEOMYCIN/BACITRACIN/POLYMYXINB 3.5-400-5K
OINTMENT (GRAM) TOPICAL ONCE
OUTPATIENT
Start: 2025-05-19 | End: 2025-05-19

## 2025-05-19 RX ORDER — CLOBETASOL PROPIONATE 0.5 MG/G
OINTMENT TOPICAL ONCE
OUTPATIENT
Start: 2025-05-19 | End: 2025-05-19

## 2025-05-19 RX ORDER — LIDOCAINE HYDROCHLORIDE 20 MG/ML
JELLY TOPICAL ONCE
OUTPATIENT
Start: 2025-05-19 | End: 2025-05-19

## 2025-05-19 RX ORDER — LIDOCAINE HYDROCHLORIDE 20 MG/ML
JELLY TOPICAL ONCE
Status: COMPLETED | OUTPATIENT
Start: 2025-05-19 | End: 2025-05-19

## 2025-05-19 RX ORDER — MUPIROCIN 20 MG/G
OINTMENT TOPICAL ONCE
OUTPATIENT
Start: 2025-05-19 | End: 2025-05-19

## 2025-05-19 RX ORDER — GENTAMICIN SULFATE 1 MG/G
OINTMENT TOPICAL ONCE
OUTPATIENT
Start: 2025-05-19 | End: 2025-05-19

## 2025-05-19 RX ORDER — SODIUM CHLOR/HYPOCHLOROUS ACID 0.033 %
SOLUTION, IRRIGATION IRRIGATION ONCE
OUTPATIENT
Start: 2025-05-19 | End: 2025-05-19

## 2025-05-19 RX ORDER — LIDOCAINE 40 MG/G
CREAM TOPICAL ONCE
OUTPATIENT
Start: 2025-05-19 | End: 2025-05-19

## 2025-05-19 RX ORDER — GINSENG 100 MG
CAPSULE ORAL ONCE
OUTPATIENT
Start: 2025-05-19 | End: 2025-05-19

## 2025-05-19 RX ORDER — LIDOCAINE 50 MG/G
OINTMENT TOPICAL ONCE
OUTPATIENT
Start: 2025-05-19 | End: 2025-05-19

## 2025-05-19 RX ORDER — BETAMETHASONE DIPROPIONATE 0.5 MG/G
CREAM TOPICAL ONCE
OUTPATIENT
Start: 2025-05-19 | End: 2025-05-19

## 2025-05-19 RX ORDER — BACITRACIN ZINC AND POLYMYXIN B SULFATE 500; 1000 [USP'U]/G; [USP'U]/G
OINTMENT TOPICAL ONCE
OUTPATIENT
Start: 2025-05-19 | End: 2025-05-19

## 2025-05-19 RX ORDER — LIDOCAINE HYDROCHLORIDE 40 MG/ML
SOLUTION TOPICAL ONCE
OUTPATIENT
Start: 2025-05-19 | End: 2025-05-19

## 2025-05-19 RX ORDER — TRIAMCINOLONE ACETONIDE 1 MG/G
OINTMENT TOPICAL ONCE
OUTPATIENT
Start: 2025-05-19 | End: 2025-05-19

## 2025-05-19 RX ORDER — SILVER SULFADIAZINE 10 MG/G
CREAM TOPICAL ONCE
OUTPATIENT
Start: 2025-05-19 | End: 2025-05-19

## 2025-05-19 RX ADMIN — LIDOCAINE HYDROCHLORIDE: 20 JELLY TOPICAL at 14:54

## 2025-05-19 NOTE — PROGRESS NOTES
Serosanguinous 05/19/25 0916   Odor None 05/19/25 0916   Guillermina-wound Assessment Maceration 05/19/25 0916   Margins Defined edges;Other (Comment) 05/19/25 0916   Wound Thickness Description not for Pressure Injury Full thickness 05/19/25 0916   Number of days: 153        -------    Past Medical History:   Diagnosis Date    A-fib (HCC)     Cardio Dr. Cleaning    Advance directive discussed with patient 04/06/2023    pt states he will bring DOS    Arthritis     Cancer (HCC) 2021    melanoma removed from nose and face, pt denies chemo or readiation    Chronic kidney disease     kidney stones    Coagulation disorder     excess iron; erthythrocytosis; hemochromatosis- [facility] yearly exams    Diabetes ([facility])     PcP  manages    Enlarged prostate     managed by Dr. Cole    Exercise tolerance finding     pt states able to climb 2 flights stairs without CP or SOB    Hypertension 2007    cardio Dr. Cleaning    Ill-defined condition     ulcer - shin- Started around [place] 2022- followed by [facility]    Kidney stones     Dr. Cole    Wears glasses 04/06/2023     Past Surgical History:   Procedure Laterality Date    BLADDER SURGERY  2017    urolift    CHOLECYSTECTOMY  07/2017    COLONOSCOPY  2021 2020, hx polyps    ORTHOPEDIC SURGERY Right 2007    r shoulder replacement    SHOULDER SURGERY Left 2022    replacement    TONSILLECTOMY      TOTAL KNEE ARTHROPLASTY Left 5/18/2023    LEFT TOTAL KNEE ARTHROPLASTY (SPEC POP) performed by Malik Hector DO at [facility] MAIN OR    WISDOM TOOTH EXTRACTION       No family history on file.  Social History     Socioeconomic History    Marital status:    Tobacco Use    Smoking status: Never     Passive exposure: Past    Smokeless tobacco: Never   Vaping Use    Vaping status: Never Used   Substance and Sexual Activity    Alcohol use: Yes     Alcohol/week: 2.0 standard drinks of alcohol     Types: 2 Cans of beer per week    Drug use: No    Sexual activity: Defer

## 2025-06-02 ENCOUNTER — HOSPITAL ENCOUNTER (OUTPATIENT)
Facility: HOSPITAL | Age: 80
Discharge: HOME | End: 2025-06-02
Payer: MEDICARE

## 2025-06-02 DIAGNOSIS — L97.211 CALF ULCER, RIGHT, LIMITED TO BREAKDOWN OF SKIN (HCC): Primary | ICD-10-CM

## 2025-06-02 DIAGNOSIS — I83.018 VARICOSE VEINS OF RIGHT LOWER EXTREMITY WITH ULCER OF OTHER PART OF LOWER LEG WITH FAT LAYER EXPOSED (HCC): ICD-10-CM

## 2025-06-02 DIAGNOSIS — L97.212 CALF ULCER, RIGHT, WITH FAT LAYER EXPOSED (HCC): ICD-10-CM

## 2025-06-02 DIAGNOSIS — L97.812 VARICOSE VEINS OF RIGHT LOWER EXTREMITY WITH ULCER OF OTHER PART OF LOWER LEG WITH FAT LAYER EXPOSED (HCC): ICD-10-CM

## 2025-06-02 PROCEDURE — 29581 APPL MULTLAYER CMPRN SYS LEG: CPT

## 2025-06-02 RX ORDER — LIDOCAINE HYDROCHLORIDE 40 MG/ML
SOLUTION TOPICAL ONCE
OUTPATIENT
Start: 2025-06-02 | End: 2025-06-02

## 2025-06-02 RX ORDER — MUPIROCIN 20 MG/G
OINTMENT TOPICAL ONCE
OUTPATIENT
Start: 2025-06-02 | End: 2025-06-02

## 2025-06-02 RX ORDER — GENTAMICIN SULFATE 1 MG/G
OINTMENT TOPICAL ONCE
OUTPATIENT
Start: 2025-06-02 | End: 2025-06-02

## 2025-06-02 RX ORDER — GINSENG 100 MG
CAPSULE ORAL ONCE
OUTPATIENT
Start: 2025-06-02 | End: 2025-06-02

## 2025-06-02 RX ORDER — LIDOCAINE 50 MG/G
OINTMENT TOPICAL ONCE
OUTPATIENT
Start: 2025-06-02 | End: 2025-06-02

## 2025-06-02 RX ORDER — TRIAMCINOLONE ACETONIDE 1 MG/G
OINTMENT TOPICAL ONCE
OUTPATIENT
Start: 2025-06-02 | End: 2025-06-02

## 2025-06-02 RX ORDER — LIDOCAINE HYDROCHLORIDE 20 MG/ML
JELLY TOPICAL ONCE
OUTPATIENT
Start: 2025-06-02 | End: 2025-06-02

## 2025-06-02 RX ORDER — NEOMYCIN/BACITRACIN/POLYMYXINB 3.5-400-5K
OINTMENT (GRAM) TOPICAL ONCE
OUTPATIENT
Start: 2025-06-02 | End: 2025-06-02

## 2025-06-02 RX ORDER — LIDOCAINE 40 MG/G
CREAM TOPICAL ONCE
OUTPATIENT
Start: 2025-06-02 | End: 2025-06-02

## 2025-06-02 RX ORDER — SILVER SULFADIAZINE 10 MG/G
CREAM TOPICAL ONCE
OUTPATIENT
Start: 2025-06-02 | End: 2025-06-02

## 2025-06-02 RX ORDER — BACITRACIN ZINC AND POLYMYXIN B SULFATE 500; 1000 [USP'U]/G; [USP'U]/G
OINTMENT TOPICAL ONCE
OUTPATIENT
Start: 2025-06-02 | End: 2025-06-02

## 2025-06-02 RX ORDER — BETAMETHASONE DIPROPIONATE 0.5 MG/G
CREAM TOPICAL ONCE
OUTPATIENT
Start: 2025-06-02 | End: 2025-06-02

## 2025-06-02 RX ORDER — CLOBETASOL PROPIONATE 0.5 MG/G
OINTMENT TOPICAL ONCE
OUTPATIENT
Start: 2025-06-02 | End: 2025-06-02

## 2025-06-02 RX ORDER — SODIUM CHLOR/HYPOCHLOROUS ACID 0.033 %
SOLUTION, IRRIGATION IRRIGATION ONCE
OUTPATIENT
Start: 2025-06-02 | End: 2025-06-02

## 2025-06-02 NOTE — FLOWSHEET NOTE
06/02/25 0921   Wound 12/16/24 Leg Right   Date First Assessed/Time First Assessed: 12/16/24 1207   Primary Wound Type: Venous Ulcer  Location: Leg  Wound Location Orientation: Right   Wound Image     Wound Etiology Venous   Dressing Status Intact   Wound Cleansed Wound cleanser   Dressing/Treatment Collagen;Alginate;Dry dressing;Roll gauze   Offloading for Diabetic Foot Ulcers Offloading not required   Dressing Change Due 06/09/25   Wound Length (cm) 1.9 cm   Wound Width (cm) 1.3 cm   Wound Depth (cm) 0.1 cm   Wound Surface Area (cm^2) 2.47 cm^2   Change in Wound Size % (l*w) 33.96   Wound Volume (cm^3) 0.247 cm^3   Wound Healing % 34   Wound Assessment Granulation tissue   Drainage Amount Small (< 25%)   Drainage Description Serosanguinous   Odor None   Guillermina-wound Assessment Intact   Margins Attached edges   Wound Thickness Description not for Pressure Injury Full thickness       Multilayer Compression Wrap   (Not Unna) Below the Knee    NAME:  Malik Clarke  YOB: 1945  MEDICAL RECORD NUMBER:  [id number]  DATE:  6/2/2025    Multilayer compression wrap: Removed old Multilayer wrap if indicated and wash leg with mild soap/water.  Applied moisturizing agent to dry skin as needed.   Applied primary and secondary dressing as ordered.  Applied multilayered dressing below the knee to right lower leg.  Instructed patient/caregiver not to remove dressing and to keep it clean and dry.   Instructed patient/caregiver on complications to report to provider, such as pain, numbness in toes, heavy drainage, and slippage of dressing.  Instructed patient on purpose of compression dressing and on activity and exercise recommendations.      Electronically signed by MARY HUA RN on 6/2/2025 at 10:36 AM

## (undated) DEVICE — COVADERM PLUS: Brand: DEROYAL

## (undated) DEVICE — SOLUTION LACTATED RINGERS INJECTION USP

## (undated) DEVICE — CATHETER CHOLGM 4.5FR L18IN W/ MTL SUPP TB

## (undated) DEVICE — TROCAR ENDOSCP SHFT L100MM DIA5MM DIL TIP ENDOPATH XCEL

## (undated) DEVICE — NEEDLE HYPO 25GA L1.5IN BLU POLYPR HUB S STL REG BVL STR

## (undated) DEVICE — STRYKER PERFORMANCE SERIES SAGITTAL BLADE: Brand: STRYKER PERFORMANCE SERIES

## (undated) DEVICE — OPTIFOAM GENTLE SA, POSTOP, 4X12: Brand: MEDLINE

## (undated) DEVICE — SEAL ENDOSCP INSTR 7FR BX SELF SEAL

## (undated) DEVICE — SOLUTION IRRIG 500ML 0.9% SOD CHLO USP POUR PLAS BTL

## (undated) DEVICE — Device

## (undated) DEVICE — CATHETER URETH 20FR BLLN 5CC STD LTX HYDRGEL 2 W F BARDX

## (undated) DEVICE — DRAPE TWL SURG 16X26IN BLU ORB04] ALLCARE INC]

## (undated) DEVICE — APPLIER CLP M L L11.4IN DIA10MM ENDOSCP ROT MULT FOR LIG

## (undated) DEVICE — 3M™ STERI-DRAPE™ U-DRAPE 1015: Brand: STERI-DRAPE™

## (undated) DEVICE — GOWN,SIRUS,NONRNF,SETINSLV,XL,20/CS: Brand: MEDLINE

## (undated) DEVICE — HANDPIECE SET WITH HIGH FLOW TIP AND SUCTION TUBE: Brand: INTERPULSE

## (undated) DEVICE — INTENDED FOR TISSUE SEPARATION, AND OTHER PROCEDURES THAT REQUIRE A SHARP SURGICAL BLADE TO PUNCTURE OR CUT.: Brand: BARD-PARKER ® CARBON RIB-BACK BLADES

## (undated) DEVICE — SOL IRR STRL H2O 1500ML BTL --

## (undated) DEVICE — GLOVE SURG SZ 85 L12IN FNGR ORTHO 126MIL CRM LTX FREE

## (undated) DEVICE — PACK PROCEDURE SURG TOT KNEE CUST

## (undated) DEVICE — GARMENT,MEDLINE,DVT,INT,CALF,MED, GEN2: Brand: MEDLINE

## (undated) DEVICE — HOOD, PEEL-AWAY: Brand: FLYTE

## (undated) DEVICE — 3 BONE CEMENT MIXER: Brand: MIXEVAC

## (undated) DEVICE — DISPOSABLE SUCTION/IRRIGATOR TUBE SET WITH TIP: Brand: AHTO

## (undated) DEVICE — VISUALIZATION SYSTEM: Brand: CLEARIFY

## (undated) DEVICE — THE CANADY HYBRID PLASMA SCALPEL IS AN ELECTROSURGICAL PLASMA SCALPEL THAT USES AN 85MM BENDABLE PADDLE BLADE TIP. THE ELECTROSURGICAL PLASMA SCALPEL IS USED TO SIMULTANEOUSLY CUT AND COAGULATE BIOLOGICAL TISSUE.: Brand: CANADY HYBRID PLASMA PADDLE BLADE

## (undated) DEVICE — SUTURE FIBERWIRE SZ 2 W/ TAPERED NEEDLE BLUE L38IN NONABSORB BLU L26.5MM 1/2 CIRCLE AR7200

## (undated) DEVICE — TISSUE RETRIEVAL SYSTEM: Brand: INZII RETRIEVAL SYSTEM

## (undated) DEVICE — DRAPE,UTILITY,XL,4/PK,STERILE: Brand: MEDLINE

## (undated) DEVICE — CYSTO PACK: Brand: MEDLINE INDUSTRIES, INC.

## (undated) DEVICE — SUTURE FIBERWIRE SZ 2 L38IN 97CM NONABSB BLU L26.5MM W/TWO TAPERED NEEDLES  1/2 CIRCLE AR7205

## (undated) DEVICE — Device: Brand: MEDEX

## (undated) DEVICE — SUTURE VCRL + SZ 2-0 L27IN ABSRB UD CT-2 L26MM 1/2 CIR TAPR VCP269H

## (undated) DEVICE — PADDING CAST W6INXL4YD COT LO LINTING WYTEX

## (undated) DEVICE — SYSTEM SKIN CLSR 22CM DERMBND PRINEO

## (undated) DEVICE — TRAP MUCUS SPECIMEN 40ML -- MEDICHOICE

## (undated) DEVICE — SUTURE STRATAFIX SZ 3-0 L30CM NONABSORBABLE UD L19MM FS-2 SXMP2B408

## (undated) DEVICE — SYRINGE MED 20ML STD CLR PLAS LUERLOCK TIP N CTRL DISP

## (undated) DEVICE — GARMENT COMPR M FOR 13IN FT INTMIT SGL BLDR HEM FORC II

## (undated) DEVICE — COAXIAL FEMORAL CANAL TIP

## (undated) DEVICE — GDWIRE 3CM FLX-TIP 0.038X150CM -- BX/5 SENSOR

## (undated) DEVICE — GOWN,SIRUS,NONRNF,XLN/2XL,18/CS: Brand: MEDLINE

## (undated) DEVICE — REM POLYHESIVE ADULT PATIENT RETURN ELECTRODE: Brand: VALLEYLAB

## (undated) DEVICE — DERMABOND SKIN ADH 0.7ML -- DERMABOND ADVANCED 12/BX

## (undated) DEVICE — THIS PRODUCT IS SINGLE USE AND INTENDED TO BE USED FOR BLUNT DISSECTION OF TISSUE.: Brand: ASPEN® ENDOSCOPIC KITTNER, SINGLE TIP

## (undated) DEVICE — GLOVE ORANGE PI 7   MSG9070

## (undated) DEVICE — NEEDLE SPNL 20GA L3.5IN YEL HUB S STL REG WALL FIT STYL W/

## (undated) DEVICE — ZIMMER® STERILE DISPOSABLE TOURNIQUET CUFF WITH PROTECTIVE SLEEVE AND PLC, SINGLE PORT, SINGLE BLADDER, 34 IN. (86 CM)

## (undated) DEVICE — DEVON™ KNEE AND BODY STRAP 60" X 3" (1.5 M X 7.6 CM): Brand: DEVON

## (undated) DEVICE — NEEDLE HYPO 21GA L1.5IN INTRAMUSCULAR S STL LATCH BVL UP

## (undated) DEVICE — KENDALL SCD EXPRESS SLEEVES, KNEE LENGTH, MEDIUM: Brand: KENDALL SCD

## (undated) DEVICE — SUT MONOCRYL PLUS UD 4-0 --

## (undated) DEVICE — SOLUTION IV 500ML 0.9% SOD CHL FLX CONT

## (undated) DEVICE — ELECTRODE ES 36CM LAP WIRE J HK COAT DISPOSABLE CLEANCOAT

## (undated) DEVICE — SUT VCRL + 0 36IN UR6 VIO --

## (undated) DEVICE — DRESSING ALG W4XL8IN AG FOAM SUPERABSORBENT SIL ANTIMIC

## (undated) DEVICE — SUT VCRL + 2-0 36IN CT1 UD --

## (undated) DEVICE — PREP SKN CHLRAPRP APL 26ML STR --

## (undated) DEVICE — DUAL LUMEN URETERAL CATHETER

## (undated) DEVICE — LIGHT HANDLE: Brand: DEVON

## (undated) DEVICE — SUTURE VCRL 0 L27IN ABSRB OS-6 BRAID COAT UD J534H

## (undated) DEVICE — CATH IV AUTOGRD ORN 14GA 45MM -- INSYTE-N

## (undated) DEVICE — SOLUTION IRRIG 3000ML 0.9% SOD CHL FLX CONT 0797208] ICU MEDICAL INC]

## (undated) DEVICE — GLOVE ORANGE PI 8 1/2   MSG9085

## (undated) DEVICE — PACK PROCEDURE SURG TOT SHLDR CUST

## (undated) DEVICE — STERILE LATEX POWDER-FREE SURGICAL GLOVESWITH NITRILE COATING: Brand: PROTEXIS

## (undated) DEVICE — CONCISE CEMENT SCULPS KIT: Brand: CONCISE

## (undated) DEVICE — TROCAR ENDOSCP L100MM DIA12MM DIL TIP STBL SL ENDOPATH XCEL

## (undated) DEVICE — STERILE POLYISOPRENE POWDER-FREE SURGICAL GLOVES: Brand: PROTEXIS

## (undated) DEVICE — SHEARS ENDOSCP L36CM DIA5MM ULTRASONIC CRV TIP W/ ADV

## (undated) DEVICE — DRAPE XR C ARM MOB SURG 44X72 --

## (undated) DEVICE — SUTURE PDS + SZ 1 L96IN ABSRB VLT L65MM TP-1 1/2 CIR PDP880G

## (undated) DEVICE — BLADE SAW 1.27X90 MM FOR LG BNE [KMS2513PM62STE] [KOMET MEDICAL]

## (undated) DEVICE — SINGLE PORT MANIFOLD: Brand: NEPTUNE 2

## (undated) DEVICE — SUTURE VCRL + SZ 1 L36IN ABSRB UD L36MM CT-1 1/2 CIR VCP947H

## (undated) DEVICE — GLOVE SURG SZ 65 THK91MIL LTX FREE SYN POLYISOPRENE

## (undated) DEVICE — TROCAR ENDOSCP L100MM DIA12MM BLNT STBL SL DISP ENDOPATH

## (undated) DEVICE — GLOVE SURG SZ 7 L12IN FNGR THK79MIL GRN LTX FREE

## (undated) DEVICE — APPLICATOR MEDICATED 26 CC SOLUTION HI LT ORNG CHLORAPREP

## (undated) DEVICE — ENDOCUT SCISSOR TIP, DISPOSABLE: Brand: RENEW